# Patient Record
Sex: MALE | Race: WHITE | NOT HISPANIC OR LATINO | URBAN - METROPOLITAN AREA
[De-identification: names, ages, dates, MRNs, and addresses within clinical notes are randomized per-mention and may not be internally consistent; named-entity substitution may affect disease eponyms.]

---

## 2017-08-22 ENCOUNTER — OUTPATIENT (OUTPATIENT)
Dept: OUTPATIENT SERVICES | Facility: HOSPITAL | Age: 72
LOS: 1 days | Discharge: HOME | End: 2017-08-22
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
## 2017-08-22 DIAGNOSIS — M25.519 PAIN IN UNSPECIFIED SHOULDER: ICD-10-CM

## 2017-08-22 DIAGNOSIS — M79.609 PAIN IN UNSPECIFIED LIMB: ICD-10-CM

## 2017-08-22 DIAGNOSIS — S22.39XA FRACTURE OF ONE RIB, UNSPECIFIED SIDE, INITIAL ENCOUNTER FOR CLOSED FRACTURE: ICD-10-CM

## 2017-08-22 DIAGNOSIS — J90 PLEURAL EFFUSION, NOT ELSEWHERE CLASSIFIED: ICD-10-CM
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
## 2017-08-22 DIAGNOSIS — M54.5 LOW BACK PAIN: ICD-10-CM

## 2018-08-06 PROBLEM — Z00.00 ENCOUNTER FOR PREVENTIVE HEALTH EXAMINATION: Status: ACTIVE | Noted: 2018-08-06

## 2018-08-14 ENCOUNTER — LABORATORY RESULT (OUTPATIENT)
Age: 73
End: 2018-08-14

## 2018-08-14 ENCOUNTER — APPOINTMENT (OUTPATIENT)
Dept: HEMATOLOGY ONCOLOGY | Facility: CLINIC | Age: 73
End: 2018-08-14

## 2018-08-14 VITALS
HEIGHT: 65 IN | TEMPERATURE: 96.3 F | DIASTOLIC BLOOD PRESSURE: 89 MMHG | WEIGHT: 193 LBS | SYSTOLIC BLOOD PRESSURE: 167 MMHG | BODY MASS INDEX: 32.15 KG/M2 | RESPIRATION RATE: 14 BRPM | HEART RATE: 87 BPM

## 2018-08-14 DIAGNOSIS — Z82.49 FAMILY HISTORY OF ISCHEMIC HEART DISEASE AND OTHER DISEASES OF THE CIRCULATORY SYSTEM: ICD-10-CM

## 2018-08-14 DIAGNOSIS — T14.8XXA OTHER INJURY OF UNSPECIFIED BODY REGION, INITIAL ENCOUNTER: ICD-10-CM

## 2018-08-14 DIAGNOSIS — Z87.39 PERSONAL HISTORY OF OTHER DISEASES OF THE MUSCULOSKELETAL SYSTEM AND CONNECTIVE TISSUE: ICD-10-CM

## 2018-08-14 DIAGNOSIS — D61.818 OTHER PANCYTOPENIA: ICD-10-CM

## 2018-08-14 DIAGNOSIS — Z86.39 PERSONAL HISTORY OF OTHER ENDOCRINE, NUTRITIONAL AND METABOLIC DISEASE: ICD-10-CM

## 2018-08-14 DIAGNOSIS — Z80.3 FAMILY HISTORY OF MALIGNANT NEOPLASM OF BREAST: ICD-10-CM

## 2018-08-14 LAB
HCT VFR BLD CALC: 33.2 %
HGB BLD-MCNC: 11.3 G/DL
MCHC RBC-ENTMCNC: 33 PG
MCHC RBC-ENTMCNC: 34 G/DL
MCV RBC AUTO: 97.1 FL
PLATELET # BLD AUTO: 115 K/UL
PMV BLD: 9 FL
RBC # BLD: 3.42 M/UL
RBC # FLD: 15.8 %
RETICS # AUTO: 2.6 %
RETICS AGGREG/RBC NFR: 87.6 K/UL
WBC # FLD AUTO: 4.34 K/UL

## 2018-08-15 PROBLEM — D61.818 PANCYTOPENIA: Status: ACTIVE | Noted: 2018-08-15

## 2018-08-15 LAB
ALBUMIN MFR SERPL ELPH: 48.4 %
ALBUMIN SERPL ELPH-MCNC: 3.4 G/DL
ALBUMIN SERPL-MCNC: 3.6 G/DL
ALBUMIN/GLOB SERPL: 0.9 RATIO
ALBUPE: 44.1 %
ALP BLD-CCNC: 167 U/L
ALPHA1 GLOB MFR SERPL ELPH: 2.8 %
ALPHA1 GLOB SERPL ELPH-MCNC: 0.2 G/DL
ALPHA1UPE: 8.3 %
ALPHA2 GLOB MFR SERPL ELPH: 6.3 %
ALPHA2 GLOB SERPL ELPH-MCNC: 0.5 G/DL
ALPHA2UPE: 15.1 %
ALT SERPL-CCNC: 20 U/L
ANION GAP SERPL CALC-SCNC: 13 MMOL/L
APPEARANCE: CLEAR
AST SERPL-CCNC: 29 U/L
B-GLOBULIN MFR SERPL ELPH: 15.3 %
B-GLOBULIN SERPL ELPH-MCNC: 1.1 G/DL
BETAUPE: 18.5 %
BILIRUB SERPL-MCNC: 1.1 MG/DL
BILIRUBIN URINE: NEGATIVE
BLOOD URINE: NEGATIVE
BUN SERPL-MCNC: 13 MG/DL
CALCIUM SERPL-MCNC: 9 MG/DL
CHLORIDE SERPL-SCNC: 102 MMOL/L
CO2 SERPL-SCNC: 27 MMOL/L
COLOR: NORMAL
CREAT 24H UR-MCNC: NORMAL G/24 H
CREAT SERPL-MCNC: 0.6 MG/DL
CREATININE UR (MAYO): 80 MG/DL
CRP SERPL-MCNC: 0.24 MG/DL
ERYTHROCYTE [SEDIMENTATION RATE] IN BLOOD BY WESTERGREN METHOD: 53 MM/HR
FERRITIN SERPL-MCNC: 25 NG/ML
FOLATE SERPL-MCNC: 19.3 NG/ML
GAMMA GLOB FLD ELPH-MCNC: 2 G/DL
GAMMA GLOB MFR SERPL ELPH: 27.2 %
GAMMAUPE: 14 %
GLUCOSE QUALITATIVE U: NEGATIVE MG/DL
GLUCOSE SERPL-MCNC: 105 MG/DL
IGA 24H UR QL IFE: NORMAL
IGA 24H UR QL IFE: NORMAL
INTERPRETATION SERPL IEP-IMP: NORMAL
IRON SATN MFR SERPL: 41 %
IRON SERPL-MCNC: 161 UG/DL
KAPPA LC 24H UR QL: NORMAL
KETONES URINE: NEGATIVE
LDH SERPL-CCNC: 179 U/L
LEUKOCYTE ESTERASE URINE: NEGATIVE
M PROTEIN SPEC IFE-MCNC: NORMAL
NITRITE URINE: NEGATIVE
PH URINE: 6
POTASSIUM SERPL-SCNC: 3.8 MMOL/L
PROT PATTERN 24H UR ELPH-IMP: NORMAL
PROT SERPL-MCNC: 7.2 G/DL
PROT SERPL-MCNC: 7.4 G/DL
PROT SERPL-MCNC: 7.4 G/DL
PROT UR-MCNC: 11 MG/DL
PROT UR-MCNC: 11 MG/DL
PROTEIN URINE: NEGATIVE MG/DL
RHEUMATOID FACT SER QL: <10 IU/ML
SODIUM SERPL-SCNC: 142 MMOL/L
SPECIFIC GRAVITY URINE: 1.02
SPECIMEN VOL 24H UR: NORMAL ML
TIBC SERPL-MCNC: 391 UG/DL
UIBC SERPL-MCNC: 230 UG/DL
UROBILINOGEN URINE: 4 MG/DL (ref 0.2–?)
VIT B12 SERPL-MCNC: 670 PG/ML

## 2018-08-16 LAB
ANA SER IF-ACNC: NEGATIVE
DEPRECATED KAPPA LC FREE/LAMBDA SER: 1.36 RATIO
KAPPA LC CSF-MCNC: 3.69 MG/DL
KAPPA LC SERPL-MCNC: 5 MG/DL

## 2018-08-17 ENCOUNTER — OTHER (OUTPATIENT)
Age: 73
End: 2018-08-17

## 2018-09-04 ENCOUNTER — LABORATORY RESULT (OUTPATIENT)
Age: 73
End: 2018-09-04

## 2018-09-04 ENCOUNTER — APPOINTMENT (OUTPATIENT)
Dept: HEMATOLOGY ONCOLOGY | Facility: CLINIC | Age: 73
End: 2018-09-04

## 2018-09-13 ENCOUNTER — LABORATORY RESULT (OUTPATIENT)
Age: 73
End: 2018-09-13

## 2018-09-13 ENCOUNTER — APPOINTMENT (OUTPATIENT)
Dept: HEMATOLOGY ONCOLOGY | Facility: CLINIC | Age: 73
End: 2018-09-13

## 2018-09-13 VITALS
TEMPERATURE: 97.6 F | HEART RATE: 94 BPM | DIASTOLIC BLOOD PRESSURE: 75 MMHG | SYSTOLIC BLOOD PRESSURE: 151 MMHG | WEIGHT: 203 LBS | HEIGHT: 65 IN | RESPIRATION RATE: 14 BRPM | BODY MASS INDEX: 33.82 KG/M2

## 2018-09-14 LAB
ALBUMIN SERPL ELPH-MCNC: 3.4 G/DL
ALP BLD-CCNC: 150 U/L
ALT SERPL-CCNC: 22 U/L
ANION GAP SERPL CALC-SCNC: 14 MMOL/L
AST SERPL-CCNC: 31 U/L
BILIRUB SERPL-MCNC: 0.9 MG/DL
BUN SERPL-MCNC: 11 MG/DL
CALCIUM SERPL-MCNC: 9.1 MG/DL
CHLORIDE SERPL-SCNC: 103 MMOL/L
CO2 SERPL-SCNC: 25 MMOL/L
CREAT SERPL-MCNC: 0.7 MG/DL
ENDOMYSIUM IGA SER QL: NEGATIVE
ENDOMYSIUM IGA TITR SER: NORMAL
ERYTHROCYTE [SEDIMENTATION RATE] IN BLOOD BY WESTERGREN METHOD: 52 MM/HR
GGT SERPL-CCNC: 16 U/L
GLIADIN IGA SER QL: 5.9 UNITS
GLIADIN IGG SER QL: <5 UNITS
GLIADIN PEPTIDE IGA SER-ACNC: NEGATIVE
GLIADIN PEPTIDE IGG SER-ACNC: NEGATIVE
GLUCOSE SERPL-MCNC: 220 MG/DL
HCT VFR BLD CALC: 31.6 %
HGB BLD-MCNC: 11 G/DL
MCHC RBC-ENTMCNC: 33.7 PG
MCHC RBC-ENTMCNC: 34.8 G/DL
MCV RBC AUTO: 96.9 FL
PLATELET # BLD AUTO: 117 K/UL
PMV BLD: 9.6 FL
POTASSIUM SERPL-SCNC: 4.2 MMOL/L
PROT SERPL-MCNC: 7.1 G/DL
RBC # BLD: 3.26 M/UL
RBC # FLD: 15.4 %
RETICS # AUTO: 2.6 %
RETICS AGGREG/RBC NFR: 83.8 K/UL
SODIUM SERPL-SCNC: 142 MMOL/L
WBC # FLD AUTO: 4.86 K/UL

## 2018-10-11 ENCOUNTER — LABORATORY RESULT (OUTPATIENT)
Age: 73
End: 2018-10-11

## 2018-10-11 ENCOUNTER — APPOINTMENT (OUTPATIENT)
Dept: HEMATOLOGY ONCOLOGY | Facility: CLINIC | Age: 73
End: 2018-10-11

## 2018-10-11 VITALS
HEIGHT: 65 IN | BODY MASS INDEX: 32.82 KG/M2 | SYSTOLIC BLOOD PRESSURE: 140 MMHG | WEIGHT: 197 LBS | DIASTOLIC BLOOD PRESSURE: 69 MMHG | RESPIRATION RATE: 14 BRPM | TEMPERATURE: 97.2 F | HEART RATE: 93 BPM

## 2018-10-12 LAB
FERRITIN SERPL-MCNC: 49 NG/ML
HAPTOGLOB SERPL-MCNC: <20 MG/DL
HCT VFR BLD CALC: 33.8 %
HGB BLD-MCNC: 11.8 G/DL
MCHC RBC-ENTMCNC: 34.3 PG
MCHC RBC-ENTMCNC: 34.9 G/DL
MCV RBC AUTO: 98.3 FL
PLATELET # BLD AUTO: 112 K/UL
PMV BLD: 9.7 FL
RBC # BLD: 3.44 M/UL
RBC # FLD: 15.1 %
WBC # FLD AUTO: 4.91 K/UL

## 2018-10-19 ENCOUNTER — EMERGENCY (EMERGENCY)
Facility: HOSPITAL | Age: 73
LOS: 0 days | Discharge: HOME | End: 2018-10-19
Attending: EMERGENCY MEDICINE | Admitting: EMERGENCY MEDICINE

## 2018-10-19 VITALS
OXYGEN SATURATION: 98 % | TEMPERATURE: 96 F | SYSTOLIC BLOOD PRESSURE: 168 MMHG | DIASTOLIC BLOOD PRESSURE: 75 MMHG | RESPIRATION RATE: 18 BRPM | HEART RATE: 93 BPM

## 2018-10-19 VITALS — HEIGHT: 65 IN | WEIGHT: 194.01 LBS
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
## 2018-10-19 DIAGNOSIS — E11.9 TYPE 2 DIABETES MELLITUS WITHOUT COMPLICATIONS: ICD-10-CM

## 2018-10-19 DIAGNOSIS — T31.0 BURNS INVOLVING LESS THAN 10% OF BODY SURFACE: ICD-10-CM

## 2018-10-19 DIAGNOSIS — T23.232A BURN OF SECOND DEGREE OF MULTIPLE LEFT FINGERS (NAIL), NOT INCLUDING THUMB, INITIAL ENCOUNTER: ICD-10-CM

## 2018-10-19 DIAGNOSIS — Z23 ENCOUNTER FOR IMMUNIZATION: ICD-10-CM
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
## 2018-10-19 DIAGNOSIS — Z79.2 LONG TERM (CURRENT) USE OF ANTIBIOTICS: ICD-10-CM

## 2018-10-19 DIAGNOSIS — Z79.84 LONG TERM (CURRENT) USE OF ORAL HYPOGLYCEMIC DRUGS: ICD-10-CM
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
## 2018-10-19 DIAGNOSIS — F32.9 MAJOR DEPRESSIVE DISORDER, SINGLE EPISODE, UNSPECIFIED: ICD-10-CM

## 2018-10-19 DIAGNOSIS — T23.039A: ICD-10-CM
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
## 2018-10-19 DIAGNOSIS — H26.9 UNSPECIFIED CATARACT: Chronic | ICD-10-CM

## 2018-10-19 DIAGNOSIS — Z79.899 OTHER LONG TERM (CURRENT) DRUG THERAPY: ICD-10-CM

## 2018-10-19 RX ORDER — TETANUS TOXOID, REDUCED DIPHTHERIA TOXOID AND ACELLULAR PERTUSSIS VACCINE, ADSORBED 5; 2.5; 8; 8; 2.5 [IU]/.5ML; [IU]/.5ML; UG/.5ML; UG/.5ML; UG/.5ML
0.5 SUSPENSION INTRAMUSCULAR ONCE
Qty: 0 | Refills: 0 | Status: COMPLETED | OUTPATIENT
Start: 2018-10-19 | End: 2018-10-19

## 2018-10-19 RX ADMIN — TETANUS TOXOID, REDUCED DIPHTHERIA TOXOID AND ACELLULAR PERTUSSIS VACCINE, ADSORBED 0.5 MILLILITER(S): 5; 2.5; 8; 8; 2.5 SUSPENSION INTRAMUSCULAR at 14:45
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
## 2018-10-19 NOTE — ED ADULT NURSE NOTE - NSIMPLEMENTINTERV_GEN_ALL_ED
Implemented All Universal Safety Interventions:  [place] to call system. Call bell, personal items and telephone within reach. Instruct patient to call for assistance. Room bathroom lighting operational. Non-slip footwear when patient is off stretcher. Physically safe environment: no spills, clutter or unnecessary equipment. Stretcher in lowest position, wheels locked, appropriate side rails in place.
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
## 2018-10-22 PROBLEM — E11.9 TYPE 2 DIABETES MELLITUS WITHOUT COMPLICATIONS: Chronic | Status: ACTIVE | Noted: 2018-10-19

## 2018-10-22 PROBLEM — F32.9 MAJOR DEPRESSIVE DISORDER, SINGLE EPISODE, UNSPECIFIED: Chronic | Status: ACTIVE | Noted: 2018-10-19

## 2018-10-23 ENCOUNTER — APPOINTMENT (OUTPATIENT)
Dept: BURN CARE | Facility: CLINIC | Age: 73
End: 2018-10-23

## 2018-10-23 ENCOUNTER — OUTPATIENT (OUTPATIENT)
Dept: OUTPATIENT SERVICES | Facility: HOSPITAL | Age: 73
LOS: 1 days | Discharge: HOME | End: 2018-10-23

## 2018-10-23 DIAGNOSIS — H26.9 UNSPECIFIED CATARACT: Chronic | ICD-10-CM

## 2018-10-28 ENCOUNTER — FORM ENCOUNTER (OUTPATIENT)
Age: 73
End: 2018-10-28

## 2018-10-29 ENCOUNTER — OUTPATIENT (OUTPATIENT)
Dept: OUTPATIENT SERVICES | Facility: HOSPITAL | Age: 73
LOS: 1 days | Discharge: HOME | End: 2018-10-29

## 2018-10-29 DIAGNOSIS — D61.818 OTHER PANCYTOPENIA: ICD-10-CM

## 2018-10-29 DIAGNOSIS — H26.9 UNSPECIFIED CATARACT: Chronic | ICD-10-CM

## 2018-10-30 ENCOUNTER — APPOINTMENT (OUTPATIENT)
Dept: BURN CARE | Facility: CLINIC | Age: 73
End: 2018-10-30

## 2018-10-30 ENCOUNTER — OUTPATIENT (OUTPATIENT)
Dept: OUTPATIENT SERVICES | Facility: HOSPITAL | Age: 73
LOS: 1 days | Discharge: HOME | End: 2018-10-30

## 2018-10-30 DIAGNOSIS — H26.9 UNSPECIFIED CATARACT: Chronic | ICD-10-CM
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
## 2018-11-06 DIAGNOSIS — T31.0 BURNS INVOLVING LESS THAN 10% OF BODY SURFACE: ICD-10-CM
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
## 2018-11-06 DIAGNOSIS — T23.232A BURN OF SECOND DEGREE OF MULTIPLE LEFT FINGERS (NAIL), NOT INCLUDING THUMB, INITIAL ENCOUNTER: ICD-10-CM
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
## 2018-11-06 DIAGNOSIS — T23.202A BURN OF SECOND DEGREE OF LEFT HAND, UNSPECIFIED SITE, INITIAL ENCOUNTER: ICD-10-CM

## 2018-11-13 ENCOUNTER — LABORATORY RESULT (OUTPATIENT)
Age: 73
End: 2018-11-13

## 2018-11-13 ENCOUNTER — APPOINTMENT (OUTPATIENT)
Dept: HEMATOLOGY ONCOLOGY | Facility: CLINIC | Age: 73
End: 2018-11-13

## 2018-11-13 ENCOUNTER — OUTPATIENT (OUTPATIENT)
Dept: OUTPATIENT SERVICES | Facility: HOSPITAL | Age: 73
LOS: 1 days | Discharge: HOME | End: 2018-11-13

## 2018-11-13 VITALS
BODY MASS INDEX: 32.49 KG/M2 | HEIGHT: 65 IN | TEMPERATURE: 96.8 F | WEIGHT: 195 LBS | SYSTOLIC BLOOD PRESSURE: 153 MMHG | DIASTOLIC BLOOD PRESSURE: 76 MMHG | HEART RATE: 83 BPM

## 2018-11-13 DIAGNOSIS — D64.9 ANEMIA, UNSPECIFIED: ICD-10-CM

## 2018-11-13 DIAGNOSIS — H26.9 UNSPECIFIED CATARACT: Chronic | ICD-10-CM

## 2018-11-13 NOTE — ASSESSMENT
[FreeTextEntry1] : 72yo M with normocytic anemia on outpatient lab work with complaints of fatigue, neck/shoulder pain ( L>R ) likely due to severe arthritis, dizziness . Found on  CBC to have mild Pancytopenia . iron deficiency , high ESR , elevated alkaline phosphatase. \par Work up so far confirms iron deficiency responding to po replacement , today's Hb is up to 12 , wbc and platelet also improved.  . Still with mild thrombocytopenia .\par Unexplained low haptoglobin ( rule out intravascular or intramedullary hemolysis ) ,  elevated ESR .  will check PT , PTT , D dimers , PNH , repeat LDH , coomb's. Consider CT scan if not done recently .

## 2018-11-13 NOTE — CONSULT LETTER
[Dear  ___] : Dear  [unfilled], [Courtesy Letter:] : I had the pleasure of seeing your patient, [unfilled], in my office today.
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
## 2018-11-13 NOTE — PHYSICAL EXAM
[Normal] : normoactive bowel sounds, soft and nontender, no hepatosplenomegaly or masses appreciated [de-identified] : slightly pale in NAD .  [de-identified] : limited left shoulder ROM .

## 2018-11-14 LAB
APTT BLD: 35.2 SEC
DEPRECATED D DIMER PPP IA-ACNC: 198 NG/ML DDU
DIRECT COOMBS: NORMAL
FERRITIN SERPL-MCNC: 50 NG/ML
HAPTOGLOB SERPL-MCNC: <20 MG/DL
HCT VFR BLD CALC: 36.3 %
HGB BLD-MCNC: 12.6 G/DL
INR PPP: 1.24 RATIO
LDH SERPL-CCNC: 184 U/L
MCHC RBC-ENTMCNC: 34.7 G/DL
MCHC RBC-ENTMCNC: 34.7 PG
MCV RBC AUTO: 100 FL
PLATELET # BLD AUTO: 128 K/UL
PMV BLD: 8.7 FL
PT BLD: 14.2 SEC
RBC # BLD: 3.63 M/UL
RBC # FLD: 15 %
RETICS # AUTO: 2.2 %
RETICS AGGREG/RBC NFR: 79.9 K/UL
WBC # FLD AUTO: 4.69 K/UL

## 2018-11-15 LAB
ABR COMMENT: NORMAL
PNH GRANULOCYTES: 0 %
PNH MONOCYTES: 0 %
PNH RBC - COMPLETE: 0 %
PNH RBC - PARTIAL: 0.01 %

## 2018-11-20 ENCOUNTER — OUTPATIENT (OUTPATIENT)
Dept: OUTPATIENT SERVICES | Facility: HOSPITAL | Age: 73
LOS: 1 days | Discharge: HOME | End: 2018-11-20

## 2018-11-20 ENCOUNTER — APPOINTMENT (OUTPATIENT)
Dept: BURN CARE | Facility: CLINIC | Age: 73
End: 2018-11-20

## 2018-11-20 DIAGNOSIS — H26.9 UNSPECIFIED CATARACT: Chronic | ICD-10-CM

## 2018-11-20 DIAGNOSIS — T23.232A: ICD-10-CM

## 2018-11-23 LAB — BACTERIA SPEC CULT: ABNORMAL

## 2018-11-27 ENCOUNTER — OUTPATIENT (OUTPATIENT)
Dept: OUTPATIENT SERVICES | Facility: HOSPITAL | Age: 73
LOS: 1 days | Discharge: HOME | End: 2018-11-27

## 2018-11-27 ENCOUNTER — APPOINTMENT (OUTPATIENT)
Dept: BURN CARE | Facility: CLINIC | Age: 73
End: 2018-11-27

## 2018-11-27 DIAGNOSIS — H26.9 UNSPECIFIED CATARACT: Chronic | ICD-10-CM

## 2018-11-27 DIAGNOSIS — L02.519 CUTANEOUS ABSCESS OF UNSPECIFIED HAND: ICD-10-CM
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
## 2018-11-29 DIAGNOSIS — T31.0 BURNS INVOLVING LESS THAN 10% OF BODY SURFACE: ICD-10-CM
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
## 2018-11-29 DIAGNOSIS — T23.231A BURN OF SECOND DEGREE OF MULTIPLE RIGHT FINGERS (NAIL), NOT INCLUDING THUMB, INITIAL ENCOUNTER: ICD-10-CM

## 2019-02-04 ENCOUNTER — LABORATORY RESULT (OUTPATIENT)
Age: 74
End: 2019-02-04

## 2019-02-04 ENCOUNTER — OUTPATIENT (OUTPATIENT)
Dept: OUTPATIENT SERVICES | Facility: HOSPITAL | Age: 74
LOS: 1 days | Discharge: HOME | End: 2019-02-04

## 2019-02-04 ENCOUNTER — APPOINTMENT (OUTPATIENT)
Dept: HEMATOLOGY ONCOLOGY | Facility: CLINIC | Age: 74
End: 2019-02-04

## 2019-02-04 VITALS
HEIGHT: 65 IN | TEMPERATURE: 96 F | SYSTOLIC BLOOD PRESSURE: 130 MMHG | RESPIRATION RATE: 14 BRPM | DIASTOLIC BLOOD PRESSURE: 59 MMHG | HEART RATE: 80 BPM | BODY MASS INDEX: 32.49 KG/M2 | WEIGHT: 195 LBS

## 2019-02-04 DIAGNOSIS — D64.9 ANEMIA, UNSPECIFIED: ICD-10-CM

## 2019-02-04 DIAGNOSIS — H26.9 UNSPECIFIED CATARACT: Chronic | ICD-10-CM

## 2019-02-04 LAB
HCT VFR BLD CALC: 38 %
HGB BLD-MCNC: 13.6 G/DL
MCHC RBC-ENTMCNC: 35.5 PG
MCHC RBC-ENTMCNC: 35.8 G/DL
MCV RBC AUTO: 99.2 FL
PLATELET # BLD AUTO: 127 K/UL
PMV BLD: 9.6 FL
RBC # BLD: 3.83 M/UL
RBC # FLD: 13.6 %
WBC # FLD AUTO: 5.18 K/UL

## 2019-02-05 NOTE — ASSESSMENT
[FreeTextEntry1] : 72yo M with normocytic anemia on outpatient lab work with complaints of fatigue, neck/shoulder pain ( L>R ) likely due to severe arthritis, dizziness . Found on  CBC to have mild Pancytopenia . iron deficiency , high ESR , elevated alkaline phosphatase. \par \par Work up so far confirms iron deficiency responding to po replacement.\par \par PLAN:\par - CBC today showed improved Hgb 13.6. Continue PO iron. recommended GI evaluation for full work up . \par - Unexplained low haptoglobin levels. Will consider doing CT chest if Hgb drops. \par - Will repeat blood work in the next visit. \par \par RTC in 3 months.\par Pt seen and examined with \par \par

## 2019-02-05 NOTE — PHYSICAL EXAM
[Normal] : normoactive bowel sounds, soft and nontender, no hepatosplenomegaly or masses appreciated [Restricted in physically strenuous activity but ambulatory and able to carry out work of a light or sedentary nature] : Status 1- Restricted in physically strenuous activity but ambulatory and able to carry out work of a light or sedentary nature, e.g., light house work, office work [de-identified] : slightly pale in NAD .  [de-identified] : limited left shoulder ROM .
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
## 2019-02-07 DIAGNOSIS — D64.9 ANEMIA, UNSPECIFIED: ICD-10-CM

## 2019-05-06 ENCOUNTER — APPOINTMENT (OUTPATIENT)
Dept: HEMATOLOGY ONCOLOGY | Facility: CLINIC | Age: 74
End: 2019-05-06

## 2019-07-12 ENCOUNTER — INPATIENT (INPATIENT)
Facility: HOSPITAL | Age: 74
LOS: 1 days | Discharge: HOME | End: 2019-07-14
Attending: HOSPITALIST | Admitting: HOSPITALIST
Payer: MEDICARE

## 2019-07-12 VITALS
WEIGHT: 160.06 LBS | RESPIRATION RATE: 20 BRPM | SYSTOLIC BLOOD PRESSURE: 156 MMHG | HEART RATE: 88 BPM | DIASTOLIC BLOOD PRESSURE: 68 MMHG

## 2019-07-12 DIAGNOSIS — E83.42 HYPOMAGNESEMIA: ICD-10-CM

## 2019-07-12 DIAGNOSIS — I10 ESSENTIAL (PRIMARY) HYPERTENSION: ICD-10-CM

## 2019-07-12 DIAGNOSIS — R41.82 ALTERED MENTAL STATUS, UNSPECIFIED: ICD-10-CM

## 2019-07-12 DIAGNOSIS — E11.9 TYPE 2 DIABETES MELLITUS WITHOUT COMPLICATIONS: ICD-10-CM

## 2019-07-12 DIAGNOSIS — F32.9 MAJOR DEPRESSIVE DISORDER, SINGLE EPISODE, UNSPECIFIED: ICD-10-CM

## 2019-07-12 DIAGNOSIS — Z98.890 OTHER SPECIFIED POSTPROCEDURAL STATES: Chronic | ICD-10-CM

## 2019-07-12 DIAGNOSIS — H26.9 UNSPECIFIED CATARACT: Chronic | ICD-10-CM

## 2019-07-12 DIAGNOSIS — Z90.49 ACQUIRED ABSENCE OF OTHER SPECIFIED PARTS OF DIGESTIVE TRACT: Chronic | ICD-10-CM

## 2019-07-12 LAB
ALBUMIN SERPL ELPH-MCNC: 3.5 G/DL — SIGNIFICANT CHANGE UP (ref 3.5–5.2)
ALP SERPL-CCNC: 121 U/L — HIGH (ref 30–115)
ALT FLD-CCNC: 28 U/L — SIGNIFICANT CHANGE UP (ref 0–41)
AMMONIA BLD-MCNC: 167 UMOL/L — CRITICAL HIGH (ref 11–55)
ANION GAP SERPL CALC-SCNC: 10 MMOL/L — SIGNIFICANT CHANGE UP (ref 7–14)
APTT BLD: 34.4 SEC — SIGNIFICANT CHANGE UP (ref 27–39.2)
AST SERPL-CCNC: 47 U/L — HIGH (ref 0–41)
BASE EXCESS BLDV CALC-SCNC: 3.4 MMOL/L — HIGH (ref -2–2)
BASOPHILS # BLD AUTO: 0.03 K/UL — SIGNIFICANT CHANGE UP (ref 0–0.2)
BASOPHILS NFR BLD AUTO: 0.6 % — SIGNIFICANT CHANGE UP (ref 0–1)
BILIRUB SERPL-MCNC: 1.9 MG/DL — HIGH (ref 0.2–1.2)
BUN SERPL-MCNC: 23 MG/DL — HIGH (ref 10–20)
CA-I SERPL-SCNC: 1.2 MMOL/L — SIGNIFICANT CHANGE UP (ref 1.12–1.3)
CALCIUM SERPL-MCNC: 9.5 MG/DL — SIGNIFICANT CHANGE UP (ref 8.5–10.1)
CHLORIDE SERPL-SCNC: 102 MMOL/L — SIGNIFICANT CHANGE UP (ref 98–110)
CO2 SERPL-SCNC: 26 MMOL/L — SIGNIFICANT CHANGE UP (ref 17–32)
CREAT SERPL-MCNC: 0.7 MG/DL — SIGNIFICANT CHANGE UP (ref 0.7–1.5)
EOSINOPHIL # BLD AUTO: 0.21 K/UL — SIGNIFICANT CHANGE UP (ref 0–0.7)
EOSINOPHIL NFR BLD AUTO: 4.2 % — SIGNIFICANT CHANGE UP (ref 0–8)
GAS PNL BLDV: 139 MMOL/L — SIGNIFICANT CHANGE UP (ref 136–145)
GAS PNL BLDV: SIGNIFICANT CHANGE UP
GLUCOSE BLDC GLUCOMTR-MCNC: 70 MG/DL — SIGNIFICANT CHANGE UP (ref 70–99)
GLUCOSE BLDC GLUCOMTR-MCNC: 78 MG/DL — SIGNIFICANT CHANGE UP (ref 70–99)
GLUCOSE BLDC GLUCOMTR-MCNC: 79 MG/DL — SIGNIFICANT CHANGE UP (ref 70–99)
GLUCOSE BLDC GLUCOMTR-MCNC: 86 MG/DL — SIGNIFICANT CHANGE UP (ref 70–99)
GLUCOSE BLDC GLUCOMTR-MCNC: 92 MG/DL — SIGNIFICANT CHANGE UP (ref 70–99)
GLUCOSE SERPL-MCNC: 82 MG/DL — SIGNIFICANT CHANGE UP (ref 70–99)
HCO3 BLDV-SCNC: 28 MMOL/L — SIGNIFICANT CHANGE UP (ref 22–29)
HCT VFR BLD CALC: 37.2 % — LOW (ref 42–52)
HCT VFR BLDA CALC: 69.2 % — HIGH (ref 34–44)
HGB BLD CALC-MCNC: 22.6 G/DL — CRITICAL HIGH (ref 14–18)
HGB BLD-MCNC: 13.3 G/DL — LOW (ref 14–18)
HOROWITZ INDEX BLDV+IHG-RTO: 21 — SIGNIFICANT CHANGE UP
IMM GRANULOCYTES NFR BLD AUTO: 0.2 % — SIGNIFICANT CHANGE UP (ref 0.1–0.3)
INR BLD: 1.42 RATIO — HIGH (ref 0.65–1.3)
LACTATE BLDV-MCNC: 1.1 MMOL/L — SIGNIFICANT CHANGE UP (ref 0.5–1.6)
LYMPHOCYTES # BLD AUTO: 1.64 K/UL — SIGNIFICANT CHANGE UP (ref 1.2–3.4)
LYMPHOCYTES # BLD AUTO: 32.7 % — SIGNIFICANT CHANGE UP (ref 20.5–51.1)
MAGNESIUM SERPL-MCNC: 1.2 MG/DL — LOW (ref 1.8–2.4)
MCHC RBC-ENTMCNC: 35.8 G/DL — SIGNIFICANT CHANGE UP (ref 32–37)
MCHC RBC-ENTMCNC: 36.6 PG — HIGH (ref 27–31)
MCV RBC AUTO: 102.5 FL — HIGH (ref 80–94)
MONOCYTES # BLD AUTO: 0.59 K/UL — SIGNIFICANT CHANGE UP (ref 0.1–0.6)
MONOCYTES NFR BLD AUTO: 11.8 % — HIGH (ref 1.7–9.3)
NEUTROPHILS # BLD AUTO: 2.54 K/UL — SIGNIFICANT CHANGE UP (ref 1.4–6.5)
NEUTROPHILS NFR BLD AUTO: 50.5 % — SIGNIFICANT CHANGE UP (ref 42.2–75.2)
NRBC # BLD: 0 /100 WBCS — SIGNIFICANT CHANGE UP (ref 0–0)
PCO2 BLDV: 40 MMHG — LOW (ref 41–51)
PH BLDV: 7.45 — HIGH (ref 7.26–7.43)
PLATELET # BLD AUTO: 129 K/UL — LOW (ref 130–400)
PO2 BLDV: 85 MMHG — HIGH (ref 20–40)
POTASSIUM BLDV-SCNC: 4 MMOL/L — SIGNIFICANT CHANGE UP (ref 3.3–5.6)
POTASSIUM SERPL-MCNC: 4 MMOL/L — SIGNIFICANT CHANGE UP (ref 3.5–5)
POTASSIUM SERPL-SCNC: 4 MMOL/L — SIGNIFICANT CHANGE UP (ref 3.5–5)
PROT SERPL-MCNC: 7.2 G/DL — SIGNIFICANT CHANGE UP (ref 6–8)
PROTHROM AB SERPL-ACNC: 16.3 SEC — HIGH (ref 9.95–12.87)
RBC # BLD: 3.63 M/UL — LOW (ref 4.7–6.1)
RBC # FLD: 13.4 % — SIGNIFICANT CHANGE UP (ref 11.5–14.5)
SAO2 % BLDV: 95 % — SIGNIFICANT CHANGE UP
SODIUM SERPL-SCNC: 138 MMOL/L — SIGNIFICANT CHANGE UP (ref 135–146)
TROPONIN T SERPL-MCNC: <0.01 NG/ML — SIGNIFICANT CHANGE UP
WBC # BLD: 5.02 K/UL — SIGNIFICANT CHANGE UP (ref 4.8–10.8)
WBC # FLD AUTO: 5.02 K/UL — SIGNIFICANT CHANGE UP (ref 4.8–10.8)

## 2019-07-12 PROCEDURE — 70450 CT HEAD/BRAIN W/O DYE: CPT | Mod: 26

## 2019-07-12 PROCEDURE — 99223 1ST HOSP IP/OBS HIGH 75: CPT

## 2019-07-12 PROCEDURE — 71045 X-RAY EXAM CHEST 1 VIEW: CPT | Mod: 26

## 2019-07-12 PROCEDURE — 99291 CRITICAL CARE FIRST HOUR: CPT

## 2019-07-12 PROCEDURE — 72125 CT NECK SPINE W/O DYE: CPT | Mod: 26

## 2019-07-12 PROCEDURE — 76705 ECHO EXAM OF ABDOMEN: CPT | Mod: 26

## 2019-07-12 PROCEDURE — 71046 X-RAY EXAM CHEST 2 VIEWS: CPT | Mod: 26

## 2019-07-12 RX ORDER — COLCHICINE 0.6 MG
0.6 TABLET ORAL DAILY
Refills: 0 | Status: DISCONTINUED | OUTPATIENT
Start: 2019-07-12 | End: 2019-07-14

## 2019-07-12 RX ORDER — LACTULOSE 10 G/15ML
200 SOLUTION ORAL EVERY 6 HOURS
Refills: 0 | Status: DISCONTINUED | OUTPATIENT
Start: 2019-07-12 | End: 2019-07-13

## 2019-07-12 RX ORDER — DEXTROSE 50 % IN WATER 50 %
25 SYRINGE (ML) INTRAVENOUS ONCE
Refills: 0 | Status: DISCONTINUED | OUTPATIENT
Start: 2019-07-12 | End: 2019-07-14

## 2019-07-12 RX ORDER — LISINOPRIL 2.5 MG/1
20 TABLET ORAL DAILY
Refills: 0 | Status: DISCONTINUED | OUTPATIENT
Start: 2019-07-12 | End: 2019-07-14

## 2019-07-12 RX ORDER — INSULIN LISPRO 100/ML
VIAL (ML) SUBCUTANEOUS
Refills: 0 | Status: DISCONTINUED | OUTPATIENT
Start: 2019-07-12 | End: 2019-07-14

## 2019-07-12 RX ORDER — ENOXAPARIN SODIUM 100 MG/ML
40 INJECTION SUBCUTANEOUS EVERY 24 HOURS
Refills: 0 | Status: DISCONTINUED | OUTPATIENT
Start: 2019-07-12 | End: 2019-07-14

## 2019-07-12 RX ORDER — MAGNESIUM SULFATE 500 MG/ML
2 VIAL (ML) INJECTION ONCE
Refills: 0 | Status: COMPLETED | OUTPATIENT
Start: 2019-07-12 | End: 2019-07-12

## 2019-07-12 RX ORDER — ASPIRIN/CALCIUM CARB/MAGNESIUM 324 MG
81 TABLET ORAL DAILY
Refills: 0 | Status: DISCONTINUED | OUTPATIENT
Start: 2019-07-12 | End: 2019-07-14

## 2019-07-12 RX ORDER — METFORMIN HYDROCHLORIDE 850 MG/1
1000 TABLET ORAL
Refills: 0 | Status: DISCONTINUED | OUTPATIENT
Start: 2019-07-12 | End: 2019-07-14

## 2019-07-12 RX ORDER — DEXTROSE 50 % IN WATER 50 %
12.5 SYRINGE (ML) INTRAVENOUS ONCE
Refills: 0 | Status: DISCONTINUED | OUTPATIENT
Start: 2019-07-12 | End: 2019-07-14

## 2019-07-12 RX ORDER — DEXTROSE 50 % IN WATER 50 %
15 SYRINGE (ML) INTRAVENOUS ONCE
Refills: 0 | Status: DISCONTINUED | OUTPATIENT
Start: 2019-07-12 | End: 2019-07-14

## 2019-07-12 RX ORDER — SODIUM CHLORIDE 9 MG/ML
2000 INJECTION, SOLUTION INTRAVENOUS ONCE
Refills: 0 | Status: COMPLETED | OUTPATIENT
Start: 2019-07-12 | End: 2019-07-12

## 2019-07-12 RX ORDER — PYRIDOXINE HCL (VITAMIN B6) 100 MG
1 TABLET ORAL
Qty: 0 | Refills: 0 | DISCHARGE

## 2019-07-12 RX ORDER — SERTRALINE 25 MG/1
1 TABLET, FILM COATED ORAL
Qty: 0 | Refills: 0 | DISCHARGE

## 2019-07-12 RX ORDER — ACETAMINOPHEN 500 MG
650 TABLET ORAL ONCE
Refills: 0 | Status: DISCONTINUED | OUTPATIENT
Start: 2019-07-12 | End: 2019-07-12

## 2019-07-12 RX ORDER — PANTOPRAZOLE SODIUM 20 MG/1
40 TABLET, DELAYED RELEASE ORAL
Refills: 0 | Status: DISCONTINUED | OUTPATIENT
Start: 2019-07-12 | End: 2019-07-14

## 2019-07-12 RX ORDER — PYRIDOXINE HCL (VITAMIN B6) 100 MG
100 TABLET ORAL DAILY
Refills: 0 | Status: DISCONTINUED | OUTPATIENT
Start: 2019-07-12 | End: 2019-07-14

## 2019-07-12 RX ORDER — SERTRALINE 25 MG/1
100 TABLET, FILM COATED ORAL
Refills: 0 | Status: DISCONTINUED | OUTPATIENT
Start: 2019-07-12 | End: 2019-07-14

## 2019-07-12 RX ORDER — GLUCAGON INJECTION, SOLUTION 0.5 MG/.1ML
1 INJECTION, SOLUTION SUBCUTANEOUS ONCE
Refills: 0 | Status: DISCONTINUED | OUTPATIENT
Start: 2019-07-12 | End: 2019-07-14

## 2019-07-12 RX ORDER — SODIUM CHLORIDE 9 MG/ML
1000 INJECTION, SOLUTION INTRAVENOUS
Refills: 0 | Status: DISCONTINUED | OUTPATIENT
Start: 2019-07-12 | End: 2019-07-13

## 2019-07-12 RX ORDER — LACTULOSE 10 G/15ML
200 SOLUTION ORAL ONCE
Refills: 0 | Status: COMPLETED | OUTPATIENT
Start: 2019-07-12 | End: 2019-07-12

## 2019-07-12 RX ORDER — LACTULOSE 10 G/15ML
200 SOLUTION ORAL ONCE
Refills: 0 | Status: DISCONTINUED | OUTPATIENT
Start: 2019-07-12 | End: 2019-07-12

## 2019-07-12 RX ORDER — SODIUM CHLORIDE 9 MG/ML
1000 INJECTION, SOLUTION INTRAVENOUS
Refills: 0 | Status: DISCONTINUED | OUTPATIENT
Start: 2019-07-12 | End: 2019-07-14

## 2019-07-12 RX ADMIN — Medication 2 GRAM(S): at 10:12

## 2019-07-12 RX ADMIN — Medication 50 GRAM(S): at 18:17

## 2019-07-12 RX ADMIN — Medication 0.6 MILLIGRAM(S): at 11:53

## 2019-07-12 RX ADMIN — Medication 100 MILLIGRAM(S): at 11:53

## 2019-07-12 RX ADMIN — LACTULOSE 200 GRAM(S): 10 SOLUTION ORAL at 10:12

## 2019-07-12 RX ADMIN — SODIUM CHLORIDE 70 MILLILITER(S): 9 INJECTION, SOLUTION INTRAVENOUS at 17:39

## 2019-07-12 RX ADMIN — Medication 81 MILLIGRAM(S): at 12:08

## 2019-07-12 RX ADMIN — LISINOPRIL 20 MILLIGRAM(S): 2.5 TABLET ORAL at 11:48

## 2019-07-12 RX ADMIN — LACTULOSE 200 GRAM(S): 10 SOLUTION ORAL at 18:17

## 2019-07-12 RX ADMIN — ENOXAPARIN SODIUM 40 MILLIGRAM(S): 100 INJECTION SUBCUTANEOUS at 11:47

## 2019-07-12 RX ADMIN — SODIUM CHLORIDE 2000 MILLILITER(S): 9 INJECTION, SOLUTION INTRAVENOUS at 05:28

## 2019-07-12 RX ADMIN — Medication 50 GRAM(S): at 09:11

## 2019-07-12 NOTE — CONSULT NOTE ADULT - ASSESSMENT
72 yo male pmh HTN, DM, depression, early dementia, with liver cirrhosis from [facility] followed at [facility] with jaki Vigil and with Dr. Galicia outpatient GI. Patient presents to the ED s/p confusion patient found unresponsive. Patient at baseline as per family is alert and oriented times 3 72 yo male pmh HTN, DM, depression, early dementia, with liver cirrhosis from [facility] followed at [facility] with jaki Vigil and with Dr. Galicia outpatient GI. Patient presents to the ED s/p confusion patient found unresponsive. Patient at baseline as per family is alert and oriented times 3    Problem 1-Hepatic encephalopathy   Rec  -Rifaximin BID  -Lactulose titrated to 3 bowel movements per day  -MELD Score 13 points 72 yo male pmh HTN, DM, depression, early dementia, with liver cirrhosis from [facility] followed at [facility] with jaki Vigil and with Dr. Galicia outpatient GI. Patient presents to the ED s/p confusion patient found unresponsive. Patient at baseline as per family is alert and oriented times 3    Problem 1-Hepatic encephalopathy   Rec  -Rifaximin BID  -Lactulose titrated to 3 bowel movements per day  -Strict 2g Sodium Diet  -Monitor daily weights   -MELD Score 13 points 72 yo male pmh HTN, DM, depression, early dementia, with liver cirrhosis from [facility] followed at [facility] with jaki Vigil and with Dr. Galicia outpatient GI. Patient presents to the ED s/p confusion patient found unresponsive. Patient at baseline as per family is alert and oriented times 3    Problem 1-Hepatic encephalopathy   Rec  -Rifaximin BID 550mg   -Lactulose titrated to 3 bowel movements per day  -Strict 2g Sodium Diet  -Monitor daily weights   -MELD Score 13 points   -Ultrasound of abdomen, if ultrasound is positive for ascites patient will need Ultrasound guided diagnostic paracentesis: Obtain serum albumin same day as paracentesis and Obtain fluid studies: Cultures, Cell count, albumin, protein, amylase, triglycerides, cytology, AFB smear and culture, Paracentesis Panel   -Consider Neurology consult as well 74 yo male pmh HTN, DM, depression, early dementia, with liver cirrhosis from [facility] followed at [facility] with jaki Vigil and with Dr. aGlicia outpatient GI. Patient presents to the ED s/p confusion patient found unresponsive. Patient at baseline as per family is alert and oriented times 3    Problem 1-Hepatic encephalopathy   Rec  -Rifaximin BID 550mg   -Lactulose titrated to 3 bowel movements per day  -Strict 2g Sodium Diet  -Monitor daily weights   -MELD Score 13 points   -Ultrasound of abdomen, if ultrasound is positive for ascites patient will need Ultrasound guided diagnostic paracentesis to rule out SBP: Obtain serum albumin same day as paracentesis and Obtain fluid studies: Cultures, Cell count, albumin, protein, amylase, triglycerides, cytology, AFB smear and culture, Paracentesis Panel   -Consider Neurology consult as well

## 2019-07-12 NOTE — H&P ADULT - PROBLEM SELECTOR PLAN 1
likely 2/2 hepatic encephalopathy   - admit to medicine service  - NPO until awake  - Lactulose Enema q6h till 2-3 BM's per/day  - Gastroenterology C/S  - RUQ Sono  - Urine Toxicology  - recheck CMP/CBC/PT/INR in AM  - DVT/GI PPX (Lovenox/Protonix)  - CHG 4% QD and PRN likely 2/2 hepatic encephalopathy   - admit to medicine service  - NPO until awake  - Lactulose Enema q6h till 2-3 BM's per/day until able to change to PO  - Gastroenterology C/S  - RUQ Sono  - Urine Toxicology  - recheck CMP/CBC/PT/INR in AM  - DVT/GI PPX (Lovenox/Protonix) likely 2/2 hepatic encephalopathy   - NPO until more awake  - Lactulose Enema q6h till 2-3 BM's per/day until able to change to PO  - Gastroenterology C/S  - RUQ Sono  - Urine Toxicology  - recheck CMP/CBC/PT/INR in AM  - DVT/GI PPX (Lovenox/Protonix)  - hold all po meds while pt lethargic - rn aware

## 2019-07-12 NOTE — ED PROVIDER NOTE - ATTENDING CONTRIBUTION TO CARE
I was present for and supervised the key and critical aspects of the procedures performed during the care of the patient. Patient presents for evaluation of altered mental status he was found on the floor of his room, incontinent of urine, wife states he has been acting normally with no fevers or chills no vomiting or comlaints he was being worked up as an outpatient for a form of dementia vs parkinsons   On physical exam patient is confused he is nc/at perrla eomi oropharynx clear ctab/l rrr s1s2 noted radial pulses 2 + =, pedal pulses 2+=, he cannot comply with neuro exam at this time   A/P- we obtained ct head and neck ekg labs   On further history patient found to have "scarring" of liver ammonia level added I will continue to monitor at this time

## 2019-07-12 NOTE — H&P ADULT - NSHPSOCIALHISTORY_GEN_ALL_CORE
Tobacco use:   EtOH use:  Illicit drug use:  Marital Status: Tobacco use: Denies   EtOH use: Denies   Illicit drug use: Denies   Marital Status: Patient is , lives at home with his family, and ambulates independently.

## 2019-07-12 NOTE — H&P ADULT - NSHPLABSRESULTS_GEN_ALL_CORE
13.3   5.02  )-----------( 129      ( 12 Jul 2019 05:30 )             37.2       07-12    138  |  102  |  23<H>  ----------------------------<  82  4.0   |  26  |  0.7    Ca    9.5      12 Jul 2019 05:30  Mg     1.2     07-12    TPro  7.2  /  Alb  3.5  /  TBili  1.9<H>  /  DBili  x   /  AST  47<H>  /  ALT  28  /  AlkPhos  121<H>  07-12          Magnesium, Serum: 1.2 mg/dL (07-12-19 @ 07:55)              PT/INR - ( 12 Jul 2019 05:30 )   PT: 16.30 sec;   INR: 1.42 ratio         PTT - ( 12 Jul 2019 05:30 )  PTT:34.4 sec    Lactate Trend      CARDIAC MARKERS ( 12 Jul 2019 05:30 )  x     / <0.01 ng/mL / x     / x     / x          CXR  Impression:      Right basilar focal atelectasis    CT C-Spine NonCon  IMPRESSION:     1.  No acute osseous fractures or dislocations.    2.  Multilevel degenerative changes of the cervical spine.     3.  Straightening of normal cervical lordosis with minimal retrolisthesis   of C5 slightly posterior on C6.    CTH  IMPRESSION:     1.  NoCT evidence of acute intra-cranial pathology.    2.  If the patient continues to be symptomatic follow-up MRI of the brain   may be helpful for further evaluation.

## 2019-07-12 NOTE — H&P ADULT - PROBLEM SELECTOR PLAN 3
..  - c/w Metformin 1000mg BID  - ..  - c/w Metformin 1000mg BID  - Hold Glipizide while in house  - s/s AC and HS with consideration for basal bolus if persistent hyperglycemia.

## 2019-07-12 NOTE — ED ADULT NURSE NOTE - NSFALLRSKUNASSIST_ED_ALL_ED
General Information and HPI
 
Consulting Request
Date of Consult: 05/13/18
Requested By:
Tiara Canela MD
 
Reason for Consult:
Atrial fibrillation, hematuria
Source of Information: patient, old records
Exam Limitations: no limitations
History of Present Illness:
The patient is a 68-year-old gentleman with a past medical history of persistent
atrial fibrillation (anticoagulated with Eliquis), coronary artery disease (
status post remote stenting), aortic valve endocarditis, prior DVT/pulmonary 
embolism with IVC filter hypertension, hyperlipidemia, paraplegia (secondary to 
spinal abscess) and a neurogenic bladder, with a chronic indwelling Castro 
catheter.  He presented to our hospital with hematuria following accidental 
traumatic Castro removal.
 
The patient was evaluated by urology and underwent bladder irrigation.  Due to 
significant hematuria, his anticoagulation regimen was held.
 
Following clearing of his hematuria, urology consult obtained suggest resuming 
his anticoagulation possibly on May 14, 2018.
 
In discussion with the patient, he is otherwise doing well from a cardiovascular
standpoint and denies symptoms of chest pains, palpitations nor dyspnea while at
rest or with physical activity.  He is however limited secondary to his 
paraplegia.
 
The patient otherwise denies excessive bleeding/bruising while on 
anticoagulation.
 
Allergies/Medications
Allergies:
Coded Allergies:
heparin (Intermediate, SWELLING, RASH 04/16/18)
vancomycin (Intermediate, HIVES, SKIN CRACKES, TURNS RED, SWELLS AND PEELS 04/16
/18)
warfarin (From COUMADIN) (Intermediate, RASH 04/16/18)
 
Home Med List:
Albuterol Sulfate (Ventolin Hfa) 90 MCG HFA.AER.AD   2 PUF INH AD PRN COPD  (
Reported)
Apixaban (Eliquis) 5 MG TABLET   5 MG PO BID atrial fibrillation
Ascorbate Calcium (Vitamin C) 500 MG TABLET   1 TAB PO BID SUPPLEMENT  (Reported
)
Atomoxetine HCl (Strattera) 40 MG CAPSULE   1 CAP PO QAM MENTAL HEALTH  (
Reported)
Baclofen 20 MG TABLET   1 TAB PO BID MUSCLE RELAXER  (Reported)
Bisacodyl (Dulcolax) 10 MG SUPP.RECT   1 SUP RC Q48 GI  (Reported)
Budesonide/Formoterol Fumarate (Symbicort 80-4.5 Mcg Inhaler) 80 MCG-4.5 MCG/
ACTUATION HFA.AER.AD   2 PUF INH BID COPD  (Reported)
Cefuroxime Axetil (Cefuroxime) 500 MG TABLET   1 TAB PO BID UROLOGIC
Docusate Sodium (Stool Softener) 100 MG CAPSULE   1 CAP PO DAILY STOOL SOFTENER 
(Reported)
Doxycycline Hyclate 100 MG CAPSULE   1 CAP PO BID infection  (Reported)
Evolocumab (Repatha Sureclick) 140 MG/ML PEN.INJCTR   1 ML SC Q 2 WEEKS 
CHOLESTEROL  (Reported)
Furosemide 20 MG TABLET   3 TAB PO Q48 DIURETIC  (Reported)
Gabapentin 300 MG CAPSULE   2 CAP PO BID NERVE PAIN  (Reported)
Guaifenesin (Mucinex) 600 MG TAB.ER.12H   1 TAB PO BID CONGESTION  (Reported)
Hydroxyzine HCl (hydrOXYzine HCl) 10 MG TABLET   1 TAB PO BID PRN ANXIETY  (
Reported)
Lactobacillus Acidophilus (Acidophilus) 1 EACH CAPSULE   1 CAP PO BID PROBIOTIC 
(Reported)
Magnesium Oxide (Magnesium) 400 MG CAPSULE   1 CAP PO 0600 SUPPLEMENT  (Reported
)
Metoprolol Succ XL (Toprol XL) 25 MG TAB   1 TAB PO DAILY HEART  (Reported)
Montelukast Sodium (Singulair) 10 MG TABLET   1 TAB PO DAILY ALLERGIES  (
Reported)
Multivitamin (Daily Value) 1 EACH TABLET   1 TAB PO DAILY VITAMIN SUPPORT  (
Reported)
Nortriptyline HCl 10 MG CAPSULE   1 CAP PO DAILY mental health  (Reported)
Omeprazole 20 MG CAPSULE.DR   1 CAP PO DAILY ACID REFLUX  (Reported)
Oxycodone HCl/Acetaminophen (Oxycodone-Acetaminophen 5-325) 5 MG-325 MG TABLET  
1 TAB PO Q4H PRN PAIN  (Reported)
Potassium Chloride 20 MEQ TAB.ER.PRT   1 TAB PO DAILY SUPPLEMENT  (Reported)
Rifampin (Rifadin) 300 MG CAPSULE   300 MG PO BID MRSA  (Reported)
Sotalol (Betapace) 80 MG TABLET   80 MG PO BID AARYTHMIAS
Tiotropium Bromide (Spiriva) 18 MCG CAP.W.DEV   1 CAP INH DAILY BREATHING 
PROBLEMS  (Reported)
 
Current Medications:
 Current Medications
 
 
  Sig/[place] Start time  Last
 
Medication Dose Route Stop Time Status Admin
 
Acetaminophen 650 MG Q6P PRN 05/12 1500 AC 05/12
 
  PO   2140
 
Albuterol Sulfate 2 PUF Q4-PRN PRN 05/12 2215 AC 
 
  INH   
 
Albuterol Sulfate 3 ML EVERY 4 HRS/AWAKE 05/12 2000 AC 05/13
 
  INH   1136
 
Baclofen 20 MG BID 05/12 2100 AC 05/13
 
  PO   0944
 
Bisacodyl 10 MG Q48 05/14 0900 AC 
 
  AK   
 
Budesonide/ 2 PUF BID 05/12 2100 AC 05/13
 
Formoterol Fumarate  INH   0945
 
Docusate Sodium 100 MG DAILY 05/13 0900 AC 
 
  PO   
 
Docusate Sodium 100 MG DAILY 05/12 1602 CAN 
 
  PO 05/12 2359  
 
Doxycycline Hyclate 100 MG BID 05/12 2100 AC 05/13
 
  PO   0944
 
Gabapentin 600 MG BID 05/12 2100 AC 05/13
 
  PO   0944
 
Guaifenesin 10 ML Q6P PRN 05/12 2115 AC 
 
  PO   
 
Guaifenesin 600 MG BID 05/12 2100 AC 05/13
 
  PO   0944
 
Hydroxyzine HCl 10 MG BID PRN 05/12 1615 AC 
 
  PO   
 
Metoprolol Succinate 25 MG DAILY 05/13 0900 AC 05/13
 
  PO   0945
 
Metoprolol Succinate 25 MG DAILY 05/12 1607 CAN 
 
  PO   
 
Montelukast Sodium 10 MG 2100 05/12 2300 AC 05/12
 
  PO   2335
 
Montelukast Sodium 10 MG DAILY 05/12 1607 CAN 
 
  PO   
 
Nortriptyline HCl 10 MG DAILY 05/13 0900 AC 05/13
 
  PO   0944
 
Omeprazole 20 MG DAILY 05/13 0900 AC 05/13
 
  PO   0944
 
Oxycodone/ 1 TAB ONCE ONE 05/13 0930 DC 05/13
 
Acetaminophen  PO 05/13 0931  0942
 
Oxycodone/ 1 TAB ONCE ONE 05/12 1700 DC 05/12
 
Acetaminophen  PO 05/12 1701  1723
 
Rifampin 300 MG BID 05/12 2100 AC 05/13
 
  PO   0944
 
Sodium Chloride 1,000 ML .Q10H 05/12 1500 DC 05/13
 
  IV   0130
 
Sotalol HCl 80 MG BID 05/12 2100 AC 05/13
 
  PO   0945
 
Tiotropium [place] 1 PUF DAILY 05/13 0900 AC 05/13
 
  INH   0943
 
Tiotropium [place] 1 PUF DAILY 05/12 1608 CAN 
 
  INH   
 
 
 
 
Review of Systems
Review of Systems:
The review of systems is negative for chest pains, palpitations nor 
lightheadedness.
The remainder of the 14 point review of systems is noncontributory with the 
exception of above.
 
Past History
 
Travel History
Traveled to [place] past 21 day No
 
Medical History
Blood Transfusion Hx: Yes
Neurological: C6-7 ABSCESS PARAPLEGIA
EENT: NONE
Cardiovascular: AFIB, hypertension, hyperlipidemia, myocardial infarction
Respiratory: asthma, COPD, OXYGEN DEPEND 2L
Gastrointestinal: NONE
Hepatic: NONE
Renal: neurogenic bladder
Musculoskeletal: PARAPLEGIA R/T ABSCESS
Psychiatric: anxiety
Endocrine: NONE
Blood Disorders: NONE
Cancer(s): NONE
GYN/Reproductive: NONE
 
Surgical History
Surgical History: non-contributory
 
Family History
Relations & Conditions If Any:
Relation not specified for:
  *No pertinent family history
 
 
Psychosocial History
Where Do You Live? Home
Who Do You Live With? partner
Services at Home: Home Health Aide, CNA MORNING & EVENING
Primary Language: English
Smoking Status: Never Smoked
ETOH Use: denies use
Illicit Drug Use: denies illicit drug use
Living Will? yes
 
Functional Ability
ADLs
Needs Assist: dressing, eating, toileting, bathing. 
Ambulation: wheelchair
IADLs
Independent: shopping, housework, finances, food prep, telephone, transportation
, medication admin. 
 
Exam & Diagnostic Data
Vital Signs and I&O
Vital Signs
 
 
Date Time Temp Pulse Resp B/P B/P Pulse O2 O2 Flow FiO2
 
     Mean Ox Delivery Rate 
 
05/13 1139      96 Nasal 2.0L 
 
       Cannula  
 
05/13 0945  74  132/74     
 
05/13 0604 98.0 74 20 132/74  91   
 
05/13 0559      97 Nasal 2.0L 
 
       Cannula  
 
05/13 0558      98   
 
05/13 0401      98   
 
05/13 0034  86    95   
 
05/13 0000       BIPAP  
 
05/12 2300 98.3 93 20 153/73  100 Nasal 2.0L 
 
       Cannula  
 
05/12 1910       Nasal 2.0L 
 
       Cannula  
 
05/12 1700 98.5 85 18 132/84  96   
 
05/12 1635      93 Nasal 2.0L 
 
       Cannula  
 
05/12 1635      93 Nasal 2.0L 
 
       Cannula  
 
05/12 1622 98.1 85 22 151/69  99 Nasal 2.0L 
 
       Cannula  
 
 
 Intake & Output
 
 
 05/13 1600 05/13 0800 05/13 0000 05/12 1600 05/12 0800 05/12 0000
 
Intake Total  920 640   
 
Output Total    
 
Balance  320 -60 -1000  
 
       
 
Intake, IV  800 400   
 
Intake, Oral  120 240   
 
Number  0 0   
 
Bowel      
 
Movements      
 
Output, Urine    
 
Patient   223 lb 223 lb  
 
Weight      
 
Weight   Reported by Patient Reported by Patient  
 
Measurement      
 
Method      
 
 
 
Physical Exam:
General: Nontoxic, no apparent distress.
HEENT: Sclera and conjunctiva within normal limits, without xanthelasmas.
Neck: Carotids 2+ without bruits.
Respiratory: Clear to auscultation, air movement is good, without accessory 
respiratory muscle use.
Heart: Regular rate and rhythm, without murmurs, without JVD.
Abdomen: Soft, nontender, no masses, normoactive bowel sounds.
Extremities: Without clubbing, cyanosis, without edema.
Neuro: Paraplegia
Skin: Within normal limits without lesions.
Psych: Mood and affect: Normal
Labs/Stalin Results:
 Laboratory Tests
 
 
 05/13 05/12
 
 0815 0931
 
Chemistry  
 
  Sodium (137 - 145 mmol/L) 138 
 
  Potassium (3.5 - 5.1 mmol/L) 4.3 
 
  Chloride (98 - 107 mmol/L) 100 
 
  Carbon Dioxide (22 - 30 mmol/L) 33  H 
 
  Anion Gap (5 - 16) 6 
 
  BUN (9 - 20 mg/dL) 14 
 
  Creatinine (0.7 - 1.2 mg/dL) 0.5  L 
 
  Estimated GFR (>60 ml/min) > 60 
 
  BUN/Creatinine Ratio (7 - 25 %) 28.0  H 
 
Hematology  
 
  CBC w Diff NO MAN DIFF REQ NO MAN DIFF REQ
 
  WBC (4.8 - 10.8 /CUMM) 8.0 12.5  H
 
  RBC (4.70 - 6.10 /CUMM) 3.48  L 3.60  L
 
  Hgb (14.0 - 18.0 G/DL) 10.1  L 10.4  L
 
  Hct (42 - 52 %) 31.3  L 32.2  L
 
  MCV (80.0 - 94.0 FL) 90.0 89.6
 
  MCH (27.0 - 31.0 PG) 28.9 28.8
 
  MCHC (33.0 - 37.0 G/DL) 32.2  L 32.2  L
 
  RDW (11.5 - 14.5 %) 14.8  H 14.0
 
  Plt Count (130 - 400 /CUMM) 228 258
 
  MPV (7.4 - 10.4 FL) 8.4 8.4
 
  Gran % (42.2 - 75.2 %) 67.1 74.9
 
  Lymphocytes % (20.5 - 51.1 %) 20.3  L 16.4  L
 
  Monocytes % (1.7 - 9.3 %) 8.5 6.7
 
  Eosinophils % (0 - 5 %) 4.1 1.8
 
  Basophils % (0.0 - 2.0 %) 0 0.2
 
  Absolute Granulocytes (1.4 - 6.5 /CUMM) 5.4 9.4  H
 
  Absolute Lymphocytes (1.2 - 3.4 /CUMM) 1.6 2.1
 
  Absolute Monocytes (0.10 - 0.60 /CUMM) 0.7  H 0.8  H
 
  Absolute Eosinophils (0.0 - 0.7 /CUMM) 0.3 0.2
 
  Absolute Basophils (0.0 - 0.2 /CUMM) 0 0
 
 
 
 
 05/12
 
 0924
 
Chemistry 
 
  Sodium (137 - 145 mmol/L) 137
 
  Potassium (3.5 - 5.1 mmol/L) 4.1
 
  Chloride (98 - 107 mmol/L) 98
 
  Carbon Dioxide (22 - 30 mmol/L) 33  H
 
  Anion Gap (5 - 16) 6
 
  BUN (9 - 20 mg/dL) 20
 
  Creatinine (0.7 - 1.2 mg/dL) 0.6  L
 
  Estimated GFR (>60 ml/min) > 60
 
  BUN/Creatinine Ratio (7 - 25 %) 33.3  H
 
  Glucose (65 - 99 mg/dL) 146  H
 
  Calcium (8.4 - 10.2 mg/dL) 8.8
 
 
 
 
Assessment/Plan
Assessment/Plan
68-year-old gentleman with a past medical history of persistent atrial 
fibrillation (anticoagulated with Eliquis), coronary artery disease (status post
remote stenting), aortic valve endocarditis, prior DVT/pulmonary embolism with 
IVC filter hypertension, hyperlipidemia, paraplegia (secondary to spinal abscess
) and a neurogenic bladder, with a chronic indwelling Castro catheter.  He 
presented to our hospital with hematuria following accidental traumatic Castro 
removal, requiring holding of his anticoagulation regimen.
 
Atrial fibrillation:
The patient has persistent versus paroxysmal atrial fibrillation and is 
anticoagulated with Eliquis.  He has been overall doing well without bleeding 
episodes; however, has had his current presentation in the setting of traumatic 
Castro removal.  At this time, I believe it would be safe to hold his 
anticoagulation for another 24 hours and observe for further hematuria.  Is 
cleared, I would resume his anticoagulation and follow-up with urology.
 
Coronary artery disease:
Stable, we will continue to follow with his primary cardiologist as an 
outpatient.
 
Hypertension:
The patient has had well-controlled blood pressures as an outpatient.  We will 
continue his outpatient regimen, and adjust as needed as an outpatient.
 
Thank you for allowing us to participate in the care of your patient.
Please do not hesitate to contact us further with any questions.
 
Sincerely,
Marshal Tena MD [facility]
 
 
Consult Acknowledgment
- Thank you for your consult request.
yes

## 2019-07-12 NOTE — H&P ADULT - PROBLEM SELECTOR PLAN 2
..  - Serum Mg 1.2 on admission  - s/p 2gm Mag Pilo in ED  - recheck level in AM ..  - Serum Mg 1.2 on admission  - s/p 2gm Mag Pilo in ED  - give another rider stat  - recheck level in AM

## 2019-07-12 NOTE — H&P ADULT - ATTENDING COMMENTS
Statement Selected patient seen and examined independently  agree with pa's assessment and plan except where edited by me

## 2019-07-12 NOTE — CONSULT NOTE ADULT - SUBJECTIVE AND OBJECTIVE BOX
Chief complaint/Reason for consult: Hepatic Encephalopathy     HPI: 74 yo male pmh HTN, DM, depression, early dementia, with liver cirrhosis from MAYA followed at [facility] with jaki Vigil and with Dr. Galicia outpatient GI. Patient presents to the ED s/p confusion patient found unresponsive. Patient at baseline as per family is alert and oriented times 3. Patient's family reports confusion and unresponsiveness has not happened before. Patient had an endoscopy and colonoscopy this year that was normal. History obtained from patient's family as patient is not responding appropriately to prompts. Patient is very obtunded.        PAST MEDICAL & SURGICAL HISTORY: PAST MEDICAL & SURGICAL HISTORY:  Osteoarthritis  Benign essential HTN  Gout  Depression  Diabetes  S/P knee surgery  S/P cholecystectomy  Cataracts, both eyes      Family history:  FAMILY HISTORY:  FH: type 2 diabetes mellitus    No GI cancers in first or second degree relatives    Social History: No smoking. No alcohol. No illegal drug use.    Allergies:     Keflex (Hives)  Keflex (Unknown)          MEDICATIONS: Home Medications:  aspirin 81 mg oral tablet: 1 tab(s) orally once a day (12 Jul 2019 10:19)  colchicine 0.6 mg oral capsule: 1 cap(s) orally once a day (12 Jul 2019 10:19)  glipiZIDE 5 mg oral tablet: 1 tab(s) orally once a day (12 Jul 2019 10:19)  metFORMIN 1000 mg oral tablet: 1 tab(s) orally 2 times a day (12 Jul 2019 10:19)  naproxen sodium 550 mg oral tablet: 1 tab(s) orally 2 times a day (12 Jul 2019 10:19)  nortriptyline 10 mg oral capsule: 1 cap(s) orally once a day (at bedtime) (12 Jul 2019 10:19)  Protonix 40 mg oral delayed release tablet: 1 tab(s) orally once a day (12 Jul 2019 10:19)  quinapril 20 mg oral tablet: 1 tab(s) orally once a day (12 Jul 2019 10:19)  sertraline 100 mg oral tablet: 1 tab(s) orally 2 times a day (12 Jul 2019 10:19)  Vitamin B6 100 mg oral tablet: 1 tab(s) orally once a day (12 Jul 2019 10:19)  Vitamin D3 2000 intl units oral tablet: 1 tab(s) orally once a day (12 Jul 2019 10:19)    MEDICATIONS  (STANDING):  aspirin  chewable 81 milliGRAM(s) Oral daily  colchicine 0.6 milliGRAM(s) Oral daily  lisinopril 20 milliGRAM(s) Oral daily  metFORMIN 1000 milliGRAM(s) Oral two times a day  pantoprazole    Tablet 40 milliGRAM(s) Oral before breakfast  pyridoxine 100 milliGRAM(s) Oral daily  sertraline 100 milliGRAM(s) Oral two times a day        REVIEW OF SYSTEMS  Unobtainable     VITALS:   T(F): --  HR: 93 (07-12-19 @ 07:52) (88 - 93)  BP: 165/68 (07-12-19 @ 07:52) (156/68 - 165/68)  RR: 19 (07-12-19 @ 07:52) (19 - 20)  SpO2: 100% (07-12-19 @ 07:52) (100% - 100%)    PHYSICAL EXAM:  GENERAL: Consuded and obtunded   HEAD:  Atraumatic, Normocephalic  EYES: conjunctiva and sclera clear  NECK: Supple, No thyromegaly   CHEST/LUNG: Clear to auscultation bilaterally; No wheeze, rhonchi, or rales  HEART: Regular rate and rhythm; normal S1, S2, No murmurs.  ABDOMEN: Soft, nontender, nondistended; Bowel sounds present, no abdominal bruit, masses, or hepatosplenomegaly  EXTREMITIES:  2+ Peripheral Pulses. No clubbing, cyanosis, or edema, warm   NEUROLOGY: No asterixis or tremor  SKIN: Intact, no jaundice          LABS:  07-12    138  |  102  |  23<H>  ----------------------------<  82  4.0   |  26  |  0.7    Ca    9.5      12 Jul 2019 05:30  Mg     1.2     07-12    TPro  7.2  /  Alb  3.5  /  TBili  1.9<H>  /  DBili  x   /  AST  47<H>  /  ALT  28  /  AlkPhos  121<H>  07-12                          13.3   5.02  )-----------( 129      ( 12 Jul 2019 05:30 )             37.2     LIVER FUNCTIONS - ( 12 Jul 2019 05:30 )  Alb: 3.5 g/dL / Pro: 7.2 g/dL / ALK PHOS: 121 U/L / ALT: 28 U/L / AST: 47 U/L / GGT: x           PT/INR - ( 12 Jul 2019 05:30 )   PT: 16.30 sec;   INR: 1.42 ratio         PTT - ( 12 Jul 2019 05:30 )  PTT:34.4 sec    IMAGING: Chief complaint/Reason for consult: Hepatic Encephalopathy     HPI: 74 yo male pmh HTN, DM, depression, early dementia, with liver cirrhosis from MAYA followed at [facility] with jaki Vigil and with Dr. Galicia outpatient GI. Patient presents to the ED s/p confusion patient found unresponsive. Patient at baseline as per family is alert and oriented times 3. Patient's family reports confusion and unresponsiveness has not happened before. Patient had an endoscopy and colonoscopy this year that was normal. History obtained from patient's family as patient is not responding appropriately to prompts. Patient is very obtunded.        PAST MEDICAL & SURGICAL HISTORY: PAST MEDICAL & SURGICAL HISTORY:  Osteoarthritis  Benign essential HTN  Gout  Depression  Diabetes  S/P knee surgery  S/P cholecystectomy  Cataracts, both eyes      Family history:  FAMILY HISTORY:  FH: type 2 diabetes mellitus    No GI cancers in first or second degree relatives    Social History: No smoking. No alcohol. No illegal drug use.    Allergies:     Keflex (Hives)  Keflex (Unknown)          MEDICATIONS: Home Medications:  aspirin 81 mg oral tablet: 1 tab(s) orally once a day (12 Jul 2019 10:19)  colchicine 0.6 mg oral capsule: 1 cap(s) orally once a day (12 Jul 2019 10:19)  glipiZIDE 5 mg oral tablet: 1 tab(s) orally once a day (12 Jul 2019 10:19)  metFORMIN 1000 mg oral tablet: 1 tab(s) orally 2 times a day (12 Jul 2019 10:19)  naproxen sodium 550 mg oral tablet: 1 tab(s) orally 2 times a day (12 Jul 2019 10:19)  nortriptyline 10 mg oral capsule: 1 cap(s) orally once a day (at bedtime) (12 Jul 2019 10:19)  Protonix 40 mg oral delayed release tablet: 1 tab(s) orally once a day (12 Jul 2019 10:19)  quinapril 20 mg oral tablet: 1 tab(s) orally once a day (12 Jul 2019 10:19)  sertraline 100 mg oral tablet: 1 tab(s) orally 2 times a day (12 Jul 2019 10:19)  Vitamin B6 100 mg oral tablet: 1 tab(s) orally once a day (12 Jul 2019 10:19)  Vitamin D3 2000 intl units oral tablet: 1 tab(s) orally once a day (12 Jul 2019 10:19)    MEDICATIONS  (STANDING):  aspirin  chewable 81 milliGRAM(s) Oral daily  colchicine 0.6 milliGRAM(s) Oral daily  lisinopril 20 milliGRAM(s) Oral daily  metFORMIN 1000 milliGRAM(s) Oral two times a day  pantoprazole    Tablet 40 milliGRAM(s) Oral before breakfast  pyridoxine 100 milliGRAM(s) Oral daily  sertraline 100 milliGRAM(s) Oral two times a day        REVIEW OF SYSTEMS  Unobtainable     VITALS:   T(F): --  HR: 93 (07-12-19 @ 07:52) (88 - 93)  BP: 165/68 (07-12-19 @ 07:52) (156/68 - 165/68)  RR: 19 (07-12-19 @ 07:52) (19 - 20)  SpO2: 100% (07-12-19 @ 07:52) (100% - 100%)    PHYSICAL EXAM:  GENERAL: Confused and obtunded   HEAD:  Atraumatic, Normocephalic  EYES: conjunctiva and sclera clear  NECK: Supple, No thyromegaly   CHEST/LUNG: Clear to auscultation bilaterally; No wheeze, rhonchi, or rales  HEART: Regular rate and rhythm; normal S1, S2, No murmurs.  ABDOMEN: Soft, nontender, nondistended; Bowel sounds present, no abdominal bruit, masses, or hepatosplenomegaly  NEUROLOGY: +asterixis   SKIN: Intact, no jaundice          LABS:  07-12    138  |  102  |  23<H>  ----------------------------<  82  4.0   |  26  |  0.7    Ca    9.5      12 Jul 2019 05:30  Mg     1.2     07-12    TPro  7.2  /  Alb  3.5  /  TBili  1.9<H>  /  DBili  x   /  AST  47<H>  /  ALT  28  /  AlkPhos  121<H>  07-12                          13.3   5.02  )-----------( 129      ( 12 Jul 2019 05:30 )             37.2     LIVER FUNCTIONS - ( 12 Jul 2019 05:30 )  Alb: 3.5 g/dL / Pro: 7.2 g/dL / ALK PHOS: 121 U/L / ALT: 28 U/L / AST: 47 U/L / GGT: x           PT/INR - ( 12 Jul 2019 05:30 )   PT: 16.30 sec;   INR: 1.42 ratio         PTT - ( 12 Jul 2019 05:30 )  PTT:34.4 sec    IMAGING: Chief complaint/Reason for consult: Hepatic Encephalopathy     HPI: 74 yo male pmh HTN, DM, depression, early dementia, with liver cirrhosis from MAYA followed at [facility] with jaki Vigil and with Dr. Galicia outpatient GI. Patient presents to the ED s/p confusion patient found unresponsive. Patient at baseline as per family is alert and oriented times 3. Patient's family reports confusion and unresponsiveness has not happened before. Patient had an endoscopy and colonoscopy this year that was normal. History obtained from patient's family as patient is not responding appropriately to prompts. Patient is very obtunded.        PAST MEDICAL & SURGICAL HISTORY:   Osteoarthritis  Benign essential HTN  Gout  Depression  Diabetes  S/P knee surgery  S/P cholecystectomy  Cataracts, both eyes      Family history:  FAMILY HISTORY:  FH: type 2 diabetes mellitus    No GI cancers in first or second degree relatives    Social History: No smoking. No alcohol. No illegal drug use.    Allergies:     Keflex (Hives)  Keflex (Unknown)          MEDICATIONS: Home Medications:  aspirin 81 mg oral tablet: 1 tab(s) orally once a day (12 Jul 2019 10:19)  colchicine 0.6 mg oral capsule: 1 cap(s) orally once a day (12 Jul 2019 10:19)  glipiZIDE 5 mg oral tablet: 1 tab(s) orally once a day (12 Jul 2019 10:19)  metFORMIN 1000 mg oral tablet: 1 tab(s) orally 2 times a day (12 Jul 2019 10:19)  naproxen sodium 550 mg oral tablet: 1 tab(s) orally 2 times a day (12 Jul 2019 10:19)  nortriptyline 10 mg oral capsule: 1 cap(s) orally once a day (at bedtime) (12 Jul 2019 10:19)  Protonix 40 mg oral delayed release tablet: 1 tab(s) orally once a day (12 Jul 2019 10:19)  quinapril 20 mg oral tablet: 1 tab(s) orally once a day (12 Jul 2019 10:19)  sertraline 100 mg oral tablet: 1 tab(s) orally 2 times a day (12 Jul 2019 10:19)  Vitamin B6 100 mg oral tablet: 1 tab(s) orally once a day (12 Jul 2019 10:19)  Vitamin D3 2000 intl units oral tablet: 1 tab(s) orally once a day (12 Jul 2019 10:19)    MEDICATIONS  (STANDING):  aspirin  chewable 81 milliGRAM(s) Oral daily  colchicine 0.6 milliGRAM(s) Oral daily  lisinopril 20 milliGRAM(s) Oral daily  metFORMIN 1000 milliGRAM(s) Oral two times a day  pantoprazole    Tablet 40 milliGRAM(s) Oral before breakfast  pyridoxine 100 milliGRAM(s) Oral daily  sertraline 100 milliGRAM(s) Oral two times a day        REVIEW OF SYSTEMS  Unobtainable     VITALS:   T(F): --  HR: 93 (07-12-19 @ 07:52) (88 - 93)  BP: 165/68 (07-12-19 @ 07:52) (156/68 - 165/68)  RR: 19 (07-12-19 @ 07:52) (19 - 20)  SpO2: 100% (07-12-19 @ 07:52) (100% - 100%)    PHYSICAL EXAM:  GENERAL: Confused and obtunded   HEAD:  Atraumatic, Normocephalic  EYES: conjunctiva and sclera clear  NECK: Supple, No thyromegaly   CHEST/LUNG: Clear to auscultation bilaterally; No wheeze, rhonchi, or rales  HEART: Regular rate and rhythm; normal S1, S2, No murmurs.  ABDOMEN: Soft, nontender, nondistended; Bowel sounds present, no abdominal bruit, masses, or hepatosplenomegaly  NEUROLOGY: +asterixis   SKIN: Intact, no jaundice          LABS:  07-12    138  |  102  |  23<H>  ----------------------------<  82  4.0   |  26  |  0.7    Ca    9.5      12 Jul 2019 05:30  Mg     1.2     07-12    TPro  7.2  /  Alb  3.5  /  TBili  1.9<H>  /  DBili  x   /  AST  47<H>  /  ALT  28  /  AlkPhos  121<H>  07-12                          13.3   5.02  )-----------( 129      ( 12 Jul 2019 05:30 )             37.2     LIVER FUNCTIONS - ( 12 Jul 2019 05:30 )  Alb: 3.5 g/dL / Pro: 7.2 g/dL / ALK PHOS: 121 U/L / ALT: 28 U/L / AST: 47 U/L / GGT: x           PT/INR - ( 12 Jul 2019 05:30 )   PT: 16.30 sec;   INR: 1.42 ratio         PTT - ( 12 Jul 2019 05:30 )  PTT:34.4 sec    IMAGING: Chief complaint/Reason for consult: Hepatic Encephalopathy     HPI: 74 yo male pmh HTN, DM, depression, early dementia, with liver cirrhosis from MAYA followed at [facility] with a Jesus Moreau and with Dr. Galicia outpatient GI. Patient presents to the ED s/p confusion patient found unresponsive. Patient at baseline as per family is alert and oriented times 3. Patient's family reports confusion and unresponsiveness has not happened before. Patient had an endoscopy and colonoscopy this year that was normal. History obtained from patient's family as patient is not responding appropriately to prompts. Patient is very obtunded.        PAST MEDICAL & SURGICAL HISTORY:   Osteoarthritis  Benign essential HTN  Gout  Depression  Diabetes  S/P knee surgery  S/P cholecystectomy  Cataracts, both eyes      Family history:  FAMILY HISTORY:  FH: type 2 diabetes mellitus    No GI cancers in first or second degree relatives    Social History: No smoking. No alcohol. No illegal drug use.    Allergies:     Keflex (Hives)            MEDICATIONS: Home Medications:  aspirin 81 mg oral tablet: 1 tab(s) orally once a day (12 Jul 2019 10:19)  colchicine 0.6 mg oral capsule: 1 cap(s) orally once a day (12 Jul 2019 10:19)  glipiZIDE 5 mg oral tablet: 1 tab(s) orally once a day (12 Jul 2019 10:19)  metFORMIN 1000 mg oral tablet: 1 tab(s) orally 2 times a day (12 Jul 2019 10:19)  naproxen sodium 550 mg oral tablet: 1 tab(s) orally 2 times a day (12 Jul 2019 10:19)  nortriptyline 10 mg oral capsule: 1 cap(s) orally once a day (at bedtime) (12 Jul 2019 10:19)  Protonix 40 mg oral delayed release tablet: 1 tab(s) orally once a day (12 Jul 2019 10:19)  quinapril 20 mg oral tablet: 1 tab(s) orally once a day (12 Jul 2019 10:19)  sertraline 100 mg oral tablet: 1 tab(s) orally 2 times a day (12 Jul 2019 10:19)  Vitamin B6 100 mg oral tablet: 1 tab(s) orally once a day (12 Jul 2019 10:19)  Vitamin D3 2000 intl units oral tablet: 1 tab(s) orally once a day (12 Jul 2019 10:19)    MEDICATIONS  (STANDING):  aspirin  chewable 81 milliGRAM(s) Oral daily  colchicine 0.6 milliGRAM(s) Oral daily  lisinopril 20 milliGRAM(s) Oral daily  metFORMIN 1000 milliGRAM(s) Oral two times a day  pantoprazole    Tablet 40 milliGRAM(s) Oral before breakfast  pyridoxine 100 milliGRAM(s) Oral daily  sertraline 100 milliGRAM(s) Oral two times a day        REVIEW OF SYSTEMS  Unobtainable     VITALS:   T(F): --  HR: 93 (07-12-19 @ 07:52) (88 - 93)  BP: 165/68 (07-12-19 @ 07:52) (156/68 - 165/68)  RR: 19 (07-12-19 @ 07:52) (19 - 20)  SpO2: 100% (07-12-19 @ 07:52) (100% - 100%)    PHYSICAL EXAM:  GENERAL: Confused and obtunded   HEAD:  Atraumatic, Normocephalic  EYES: conjunctiva and sclera clear  NECK: Supple, No thyromegaly   CHEST/LUNG: Clear to auscultation bilaterally; No wheeze, rhonchi, or rales  HEART: Regular rate and rhythm; normal S1, S2, No murmurs.  ABDOMEN: Soft, nontender, nondistended; Bowel sounds present, no abdominal bruit, masses, or hepatosplenomegaly  NEUROLOGY: +asterixis   SKIN: Intact, no jaundice          LABS:  07-12    138  |  102  |  23<H>  ----------------------------<  82  4.0   |  26  |  0.7    Ca    9.5      12 Jul 2019 05:30  Mg     1.2     07-12    TPro  7.2  /  Alb  3.5  /  TBili  1.9<H>  /  DBili  x   /  AST  47<H>  /  ALT  28  /  AlkPhos  121<H>  07-12                          13.3   5.02  )-----------( 129      ( 12 Jul 2019 05:30 )             37.2     LIVER FUNCTIONS - ( 12 Jul 2019 05:30 )  Alb: 3.5 g/dL / Pro: 7.2 g/dL / ALK PHOS: 121 U/L / ALT: 28 U/L / AST: 47 U/L / GGT: x           PT/INR - ( 12 Jul 2019 05:30 )   PT: 16.30 sec;   INR: 1.42 ratio         PTT - ( 12 Jul 2019 05:30 )  PTT:34.4 sec    IMAGING:

## 2019-07-12 NOTE — H&P ADULT - ASSESSMENT
73M with above PMHX presents to ED with complaints of AMS found to have likely hepatic encephalopathy and admitted to medicine service for further management.

## 2019-07-12 NOTE — ED PROVIDER NOTE - PHYSICAL EXAMINATION
CONSTITUTIONAL: elderly male; in no apparent distress.   EYES: PERRL; EOM intact.   ENT: normal nose; no rhinorrhea; normal pharynx with no tonsillar hypertrophy.   NECK: Supple; non-tender; no cervical lymphadenopathy. No JVD.   CARDIOVASCULAR: Normal S1, S2; no murmurs, rubs, or gallops.   RESPIRATORY: Normal chest excursion with respiration; breath sounds clear and equal bilaterally; no wheezes, rhonchi, or rales.  GI/: Normal bowel sounds; non-distended; non-tender; no palpable organomegaly.   MS: No evidence of trauma or deformity  SKIN: Normal for age and race; warm; dry; good turgor; no apparent lesions or exudate.   NEURO/PSYCH: poor responsive. move all extremities.

## 2019-07-12 NOTE — H&P ADULT - HISTORY OF PRESENT ILLNESS
From ED:   74 yo male hx of HTN/DM/Depression/early Dementia BIBA 2/2 alter mental status. as per EMS, patient was found next to his bed by wife and wife called EMS. they noticed he was warm and thought patient had fever. patient was poorly responsive so he was brought to ED for evaluation. as per wife, patient had normal day yesterday. She found patient next to the bed with 2 pillows. patient also had an episode of urinary incontinence.  ________________________________________________________________________________________________________   Patient BIBA 2/2 AMS first noticed this AM.  Wife reports her  was in his usual state of health last night before he went to bed at 2030.  She reports she woke up and checked on him at 0400 and noticed him on the floor.  No witnessed fall.  He was unarousable at that time and so EMS was activated and the patient was transported to the ED.  The family reports the patient has had bouts of confusion for several months, most pronounced was approx 7/4 were he was confused x 2 days then spontaneously returned to his baseline.  He has seen multiple specialists inlcuding Neurology and GI and recently diagnosed with Liver Cirrhosis of unknown etiology.  Patient is drowsy but arousable at the bedside.  He does not answer questions.  Wife reports no other symptoms in the preceding days.  Denies Fever/Chill.  No cough/SOB.  No abdominal complaints of pain n/v/d, last BM was yesterday but not checked.  No urinary complaints.  Family does report he has had intermittent bowel and bladder incontinence but the wife states its because the patient can not make it to the bathroom in time.

## 2019-07-12 NOTE — ED PROVIDER NOTE - CLINICAL SUMMARY MEDICAL DECISION MAKING FREE TEXT BOX
mental stus changes likely due to ammonia.  lactulose ordered.  In my opinion, in patient treatment is medically justifiable and appropriate.

## 2019-07-12 NOTE — ED PROVIDER NOTE - OBJECTIVE STATEMENT
72 yo male hx of HTN/DM/Depression/early Dementia BIBA 2/2 alter mental status. as per EMS, patient was found next to his bed by wife and wife called EMS. they noticed he was warm and thought patient had fever. patient was poorly responsive so he was brought to ED for evaluation. as per wife, patient had normal day yesterday. She found patient next to the bed with 2 pillows. patient also had an episode of urinary incontinence.

## 2019-07-12 NOTE — H&P ADULT - NSHPPHYSICALEXAM_GEN_ALL_CORE
PHYSICAL EXAM:  GENERAL: NAD, well-developed, obtunded   SKIN: No rashes or lesions  HEAD:  Atraumatic, Normocephalic  EYES: PERRL, conjunctiva and sclera clear  NECK: Supple, No JVD  CHEST/LUNG: Clear to auscultation bilaterally; No wheeze  HEART: Regular rate and rhythm; No murmurs, rubs, or gallops  ABDOMEN: Soft, Nontender, Nondistended; Bowel sounds present  EXTREMITIES:  No clubbing, cyanosis, or edema  CNS: drowsy but responds to painful stimuli Vital Signs Last 24 Hrs  T(C): 36.3 (12 Jul 2019 14:36), Max: 37.2 (12 Jul 2019 13:03)  T(F): 97.4 (12 Jul 2019 14:36), Max: 99 (12 Jul 2019 13:03)  HR: 94 (12 Jul 2019 14:36) (88 - 94)  BP: 115/57 (12 Jul 2019 14:36) (115/57 - 176/72)  BP(mean): --  RR: 18 (12 Jul 2019 14:36) (18 - 20)  SpO2: 100% (12 Jul 2019 15:00) (99% - 100%)    PHYSICAL EXAM:  GENERAL: NAD, well-developed, obtunded   SKIN: No rashes or lesions  HEAD:  Atraumatic, Normocephalic  EYES: PERRL, conjunctiva and sclera clear  NECK: Supple, No JVD  CHEST/LUNG: Clear to auscultation bilaterally; No wheeze  HEART: Regular rate and rhythm; No murmurs, rubs, or gallops  ABDOMEN: Soft, Nontender, Nondistended; Bowel sounds present  EXTREMITIES:  No clubbing, cyanosis, or edema  CNS: drowsy but responds to painful stimuli

## 2019-07-13 LAB
ALBUMIN SERPL ELPH-MCNC: 3.4 G/DL — LOW (ref 3.5–5.2)
ALP SERPL-CCNC: 91 U/L — SIGNIFICANT CHANGE UP (ref 30–115)
ALT FLD-CCNC: 26 U/L — SIGNIFICANT CHANGE UP (ref 0–41)
AMMONIA BLD-MCNC: 41 UMOL/L — SIGNIFICANT CHANGE UP (ref 11–55)
ANION GAP SERPL CALC-SCNC: 14 MMOL/L — SIGNIFICANT CHANGE UP (ref 7–14)
AST SERPL-CCNC: 40 U/L — SIGNIFICANT CHANGE UP (ref 0–41)
BASOPHILS # BLD AUTO: 0.04 K/UL — SIGNIFICANT CHANGE UP (ref 0–0.2)
BASOPHILS NFR BLD AUTO: 0.9 % — SIGNIFICANT CHANGE UP (ref 0–1)
BILIRUB SERPL-MCNC: 2 MG/DL — HIGH (ref 0.2–1.2)
BUN SERPL-MCNC: 18 MG/DL — SIGNIFICANT CHANGE UP (ref 10–20)
CALCIUM SERPL-MCNC: 8.8 MG/DL — SIGNIFICANT CHANGE UP (ref 8.5–10.1)
CHLORIDE SERPL-SCNC: 109 MMOL/L — SIGNIFICANT CHANGE UP (ref 98–110)
CO2 SERPL-SCNC: 23 MMOL/L — SIGNIFICANT CHANGE UP (ref 17–32)
CREAT SERPL-MCNC: 0.8 MG/DL — SIGNIFICANT CHANGE UP (ref 0.7–1.5)
EOSINOPHIL # BLD AUTO: 0.15 K/UL — SIGNIFICANT CHANGE UP (ref 0–0.7)
EOSINOPHIL NFR BLD AUTO: 3.2 % — SIGNIFICANT CHANGE UP (ref 0–8)
ESTIMATED AVERAGE GLUCOSE: 97 MG/DL — SIGNIFICANT CHANGE UP (ref 68–114)
GLUCOSE BLDC GLUCOMTR-MCNC: 101 MG/DL — HIGH (ref 70–99)
GLUCOSE BLDC GLUCOMTR-MCNC: 114 MG/DL — HIGH (ref 70–99)
GLUCOSE BLDC GLUCOMTR-MCNC: 141 MG/DL — HIGH (ref 70–99)
GLUCOSE BLDC GLUCOMTR-MCNC: 172 MG/DL — HIGH (ref 70–99)
GLUCOSE BLDC GLUCOMTR-MCNC: 85 MG/DL — SIGNIFICANT CHANGE UP (ref 70–99)
GLUCOSE SERPL-MCNC: 88 MG/DL — SIGNIFICANT CHANGE UP (ref 70–99)
HBA1C BLD-MCNC: 5 % — SIGNIFICANT CHANGE UP (ref 4–5.6)
HCT VFR BLD CALC: 37.5 % — LOW (ref 42–52)
HGB BLD-MCNC: 13.2 G/DL — LOW (ref 14–18)
IMM GRANULOCYTES NFR BLD AUTO: 0.2 % — SIGNIFICANT CHANGE UP (ref 0.1–0.3)
INR BLD: 1.48 RATIO — HIGH (ref 0.65–1.3)
LYMPHOCYTES # BLD AUTO: 1.04 K/UL — LOW (ref 1.2–3.4)
LYMPHOCYTES # BLD AUTO: 22.3 % — SIGNIFICANT CHANGE UP (ref 20.5–51.1)
MAGNESIUM SERPL-MCNC: 1.7 MG/DL — LOW (ref 1.8–2.4)
MCHC RBC-ENTMCNC: 35.2 G/DL — SIGNIFICANT CHANGE UP (ref 32–37)
MCHC RBC-ENTMCNC: 36.7 PG — HIGH (ref 27–31)
MCV RBC AUTO: 104.2 FL — HIGH (ref 80–94)
MONOCYTES # BLD AUTO: 0.53 K/UL — SIGNIFICANT CHANGE UP (ref 0.1–0.6)
MONOCYTES NFR BLD AUTO: 11.3 % — HIGH (ref 1.7–9.3)
NEUTROPHILS # BLD AUTO: 2.9 K/UL — SIGNIFICANT CHANGE UP (ref 1.4–6.5)
NEUTROPHILS NFR BLD AUTO: 62.1 % — SIGNIFICANT CHANGE UP (ref 42.2–75.2)
NRBC # BLD: 0 /100 WBCS — SIGNIFICANT CHANGE UP (ref 0–0)
PLATELET # BLD AUTO: 128 K/UL — LOW (ref 130–400)
POTASSIUM SERPL-MCNC: 3.8 MMOL/L — SIGNIFICANT CHANGE UP (ref 3.5–5)
POTASSIUM SERPL-SCNC: 3.8 MMOL/L — SIGNIFICANT CHANGE UP (ref 3.5–5)
PROT SERPL-MCNC: 7.1 G/DL — SIGNIFICANT CHANGE UP (ref 6–8)
PROTHROM AB SERPL-ACNC: 16.9 SEC — HIGH (ref 9.95–12.87)
RBC # BLD: 3.6 M/UL — LOW (ref 4.7–6.1)
RBC # FLD: 13.3 % — SIGNIFICANT CHANGE UP (ref 11.5–14.5)
SODIUM SERPL-SCNC: 146 MMOL/L — SIGNIFICANT CHANGE UP (ref 135–146)
WBC # BLD: 4.67 K/UL — LOW (ref 4.8–10.8)
WBC # FLD AUTO: 4.67 K/UL — LOW (ref 4.8–10.8)

## 2019-07-13 PROCEDURE — 99233 SBSQ HOSP IP/OBS HIGH 50: CPT

## 2019-07-13 RX ORDER — MAGNESIUM SULFATE 500 MG/ML
2 VIAL (ML) INJECTION ONCE
Refills: 0 | Status: COMPLETED | OUTPATIENT
Start: 2019-07-13 | End: 2019-07-13

## 2019-07-13 RX ORDER — SODIUM CHLORIDE 9 MG/ML
1000 INJECTION, SOLUTION INTRAVENOUS
Refills: 0 | Status: DISCONTINUED | OUTPATIENT
Start: 2019-07-13 | End: 2019-07-13

## 2019-07-13 RX ORDER — LACTULOSE 10 G/15ML
20 SOLUTION ORAL EVERY 8 HOURS
Refills: 0 | Status: DISCONTINUED | OUTPATIENT
Start: 2019-07-13 | End: 2019-07-14

## 2019-07-13 RX ADMIN — LISINOPRIL 20 MILLIGRAM(S): 2.5 TABLET ORAL at 05:16

## 2019-07-13 RX ADMIN — Medication 0.6 MILLIGRAM(S): at 11:50

## 2019-07-13 RX ADMIN — METFORMIN HYDROCHLORIDE 1000 MILLIGRAM(S): 850 TABLET ORAL at 05:16

## 2019-07-13 RX ADMIN — SERTRALINE 100 MILLIGRAM(S): 25 TABLET, FILM COATED ORAL at 05:16

## 2019-07-13 RX ADMIN — Medication 50 GRAM(S): at 12:01

## 2019-07-13 RX ADMIN — LACTULOSE 20 GRAM(S): 10 SOLUTION ORAL at 15:14

## 2019-07-13 RX ADMIN — METFORMIN HYDROCHLORIDE 1000 MILLIGRAM(S): 850 TABLET ORAL at 17:52

## 2019-07-13 RX ADMIN — Medication 1: at 16:58

## 2019-07-13 RX ADMIN — PANTOPRAZOLE SODIUM 40 MILLIGRAM(S): 20 TABLET, DELAYED RELEASE ORAL at 05:18

## 2019-07-13 RX ADMIN — LACTULOSE 200 GRAM(S): 10 SOLUTION ORAL at 05:15

## 2019-07-13 RX ADMIN — LACTULOSE 200 GRAM(S): 10 SOLUTION ORAL at 00:05

## 2019-07-13 RX ADMIN — SERTRALINE 100 MILLIGRAM(S): 25 TABLET, FILM COATED ORAL at 17:52

## 2019-07-13 RX ADMIN — Medication 100 MILLIGRAM(S): at 11:50

## 2019-07-13 RX ADMIN — Medication 81 MILLIGRAM(S): at 11:51

## 2019-07-13 RX ADMIN — ENOXAPARIN SODIUM 40 MILLIGRAM(S): 100 INJECTION SUBCUTANEOUS at 11:55

## 2019-07-13 RX ADMIN — PANTOPRAZOLE SODIUM 40 MILLIGRAM(S): 20 TABLET, DELAYED RELEASE ORAL at 00:06

## 2019-07-13 NOTE — PROGRESS NOTE ADULT - PROBLEM SELECTOR PLAN 1
likely 2/2 hepatic encephalopathy   significantly improved since yesterday   start po lactulose  Gastroenterology C/S  - RUQ Sono - cirrhosis  -resume all po meds today

## 2019-07-13 NOTE — PROGRESS NOTE ADULT - SUBJECTIVE AND OBJECTIVE BOX
STACY CARDENAS  73y  Male      Patient is a 73y old  Male who presents with AMS x 1 day (12 Jul 2019 10:41)      INTERVAL HPI/OVERNIGHT EVENTS:  Patient seen and examined earlier this morning.  Wife and daughter bedside.  Pt is significantly improved.  Lying comfortably in bed.  Eating well and asking to walk around.  No complaints.       REVIEW OF SYSTEMS:  CONSTITUTIONAL: No fever, weight loss, or fatigue  EYES: No eye pain, visual disturbances, or discharge  ENMT:  No difficulty hearing, tinnitus, vertigo; No sinus or throat pain  NECK: No pain or stiffness  RESPIRATORY: No cough, wheezing, chills or hemoptysis; No shortness of breath  CARDIOVASCULAR: No chest pain, palpitations, dizziness, or leg swelling  GASTROINTESTINAL: No abdominal or epigastric pain. No nausea, vomiting, or hematemesis; No diarrhea or constipation. No melena or hematochezia.  GENITOURINARY: No dysuria, frequency, hematuria, or incontinence  NEUROLOGICAL: No headaches, memory loss, loss of strength, numbness, or tremors  SKIN: No itching, burning, rashes, or lesions   LYMPH NODES: No enlarged glands  ENDOCRINE: No heat or cold intolerance; No hair loss  MUSCULOSKELETAL: No joint pain or swelling; No muscle, back, or extremity pain  PSYCHIATRIC: No depression, anxiety, mood swings, or difficulty sleeping  HEME/LYMPH: No easy bruising, or bleeding gums  ALLERY AND IMMUNOLOGIC: No hives or eczema    T(C): 36.7 (07-13-19 @ 05:15), Max: 37.2 (07-12-19 @ 13:03)  HR: 91 (07-13-19 @ 05:15) (86 - 94)  BP: 137/61 (07-13-19 @ 05:15) (115/57 - 176/72)  RR: 16 (07-13-19 @ 05:15) (16 - 20)  SpO2: 100% (07-12-19 @ 15:00) (99% - 100%)    PHYSICAL EXAM:  GENERAL: NAD, well-groomed, well-developed  HEAD:  Atraumatic, Normocephalic  EYES: EOMI, PERRLA, conjunctiva and sclera clear  ENMT: No tonsillar erythema, exudates, or enlargement; Moist mucous membranes, Good dentition, No lesions  NECK: Supple, No JVD, Normal thyroid  NERVOUS SYSTEM:  Alert & Oriented X3, Good concentration; Motor Strength 5/5 B/L upper and lower extremities; DTRs 2+ intact and symmetric  CHEST/LUNG: Clear to percussion bilaterally; No rales, rhonchi, wheezing, or rubs  HEART: Regular rate and rhythm; No murmurs, rubs, or gallops  ABDOMEN: Soft, Nontender, Nondistended; Bowel sounds present  EXTREMITIES:  2+ Peripheral Pulses, No clubbing, cyanosis, or edema  LYMPH: No lymphadenopathy noted  SKIN: No rashes or lesions    Consultant(s) Notes Reviewed:  [x ] YES  [ ] NO  Care Discussed with Consultants/Other Providers [ x] YES  [ ] NO    LAB:                        13.2   4.67  )-----------( 128      ( 13 Jul 2019 08:18 )             37.5     07-13    146  |  109  |  18  ----------------------------<  88  3.8   |  23  |  0.8    Ca    8.8      13 Jul 2019 08:18  Mg     1.7     07-13    TPro  7.1  /  Alb  3.4<L>  /  TBili  2.0<H>  /  DBili  x   /  AST  40  /  ALT  26  /  AlkPhos  91  07-13    LIVER FUNCTIONS - ( 13 Jul 2019 08:18 )  Alb: 3.4 g/dL / Pro: 7.1 g/dL / ALK PHOS: 91 U/L / ALT: 26 U/L / AST: 40 U/L / GGT: x           CARDIAC MARKERS ( 12 Jul 2019 05:30 )  x     / <0.01 ng/mL / x     / x     / x          Drug Dosing Weight  Height (cm): 154.94 (12 Jul 2019 14:36)  Weight (kg): 81 (12 Jul 2019 14:36)  BMI (kg/m2): 33.7 (12 Jul 2019 14:36)  BSA (m2): 1.8 (12 Jul 2019 14:36)        CAPILLARY BLOOD GLUCOSE  POCT Blood Glucose.: 85 mg/dL (13 Jul 2019 07:51)  POCT Blood Glucose.: 141 mg/dL (13 Jul 2019 01:41)  POCT Blood Glucose.: 86 mg/dL (12 Jul 2019 21:03)  POCT Blood Glucose.: 70 mg/dL (12 Jul 2019 16:25)  POCT Blood Glucose.: 92 mg/dL (12 Jul 2019 12:22)  POCT Blood Glucose.: 79 mg/dL (12 Jul 2019 11:31)        RADIOLOGY & ADDITIONAL TESTS:  Imaging Personally Reviewed:  [x] YES  [ ] NO    HEALTH ISSUES - PROBLEM Dx:  Depression: Depression  Benign essential HTN: Benign essential HTN  Diabetes: Diabetes  Hypomagnesemia: Hypomagnesemia  Altered mental status: Altered mental status          MEDS:  aspirin  chewable 81 milliGRAM(s) Oral daily  colchicine 0.6 milliGRAM(s) Oral daily  dextrose 40% Gel 15 Gram(s) Oral once PRN  dextrose 5%. 1000 milliLiter(s) IV Continuous <Continuous>  dextrose 50% Injectable 12.5 Gram(s) IV Push once  dextrose 50% Injectable 25 Gram(s) IV Push once  dextrose 50% Injectable 25 Gram(s) IV Push once  enoxaparin Injectable 40 milliGRAM(s) SubCutaneous every 24 hours  glucagon  Injectable 1 milliGRAM(s) IntraMuscular once PRN  insulin lispro (HumaLOG) corrective regimen sliding scale   SubCutaneous three times a day before meals  lactulose Syrup 20 Gram(s) Oral every 8 hours  lisinopril 20 milliGRAM(s) Oral daily  magnesium sulfate  IVPB 2 Gram(s) IV Intermittent once  metFORMIN 1000 milliGRAM(s) Oral two times a day  pantoprazole    Tablet 40 milliGRAM(s) Oral before breakfast  pyridoxine 100 milliGRAM(s) Oral daily  rifaximin 550 milliGRAM(s) Oral two times a day  sertraline 100 milliGRAM(s) Oral two times a day      Progress Note Handoff  Pending Consults: none  Pending Tests: none  Pending Results: none  Family Discussion: discussed with pt's family -anticipated for am if continues to improve  Disposition: Home_x____/SNF______/Other_____/Unknown at this time_____    Please call me with any questions at extension [phone]

## 2019-07-14 ENCOUNTER — TRANSCRIPTION ENCOUNTER (OUTPATIENT)
Age: 74
End: 2019-07-14

## 2019-07-14 VITALS
HEART RATE: 76 BPM | SYSTOLIC BLOOD PRESSURE: 157 MMHG | TEMPERATURE: 98 F | DIASTOLIC BLOOD PRESSURE: 64 MMHG | RESPIRATION RATE: 16 BRPM

## 2019-07-14 LAB
APPEARANCE UR: CLEAR — SIGNIFICANT CHANGE UP
BACTERIA # UR AUTO: ABNORMAL
BILIRUB UR-MCNC: ABNORMAL
COD CRY URNS QL: ABNORMAL
COLOR SPEC: YELLOW — SIGNIFICANT CHANGE UP
DIFF PNL FLD: NEGATIVE — SIGNIFICANT CHANGE UP
EPI CELLS # UR: ABNORMAL /HPF
GLUCOSE BLDC GLUCOMTR-MCNC: 134 MG/DL — HIGH (ref 70–99)
GLUCOSE BLDC GLUCOMTR-MCNC: 94 MG/DL — SIGNIFICANT CHANGE UP (ref 70–99)
GLUCOSE UR QL: NEGATIVE MG/DL — SIGNIFICANT CHANGE UP
HCV AB S/CO SERPL IA: 0.11 S/CO — SIGNIFICANT CHANGE UP (ref 0–0.99)
HCV AB SERPL-IMP: SIGNIFICANT CHANGE UP
KETONES UR-MCNC: 15
LEUKOCYTE ESTERASE UR-ACNC: NEGATIVE — SIGNIFICANT CHANGE UP
NITRITE UR-MCNC: NEGATIVE — SIGNIFICANT CHANGE UP
PH UR: 5.5 — SIGNIFICANT CHANGE UP (ref 5–8)
PROT UR-MCNC: ABNORMAL MG/DL
SP GR SPEC: >=1.03 (ref 1.01–1.03)
UROBILINOGEN FLD QL: 0.2 MG/DL — SIGNIFICANT CHANGE UP (ref 0.2–0.2)
WBC UR QL: SIGNIFICANT CHANGE UP /HPF

## 2019-07-14 PROCEDURE — 99239 HOSP IP/OBS DSCHRG MGMT >30: CPT

## 2019-07-14 RX ORDER — LACTULOSE 10 G/15ML
30 SOLUTION ORAL
Qty: 2700 | Refills: 0
Start: 2019-07-14 | End: 2019-08-12

## 2019-07-14 RX ADMIN — METFORMIN HYDROCHLORIDE 1000 MILLIGRAM(S): 850 TABLET ORAL at 06:05

## 2019-07-14 RX ADMIN — LISINOPRIL 20 MILLIGRAM(S): 2.5 TABLET ORAL at 06:05

## 2019-07-14 RX ADMIN — Medication 0.6 MILLIGRAM(S): at 11:18

## 2019-07-14 RX ADMIN — Medication 100 MILLIGRAM(S): at 11:19

## 2019-07-14 RX ADMIN — Medication 81 MILLIGRAM(S): at 11:18

## 2019-07-14 RX ADMIN — LACTULOSE 20 GRAM(S): 10 SOLUTION ORAL at 06:05

## 2019-07-14 RX ADMIN — PANTOPRAZOLE SODIUM 40 MILLIGRAM(S): 20 TABLET, DELAYED RELEASE ORAL at 06:05

## 2019-07-14 RX ADMIN — ENOXAPARIN SODIUM 40 MILLIGRAM(S): 100 INJECTION SUBCUTANEOUS at 11:19

## 2019-07-14 RX ADMIN — SERTRALINE 100 MILLIGRAM(S): 25 TABLET, FILM COATED ORAL at 06:05

## 2019-07-14 NOTE — DISCHARGE NOTE PROVIDER - NSDCCPCAREPLAN_GEN_ALL_CORE_FT
PRINCIPAL DISCHARGE DIAGNOSIS  Diagnosis: Hepatic encephalopathy  Assessment and Plan of Treatment: take medication as prescribed  follow up with your liver specialist

## 2019-07-14 NOTE — DISCHARGE NOTE NURSING/CASE MANAGEMENT/SOCIAL WORK - NSDCDPATPORTLINK_GEN_ALL_CORE
You can access the [facility] Portal, offered by [facility], by registering with the following website: [email or web address]

## 2019-07-14 NOTE — DISCHARGE NOTE PROVIDER - CARE PROVIDER_API CALL
liver specialist,   Phone: (   )    -  Fax: (   )    -  Follow Up Time: 1 week    Adam Kirkland (DO)  Infectious Disease; Internal Medicine  [address] [place] [address]  Phone: [phone]  Fax: [phone]  Follow Up Time: 1 week

## 2019-07-14 NOTE — DISCHARGE NOTE PROVIDER - PROVIDER TOKENS
FREE:[LAST:[liver specialist],PHONE:[(   )    -],FAX:[(   )    -],FOLLOWUP:[1 week]],PROVIDER:[TOKEN:[[id number]],FOLLOWUP:[1 week]]

## 2019-07-14 NOTE — DISCHARGE NOTE PROVIDER - HOSPITAL COURSE
Patient BIBA 2/2 [facility] first noticed this AM.  Wife reports her  was in his usual state of health last night before he went to bed at 2030.  She reports she woke up and checked on him at 0400 and noticed him on the floor.  No witnessed fall.  He was unarousable at that time and so EMS was activated and the patient was transported to the ED.  The family reports the patient has had bouts of confusion for several months, most pronounced was approx 7/4 were he was confused x 2 days then spontaneously returned to his baseline.  He has seen multiple specialists inlcuding Neurology and GI and recently diagnosed with Liver Cirrhosis of unknown etiology.  Patient is drowsy but arousable at the bedside.  He does not answer questions.  Wife reports no other symptoms in the preceding days.  Denies Fever/Chill.  No cough/SOB.  No abdominal complaints of pain n/v/d, last BM was yesterday but not checked.  No urinary complaints.  Family does report he has had intermittent bowel and bladder incontinence but the wife states its because the patient can not make it to the bathroom in time.          Patient was given lactulose and rifaxamine with significant improvement.  His ammonia level went from 161 to 41 with return to baseline of mental status.    Pt ambulated with the staff, tolerated a diet, and is stable for d/ c home today.     D/C planning took over 57 min

## 2019-07-14 NOTE — PROGRESS NOTE ADULT - PROBLEM SELECTOR PLAN 1
likely 2/2 hepatic encephalopathy   resolved  on lactulose  Gastroenterology follow up as outpt  - RUQ Sono - cirrhosis

## 2019-07-14 NOTE — PROGRESS NOTE ADULT - SUBJECTIVE AND OBJECTIVE BOX
STACY CARDENAS  73y  Male      Patient is a 73y old  Male who presents with AMS x 1 day (12 Jul 2019 10:41)      INTERVAL HPI/OVERNIGHT EVENTS:  Patient seen and examined earlier this morning.  Discussed with pt's wife. Pt is significantly improved.  Sitting comfortably in chair.  Eating well and asking to walk around; ambulated with RN without any issues.  No complaints.       REVIEW OF SYSTEMS:  CONSTITUTIONAL: No fever, weight loss, or fatigue  EYES: No eye pain, visual disturbances, or discharge  ENMT:  No difficulty hearing, tinnitus, vertigo; No sinus or throat pain  NECK: No pain or stiffness  RESPIRATORY: No cough, wheezing, chills or hemoptysis; No shortness of breath  CARDIOVASCULAR: No chest pain, palpitations, dizziness, or leg swelling  GASTROINTESTINAL: No abdominal or epigastric pain. No nausea, vomiting, or hematemesis; No diarrhea or constipation. No melena or hematochezia.  GENITOURINARY: No dysuria, frequency, hematuria, or incontinence  NEUROLOGICAL: No headaches, memory loss, loss of strength, numbness, or tremors  SKIN: No itching, burning, rashes, or lesions   LYMPH NODES: No enlarged glands  ENDOCRINE: No heat or cold intolerance; No hair loss  MUSCULOSKELETAL: No joint pain or swelling; No muscle, back, or extremity pain  PSYCHIATRIC: No depression, anxiety, mood swings, or difficulty sleeping  HEME/LYMPH: No easy bruising, or bleeding gums  ALLERY AND IMMUNOLOGIC: No hives or eczema    Vital Signs Last 24 Hrs  T(C): 36.7 (14 Jul 2019 05:16), Max: 36.7 (14 Jul 2019 05:16)  T(F): 98 (14 Jul 2019 05:16), Max: 98 (14 Jul 2019 05:16)  HR: 76 (14 Jul 2019 05:16) (76 - 86)  BP: 157/64 (14 Jul 2019 05:16) (125/60 - 157/64)  BP(mean): --  RR: 16 (14 Jul 2019 05:16) (16 - 18)  SpO2: --    PHYSICAL EXAM:  GENERAL: NAD, well-groomed, well-developed  HEAD:  Atraumatic, Normocephalic  EYES: EOMI, PERRLA, conjunctiva and sclera clear  ENMT: No tonsillar erythema, exudates, or enlargement; Moist mucous membranes, Good dentition, No lesions  NECK: Supple, No JVD, Normal thyroid  NERVOUS SYSTEM:  Alert & Oriented X3, Good concentration; Motor Strength 5/5 B/L upper and lower extremities; DTRs 2+ intact and symmetric  CHEST/LUNG: Clear to percussion bilaterally; No rales, rhonchi, wheezing, or rubs  HEART: Regular rate and rhythm; No murmurs, rubs, or gallops  ABDOMEN: Soft, Nontender, Nondistended; Bowel sounds present  EXTREMITIES:  2+ Peripheral Pulses, No clubbing, cyanosis, or edema  LYMPH: No lymphadenopathy noted  SKIN: No rashes or lesions    Consultant(s) Notes Reviewed:  [x ] YES  [ ] NO  Care Discussed with Consultants/Other Providers [ x] YES  [ ] NO    LAB:  AMMONIA = 41                        13.2   4.67  )-----------( 128      ( 13 Jul 2019 08:18 )             37.5     07-13    146  |  109  |  18  ----------------------------<  88  3.8   |  23  |  0.8    Ca    8.8      13 Jul 2019 08:18  Mg     1.7     07-13    TPro  7.1  /  Alb  3.4<L>  /  TBili  2.0<H>  /  DBili  x   /  AST  40  /  ALT  26  /  AlkPhos  91  07-13    LIVER FUNCTIONS - ( 13 Jul 2019 08:18 )  Alb: 3.4 g/dL / Pro: 7.1 g/dL / ALK PHOS: 91 U/L / ALT: 26 U/L / AST: 40 U/L / GGT: x           CARDIAC MARKERS ( 12 Jul 2019 05:30 )  x     / <0.01 ng/mL / x     / x     / x          Drug Dosing Weight  Height (cm): 154.94 (12 Jul 2019 14:36)  Weight (kg): 81 (12 Jul 2019 14:36)  BMI (kg/m2): 33.7 (12 Jul 2019 14:36)  BSA (m2): 1.8 (12 Jul 2019 14:36)      CAPILLARY BLOOD GLUCOSE  POCT Blood Glucose.: 94 mg/dL (14 Jul 2019 07:53)  POCT Blood Glucose.: 114 mg/dL (13 Jul 2019 21:31)  POCT Blood Glucose.: 172 mg/dL (13 Jul 2019 16:47)  POCT Blood Glucose.: 101 mg/dL (13 Jul 2019 11:32)        RADIOLOGY & ADDITIONAL TESTS:  Imaging Personally Reviewed:  [x] YES  [ ] NO    HEALTH ISSUES - PROBLEM Dx:  Depression: Depression  Benign essential HTN: Benign essential HTN  Diabetes: Diabetes  Hypomagnesemia: Hypomagnesemia  Altered mental status: Altered mental status    MEDICATIONS  (STANDING):  aspirin  chewable 81 milliGRAM(s) Oral daily  colchicine 0.6 milliGRAM(s) Oral daily  dextrose 5%. 1000 milliLiter(s) (50 mL/Hr) IV Continuous <Continuous>  dextrose 50% Injectable 12.5 Gram(s) IV Push once  dextrose 50% Injectable 25 Gram(s) IV Push once  dextrose 50% Injectable 25 Gram(s) IV Push once  enoxaparin Injectable 40 milliGRAM(s) SubCutaneous every 24 hours  insulin lispro (HumaLOG) corrective regimen sliding scale   SubCutaneous three times a day before meals  lactulose Syrup 20 Gram(s) Oral every 8 hours  lisinopril 20 milliGRAM(s) Oral daily  metFORMIN 1000 milliGRAM(s) Oral two times a day  pantoprazole    Tablet 40 milliGRAM(s) Oral before breakfast  pyridoxine 100 milliGRAM(s) Oral daily  rifaximin 550 milliGRAM(s) Oral two times a day  sertraline 100 milliGRAM(s) Oral two times a day    MEDICATIONS  (PRN):  dextrose 40% Gel 15 Gram(s) Oral once PRN Blood Glucose LESS THAN 70 milliGRAM(s)/deciliter  glucagon  Injectable 1 milliGRAM(s) IntraMuscular once PRN Glucose LESS THAN 70 milligrams/deciliter      Progress Note Handoff  Pending Consults: none  Pending Tests: none  Pending Results: none  Family Discussion: discussed with pt's wife - in agreement with discharge   Disposition: Home_x____/SNF______/Other_____/Unknown at this time_____    Please call me with any questions at extension [phone]

## 2019-07-15 LAB
AMPHET UR-MCNC: NEGATIVE — SIGNIFICANT CHANGE UP
BARBITURATES UR SCN-MCNC: NEGATIVE — SIGNIFICANT CHANGE UP
BENZODIAZ UR-MCNC: NEGATIVE — SIGNIFICANT CHANGE UP
COCAINE METAB.OTHER UR-MCNC: NEGATIVE — SIGNIFICANT CHANGE UP
CULTURE RESULTS: NO GROWTH — SIGNIFICANT CHANGE UP
METHADONE UR-MCNC: NEGATIVE — SIGNIFICANT CHANGE UP
OPIATES UR-MCNC: NEGATIVE — SIGNIFICANT CHANGE UP
PCP SPEC-MCNC: SIGNIFICANT CHANGE UP
PROPOXYPHENE QUALITATIVE URINE RESULT: NEGATIVE — SIGNIFICANT CHANGE UP
SPECIMEN SOURCE: SIGNIFICANT CHANGE UP

## 2019-07-17 ENCOUNTER — OUTPATIENT (OUTPATIENT)
Dept: OUTPATIENT SERVICES | Facility: HOSPITAL | Age: 74
LOS: 1 days | Discharge: HOME | End: 2019-07-17
Payer: MEDICARE

## 2019-07-17 DIAGNOSIS — H26.9 UNSPECIFIED CATARACT: Chronic | ICD-10-CM

## 2019-07-17 DIAGNOSIS — G20 PARKINSON'S DISEASE: ICD-10-CM

## 2019-07-17 DIAGNOSIS — Z90.49 ACQUIRED ABSENCE OF OTHER SPECIFIED PARTS OF DIGESTIVE TRACT: Chronic | ICD-10-CM

## 2019-07-17 DIAGNOSIS — Z98.890 OTHER SPECIFIED POSTPROCEDURAL STATES: Chronic | ICD-10-CM

## 2019-07-17 DIAGNOSIS — G25.0 ESSENTIAL TREMOR: ICD-10-CM

## 2019-07-17 LAB
CULTURE RESULTS: SIGNIFICANT CHANGE UP
CULTURE RESULTS: SIGNIFICANT CHANGE UP
SPECIMEN SOURCE: SIGNIFICANT CHANGE UP
SPECIMEN SOURCE: SIGNIFICANT CHANGE UP

## 2019-07-17 PROCEDURE — 78607: CPT | Mod: 26

## 2019-07-18 DIAGNOSIS — I10 ESSENTIAL (PRIMARY) HYPERTENSION: ICD-10-CM

## 2019-07-18 DIAGNOSIS — F32.9 MAJOR DEPRESSIVE DISORDER, SINGLE EPISODE, UNSPECIFIED: ICD-10-CM

## 2019-07-18 DIAGNOSIS — Z79.84 LONG TERM (CURRENT) USE OF ORAL HYPOGLYCEMIC DRUGS: ICD-10-CM

## 2019-07-18 DIAGNOSIS — K74.60 UNSPECIFIED CIRRHOSIS OF LIVER: ICD-10-CM

## 2019-07-18 DIAGNOSIS — E11.9 TYPE 2 DIABETES MELLITUS WITHOUT COMPLICATIONS: ICD-10-CM

## 2019-07-18 DIAGNOSIS — E83.42 HYPOMAGNESEMIA: ICD-10-CM

## 2019-07-18 DIAGNOSIS — M19.90 UNSPECIFIED OSTEOARTHRITIS, UNSPECIFIED SITE: ICD-10-CM

## 2019-07-18 DIAGNOSIS — R41.82 ALTERED MENTAL STATUS, UNSPECIFIED: ICD-10-CM

## 2019-07-18 DIAGNOSIS — F03.90 UNSPECIFIED DEMENTIA WITHOUT BEHAVIORAL DISTURBANCE: ICD-10-CM

## 2019-07-18 DIAGNOSIS — H26.9 UNSPECIFIED CATARACT: ICD-10-CM

## 2019-07-18 DIAGNOSIS — K72.90 HEPATIC FAILURE, UNSPECIFIED WITHOUT COMA: ICD-10-CM

## 2019-07-18 DIAGNOSIS — M10.9 GOUT, UNSPECIFIED: ICD-10-CM

## 2019-07-19 PROBLEM — I10 ESSENTIAL (PRIMARY) HYPERTENSION: Chronic | Status: ACTIVE | Noted: 2019-07-12

## 2019-07-19 PROBLEM — M10.9 GOUT, UNSPECIFIED: Chronic | Status: ACTIVE | Noted: 2019-07-12

## 2019-07-19 PROBLEM — M19.90 UNSPECIFIED OSTEOARTHRITIS, UNSPECIFIED SITE: Chronic | Status: ACTIVE | Noted: 2019-07-12

## 2019-07-21 ENCOUNTER — OUTPATIENT (OUTPATIENT)
Dept: OUTPATIENT SERVICES | Facility: HOSPITAL | Age: 74
LOS: 1 days | Discharge: HOME | End: 2019-07-21
Payer: MEDICARE

## 2019-07-21 DIAGNOSIS — H26.9 UNSPECIFIED CATARACT: Chronic | ICD-10-CM

## 2019-07-21 DIAGNOSIS — K74.60 UNSPECIFIED CIRRHOSIS OF LIVER: ICD-10-CM

## 2019-07-21 DIAGNOSIS — Z90.49 ACQUIRED ABSENCE OF OTHER SPECIFIED PARTS OF DIGESTIVE TRACT: Chronic | ICD-10-CM

## 2019-07-21 DIAGNOSIS — Z98.890 OTHER SPECIFIED POSTPROCEDURAL STATES: Chronic | ICD-10-CM

## 2019-07-21 PROCEDURE — 74183 MRI ABD W/O CNTR FLWD CNTR: CPT | Mod: 26

## 2019-10-10 ENCOUNTER — INPATIENT (INPATIENT)
Facility: HOSPITAL | Age: 74
LOS: 6 days | Discharge: HOME HEALTH | End: 2019-10-17
Attending: SURGERY | Admitting: SURGERY
Payer: MEDICARE

## 2019-10-10 VITALS
DIASTOLIC BLOOD PRESSURE: 69 MMHG | RESPIRATION RATE: 17 BRPM | SYSTOLIC BLOOD PRESSURE: 150 MMHG | TEMPERATURE: 97 F | OXYGEN SATURATION: 95 % | HEART RATE: 98 BPM

## 2019-10-10 DIAGNOSIS — Z98.890 OTHER SPECIFIED POSTPROCEDURAL STATES: Chronic | ICD-10-CM

## 2019-10-10 DIAGNOSIS — Z90.49 ACQUIRED ABSENCE OF OTHER SPECIFIED PARTS OF DIGESTIVE TRACT: Chronic | ICD-10-CM

## 2019-10-10 DIAGNOSIS — H26.9 UNSPECIFIED CATARACT: Chronic | ICD-10-CM

## 2019-10-10 LAB
ALBUMIN SERPL ELPH-MCNC: 3.4 G/DL — LOW (ref 3.5–5.2)
ALP SERPL-CCNC: 92 U/L — SIGNIFICANT CHANGE UP (ref 30–115)
ALT FLD-CCNC: 34 U/L — SIGNIFICANT CHANGE UP (ref 0–41)
ANION GAP SERPL CALC-SCNC: 14 MMOL/L — SIGNIFICANT CHANGE UP (ref 7–14)
APTT BLD: 30.5 SEC — SIGNIFICANT CHANGE UP (ref 27–39.2)
AST SERPL-CCNC: 54 U/L — HIGH (ref 0–41)
BASOPHILS # BLD AUTO: 0.03 K/UL — SIGNIFICANT CHANGE UP (ref 0–0.2)
BASOPHILS NFR BLD AUTO: 0.4 % — SIGNIFICANT CHANGE UP (ref 0–1)
BILIRUB SERPL-MCNC: 2.1 MG/DL — HIGH (ref 0.2–1.2)
BLD GP AB SCN SERPL QL: SIGNIFICANT CHANGE UP
BUN SERPL-MCNC: 17 MG/DL — SIGNIFICANT CHANGE UP (ref 10–20)
CALCIUM SERPL-MCNC: 8.8 MG/DL — SIGNIFICANT CHANGE UP (ref 8.5–10.1)
CHLORIDE SERPL-SCNC: 102 MMOL/L — SIGNIFICANT CHANGE UP (ref 98–110)
CO2 SERPL-SCNC: 22 MMOL/L — SIGNIFICANT CHANGE UP (ref 17–32)
CREAT SERPL-MCNC: 0.7 MG/DL — SIGNIFICANT CHANGE UP (ref 0.7–1.5)
EOSINOPHIL # BLD AUTO: 0.08 K/UL — SIGNIFICANT CHANGE UP (ref 0–0.7)
EOSINOPHIL NFR BLD AUTO: 1 % — SIGNIFICANT CHANGE UP (ref 0–8)
GLUCOSE SERPL-MCNC: 186 MG/DL — HIGH (ref 70–99)
HCT VFR BLD CALC: 35.4 % — LOW (ref 42–52)
HGB BLD-MCNC: 12.4 G/DL — LOW (ref 14–18)
IMM GRANULOCYTES NFR BLD AUTO: 0.5 % — HIGH (ref 0.1–0.3)
INR BLD: 1.36 RATIO — HIGH (ref 0.65–1.3)
LACTATE SERPL-SCNC: 2.9 MMOL/L — HIGH (ref 0.5–2.2)
LIDOCAIN IGE QN: 22 U/L — SIGNIFICANT CHANGE UP (ref 7–60)
LYMPHOCYTES # BLD AUTO: 1.47 K/UL — SIGNIFICANT CHANGE UP (ref 1.2–3.4)
LYMPHOCYTES # BLD AUTO: 18.7 % — LOW (ref 20.5–51.1)
MCHC RBC-ENTMCNC: 35 G/DL — SIGNIFICANT CHANGE UP (ref 32–37)
MCHC RBC-ENTMCNC: 36.3 PG — HIGH (ref 27–31)
MCV RBC AUTO: 103.5 FL — HIGH (ref 80–94)
MONOCYTES # BLD AUTO: 0.94 K/UL — HIGH (ref 0.1–0.6)
MONOCYTES NFR BLD AUTO: 11.9 % — HIGH (ref 1.7–9.3)
NEUTROPHILS # BLD AUTO: 5.32 K/UL — SIGNIFICANT CHANGE UP (ref 1.4–6.5)
NEUTROPHILS NFR BLD AUTO: 67.5 % — SIGNIFICANT CHANGE UP (ref 42.2–75.2)
NRBC # BLD: 0 /100 WBCS — SIGNIFICANT CHANGE UP (ref 0–0)
PLATELET # BLD AUTO: 128 K/UL — LOW (ref 130–400)
POTASSIUM SERPL-MCNC: 4.4 MMOL/L — SIGNIFICANT CHANGE UP (ref 3.5–5)
POTASSIUM SERPL-SCNC: 4.4 MMOL/L — SIGNIFICANT CHANGE UP (ref 3.5–5)
PROT SERPL-MCNC: 7.4 G/DL — SIGNIFICANT CHANGE UP (ref 6–8)
PROTHROM AB SERPL-ACNC: 15.6 SEC — HIGH (ref 9.95–12.87)
RBC # BLD: 3.42 M/UL — LOW (ref 4.7–6.1)
RBC # FLD: 14.5 % — SIGNIFICANT CHANGE UP (ref 11.5–14.5)
SODIUM SERPL-SCNC: 138 MMOL/L — SIGNIFICANT CHANGE UP (ref 135–146)
WBC # BLD: 7.88 K/UL — SIGNIFICANT CHANGE UP (ref 4.8–10.8)
WBC # FLD AUTO: 7.88 K/UL — SIGNIFICANT CHANGE UP (ref 4.8–10.8)

## 2019-10-10 PROCEDURE — 71260 CT THORAX DX C+: CPT | Mod: 26

## 2019-10-10 PROCEDURE — 72125 CT NECK SPINE W/O DYE: CPT | Mod: 26

## 2019-10-10 PROCEDURE — 32551 INSERTION OF CHEST TUBE: CPT

## 2019-10-10 PROCEDURE — 93010 ELECTROCARDIOGRAM REPORT: CPT

## 2019-10-10 PROCEDURE — 99223 1ST HOSP IP/OBS HIGH 75: CPT | Mod: 25

## 2019-10-10 PROCEDURE — 99291 CRITICAL CARE FIRST HOUR: CPT

## 2019-10-10 PROCEDURE — 71045 X-RAY EXAM CHEST 1 VIEW: CPT | Mod: 26,77

## 2019-10-10 PROCEDURE — 71045 X-RAY EXAM CHEST 1 VIEW: CPT | Mod: 26,76

## 2019-10-10 PROCEDURE — 72170 X-RAY EXAM OF PELVIS: CPT | Mod: 26

## 2019-10-10 PROCEDURE — 74177 CT ABD & PELVIS W/CONTRAST: CPT | Mod: 26

## 2019-10-10 PROCEDURE — 70450 CT HEAD/BRAIN W/O DYE: CPT | Mod: 26

## 2019-10-10 RX ORDER — MORPHINE SULFATE 50 MG/1
4 CAPSULE, EXTENDED RELEASE ORAL ONCE
Refills: 0 | Status: DISCONTINUED | OUTPATIENT
Start: 2019-10-10 | End: 2019-10-10

## 2019-10-10 RX ORDER — IBUPROFEN 200 MG
600 TABLET ORAL EVERY 8 HOURS
Refills: 0 | Status: DISCONTINUED | OUTPATIENT
Start: 2019-10-10 | End: 2019-10-17

## 2019-10-10 RX ORDER — SODIUM CHLORIDE 9 MG/ML
1000 INJECTION INTRAMUSCULAR; INTRAVENOUS; SUBCUTANEOUS ONCE
Refills: 0 | Status: COMPLETED | OUTPATIENT
Start: 2019-10-10 | End: 2019-10-10

## 2019-10-10 RX ORDER — CHLORHEXIDINE GLUCONATE 213 G/1000ML
1 SOLUTION TOPICAL EVERY 12 HOURS
Refills: 0 | Status: COMPLETED | OUTPATIENT
Start: 2019-10-10 | End: 2019-10-11

## 2019-10-10 RX ORDER — PYRIDOXINE HCL (VITAMIN B6) 100 MG
100 TABLET ORAL DAILY
Refills: 0 | Status: DISCONTINUED | OUTPATIENT
Start: 2019-10-10 | End: 2019-10-17

## 2019-10-10 RX ORDER — FENTANYL CITRATE 50 UG/ML
50 INJECTION INTRAVENOUS ONCE
Refills: 0 | Status: DISCONTINUED | OUTPATIENT
Start: 2019-10-10 | End: 2019-10-10

## 2019-10-10 RX ORDER — LACTULOSE 10 G/15ML
20 SOLUTION ORAL EVERY 12 HOURS
Refills: 0 | Status: DISCONTINUED | OUTPATIENT
Start: 2019-10-10 | End: 2019-10-17

## 2019-10-10 RX ORDER — CHOLECALCIFEROL (VITAMIN D3) 125 MCG
1 CAPSULE ORAL
Qty: 0 | Refills: 0 | DISCHARGE

## 2019-10-10 RX ORDER — SODIUM CHLORIDE 9 MG/ML
1000 INJECTION, SOLUTION INTRAVENOUS
Refills: 0 | Status: DISCONTINUED | OUTPATIENT
Start: 2019-10-10 | End: 2019-10-17

## 2019-10-10 RX ORDER — DEXTROSE 50 % IN WATER 50 %
12.5 SYRINGE (ML) INTRAVENOUS ONCE
Refills: 0 | Status: DISCONTINUED | OUTPATIENT
Start: 2019-10-10 | End: 2019-10-17

## 2019-10-10 RX ORDER — DEXTROSE 50 % IN WATER 50 %
25 SYRINGE (ML) INTRAVENOUS ONCE
Refills: 0 | Status: DISCONTINUED | OUTPATIENT
Start: 2019-10-10 | End: 2019-10-17

## 2019-10-10 RX ORDER — PANTOPRAZOLE SODIUM 20 MG/1
1 TABLET, DELAYED RELEASE ORAL
Qty: 0 | Refills: 0 | DISCHARGE

## 2019-10-10 RX ORDER — CHOLECALCIFEROL (VITAMIN D3) 125 MCG
2000 CAPSULE ORAL DAILY
Refills: 0 | Status: DISCONTINUED | OUTPATIENT
Start: 2019-10-10 | End: 2019-10-17

## 2019-10-10 RX ORDER — NORTRIPTYLINE HYDROCHLORIDE 10 MG/1
1 CAPSULE ORAL
Qty: 0 | Refills: 0 | DISCHARGE

## 2019-10-10 RX ORDER — GLUCAGON INJECTION, SOLUTION 0.5 MG/.1ML
1 INJECTION, SOLUTION SUBCUTANEOUS ONCE
Refills: 0 | Status: DISCONTINUED | OUTPATIENT
Start: 2019-10-10 | End: 2019-10-17

## 2019-10-10 RX ORDER — OXYCODONE HYDROCHLORIDE 5 MG/1
5 TABLET ORAL EVERY 6 HOURS
Refills: 0 | Status: DISCONTINUED | OUTPATIENT
Start: 2019-10-10 | End: 2019-10-15

## 2019-10-10 RX ORDER — HEPARIN SODIUM 5000 [USP'U]/ML
5000 INJECTION INTRAVENOUS; SUBCUTANEOUS EVERY 8 HOURS
Refills: 0 | Status: DISCONTINUED | OUTPATIENT
Start: 2019-10-10 | End: 2019-10-17

## 2019-10-10 RX ORDER — SERTRALINE 25 MG/1
1 TABLET, FILM COATED ORAL
Qty: 0 | Refills: 0 | DISCHARGE

## 2019-10-10 RX ORDER — DEXTROSE 50 % IN WATER 50 %
15 SYRINGE (ML) INTRAVENOUS ONCE
Refills: 0 | Status: DISCONTINUED | OUTPATIENT
Start: 2019-10-10 | End: 2019-10-17

## 2019-10-10 RX ORDER — QUINAPRIL HYDROCHLORIDE 40 MG/1
1 TABLET, FILM COATED ORAL
Qty: 0 | Refills: 0 | DISCHARGE

## 2019-10-10 RX ORDER — INSULIN LISPRO 100/ML
VIAL (ML) SUBCUTANEOUS
Refills: 0 | Status: DISCONTINUED | OUTPATIENT
Start: 2019-10-10 | End: 2019-10-11

## 2019-10-10 RX ORDER — MORPHINE SULFATE 50 MG/1
2 CAPSULE, EXTENDED RELEASE ORAL ONCE
Refills: 0 | Status: DISCONTINUED | OUTPATIENT
Start: 2019-10-10 | End: 2019-10-10

## 2019-10-10 RX ORDER — PANTOPRAZOLE SODIUM 20 MG/1
40 TABLET, DELAYED RELEASE ORAL
Refills: 0 | Status: DISCONTINUED | OUTPATIENT
Start: 2019-10-10 | End: 2019-10-17

## 2019-10-10 RX ADMIN — OXYCODONE HYDROCHLORIDE 5 MILLIGRAM(S): 5 TABLET ORAL at 23:25

## 2019-10-10 RX ADMIN — Medication 600 MILLIGRAM(S): at 23:57

## 2019-10-10 RX ADMIN — OXYCODONE HYDROCHLORIDE 5 MILLIGRAM(S): 5 TABLET ORAL at 23:55

## 2019-10-10 RX ADMIN — FENTANYL CITRATE 50 MICROGRAM(S): 50 INJECTION INTRAVENOUS at 22:14

## 2019-10-10 RX ADMIN — SODIUM CHLORIDE 1000 MILLILITER(S): 9 INJECTION INTRAMUSCULAR; INTRAVENOUS; SUBCUTANEOUS at 16:34

## 2019-10-10 RX ADMIN — Medication 600 MILLIGRAM(S): at 23:27

## 2019-10-10 RX ADMIN — FENTANYL CITRATE 50 MICROGRAM(S): 50 INJECTION INTRAVENOUS at 20:57

## 2019-10-10 RX ADMIN — MORPHINE SULFATE 4 MILLIGRAM(S): 50 CAPSULE, EXTENDED RELEASE ORAL at 18:25

## 2019-10-10 RX ADMIN — MORPHINE SULFATE 2 MILLIGRAM(S): 50 CAPSULE, EXTENDED RELEASE ORAL at 20:19

## 2019-10-10 NOTE — ED PROVIDER NOTE - CRITICAL CARE PROVIDED
direct patient care (not related to procedure)/consultation with other physicians/interpretation of diagnostic studies/documentation/consult w/ pt's family directly relating to pts condition/additional history taking

## 2019-10-10 NOTE — ED ADULT NURSE NOTE - OBJECTIVE STATEMENT
pt states he was standing on 2 step stool while  painting when he fell back onto back of his head /back. denies any loc. denies any n/v/d. denies ac. pt gcs 15. pt c/o pain right sided back pain and right arm pain. denies any bleeding. denies chest pain. pt uses cane at baseline.

## 2019-10-10 NOTE — ED ADULT NURSE NOTE - NSIMPLEMENTINTERV_GEN_ALL_ED
Implemented All Fall with Harm Risk Interventions:  [place] to call system. Call bell, personal items and telephone within reach. Instruct patient to call for assistance. Room bathroom lighting operational. Non-slip footwear when patient is off stretcher. Physically safe environment: no spills, clutter or unnecessary equipment. Stretcher in lowest position, wheels locked, appropriate side rails in place. Provide visual cue, wrist band, yellow gown, etc. Monitor gait and stability. Monitor for mental status changes and reorient to person, place, and time. Review medications for side effects contributing to fall risk. Reinforce activity limits and safety measures with patient and family. Provide visual clues: red socks.

## 2019-10-10 NOTE — H&P ADULT - ATTENDING COMMENTS
I examined the patient with the resident and discussed my plan with him    the patent has a post-traumatic hemothorax requiring chest tube drainage.  he will require sicu admission for multiple rib fractures in setting of cirrhosis and advanced age

## 2019-10-10 NOTE — ED PROVIDER NOTE - CARE PLAN
Principal Discharge DX:	Hemothorax with pneumothorax, traumatic, initial encounter  Secondary Diagnosis:	Rib fractures  Secondary Diagnosis:	Fall

## 2019-10-10 NOTE — ED ADULT NURSE NOTE - CHPI ED NUR SYMPTOMS NEG
no fever/no confusion/no abrasion/no bleeding/no numbness/no vomiting/no loss of consciousness/no tingling

## 2019-10-10 NOTE — ED PROVIDER NOTE - PROGRESS NOTE DETAILS
Rib fxs noted on CT. Trauma consult placed. Pt moved to critical care area. Pt signed out to Dr. Walter.

## 2019-10-10 NOTE — H&P ADULT - ASSESSMENT
74m s/p mechanical fall with Right 3-8 rib fractures with pneumothorax, s/p chest tube placement on the right side.  -Admission to SICU  -Chest tube to suction  -Regular diet, IVL  -Pain management  -Home meds 74m s/p mechanical fall with Right 3-8 rib fractures with pneumothorax, s/p chest tube placement on the right side.  -Admission to SICU  -Chest tube to suction  -Regular diet, IVL  -Pain management  -Home meds  Senior Note  I have personally examined and evaluated the patient  I agree with the above plan and note  Surgical Attending aware and agrees with plan

## 2019-10-10 NOTE — ED ADULT NURSE REASSESSMENT NOTE - NS ED NURSE REASSESS COMMENT FT1
pt received from previous shift RN. pt aox4 resting comfortably in bed. pt in no acute distress, hooked up to cardiac monitor. pt c/o right sided chest pain, MD made aware 2mg morphine ordered.

## 2019-10-10 NOTE — H&P ADULT - NSHPLABSRESULTS_GEN_ALL_CORE
Labs:  CAPILLARY BLOOD GLUCOSE                              12.4   7.88  )-----------( 128      ( 10 Oct 2019 16:32 )             35.4       Auto Immature Granulocyte %: 0.5 % (10-10-19 @ 16:32)  Auto Neutrophil %: 67.5 % (10-10-19 @ 16:32)    10-10    138  |  102  |  17  ----------------------------<  186<H>  4.4   |  22  |  0.7      Calcium, Total Serum: 8.8 mg/dL (10-10-19 @ 16:32)      LFTs:             7.4  | 2.1  | 54       ------------------[92      ( 10 Oct 2019 16:32 )  3.4  | x    | 34          Lipase:22     Amylase:x         Lactate, Blood: 2.9 mmol/L (10-10-19 @ 16:32)      Coags:     15.60  ----< 1.36    ( 10 Oct 2019 16:32 )     30.5      < from: CT Abdomen and Pelvis w/ IV Cont (10.10.19 @ 17:59) >    Multiple right-sided rib fractures (3rd-8th ribs) some of which are   comminuted.  The fractures of the fifth and sixth ribs involve the   lateral and posterior aspect.    Small right apical pneumothorax.    Right extrapleural hematoma. Right pleural effusion measuring higher than   simple fluid and may represent some degree of hemorrhage with adjacent   atelectasis    Cirrhosis with sequela of  portal hypertension.    < from: CT Chest w/ IV Cont (10.10.19 @ 17:59) >    Multiple right-sided rib fractures (3rd-8th ribs) some of which are   comminuted.  The fractures of the fifth and sixth ribs involve the   lateral and posterior aspect.    Small right apical pneumothorax.    Right extrapleural hematoma. Right pleural effusion measuring higher than   simple fluid and may represent some degree of hemorrhage with adjacent   atelectasis    Cirrhosis with sequela of  portal hypertension.    < end of copied text >    < from: CT Cervical Spine No Cont (10.10.19 @ 17:46) >        < end of copied text >    < from: CT Head No Cont (10.10.19 @ 17:46) >        < end of copied text >

## 2019-10-10 NOTE — ED PROVIDER NOTE - OBJECTIVE STATEMENT
74y M w/ PMH of HTN, DM, Depression, and early Dementia presents with R. sided pain after falling from the stool while painting. States landed on his R. side. Did not hit head. No LOC. Complaining of R. shoulder pain, and R. rib pain. Denies headache, CP, SOB, abd pain, n/v, or urinary/bowel incontinence.

## 2019-10-10 NOTE — ED PROVIDER NOTE - CLINICAL SUMMARY MEDICAL DECISION MAKING FREE TEXT BOX
pt seen for rib pain s/p fall, labs unremarkable, pt s/p pan scan, notable for right hemothorax with small PTX and multiple rib fractures. pt remained stable with normal O@ and normal WOB. Trauma consult called and pt moved to critical care. Surgery requested admission to trauma service under Dr. Zamora with surgery to place chest tube.

## 2019-10-10 NOTE — H&P ADULT - HISTORY OF PRESENT ILLNESS
74m presents to ED s/p fall yesterday. The patient was standing on a stool and fell off and hit his right side on the side of a tub. The patient reports right sided chest pain for the day and decided to come to the ED. CT scan revealed several rib fractures on the right side with a hemothorax. A chest tube was placed in the ED.
Spine appears normal, range of motion is not limited, no muscle or joint tenderness

## 2019-10-10 NOTE — ED PROVIDER NOTE - NS ED ROS FT
Eyes:  No visual changes, eye pain or discharge.  ENMT:  No hearing changes, pain, no sore throat or runny nose, no difficulty swallowing  Cardiac:  No chest pain, SOB or edema. No chest pain with exertion.  Respiratory:  No cough or respiratory distress. No hemoptysis. No history of asthma or RAD.  GI:  No nausea, vomiting, diarrhea or abdominal pain.  :  No dysuria, frequency or burning.  MS:  No myalgia, muscle weakness, back pain. + joint pain   Neuro:  No headache or weakness.  No LOC.  Skin:  No skin rash.   Endocrine: + history of diabetes.

## 2019-10-10 NOTE — ED PROVIDER NOTE - ATTENDING CONTRIBUTION TO CARE
73 yo male with PMH DM, HTN, gout presents c/o right sided rib pain and back pain s/op fall that occurred yesterday. Pt states he was painting in his bathroom and felt dizzy and fell backward onto floor. Today with worsened right chest wall pain. No HA, focal weakness or paresthesias, N/V/D or lower abdominal pain. Able to ambulate at baseline.     VITAL SIGNS: noted  CONSTITUTIONAL: Well-developed; well-nourished; in no acute distress  HEAD: Normocephalic; atraumatic  EYES: PERRL, EOM intact; conjunctiva and sclera clear  ENT: No nasal discharge; airway clear. MMM  NECK: Supple; non tender. No anterior cervical lymphadenopathy noted  CARD: S1, S2 normal; no murmurs, gallops, or rubs. Regular rate and rhythm  CHEST: + diffuse right sided chest wall pain  RESP: CTAB/L, no wheezes, rales or rhonchi  ABD: Normal bowel sounds; soft; non-distended; + bilateral upper abdominal tenderness R>L  EXT: Normal ROM. No calf tenderness or edema. Distal pulses intact  NEURO: Alert, oriented. Grossly unremarkable. No focal deficits  SKIN: Skin exam is warm and dry, no acute rash  MS: No midline spinal tenderness 73 yo male with PMH DM, HTN, gout, cirrhosis secondary to fatty liver presents c/o right sided rib pain and back pain s/op fall that occurred yesterday. Pt states he was painting in his bathroom and felt dizzy and fell backward onto floor. Today with worsened right chest wall pain. No HA, focal weakness or paresthesias, N/V/D or lower abdominal pain. Able to ambulate at baseline.     VITAL SIGNS: noted  CONSTITUTIONAL: Well-developed; well-nourished; in no acute distress  HEAD: Normocephalic; atraumatic  EYES: PERRL, EOM intact; conjunctiva and sclera clear  ENT: No nasal discharge; airway clear. MMM  NECK: Supple; non tender. No anterior cervical lymphadenopathy noted  CARD: S1, S2 normal; no murmurs, gallops, or rubs. Regular rate and rhythm  CHEST: + diffuse right sided chest wall pain  RESP: CTAB/L, no wheezes, rales or rhonchi  ABD: Normal bowel sounds; soft; non-distended; + bilateral upper abdominal tenderness R>L  EXT: Normal ROM. No calf tenderness or edema. Distal pulses intact  NEURO: Alert, oriented. Grossly unremarkable. No focal deficits  SKIN: Skin exam is warm and dry, no acute rash  MS: No midline spinal tenderness

## 2019-10-11 LAB
ALBUMIN SERPL ELPH-MCNC: 2.9 G/DL — LOW (ref 3.5–5.2)
ALP SERPL-CCNC: 77 U/L — SIGNIFICANT CHANGE UP (ref 30–115)
ALT FLD-CCNC: 27 U/L — SIGNIFICANT CHANGE UP (ref 0–41)
ANION GAP SERPL CALC-SCNC: 13 MMOL/L — SIGNIFICANT CHANGE UP (ref 7–14)
AST SERPL-CCNC: 39 U/L — SIGNIFICANT CHANGE UP (ref 0–41)
BASOPHILS # BLD AUTO: 0.02 K/UL — SIGNIFICANT CHANGE UP (ref 0–0.2)
BASOPHILS NFR BLD AUTO: 0.3 % — SIGNIFICANT CHANGE UP (ref 0–1)
BILIRUB DIRECT SERPL-MCNC: 0.7 MG/DL — HIGH (ref 0–0.2)
BILIRUB INDIRECT FLD-MCNC: 1.4 MG/DL — HIGH (ref 0.2–1.2)
BILIRUB SERPL-MCNC: 2.1 MG/DL — HIGH (ref 0.2–1.2)
BUN SERPL-MCNC: 15 MG/DL — SIGNIFICANT CHANGE UP (ref 10–20)
CALCIUM SERPL-MCNC: 8.3 MG/DL — LOW (ref 8.5–10.1)
CHLORIDE SERPL-SCNC: 103 MMOL/L — SIGNIFICANT CHANGE UP (ref 98–110)
CO2 SERPL-SCNC: 23 MMOL/L — SIGNIFICANT CHANGE UP (ref 17–32)
CREAT SERPL-MCNC: 0.6 MG/DL — LOW (ref 0.7–1.5)
EOSINOPHIL # BLD AUTO: 0.17 K/UL — SIGNIFICANT CHANGE UP (ref 0–0.7)
EOSINOPHIL NFR BLD AUTO: 2.5 % — SIGNIFICANT CHANGE UP (ref 0–8)
ESTIMATED AVERAGE GLUCOSE: 91 MG/DL — SIGNIFICANT CHANGE UP (ref 68–114)
GLUCOSE BLDC GLUCOMTR-MCNC: 147 MG/DL — HIGH (ref 70–99)
GLUCOSE BLDC GLUCOMTR-MCNC: 151 MG/DL — HIGH (ref 70–99)
GLUCOSE BLDC GLUCOMTR-MCNC: 181 MG/DL — HIGH (ref 70–99)
GLUCOSE BLDC GLUCOMTR-MCNC: 228 MG/DL — HIGH (ref 70–99)
GLUCOSE SERPL-MCNC: 130 MG/DL — HIGH (ref 70–99)
HBA1C BLD-MCNC: 4.8 % — SIGNIFICANT CHANGE UP (ref 4–5.6)
HCT VFR BLD CALC: 30 % — LOW (ref 42–52)
HGB BLD-MCNC: 10.4 G/DL — LOW (ref 14–18)
IMM GRANULOCYTES NFR BLD AUTO: 0.3 % — SIGNIFICANT CHANGE UP (ref 0.1–0.3)
LYMPHOCYTES # BLD AUTO: 1.51 K/UL — SIGNIFICANT CHANGE UP (ref 1.2–3.4)
LYMPHOCYTES # BLD AUTO: 22.2 % — SIGNIFICANT CHANGE UP (ref 20.5–51.1)
MAGNESIUM SERPL-MCNC: 1.4 MG/DL — LOW (ref 1.8–2.4)
MCHC RBC-ENTMCNC: 34.7 G/DL — SIGNIFICANT CHANGE UP (ref 32–37)
MCHC RBC-ENTMCNC: 35.9 PG — HIGH (ref 27–31)
MCV RBC AUTO: 103.4 FL — HIGH (ref 80–94)
MONOCYTES # BLD AUTO: 0.8 K/UL — HIGH (ref 0.1–0.6)
MONOCYTES NFR BLD AUTO: 11.8 % — HIGH (ref 1.7–9.3)
NEUTROPHILS # BLD AUTO: 4.27 K/UL — SIGNIFICANT CHANGE UP (ref 1.4–6.5)
NEUTROPHILS NFR BLD AUTO: 62.9 % — SIGNIFICANT CHANGE UP (ref 42.2–75.2)
NRBC # BLD: 0 /100 WBCS — SIGNIFICANT CHANGE UP (ref 0–0)
PHOSPHATE SERPL-MCNC: 2.9 MG/DL — SIGNIFICANT CHANGE UP (ref 2.1–4.9)
PLATELET # BLD AUTO: 111 K/UL — LOW (ref 130–400)
POTASSIUM SERPL-MCNC: 4.1 MMOL/L — SIGNIFICANT CHANGE UP (ref 3.5–5)
POTASSIUM SERPL-SCNC: 4.1 MMOL/L — SIGNIFICANT CHANGE UP (ref 3.5–5)
PROT SERPL-MCNC: 6.3 G/DL — SIGNIFICANT CHANGE UP (ref 6–8)
RBC # BLD: 2.9 M/UL — LOW (ref 4.7–6.1)
RBC # FLD: 14.6 % — HIGH (ref 11.5–14.5)
SODIUM SERPL-SCNC: 139 MMOL/L — SIGNIFICANT CHANGE UP (ref 135–146)
WBC # BLD: 6.79 K/UL — SIGNIFICANT CHANGE UP (ref 4.8–10.8)
WBC # FLD AUTO: 6.79 K/UL — SIGNIFICANT CHANGE UP (ref 4.8–10.8)

## 2019-10-11 PROCEDURE — 99291 CRITICAL CARE FIRST HOUR: CPT

## 2019-10-11 PROCEDURE — 71045 X-RAY EXAM CHEST 1 VIEW: CPT | Mod: 26

## 2019-10-11 RX ORDER — CHLORHEXIDINE GLUCONATE 213 G/1000ML
1 SOLUTION TOPICAL
Refills: 0 | Status: DISCONTINUED | OUTPATIENT
Start: 2019-10-11 | End: 2019-10-17

## 2019-10-11 RX ORDER — MAGNESIUM SULFATE 500 MG/ML
2 VIAL (ML) INJECTION ONCE
Refills: 0 | Status: COMPLETED | OUTPATIENT
Start: 2019-10-11 | End: 2019-10-11

## 2019-10-11 RX ORDER — INSULIN LISPRO 100/ML
VIAL (ML) SUBCUTANEOUS
Refills: 0 | Status: DISCONTINUED | OUTPATIENT
Start: 2019-10-11 | End: 2019-10-17

## 2019-10-11 RX ORDER — ACETAMINOPHEN 500 MG
650 TABLET ORAL EVERY 6 HOURS
Refills: 0 | Status: COMPLETED | OUTPATIENT
Start: 2019-10-11 | End: 2019-10-14

## 2019-10-11 RX ORDER — INFLUENZA VIRUS VACCINE 15; 15; 15; 15 UG/.5ML; UG/.5ML; UG/.5ML; UG/.5ML
0.5 SUSPENSION INTRAMUSCULAR ONCE
Refills: 0 | Status: DISCONTINUED | OUTPATIENT
Start: 2019-10-11 | End: 2019-10-17

## 2019-10-11 RX ADMIN — HEPARIN SODIUM 5000 UNIT(S): 5000 INJECTION INTRAVENOUS; SUBCUTANEOUS at 05:30

## 2019-10-11 RX ADMIN — OXYCODONE HYDROCHLORIDE 5 MILLIGRAM(S): 5 TABLET ORAL at 07:58

## 2019-10-11 RX ADMIN — Medication 2: at 17:12

## 2019-10-11 RX ADMIN — Medication 600 MILLIGRAM(S): at 05:33

## 2019-10-11 RX ADMIN — LACTULOSE 20 GRAM(S): 10 SOLUTION ORAL at 17:12

## 2019-10-11 RX ADMIN — LACTULOSE 20 GRAM(S): 10 SOLUTION ORAL at 05:31

## 2019-10-11 RX ADMIN — Medication 650 MILLIGRAM(S): at 17:12

## 2019-10-11 RX ADMIN — PANTOPRAZOLE SODIUM 40 MILLIGRAM(S): 20 TABLET, DELAYED RELEASE ORAL at 05:31

## 2019-10-11 RX ADMIN — HEPARIN SODIUM 5000 UNIT(S): 5000 INJECTION INTRAVENOUS; SUBCUTANEOUS at 15:07

## 2019-10-11 RX ADMIN — Medication 2000 UNIT(S): at 11:50

## 2019-10-11 RX ADMIN — Medication 25 GRAM(S): at 03:04

## 2019-10-11 RX ADMIN — Medication 650 MILLIGRAM(S): at 11:55

## 2019-10-11 RX ADMIN — Medication 100 MILLIGRAM(S): at 11:50

## 2019-10-11 RX ADMIN — Medication 600 MILLIGRAM(S): at 22:21

## 2019-10-11 RX ADMIN — HEPARIN SODIUM 5000 UNIT(S): 5000 INJECTION INTRAVENOUS; SUBCUTANEOUS at 22:21

## 2019-10-11 RX ADMIN — Medication 4: at 11:57

## 2019-10-11 RX ADMIN — CHLORHEXIDINE GLUCONATE 1 APPLICATION(S): 213 SOLUTION TOPICAL at 05:30

## 2019-10-11 RX ADMIN — Medication 600 MILLIGRAM(S): at 15:07

## 2019-10-11 NOTE — CONSULT NOTE ADULT - SUBJECTIVE AND OBJECTIVE BOX
SICU Consultation Note  ======================================================================================================  STACY CARDENAS  [id number]    74y Male          Procedure:  OR Stats  OR Time:      EBL:          IV Fluids:       Blood Products:                UOP:      Findings-    PMH  PAST MEDICAL & SURGICAL HISTORY:  Osteoarthritis  Benign essential HTN  Gout  Depression  Diabetes  S/P knee surgery  S/P cholecystectomy  Cataracts, both eyes      Home Meds:  Home Medications:  aspirin 81 mg oral tablet: 1 tab(s) orally once a day (10 Oct 2019 21:41)  colchicine 0.6 mg oral capsule: 1 cap(s) orally once a day (10 Oct 2019 21:41)  glipiZIDE 2.5 mg oral tablet, extended release: 1 tab(s) orally once a day (10 Oct 2019 21:41)  metFORMIN 1000 mg oral tablet: 1 tab(s) orally 2 times a day (10 Oct 2019 21:41)  Vitamin B6 100 mg oral tablet: 1 tab(s) orally once a day (10 Oct 2019 21:41)  Vitamin D3 2000 intl units oral tablet: 1 tab(s) orally once a day (10 Oct 2019 21:47)          Allergies   Allergies    Keflex (Hives)  Keflex (Unknown)    Intolerances          Current Medications:   chlorhexidine 4% Liquid 1 Application(s) Topical every 12 hours  cholecalciferol 2000 Unit(s) Oral daily  dextrose 40% Gel 15 Gram(s) Oral once PRN  dextrose 5%. 1000 milliLiter(s) (50 mL/Hr) IV Continuous <Continuous>  dextrose 50% Injectable 12.5 Gram(s) IV Push once  dextrose 50% Injectable 25 Gram(s) IV Push once  dextrose 50% Injectable 25 Gram(s) IV Push once  glucagon  Injectable 1 milliGRAM(s) IntraMuscular once PRN  heparin  Injectable 5000 Unit(s) SubCutaneous every 8 hours  ibuprofen  Tablet. 600 milliGRAM(s) Oral every 8 hours  insulin lispro (HumaLOG) corrective regimen sliding scale   SubCutaneous three times a day before meals  lactulose Syrup 20 Gram(s) Oral every 12 hours  oxyCODONE    IR 5 milliGRAM(s) Oral every 6 hours PRN  pantoprazole    Tablet 40 milliGRAM(s) Oral before breakfast  pyridoxine 100 milliGRAM(s) Oral daily      Advanced Directives: Presumed Full Code    VITAL SIGNS, INS/OUTS (Last 24hours):    ICU Vital Signs Last 24 Hrs  T(C): 37.1 (10 Oct 2019 21:00), Max: 37.1 (10 Oct 2019 21:00)  T(F): 98.7 (10 Oct 2019 21:00), Max: 98.7 (10 Oct 2019 21:00)  HR: 88 (10 Oct 2019 22:00) (88 - 98)  BP: 162/72 (10 Oct 2019 22:00) (150/69 - 173/72)  BP(mean): --  ABP: --  ABP(mean): --  RR: 18 (10 Oct 2019 22:00) (17 - 18)  SpO2: 99% (10 Oct 2019 22:00) (95% - 99%)    I&O's Summary    10 Oct 2019 07:01  -  11 Oct 2019 01:34  --------------------------------------------------------  IN: 0 mL / OUT: 650 mL / NET: -650 mL            Physical Exam:   ---------------------------------------------------------------------------------------  GCS:      Exam: A&Ox3, no focal deficits    RESPIRATORY:  Lungs clear to auscultation b/l, Normal expansion/effort  Mechanical Ventilation:     CARDIOVASCULAR:   S1/S2.  RRR  No peripheral edema    GASTROINTESTINAL:  Abdomen soft, non-tender, non-distended      MUSCULOSKELETAL:  Extremities warm, pink, well-perfused.    DERM:  No skin breakdown     :   Exam: Graves catheter in place.       Tubes/Lines/Drains   ----------------------------------------------------------------------------------------------------------  [x] Peripheral IV  [X] Central Venous Line          IJ/Femoral             Date Placed:    [X] Arterial Line		      Radial/Femoral    Date Placed:   [X] PICC:         	  [X] Midline		                                  Date Placed:   [X] Urinary Catheter Graves                                             Date Placed:       LABS  --------------------------------------------------------------------------------------  LFTs  LIVER FUNCTIONS - ( 11 Oct 2019 00:22 )  Alb: 2.9 g/dL / Pro: 6.3 g/dL / ALK PHOS: 77 U/L / ALT: 27 U/L / AST: 39 U/L / GGT: x             Cardiac Markers        Coagulation  PT/INR - ( 10 Oct 2019 16:32 )   PT: 15.60 sec;   INR: 1.36 ratio         PTT - ( 10 Oct 2019 16:32 )  PTT:30.5 sec    Arterial Blood Gas      Blood Sugar  CAPILLARY BLOOD GLUCOSE          Urinalysis      Cultures            CT/XRAY/ECHO/TCD/EEG  ----------------------------------------------------------------------------------------------          --------------------------------------------------------------------------------------  Admit Diagnosis: HEMOTHORAX WITH PNEUMOTHORAX TARUMATIC INITIAL ENCOUNTER RIB FRACTURES     Critical Care Diagnoses: SICU Consultation Note  ======================================================================================================  STACY CARDENAS  [id number]    74y Male pmh of OA, HTN, Gout, Depression, DM, cataracts presents to the ED 10/10 c/o right sided pain s/p fall. Patient states he fell from a stool while painting and c/o right shoulder and rib pain. Denies head trauma and LOC. Patient is not on any anticoagulation at home, is on ASA 81mg qd. Workup scans including CT Head, CT cspine negative for any acute fractures. CT C/A/P revealed right 3-8th rib fx, right small apical pneumothorax right extrapleural hematoma and right pleural effusion and cirrhosis with sequela of portal HTN.     In ED, right sided 20Fr chest tube was placed in the R 5th ICS and is at 14cm at skin and set to wall suction confirmed by CXR. SICU consulted for hemodynamic monitoring in the presence of multiple rib fx and advanced age.      PMH  PAST MEDICAL & SURGICAL HISTORY:  Osteoarthritis  Benign essential HTN  Gout  Depression  Diabetes  S/P knee surgery  S/P cholecystectomy  Cataracts, both eyes      Home Meds:  Home Medications:  aspirin 81 mg oral tablet: 1 tab(s) orally once a day (10 Oct 2019 21:41)  colchicine 0.6 mg oral capsule: 1 cap(s) orally once a day (10 Oct 2019 21:41)  glipiZIDE 2.5 mg oral tablet, extended release: 1 tab(s) orally once a day (10 Oct 2019 21:41)  metFORMIN 1000 mg oral tablet: 1 tab(s) orally 2 times a day (10 Oct 2019 21:41)  Vitamin B6 100 mg oral tablet: 1 tab(s) orally once a day (10 Oct 2019 21:41)  Vitamin D3 2000 intl units oral tablet: 1 tab(s) orally once a day (10 Oct 2019 21:47)          Allergies   Allergies  Keflex (Hives)          Current Medications:   chlorhexidine 4% Liquid 1 Application(s) Topical every 12 hours  cholecalciferol 2000 Unit(s) Oral daily  dextrose 40% Gel 15 Gram(s) Oral once PRN  dextrose 5%. 1000 milliLiter(s) (50 mL/Hr) IV Continuous <Continuous>  dextrose 50% Injectable 12.5 Gram(s) IV Push once  dextrose 50% Injectable 25 Gram(s) IV Push once  dextrose 50% Injectable 25 Gram(s) IV Push once  glucagon  Injectable 1 milliGRAM(s) IntraMuscular once PRN  heparin  Injectable 5000 Unit(s) SubCutaneous every 8 hours  ibuprofen  Tablet. 600 milliGRAM(s) Oral every 8 hours  insulin lispro (HumaLOG) corrective regimen sliding scale   SubCutaneous three times a day before meals  lactulose Syrup 20 Gram(s) Oral every 12 hours  oxyCODONE    IR 5 milliGRAM(s) Oral every 6 hours PRN  pantoprazole    Tablet 40 milliGRAM(s) Oral before breakfast  pyridoxine 100 milliGRAM(s) Oral daily      Advanced Directives: Presumed Full Code    VITAL SIGNS, INS/OUTS (Last 24hours):    ICU Vital Signs Last 24 Hrs  T(C): 37.1 (10 Oct 2019 21:00), Max: 37.1 (10 Oct 2019 21:00)  T(F): 98.7 (10 Oct 2019 21:00), Max: 98.7 (10 Oct 2019 21:00)  HR: 88 (10 Oct 2019 22:00) (88 - 98)  BP: 162/72 (10 Oct 2019 22:00) (150/69 - 173/72)  BP(mean): --  ABP: --  ABP(mean): --  RR: 18 (10 Oct 2019 22:00) (17 - 18)  SpO2: 99% (10 Oct 2019 22:00) (95% - 99%)    I&O's Summary    10 Oct 2019 07:01  -  11 Oct 2019 01:34  --------------------------------------------------------  IN: 0 mL / OUT: 650 mL / NET: -650 mL            Physical Exam:   ---------------------------------------------------------------------------------------        Exam: A&Ox3    RESPIRATORY:   Normal expansion/effort  right sided chest tube set to suction    CARDIOVASCULAR:   S1/S2.  RRR    GASTROINTESTINAL:  Abdomen soft, non-tender, non-distended    MUSCULOSKELETAL:  Extremities warm, pink, well-perfused. TTP on right side    DERM:  No skin breakdown     :   Exam: Free void      Tubes/Lines/Drains   ----------------------------------------------------------------------------------------------------------  [x] Peripheral IV        LABS  --------------------------------------------------------------------------------------  LFTs  LIVER FUNCTIONS - ( 11 Oct 2019 00:22 )  Alb: 2.9 g/dL / Pro: 6.3 g/dL / ALK PHOS: 77 U/L / ALT: 27 U/L / AST: 39 U/L / GGT: x             Cardiac Markers        Coagulation  PT/INR - ( 10 Oct 2019 16:32 )   PT: 15.60 sec;   INR: 1.36 ratio         PTT - ( 10 Oct 2019 16:32 )  PTT:30.5 sec    Arterial Blood Gas      Blood Sugar  CAPILLARY BLOOD GLUCOSE          Urinalysis      Cultures            CT/XRAY/ECHO/TCD/EEG  ----------------------------------------------------------------------------------------------    < from: CT Chest w/ IV Cont (10.10.19 @ 17:59) >  IMPRESSION:  Multiple right-sided rib fractures (3rd-8th ribs) some of which are   comminuted.  The fractures of the fifth and sixth ribs involve the   lateral and posterior aspect.    Small right apical pneumothorax.    Right extrapleural hematoma. Right pleural effusion measuring higher than   simple fluid and may represent some degree of hemorrhage with adjacent   atelectasis    Cirrhosis with sequela of  portal hypertension.    < end of copied text >        --------------------------------------------------------------------------------------  Admit Diagnosis: HEMOTHORAX WITH PNEUMOTHORAX TARUMATIC INITIAL ENCOUNTER RIB FRACTURES     Critical Care Diagnoses:

## 2019-10-11 NOTE — CONSULT NOTE ADULT - ASSESSMENT
Assessment & Plan    74y Male  s/p fall, right sided 3-8th rib fx, right apical pneumothorax, right extrapleural hematoma s/p right sided chest tube    NEURO:    Pain-controlled with motrin, oxy prn    RESP:     multiple rib fx-encourage IS, right sided chest tube set to wall suction, AM CXR pending    Activity-OOBTC         CARDS:     hx of HTN-no on any rx at home, remains normotensive    12 Lead ECG 10/10 Diagnosis Line Normal sinus rhythm,Right bundle branch block, Voltage criteria for left ventricular, hypertrophy      GI/NUTR:       GI Prophylaxis-    Bowel regimen-        lactulose Syrup 20 Gram(s) Oral every 12 hours  pantoprazole    Tablet 40 milliGRAM(s) Oral before breakfast          /RENAL:     Strict I/O-15/0.6  <--, 17/0.7  <--    Lytes-10-11 @ 00:22 Na 139   K 4.1   Phos 2.9    Mg 1.4    10-10 @ 16:32 Na 138   K 4.4   Phos ---    Mg ---              HEME/ONC:     DVT prophylaxis-    Acute anemia-H/H 10.4 (10-11 @ 00:22)  12.4 (10-10 @ 16:32)        ID:      Abx-Antibiotics-    ENDO:      Glucose Glucose, Serum: 130 (10-11 @ 00:22)      HA1C 5.0 (07-13-19)      LINES/DRAINS:  Rae WHITE Foley     DISPO: SICU Assessment & Plan    74y Male  s/p fall, right sided 3-8th rib fx, right apical pneumothorax, right extrapleural hematoma s/p right sided chest tube    NEURO:    Pain-controlled with motrin, oxy prn    RESP:     multiple rib fx-encourage IS, right sided chest tube set to wall suction, AM CXR pending    Activity-OOBTC         CARDS:     hx of HTN-no on any rx at home, remains normotensive    12 Lead ECG 10/10 Diagnosis Line Normal sinus rhythm,Right bundle branch block, Voltage criteria for left ventricular, hypertrophy      GI/NUTR:     Diet-carb consistent    GI Prophylaxis-pantoprazole     Bowel regimen-lactulose    /RENAL:     Strict I/O-BUN/Cr 15/0.6  <--, 17/0.7      Lytes-10-11 @ 00:22 Na 139   K 4.1   Phos 2.9    Mg 1.4   10-10 @ 16:32 Na 138   K 4.4   Phos ---    Mg ---    HEME/ONC:     DVT prophylaxis-HSQ    Acute anemia-H/H 10.4 (10-11 @ 00:22)12.4 (10-10 @ 16:32)    ID:    Abx-none    ENDO:    hx of DM-Glu 130s  HA1C 5.0 (07-13-19)      LINES/DRAINS:  PIV    DISPO: SICU d/w Dr. Zamora

## 2019-10-11 NOTE — PHYSICAL THERAPY INITIAL EVALUATION ADULT - GENERAL OBSERVATIONS, REHAB EVAL
15:07-15:55 48 min  pt rec in bed in NAD, pt agreeable to PT, VSS, RN disconnected tele for PT, pt c/o 6/10 r sided rib pain 2* fxs, as per RN, pt got pain meds recently

## 2019-10-11 NOTE — CONSULT NOTE ADULT - SUBJECTIVE AND OBJECTIVE BOX
HPI:  74m presents to ED s/p fall yesterday. The patient was standing on a stool and fell off and hit his right side on the side of a tub. The patient reports right sided chest pain for the day and decided to come to the ED. CT scan revealed several rib fractures on the right side with a hemothorax. A chest tube was placed in the ED. (10 Oct 2019 21:41)      PAST MEDICAL & SURGICAL HISTORY:  Osteoarthritis  Benign essential HTN  Gout  Depression  Diabetes  S/P knee surgery  S/P cholecystectomy  Cataracts, both eyes      Hospital Course:    TODAY'S SUBJECTIVE & REVIEW OF SYMPTOMS:     Constitutional WNL   Cardio WNL   Resp WNL   GI WNL  Heme WNL  Endo WNL  Skin WNL  MSK Weakness  Neuro WNL  Cognitive WNL  Psych WNL      MEDICATIONS  (STANDING):  acetaminophen   Tablet .. 650 milliGRAM(s) Oral every 6 hours  chlorhexidine 4% Liquid 1 Application(s) Topical <User Schedule>  cholecalciferol 2000 Unit(s) Oral daily  dextrose 5%. 1000 milliLiter(s) (50 mL/Hr) IV Continuous <Continuous>  dextrose 50% Injectable 12.5 Gram(s) IV Push once  dextrose 50% Injectable 25 Gram(s) IV Push once  dextrose 50% Injectable 25 Gram(s) IV Push once  heparin  Injectable 5000 Unit(s) SubCutaneous every 8 hours  ibuprofen  Tablet. 600 milliGRAM(s) Oral every 8 hours  influenza   Vaccine 0.5 milliLiter(s) IntraMuscular once  insulin lispro (HumaLOG) corrective regimen sliding scale   SubCutaneous three times a day before meals  lactulose Syrup 20 Gram(s) Oral every 12 hours  pantoprazole    Tablet 40 milliGRAM(s) Oral before breakfast  pyridoxine 100 milliGRAM(s) Oral daily    MEDICATIONS  (PRN):  dextrose 40% Gel 15 Gram(s) Oral once PRN Blood Glucose LESS THAN 70 milliGRAM(s)/deciliter  glucagon  Injectable 1 milliGRAM(s) IntraMuscular once PRN Glucose LESS THAN 70 milligrams/deciliter  oxyCODONE    IR 5 milliGRAM(s) Oral every 6 hours PRN Severe Pain (7 - 10)      FAMILY HISTORY:      Allergies    Keflex (Hives)  Keflex (Unknown)    Intolerances        SOCIAL HISTORY:    [  ] Etoh  [  ] Smoking  [  ] Substance abuse     Home Environment:  [  ] Home Alone  [x  ] Lives with Family  [  ] Home Health Aid    Dwelling:  [  ] Apartment  [ x ] Private House  [  ] Adult Home  [  ] Skilled Nursing Facility      [  ] Short Term  [  ] Long Term  [ x ] Stairs       Elevator [  ]    FUNCTIONAL STATUS PTA: (Check all that apply)  Ambulation: [ x  ]Independent    [  ] Dependent     [  ] Non-Ambulatory  Assistive Device: [x  ] SA Cane  [  ]  Q Cane  [  ] Walker  [  ]  Wheelchair  ADL : [ x ] Independent  [  ]  Dependent       Vital Signs Last 24 Hrs  T(C): 36.6 (11 Oct 2019 04:00), Max: 37.2 (11 Oct 2019 00:00)  T(F): 97.8 (11 Oct 2019 04:00), Max: 98.9 (11 Oct 2019 00:00)  HR: 80 (11 Oct 2019 06:00) (76 - 98)  BP: 115/55 (11 Oct 2019 06:00) (114/57 - 173/72)  BP(mean): 79 (11 Oct 2019 06:00) (71 - 112)  RR: 20 (11 Oct 2019 06:00) (15 - 21)  SpO2: 95% (11 Oct 2019 06:00) (92% - 99%)      PHYSICAL EXAM: Alert & Oriented X3  GENERAL: NAD  HEAD:  Atraumatic, Normocephalic  CHEST/LUNG: decreased bs lung bases  HEART: S1S2+  ABDOMEN: Soft, Nontender  EXTREMITIES:  no calf tenderness    NERVOUS SYSTEM:  Cranial Nerves 2-12 intact [  ] Abnormal  [  ]  ROM: WFL all extremities [x  ]  Abnormal [  ]  Motor Strength: WFL all extremities  [x  ]  Abnormal [  ]  Sensation: intact to light touch [x  ] Abnormal [  ]  Reflexes: Symmetric [  ]  Abnormal [  ]    FUNCTIONAL STATUS:  Bed Mobility: Independent [  ]  Supervision [  ]  Needs Assistance [ x ]  N/A [  ]  Transfers: Independent [  ]  Supervision [  ]  Needs Assistance [x  ]  N/A [  ]   Ambulation: Independent [  ]  Supervision [  ]  Needs Assistance [  ]  N/A [  ]  ADL: Independent [  ] Requires Assistance [  ] N/A [  ]      LABS:                        10.4   6.79  )-----------( 111      ( 11 Oct 2019 00:22 )             30.0     10-11    139  |  103  |  15  ----------------------------<  130<H>  4.1   |  23  |  0.6<L>    Ca    8.3<L>      11 Oct 2019 00:22  Phos  2.9     10-11  Mg     1.4     10-11    TPro  6.3  /  Alb  2.9<L>  /  TBili  2.1<H>  /  DBili  0.7<H>  /  AST  39  /  ALT  27  /  AlkPhos  77  10-11    PT/INR - ( 10 Oct 2019 16:32 )   PT: 15.60 sec;   INR: 1.36 ratio         PTT - ( 10 Oct 2019 16:32 )  PTT:30.5 sec      RADIOLOGY & ADDITIONAL STUDIES:    Assesment:

## 2019-10-11 NOTE — CHART NOTE - NSCHARTNOTEFT_GEN_A_CORE
74y Male  s/p fall, right sided 3-8th rib fx, right apical pneumothorax, right extrapleural hematoma s/p right sided chest tube    NEURO:    Pain-controlled with tylenol, motrin, oxy prn    RESP:     multiple rib fx-encourage IS, right sided chest tube set to wall suction, pulling 500cc    Activity-OOBTC         CARDS:     hx of HTN-no on any rx at home, remains normotensive    12 Lead ECG 10/10 Diagnosis Line Normal sinus rhythm, Right bundle branch block, Voltage criteria for left ventricular, hypertrophy      GI/NUTR:     Diet-carb consistent    GI Prophylaxis-pantoprazole     Bowel regimen-lactulose    /RENAL:   no marie, voiding,     HEME/ONC:     DVT prophylaxis-HSQ    Acute anemia-H/H 10.4 (10-11 @ 00:22)12.4 (10-10 @ 16:32)    ID:    Abx-none    ENDO:    hx of DM-hbgA1C 4.8, on RISS, monitor F/s    f/u  -evening lab,   -encourange IS  -f/u am cxr     full sign out given to primary team Dr Moreno @ 11:44am

## 2019-10-11 NOTE — CONSULT NOTE ADULT - ASSESSMENT
IMPRESSION: Rehab of gait dysfunction    PRECAUTIONS: [  ] Cardiac  [  ] Respiratory  [  ] Seizures [  ] Contact Isolation  [  ] Droplet Isolation  [  ] Other    Weight Bearing Status:     RECOMMENDATION:    Out of Bed to Chair     DVT/Decubiti Prophylaxis    REHAB PLAN:     [ x  ] Bedside P/T 3-5 times a week   [   ]   Bedside O/T  2-3 times a week             [   ] No Rehab Therapy Indicated                   [   ]  Speech Therapy   Conditioning/ROM                                    ADL  Bed Mobility                                               Conditioning/ROM  Transfers                                                     Bed Mobility  Sitting /Standing Balance                         Transfers                                        Gait Training                                               Sitting/Standing Balance  Stair Training [   ]Applicable                    Home equipment Eval                                                                        Splinting  [   ] Only      GOALS:   ADL   [   ]   Independent                    Transfers  [x   ] Independent                          Ambulation  [ x  ] Independent     [  x  ] With device                            [   ]  CG                                                         [   ]  CG                                                                  [   ] CG                            [    ] Min A                                                   [   ] Min A                                                              [   ] Min  A          DISCHARGE PLAN:   [   ]  Good candidate for Intensive Rehabilitation/Hospital based                                             Will tolerate 3hrs Intensive Rehab Daily                                       [ x   ]  Short Term Rehab in Skilled Nursing Facility                           vs            [  x  ]  Home with Outpatient or VN services                                         [    ]  Possible Candidate for Intensive Hospital based Rehab

## 2019-10-12 LAB
ANION GAP SERPL CALC-SCNC: 11 MMOL/L — SIGNIFICANT CHANGE UP (ref 7–14)
ANION GAP SERPL CALC-SCNC: 9 MMOL/L — SIGNIFICANT CHANGE UP (ref 7–14)
APTT BLD: 45.7 SEC — HIGH (ref 27–39.2)
BASOPHILS # BLD AUTO: 0.02 K/UL — SIGNIFICANT CHANGE UP (ref 0–0.2)
BASOPHILS NFR BLD AUTO: 0.3 % — SIGNIFICANT CHANGE UP (ref 0–1)
BUN SERPL-MCNC: 21 MG/DL — HIGH (ref 10–20)
BUN SERPL-MCNC: 21 MG/DL — HIGH (ref 10–20)
CALCIUM SERPL-MCNC: 8.1 MG/DL — LOW (ref 8.5–10.1)
CALCIUM SERPL-MCNC: 8.3 MG/DL — LOW (ref 8.5–10.1)
CHLORIDE SERPL-SCNC: 103 MMOL/L — SIGNIFICANT CHANGE UP (ref 98–110)
CHLORIDE SERPL-SCNC: 107 MMOL/L — SIGNIFICANT CHANGE UP (ref 98–110)
CO2 SERPL-SCNC: 25 MMOL/L — SIGNIFICANT CHANGE UP (ref 17–32)
CO2 SERPL-SCNC: 25 MMOL/L — SIGNIFICANT CHANGE UP (ref 17–32)
CREAT SERPL-MCNC: 0.7 MG/DL — SIGNIFICANT CHANGE UP (ref 0.7–1.5)
CREAT SERPL-MCNC: 0.8 MG/DL — SIGNIFICANT CHANGE UP (ref 0.7–1.5)
EOSINOPHIL # BLD AUTO: 0.3 K/UL — SIGNIFICANT CHANGE UP (ref 0–0.7)
EOSINOPHIL NFR BLD AUTO: 4.3 % — SIGNIFICANT CHANGE UP (ref 0–8)
GLUCOSE BLDC GLUCOMTR-MCNC: 119 MG/DL — HIGH (ref 70–99)
GLUCOSE BLDC GLUCOMTR-MCNC: 153 MG/DL — HIGH (ref 70–99)
GLUCOSE BLDC GLUCOMTR-MCNC: 251 MG/DL — HIGH (ref 70–99)
GLUCOSE BLDC GLUCOMTR-MCNC: 89 MG/DL — SIGNIFICANT CHANGE UP (ref 70–99)
GLUCOSE SERPL-MCNC: 112 MG/DL — HIGH (ref 70–99)
GLUCOSE SERPL-MCNC: 140 MG/DL — HIGH (ref 70–99)
HCT VFR BLD CALC: 26.9 % — LOW (ref 42–52)
HCT VFR BLD CALC: 27.2 % — LOW (ref 42–52)
HGB BLD-MCNC: 9.5 G/DL — LOW (ref 14–18)
HGB BLD-MCNC: 9.7 G/DL — LOW (ref 14–18)
IMM GRANULOCYTES NFR BLD AUTO: 0.4 % — HIGH (ref 0.1–0.3)
INR BLD: 1.54 RATIO — HIGH (ref 0.65–1.3)
LYMPHOCYTES # BLD AUTO: 1.79 K/UL — SIGNIFICANT CHANGE UP (ref 1.2–3.4)
LYMPHOCYTES # BLD AUTO: 25.8 % — SIGNIFICANT CHANGE UP (ref 20.5–51.1)
MAGNESIUM SERPL-MCNC: 1.8 MG/DL — SIGNIFICANT CHANGE UP (ref 1.8–2.4)
MAGNESIUM SERPL-MCNC: 2 MG/DL — SIGNIFICANT CHANGE UP (ref 1.8–2.4)
MCHC RBC-ENTMCNC: 35.3 G/DL — SIGNIFICANT CHANGE UP (ref 32–37)
MCHC RBC-ENTMCNC: 35.7 G/DL — SIGNIFICANT CHANGE UP (ref 32–37)
MCHC RBC-ENTMCNC: 36.4 PG — HIGH (ref 27–31)
MCHC RBC-ENTMCNC: 36.7 PG — HIGH (ref 27–31)
MCV RBC AUTO: 103 FL — HIGH (ref 80–94)
MCV RBC AUTO: 103.1 FL — HIGH (ref 80–94)
MONOCYTES # BLD AUTO: 0.59 K/UL — SIGNIFICANT CHANGE UP (ref 0.1–0.6)
MONOCYTES NFR BLD AUTO: 8.5 % — SIGNIFICANT CHANGE UP (ref 1.7–9.3)
NEUTROPHILS # BLD AUTO: 4.2 K/UL — SIGNIFICANT CHANGE UP (ref 1.4–6.5)
NEUTROPHILS NFR BLD AUTO: 60.7 % — SIGNIFICANT CHANGE UP (ref 42.2–75.2)
NRBC # BLD: 0 /100 WBCS — SIGNIFICANT CHANGE UP (ref 0–0)
NRBC # BLD: 0 /100 WBCS — SIGNIFICANT CHANGE UP (ref 0–0)
PHOSPHATE SERPL-MCNC: 3.4 MG/DL — SIGNIFICANT CHANGE UP (ref 2.1–4.9)
PHOSPHATE SERPL-MCNC: 3.6 MG/DL — SIGNIFICANT CHANGE UP (ref 2.1–4.9)
PLATELET # BLD AUTO: 110 K/UL — LOW (ref 130–400)
PLATELET # BLD AUTO: 125 K/UL — LOW (ref 130–400)
POTASSIUM SERPL-MCNC: 3.9 MMOL/L — SIGNIFICANT CHANGE UP (ref 3.5–5)
POTASSIUM SERPL-MCNC: 3.9 MMOL/L — SIGNIFICANT CHANGE UP (ref 3.5–5)
POTASSIUM SERPL-SCNC: 3.9 MMOL/L — SIGNIFICANT CHANGE UP (ref 3.5–5)
POTASSIUM SERPL-SCNC: 3.9 MMOL/L — SIGNIFICANT CHANGE UP (ref 3.5–5)
PROTHROM AB SERPL-ACNC: 17.6 SEC — HIGH (ref 9.95–12.87)
RBC # BLD: 2.61 M/UL — LOW (ref 4.7–6.1)
RBC # BLD: 2.64 M/UL — LOW (ref 4.7–6.1)
RBC # FLD: 14.3 % — SIGNIFICANT CHANGE UP (ref 11.5–14.5)
RBC # FLD: 14.5 % — SIGNIFICANT CHANGE UP (ref 11.5–14.5)
SODIUM SERPL-SCNC: 137 MMOL/L — SIGNIFICANT CHANGE UP (ref 135–146)
SODIUM SERPL-SCNC: 143 MMOL/L — SIGNIFICANT CHANGE UP (ref 135–146)
WBC # BLD: 6.93 K/UL — SIGNIFICANT CHANGE UP (ref 4.8–10.8)
WBC # BLD: 7.85 K/UL — SIGNIFICANT CHANGE UP (ref 4.8–10.8)
WBC # FLD AUTO: 6.93 K/UL — SIGNIFICANT CHANGE UP (ref 4.8–10.8)
WBC # FLD AUTO: 7.85 K/UL — SIGNIFICANT CHANGE UP (ref 4.8–10.8)

## 2019-10-12 PROCEDURE — 99232 SBSQ HOSP IP/OBS MODERATE 35: CPT

## 2019-10-12 PROCEDURE — 71045 X-RAY EXAM CHEST 1 VIEW: CPT | Mod: 26

## 2019-10-12 RX ADMIN — Medication 650 MILLIGRAM(S): at 07:52

## 2019-10-12 RX ADMIN — Medication 8: at 11:39

## 2019-10-12 RX ADMIN — Medication 2000 UNIT(S): at 11:40

## 2019-10-12 RX ADMIN — HEPARIN SODIUM 5000 UNIT(S): 5000 INJECTION INTRAVENOUS; SUBCUTANEOUS at 13:39

## 2019-10-12 RX ADMIN — Medication 100 MILLIGRAM(S): at 11:40

## 2019-10-12 RX ADMIN — Medication 650 MILLIGRAM(S): at 00:30

## 2019-10-12 RX ADMIN — CHLORHEXIDINE GLUCONATE 1 APPLICATION(S): 213 SOLUTION TOPICAL at 06:56

## 2019-10-12 RX ADMIN — Medication 600 MILLIGRAM(S): at 21:14

## 2019-10-12 RX ADMIN — Medication 650 MILLIGRAM(S): at 07:00

## 2019-10-12 RX ADMIN — Medication 650 MILLIGRAM(S): at 06:56

## 2019-10-12 RX ADMIN — Medication 600 MILLIGRAM(S): at 13:39

## 2019-10-12 RX ADMIN — HEPARIN SODIUM 5000 UNIT(S): 5000 INJECTION INTRAVENOUS; SUBCUTANEOUS at 06:57

## 2019-10-12 RX ADMIN — Medication 600 MILLIGRAM(S): at 07:52

## 2019-10-12 RX ADMIN — Medication 650 MILLIGRAM(S): at 11:40

## 2019-10-12 RX ADMIN — LACTULOSE 20 GRAM(S): 10 SOLUTION ORAL at 17:13

## 2019-10-12 RX ADMIN — Medication 650 MILLIGRAM(S): at 11:42

## 2019-10-12 RX ADMIN — PANTOPRAZOLE SODIUM 40 MILLIGRAM(S): 20 TABLET, DELAYED RELEASE ORAL at 06:57

## 2019-10-12 RX ADMIN — HEPARIN SODIUM 5000 UNIT(S): 5000 INJECTION INTRAVENOUS; SUBCUTANEOUS at 21:14

## 2019-10-12 RX ADMIN — Medication 600 MILLIGRAM(S): at 06:57

## 2019-10-12 RX ADMIN — LACTULOSE 20 GRAM(S): 10 SOLUTION ORAL at 06:58

## 2019-10-12 RX ADMIN — Medication 650 MILLIGRAM(S): at 17:13

## 2019-10-12 RX ADMIN — Medication 650 MILLIGRAM(S): at 17:14

## 2019-10-12 NOTE — PROGRESS NOTE ADULT - SUBJECTIVE AND OBJECTIVE BOX
GENERAL SURGERY PROGRESS NOTE     STACY CARDENAS  74y  Male  Hospital day :2d    OVERNIGHT EVENTS: No acute events    T(F): 97.5 (10-12-19 @ 00:55), Max: 98 (10-11-19 @ 08:00)  HR: 75 (10-12-19 @ 00:55) (75 - 86)  BP: 139/66 (10-12-19 @ 00:55) (99/48 - 140/63)  RR: 18 (10-12-19 @ 00:55) (15 - 31)  SpO2: 95% (10-11-19 @ 21:06) (95% - 97%)    DIET/FLUIDS: cholecalciferol 2000 Unit(s) Oral daily  dextrose 5%. 1000 milliLiter(s) IV Continuous <Continuous>  pyridoxine 100 milliGRAM(s) Oral daily       GI proph:  pantoprazole    Tablet 40 milliGRAM(s) Oral before breakfast    AC/ proph: heparin  Injectable 5000 Unit(s) SubCutaneous every 8 hours    ABx:     PHYSICAL EXAM:  GENERAL: NAD, well appearing  CHEST/LUNG: Clear to auscultation bilaterally  HEART: Regular rate and rhythm  ABDOMEN: Soft, Nontender, Nondistended;   EXTREMITIES:  No clubbing, cyanosis, or edema      LABS  Labs:  CAPILLARY BLOOD GLUCOSE      POCT Blood Glucose.: 181 mg/dL (11 Oct 2019 22:22)  POCT Blood Glucose.: 151 mg/dL (11 Oct 2019 16:54)  POCT Blood Glucose.: 228 mg/dL (11 Oct 2019 11:54)  POCT Blood Glucose.: 147 mg/dL (11 Oct 2019 07:46)                          9.7    7.85  )-----------( 110      ( 11 Oct 2019 22:58 )             27.2         10-11    137  |  103  |  21<H>  ----------------------------<  140<H>  3.9   |  25  |  0.8      Calcium, Total Serum: 8.1 mg/dL (10-11-19 @ 22:58)      LFTs:             6.3  | 2.1  | 39       ------------------[77      ( 11 Oct 2019 00:22 )  2.9  | 0.7  | 27          Lipase:x      Amylase:x         Lactate, Blood: 2.9 mmol/L (10-10-19 @ 16:32)      Coags:     17.60  ----< 1.54    ( 12 Oct 2019 01:22 )     45.7                        RADIOLOGY & ADDITIONAL TESTS:    Impression:      Stable elevated right hemidiaphragm with blunted costophrenic angle and   adjacent basilar opacity.    No  radiographic evidence of pneumothorax

## 2019-10-13 LAB
ANION GAP SERPL CALC-SCNC: 9 MMOL/L — SIGNIFICANT CHANGE UP (ref 7–14)
BASOPHILS # BLD AUTO: 0.03 K/UL — SIGNIFICANT CHANGE UP (ref 0–0.2)
BASOPHILS NFR BLD AUTO: 0.5 % — SIGNIFICANT CHANGE UP (ref 0–1)
BUN SERPL-MCNC: 22 MG/DL — HIGH (ref 10–20)
CALCIUM SERPL-MCNC: 8.3 MG/DL — LOW (ref 8.5–10.1)
CHLORIDE SERPL-SCNC: 107 MMOL/L — SIGNIFICANT CHANGE UP (ref 98–110)
CO2 SERPL-SCNC: 28 MMOL/L — SIGNIFICANT CHANGE UP (ref 17–32)
CREAT SERPL-MCNC: 0.9 MG/DL — SIGNIFICANT CHANGE UP (ref 0.7–1.5)
EOSINOPHIL # BLD AUTO: 0.3 K/UL — SIGNIFICANT CHANGE UP (ref 0–0.7)
EOSINOPHIL NFR BLD AUTO: 4.7 % — SIGNIFICANT CHANGE UP (ref 0–8)
GLUCOSE BLDC GLUCOMTR-MCNC: 115 MG/DL — HIGH (ref 70–99)
GLUCOSE BLDC GLUCOMTR-MCNC: 187 MG/DL — HIGH (ref 70–99)
GLUCOSE BLDC GLUCOMTR-MCNC: 202 MG/DL — HIGH (ref 70–99)
GLUCOSE BLDC GLUCOMTR-MCNC: 233 MG/DL — HIGH (ref 70–99)
GLUCOSE SERPL-MCNC: 203 MG/DL — HIGH (ref 70–99)
HCT VFR BLD CALC: 27.6 % — LOW (ref 42–52)
HGB BLD-MCNC: 9.6 G/DL — LOW (ref 14–18)
IMM GRANULOCYTES NFR BLD AUTO: 0.5 % — HIGH (ref 0.1–0.3)
LYMPHOCYTES # BLD AUTO: 1.61 K/UL — SIGNIFICANT CHANGE UP (ref 1.2–3.4)
LYMPHOCYTES # BLD AUTO: 25.4 % — SIGNIFICANT CHANGE UP (ref 20.5–51.1)
MAGNESIUM SERPL-MCNC: 1.8 MG/DL — SIGNIFICANT CHANGE UP (ref 1.8–2.4)
MCHC RBC-ENTMCNC: 34.8 G/DL — SIGNIFICANT CHANGE UP (ref 32–37)
MCHC RBC-ENTMCNC: 36.4 PG — HIGH (ref 27–31)
MCV RBC AUTO: 104.5 FL — HIGH (ref 80–94)
MONOCYTES # BLD AUTO: 0.57 K/UL — SIGNIFICANT CHANGE UP (ref 0.1–0.6)
MONOCYTES NFR BLD AUTO: 9 % — SIGNIFICANT CHANGE UP (ref 1.7–9.3)
NEUTROPHILS # BLD AUTO: 3.79 K/UL — SIGNIFICANT CHANGE UP (ref 1.4–6.5)
NEUTROPHILS NFR BLD AUTO: 59.9 % — SIGNIFICANT CHANGE UP (ref 42.2–75.2)
NRBC # BLD: 0 /100 WBCS — SIGNIFICANT CHANGE UP (ref 0–0)
PHOSPHATE SERPL-MCNC: 3.6 MG/DL — SIGNIFICANT CHANGE UP (ref 2.1–4.9)
PLATELET # BLD AUTO: 151 K/UL — SIGNIFICANT CHANGE UP (ref 130–400)
POTASSIUM SERPL-MCNC: 4.2 MMOL/L — SIGNIFICANT CHANGE UP (ref 3.5–5)
POTASSIUM SERPL-SCNC: 4.2 MMOL/L — SIGNIFICANT CHANGE UP (ref 3.5–5)
RBC # BLD: 2.64 M/UL — LOW (ref 4.7–6.1)
RBC # FLD: 14.6 % — HIGH (ref 11.5–14.5)
SODIUM SERPL-SCNC: 144 MMOL/L — SIGNIFICANT CHANGE UP (ref 135–146)
WBC # BLD: 6.33 K/UL — SIGNIFICANT CHANGE UP (ref 4.8–10.8)
WBC # FLD AUTO: 6.33 K/UL — SIGNIFICANT CHANGE UP (ref 4.8–10.8)

## 2019-10-13 PROCEDURE — 99232 SBSQ HOSP IP/OBS MODERATE 35: CPT

## 2019-10-13 PROCEDURE — 71045 X-RAY EXAM CHEST 1 VIEW: CPT | Mod: 26

## 2019-10-13 RX ADMIN — Medication 2000 UNIT(S): at 11:32

## 2019-10-13 RX ADMIN — HEPARIN SODIUM 5000 UNIT(S): 5000 INJECTION INTRAVENOUS; SUBCUTANEOUS at 05:18

## 2019-10-13 RX ADMIN — HEPARIN SODIUM 5000 UNIT(S): 5000 INJECTION INTRAVENOUS; SUBCUTANEOUS at 13:13

## 2019-10-13 RX ADMIN — LACTULOSE 20 GRAM(S): 10 SOLUTION ORAL at 17:21

## 2019-10-13 RX ADMIN — Medication 600 MILLIGRAM(S): at 13:13

## 2019-10-13 RX ADMIN — LACTULOSE 20 GRAM(S): 10 SOLUTION ORAL at 05:17

## 2019-10-13 RX ADMIN — Medication 600 MILLIGRAM(S): at 21:23

## 2019-10-13 RX ADMIN — Medication 650 MILLIGRAM(S): at 11:31

## 2019-10-13 RX ADMIN — Medication 600 MILLIGRAM(S): at 05:17

## 2019-10-13 RX ADMIN — Medication 6: at 11:29

## 2019-10-13 RX ADMIN — HEPARIN SODIUM 5000 UNIT(S): 5000 INJECTION INTRAVENOUS; SUBCUTANEOUS at 21:23

## 2019-10-13 RX ADMIN — Medication 100 MILLIGRAM(S): at 11:31

## 2019-10-13 RX ADMIN — Medication 650 MILLIGRAM(S): at 05:17

## 2019-10-13 RX ADMIN — Medication 650 MILLIGRAM(S): at 17:21

## 2019-10-13 RX ADMIN — PANTOPRAZOLE SODIUM 40 MILLIGRAM(S): 20 TABLET, DELAYED RELEASE ORAL at 08:16

## 2019-10-13 RX ADMIN — Medication 4: at 16:21

## 2019-10-13 NOTE — PROGRESS NOTE ADULT - SUBJECTIVE AND OBJECTIVE BOX
GENERAL SURGERY PROGRESS NOTE     RONALD [address] day :3d  POD:  Procedure:   Surgical Attending: Riki Zamora  Overnight events: No acute events overnight. CT to suction. His pain is well controlled.    T(F): 98.4 (10-12-19 @ 21:00), Max: 98.4 (10-12-19 @ 21:00)  HR: 84 (10-12-19 @ 21:00) (72 - 84)  BP: 137/61 (10-12-19 @ 21:00) (112/55 - 152/68)  ABP: --  ABP(mean): --  RR: 18 (10-12-19 @ 21:00) (18 - 18)  SpO2: 97% (10-12-19 @ 09:00) (96% - 97%)      10-11-19 @ 07:01  -  10-12-19 @ 07:00  --------------------------------------------------------  IN:    Oral Fluid: 240 mL  Total IN: 240 mL    OUT:    Chest Tube: 198 mL    Voided: 650 mL  Total OUT: 848 mL    Total NET: -608 mL      10-12-19 @ 07:01  -  10-13-19 @ 04:39  --------------------------------------------------------  IN:    Oral Fluid: 396 mL  Total IN: 396 mL    OUT:    Chest Tube: 140 mL    Voided: 1200 mL  Total OUT: 1340 mL    Total NET: -944 mL        DIET/FLUIDS: cholecalciferol 2000 Unit(s) Oral daily  dextrose 5%. 1000 milliLiter(s) IV Continuous <Continuous>  pyridoxine 100 milliGRAM(s) Oral daily     GI proph:  pantoprazole    Tablet 40 milliGRAM(s) Oral before breakfast    AC/ proph: heparin  Injectable 5000 Unit(s) SubCutaneous every 8 hours    ABx:     PHYSICAL EXAM:  GENERAL: NAD, well-appearing  CHEST/LUNG: Clear to auscultation bilaterally, right sided chest tube set to suction, without sign of air leak  HEART: Regular rate and rhythm  ABDOMEN: Soft, Nontender, Nondistended;   EXTREMITIES:  No clubbing, cyanosis, or edema      LABS  Labs:  CAPILLARY BLOOD GLUCOSE      POCT Blood Glucose.: 153 mg/dL (12 Oct 2019 20:45)  POCT Blood Glucose.: 89 mg/dL (12 Oct 2019 16:29)  POCT Blood Glucose.: 251 mg/dL (12 Oct 2019 11:25)  POCT Blood Glucose.: 119 mg/dL (12 Oct 2019 07:40)                          9.5    6.93  )-----------( 125      ( 12 Oct 2019 23:12 )             26.9       Auto Immature Granulocyte %: 0.4 % (10-12-19 @ 23:12)  Auto Neutrophil %: 60.7 % (10-12-19 @ 23:12)    10-12    143  |  107  |  21<H>  ----------------------------<  112<H>  3.9   |  25  |  0.7      Calcium, Total Serum: 8.3 mg/dL (10-12-19 @ 23:12)           Lactate, Blood: 2.9 mmol/L (10-10-19 @ 16:32)      Coags:     17.60  ----< 1.54    ( 12 Oct 2019 01:22 )     45.7      RADIOLOGY & ADDITIONAL TESTS:  < from: Xray Chest 1 View- PORTABLE-Routine (10.12.19 @ 07:58) >  Impression:      Stable right basilar opacity. No radiographic evidence of pneumothorax on   current x-ray.        < end of copied text >

## 2019-10-14 LAB
BASOPHILS # BLD AUTO: 0.03 K/UL — SIGNIFICANT CHANGE UP (ref 0–0.2)
BASOPHILS NFR BLD AUTO: 0.5 % — SIGNIFICANT CHANGE UP (ref 0–1)
EOSINOPHIL # BLD AUTO: 0.35 K/UL — SIGNIFICANT CHANGE UP (ref 0–0.7)
EOSINOPHIL NFR BLD AUTO: 5.6 % — SIGNIFICANT CHANGE UP (ref 0–8)
GLUCOSE BLDC GLUCOMTR-MCNC: 139 MG/DL — HIGH (ref 70–99)
GLUCOSE BLDC GLUCOMTR-MCNC: 140 MG/DL — HIGH (ref 70–99)
GLUCOSE BLDC GLUCOMTR-MCNC: 170 MG/DL — HIGH (ref 70–99)
HCT VFR BLD CALC: 28.2 % — LOW (ref 42–52)
HGB BLD-MCNC: 9.9 G/DL — LOW (ref 14–18)
IMM GRANULOCYTES NFR BLD AUTO: 0.5 % — HIGH (ref 0.1–0.3)
LYMPHOCYTES # BLD AUTO: 1.5 K/UL — SIGNIFICANT CHANGE UP (ref 1.2–3.4)
LYMPHOCYTES # BLD AUTO: 24 % — SIGNIFICANT CHANGE UP (ref 20.5–51.1)
MCHC RBC-ENTMCNC: 35.1 G/DL — SIGNIFICANT CHANGE UP (ref 32–37)
MCHC RBC-ENTMCNC: 36.4 PG — HIGH (ref 27–31)
MCV RBC AUTO: 103.7 FL — HIGH (ref 80–94)
MONOCYTES # BLD AUTO: 0.62 K/UL — HIGH (ref 0.1–0.6)
MONOCYTES NFR BLD AUTO: 9.9 % — HIGH (ref 1.7–9.3)
NEUTROPHILS # BLD AUTO: 3.72 K/UL — SIGNIFICANT CHANGE UP (ref 1.4–6.5)
NEUTROPHILS NFR BLD AUTO: 59.5 % — SIGNIFICANT CHANGE UP (ref 42.2–75.2)
NRBC # BLD: 0 /100 WBCS — SIGNIFICANT CHANGE UP (ref 0–0)
PLATELET # BLD AUTO: 158 K/UL — SIGNIFICANT CHANGE UP (ref 130–400)
RBC # BLD: 2.72 M/UL — LOW (ref 4.7–6.1)
RBC # FLD: 14.5 % — SIGNIFICANT CHANGE UP (ref 11.5–14.5)
WBC # BLD: 6.25 K/UL — SIGNIFICANT CHANGE UP (ref 4.8–10.8)
WBC # FLD AUTO: 6.25 K/UL — SIGNIFICANT CHANGE UP (ref 4.8–10.8)

## 2019-10-14 PROCEDURE — 71045 X-RAY EXAM CHEST 1 VIEW: CPT | Mod: 26

## 2019-10-14 PROCEDURE — 71250 CT THORAX DX C-: CPT | Mod: 26

## 2019-10-14 PROCEDURE — 99223 1ST HOSP IP/OBS HIGH 75: CPT

## 2019-10-14 PROCEDURE — 99232 SBSQ HOSP IP/OBS MODERATE 35: CPT

## 2019-10-14 RX ADMIN — HEPARIN SODIUM 5000 UNIT(S): 5000 INJECTION INTRAVENOUS; SUBCUTANEOUS at 21:23

## 2019-10-14 RX ADMIN — Medication 2000 UNIT(S): at 12:31

## 2019-10-14 RX ADMIN — Medication 600 MILLIGRAM(S): at 15:20

## 2019-10-14 RX ADMIN — Medication 600 MILLIGRAM(S): at 21:26

## 2019-10-14 RX ADMIN — LACTULOSE 20 GRAM(S): 10 SOLUTION ORAL at 05:23

## 2019-10-14 RX ADMIN — Medication 650 MILLIGRAM(S): at 05:22

## 2019-10-14 RX ADMIN — LACTULOSE 20 GRAM(S): 10 SOLUTION ORAL at 17:17

## 2019-10-14 RX ADMIN — Medication 100 MILLIGRAM(S): at 12:31

## 2019-10-14 RX ADMIN — Medication 600 MILLIGRAM(S): at 21:23

## 2019-10-14 RX ADMIN — PANTOPRAZOLE SODIUM 40 MILLIGRAM(S): 20 TABLET, DELAYED RELEASE ORAL at 08:33

## 2019-10-14 RX ADMIN — HEPARIN SODIUM 5000 UNIT(S): 5000 INJECTION INTRAVENOUS; SUBCUTANEOUS at 15:20

## 2019-10-14 RX ADMIN — Medication 2: at 17:16

## 2019-10-14 RX ADMIN — HEPARIN SODIUM 5000 UNIT(S): 5000 INJECTION INTRAVENOUS; SUBCUTANEOUS at 05:22

## 2019-10-14 RX ADMIN — Medication 600 MILLIGRAM(S): at 05:22

## 2019-10-14 NOTE — CONSULT NOTE ADULT - ATTENDING COMMENTS
General Thoracic Surgery Attestation    I have seen and examined the patient.  Where appropriate I have updated, edited, or corrected the resident's or PA's note with regard to findings, values, and plan.    additionally, have reviewed ct chest from today, of note appx half of the opacity in the lower chest is subpleural as evidenced by a fat line superficial to the clot.    chest tube outputs reported as high, although at least per chart slowly decreasing.  I suspect we are looking at chest tube output that is high due to multifactorial issues:  significant chest wall trauma, low albumin, may have some component of hepatic ascites with known cirrhosis (although no ascites on last abd U/S.)  Fluid output is non chylous, and mostly serous with a tinge of sanguinous.    would recommend:    consider anti-inflammatories, this would also be excellent pain control for rib fx  consider fluid restriction  consider diuretics  continue chest tube until output <250cc per day.  thoracic will follow
s/ fall with multiple ribs fracture and hemothorax s/p CT   stable this morning   pain control   incentive spirometer   resume diet and home meds   Transfer to floor.

## 2019-10-14 NOTE — CONSULT NOTE ADULT - SUBJECTIVE AND OBJECTIVE BOX
CT SURGERY CONSULT NOTE    Patient: STACY CARDENAS , 74y ([birth year])Male   MRN: [id number]  Location: [address]  Visit: 10-10-19 Inpatient  Date: 10-14-19 @ 13:52    HPI:  74m presents to ED s/p fall yesterday. The patient was standing on a stool and fell off and hit his right side on the side of a tub. The patient reports right sided chest pain for the day and decided to come to the ED. CT scan revealed several rib fractures on the right side with a hemothorax. A chest tube was placed in the ED. (10 Oct 2019 21:41)    PAST MEDICAL & SURGICAL HISTORY:  Osteoarthritis  Benign essential HTN  Gout  Depression  Diabetes  S/P knee surgery  S/P cholecystectomy  Cataracts, both eyes    Home Medications:  aspirin 81 mg oral tablet: 1 tab(s) orally once a day (10 Oct 2019 21:41)  colchicine 0.6 mg oral capsule: 1 cap(s) orally once a day (10 Oct 2019 21:41)  glipiZIDE 2.5 mg oral tablet, extended release: 1 tab(s) orally once a day (10 Oct 2019 21:41)  metFORMIN 1000 mg oral tablet: 1 tab(s) orally 2 times a day (10 Oct 2019 21:41)  Vitamin B6 100 mg oral tablet: 1 tab(s) orally once a day (10 Oct 2019 21:41)  Vitamin D3 2000 intl units oral tablet: 1 tab(s) orally once a day (10 Oct 2019 21:47)    VITALS:  T(F): 97.7 (10-14-19 @ 12:11), Max: 97.8 (10-14-19 @ 08:21)  HR: 88 (10-14-19 @ 12:11) (73 - 88)  BP: 139/65 (10-14-19 @ 12:11) (132/58 - 164/71)  RR: 18 (10-14-19 @ 12:11) (18 - 18)    PHYSICAL EXAM:  GENERAL: NAD, well-appearing  CHEST/LUNG: Clear to auscultation bilaterally. Rt Chest tube in place, - airleak. SS output.  HEART: Regular rate and rhythm  ABDOMEN: Soft, Nontender, Nondistended;   EXTREMITIES:  No clubbing, cyanosis, or edema    MEDICATIONS  (STANDING):  chlorhexidine 4% Liquid 1 Application(s) Topical <User Schedule>  cholecalciferol 2000 Unit(s) Oral daily  dextrose 5%. 1000 milliLiter(s) (50 mL/Hr) IV Continuous <Continuous>  dextrose 50% Injectable 12.5 Gram(s) IV Push once  dextrose 50% Injectable 25 Gram(s) IV Push once  dextrose 50% Injectable 25 Gram(s) IV Push once  heparin  Injectable 5000 Unit(s) SubCutaneous every 8 hours  ibuprofen  Tablet. 600 milliGRAM(s) Oral every 8 hours  influenza   Vaccine 0.5 milliLiter(s) IntraMuscular once  insulin lispro (HumaLOG) corrective regimen sliding scale   SubCutaneous three times a day before meals  lactulose Syrup 20 Gram(s) Oral every 12 hours  pantoprazole    Tablet 40 milliGRAM(s) Oral before breakfast  pyridoxine 100 milliGRAM(s) Oral daily    MEDICATIONS  (PRN):  dextrose 40% Gel 15 Gram(s) Oral once PRN Blood Glucose LESS THAN 70 milliGRAM(s)/deciliter  glucagon  Injectable 1 milliGRAM(s) IntraMuscular once PRN Glucose LESS THAN 70 milligrams/deciliter  oxyCODONE    IR 5 milliGRAM(s) Oral every 6 hours PRN Severe Pain (7 - 10)    LAB/STUDIES:             9.6    6.33  )-----------( 151      ( 13 Oct 2019 22:16 )             27.6     10-13    144  |  107  |  22<H>  ----------------------------<  203<H>  4.2   |  28  |  0.9    Ca    8.3<L>      13 Oct 2019 22:16  Phos  3.6     10-13  Mg     1.8     10-13    IMAGING:  Xray Chest 1 View- PORTABLE-Urgent (10.14.19 @ 06:36) >  Impression:    Stable right chest tube and basilar opacity. No pneumothorax    ASSESSMENT:  74M s/p mechanical fall with Right 3-8 rib fractures with pneumothorax, s/p chest tube placement on the right side. CT output 150, CXR shows right basilar effusion. CT surgery consulted for persistent high serosanguinous outputs from Rt CT    PLAN:  - Obtain CT Chest  - Keep Chest tube to suction, Monitor outputs

## 2019-10-14 NOTE — PROGRESS NOTE ADULT - SUBJECTIVE AND OBJECTIVE BOX
GENERAL SURGERY PROGRESS NOTE     STACY CARDENAS  74y  Male  Hospital day :4d    OVERNIGHT EVENTS: No acute events    T(F): 97.3 (10-14-19 @ 00:02), Max: 97.9 (10-13-19 @ 11:58)  HR: 75 (10-14-19 @ 00:02) (73 - 86)  BP: 138/65 (10-14-19 @ 00:02) (132/58 - 163/74)  RR: 18 (10-14-19 @ 00:02) (18 - 18)  SpO2: 95% (10-13-19 @ 10:07) (95% - 95%)    DIET/FLUIDS: cholecalciferol 2000 Unit(s) Oral daily  dextrose 5%. 1000 milliLiter(s) IV Continuous <Continuous>  pyridoxine 100 milliGRAM(s) Oral daily         GI proph:  pantoprazole    Tablet 40 milliGRAM(s) Oral before breakfast    AC/ proph: heparin  Injectable 5000 Unit(s) SubCutaneous every 8 hours    ABx:     PHYSICAL EXAM:  GENERAL: NAD, well-appearing  CHEST/LUNG: Clear to auscultation bilaterally. Chest tube in right chest  HEART: Regular rate and rhythm  ABDOMEN: Soft, Nontender, Nondistended;   EXTREMITIES:  No clubbing, cyanosis, or edema      LABS  Labs:  CAPILLARY BLOOD GLUCOSE      POCT Blood Glucose.: 187 mg/dL (13 Oct 2019 21:15)  POCT Blood Glucose.: 202 mg/dL (13 Oct 2019 16:14)  POCT Blood Glucose.: 233 mg/dL (13 Oct 2019 11:21)  POCT Blood Glucose.: 115 mg/dL (13 Oct 2019 07:25)                          9.6    6.33  )-----------( 151      ( 13 Oct 2019 22:16 )             27.6       Auto Neutrophil %: 59.9 % (10-13-19 @ 22:16)  Auto Immature Granulocyte %: 0.5 % (10-13-19 @ 22:16)    10-13    144  |  107  |  22<H>  ----------------------------<  203<H>  4.2   |  28  |  0.9      Calcium, Total Serum: 8.3 mg/dL (10-13-19 @ 22:16)                        RADIOLOGY & ADDITIONAL TESTS:      < from: Xray Chest 1 View- PORTABLE-Routine (10.13.19 @ 08:28) >  Impression:      Stable right basilar opacity/pleural effusion.  No radiographic evidence of pneumothorax.    < end of copied text >

## 2019-10-14 NOTE — CONSULT NOTE ADULT - REASON FOR ADMISSION
s/p fall with multiple rib fractures

## 2019-10-14 NOTE — CONSULT NOTE ADULT - CONSULT REASON
s/p fall, rib fx, chest tube placement
fall
Right 3-8 rib fractures with hemopneumothorax, s/p Rt chest tube placement.

## 2019-10-15 LAB
ANION GAP SERPL CALC-SCNC: 10 MMOL/L — SIGNIFICANT CHANGE UP (ref 7–14)
ANION GAP SERPL CALC-SCNC: 10 MMOL/L — SIGNIFICANT CHANGE UP (ref 7–14)
BASOPHILS # BLD AUTO: 0.03 K/UL — SIGNIFICANT CHANGE UP (ref 0–0.2)
BASOPHILS NFR BLD AUTO: 0.4 % — SIGNIFICANT CHANGE UP (ref 0–1)
BUN SERPL-MCNC: 18 MG/DL — SIGNIFICANT CHANGE UP (ref 10–20)
BUN SERPL-MCNC: 19 MG/DL — SIGNIFICANT CHANGE UP (ref 10–20)
CALCIUM SERPL-MCNC: 8.1 MG/DL — LOW (ref 8.5–10.1)
CALCIUM SERPL-MCNC: 8.2 MG/DL — LOW (ref 8.5–10.1)
CHLORIDE SERPL-SCNC: 105 MMOL/L — SIGNIFICANT CHANGE UP (ref 98–110)
CHLORIDE SERPL-SCNC: 107 MMOL/L — SIGNIFICANT CHANGE UP (ref 98–110)
CO2 SERPL-SCNC: 25 MMOL/L — SIGNIFICANT CHANGE UP (ref 17–32)
CO2 SERPL-SCNC: 26 MMOL/L — SIGNIFICANT CHANGE UP (ref 17–32)
CREAT SERPL-MCNC: 0.7 MG/DL — SIGNIFICANT CHANGE UP (ref 0.7–1.5)
CREAT SERPL-MCNC: 0.7 MG/DL — SIGNIFICANT CHANGE UP (ref 0.7–1.5)
EOSINOPHIL # BLD AUTO: 0.29 K/UL — SIGNIFICANT CHANGE UP (ref 0–0.7)
EOSINOPHIL NFR BLD AUTO: 4.3 % — SIGNIFICANT CHANGE UP (ref 0–8)
GLUCOSE BLDC GLUCOMTR-MCNC: 144 MG/DL — HIGH (ref 70–99)
GLUCOSE BLDC GLUCOMTR-MCNC: 146 MG/DL — HIGH (ref 70–99)
GLUCOSE BLDC GLUCOMTR-MCNC: 151 MG/DL — HIGH (ref 70–99)
GLUCOSE BLDC GLUCOMTR-MCNC: 257 MG/DL — HIGH (ref 70–99)
GLUCOSE BLDC GLUCOMTR-MCNC: 286 MG/DL — HIGH (ref 70–99)
GLUCOSE SERPL-MCNC: 124 MG/DL — HIGH (ref 70–99)
GLUCOSE SERPL-MCNC: 151 MG/DL — HIGH (ref 70–99)
HCT VFR BLD CALC: 30.2 % — LOW (ref 42–52)
HGB BLD-MCNC: 10.5 G/DL — LOW (ref 14–18)
IMM GRANULOCYTES NFR BLD AUTO: 0.3 % — SIGNIFICANT CHANGE UP (ref 0.1–0.3)
LYMPHOCYTES # BLD AUTO: 1.74 K/UL — SIGNIFICANT CHANGE UP (ref 1.2–3.4)
LYMPHOCYTES # BLD AUTO: 25.7 % — SIGNIFICANT CHANGE UP (ref 20.5–51.1)
MAGNESIUM SERPL-MCNC: 1.5 MG/DL — LOW (ref 1.8–2.4)
MAGNESIUM SERPL-MCNC: 1.8 MG/DL — SIGNIFICANT CHANGE UP (ref 1.8–2.4)
MCHC RBC-ENTMCNC: 34.8 G/DL — SIGNIFICANT CHANGE UP (ref 32–37)
MCHC RBC-ENTMCNC: 36.2 PG — HIGH (ref 27–31)
MCV RBC AUTO: 104.1 FL — HIGH (ref 80–94)
MONOCYTES # BLD AUTO: 0.7 K/UL — HIGH (ref 0.1–0.6)
MONOCYTES NFR BLD AUTO: 10.3 % — HIGH (ref 1.7–9.3)
NEUTROPHILS # BLD AUTO: 4 K/UL — SIGNIFICANT CHANGE UP (ref 1.4–6.5)
NEUTROPHILS NFR BLD AUTO: 59 % — SIGNIFICANT CHANGE UP (ref 42.2–75.2)
NRBC # BLD: 0 /100 WBCS — SIGNIFICANT CHANGE UP (ref 0–0)
PHOSPHATE SERPL-MCNC: 3 MG/DL — SIGNIFICANT CHANGE UP (ref 2.1–4.9)
PHOSPHATE SERPL-MCNC: 3.2 MG/DL — SIGNIFICANT CHANGE UP (ref 2.1–4.9)
PLATELET # BLD AUTO: 160 K/UL — SIGNIFICANT CHANGE UP (ref 130–400)
POTASSIUM SERPL-MCNC: 4.2 MMOL/L — SIGNIFICANT CHANGE UP (ref 3.5–5)
POTASSIUM SERPL-MCNC: 4.4 MMOL/L — SIGNIFICANT CHANGE UP (ref 3.5–5)
POTASSIUM SERPL-SCNC: 4.2 MMOL/L — SIGNIFICANT CHANGE UP (ref 3.5–5)
POTASSIUM SERPL-SCNC: 4.4 MMOL/L — SIGNIFICANT CHANGE UP (ref 3.5–5)
RBC # BLD: 2.9 M/UL — LOW (ref 4.7–6.1)
RBC # FLD: 14.6 % — HIGH (ref 11.5–14.5)
SODIUM SERPL-SCNC: 141 MMOL/L — SIGNIFICANT CHANGE UP (ref 135–146)
SODIUM SERPL-SCNC: 142 MMOL/L — SIGNIFICANT CHANGE UP (ref 135–146)
WBC # BLD: 6.78 K/UL — SIGNIFICANT CHANGE UP (ref 4.8–10.8)
WBC # FLD AUTO: 6.78 K/UL — SIGNIFICANT CHANGE UP (ref 4.8–10.8)

## 2019-10-15 PROCEDURE — 99232 SBSQ HOSP IP/OBS MODERATE 35: CPT

## 2019-10-15 PROCEDURE — 71045 X-RAY EXAM CHEST 1 VIEW: CPT | Mod: 26

## 2019-10-15 PROCEDURE — 93010 ELECTROCARDIOGRAM REPORT: CPT

## 2019-10-15 RX ORDER — MAGNESIUM SULFATE 500 MG/ML
2 VIAL (ML) INJECTION ONCE
Refills: 0 | Status: COMPLETED | OUTPATIENT
Start: 2019-10-15 | End: 2019-10-15

## 2019-10-15 RX ADMIN — Medication 2: at 17:24

## 2019-10-15 RX ADMIN — HEPARIN SODIUM 5000 UNIT(S): 5000 INJECTION INTRAVENOUS; SUBCUTANEOUS at 05:44

## 2019-10-15 RX ADMIN — HEPARIN SODIUM 5000 UNIT(S): 5000 INJECTION INTRAVENOUS; SUBCUTANEOUS at 13:25

## 2019-10-15 RX ADMIN — Medication 50 GRAM(S): at 06:28

## 2019-10-15 RX ADMIN — OXYCODONE HYDROCHLORIDE 5 MILLIGRAM(S): 5 TABLET ORAL at 05:43

## 2019-10-15 RX ADMIN — LACTULOSE 20 GRAM(S): 10 SOLUTION ORAL at 05:44

## 2019-10-15 RX ADMIN — PANTOPRAZOLE SODIUM 40 MILLIGRAM(S): 20 TABLET, DELAYED RELEASE ORAL at 08:25

## 2019-10-15 RX ADMIN — Medication 600 MILLIGRAM(S): at 21:05

## 2019-10-15 RX ADMIN — HEPARIN SODIUM 5000 UNIT(S): 5000 INJECTION INTRAVENOUS; SUBCUTANEOUS at 21:05

## 2019-10-15 RX ADMIN — Medication 8: at 12:10

## 2019-10-15 RX ADMIN — Medication 2000 UNIT(S): at 12:10

## 2019-10-15 RX ADMIN — Medication 600 MILLIGRAM(S): at 05:44

## 2019-10-15 RX ADMIN — Medication 600 MILLIGRAM(S): at 21:06

## 2019-10-15 RX ADMIN — LACTULOSE 20 GRAM(S): 10 SOLUTION ORAL at 17:25

## 2019-10-15 RX ADMIN — Medication 100 MILLIGRAM(S): at 12:10

## 2019-10-15 RX ADMIN — Medication 600 MILLIGRAM(S): at 13:25

## 2019-10-15 RX ADMIN — CHLORHEXIDINE GLUCONATE 1 APPLICATION(S): 213 SOLUTION TOPICAL at 05:39

## 2019-10-15 NOTE — PROGRESS NOTE ADULT - SUBJECTIVE AND OBJECTIVE BOX
GENERAL SURGERY PROGRESS NOTE     STACY CARDENAS  74y  Male  Hospital day :5d    OVERNIGHT EVENTS: No acute events. Pleuravac changed after reaching capacity    T(F): 97.6 (10-15-19 @ 05:00), Max: 98.8 (10-14-19 @ 16:00)  HR: 81 (10-15-19 @ 05:00) (80 - 88)  BP: 174/65 (10-15-19 @ 05:00) (133/62 - 174/65)    RR: 18 (10-15-19 @ 05:00) (18 - 18)  SpO2: 98% (10-14-19 @ 20:09) (98% - 100%)    DIET/FLUIDS: cholecalciferol 2000 Unit(s) Oral daily  dextrose 5%. 1000 milliLiter(s) IV Continuous <Continuous>  magnesium sulfate  IVPB 2 Gram(s) IV Intermittent once  pyridoxine 100 milliGRAM(s) Oral daily         GI proph:  pantoprazole    Tablet 40 milliGRAM(s) Oral before breakfast    AC/ proph: heparin  Injectable 5000 Unit(s) SubCutaneous every 8 hours    ABx:     PHYSICAL EXAM:  GENERAL: NAD, well-appearing  CHEST/LUNG: Clear to auscultation bilaterally  HEART: Regular rate and rhythm  ABDOMEN: Soft, Nontender, Nondistended;   EXTREMITIES:  No clubbing, cyanosis, or edema      LABS  Labs:  CAPILLARY BLOOD GLUCOSE      POCT Blood Glucose.: 139 mg/dL (14 Oct 2019 21:11)  POCT Blood Glucose.: 170 mg/dL (14 Oct 2019 16:01)  POCT Blood Glucose.: 140 mg/dL (14 Oct 2019 07:11)                          9.9    6.25  )-----------( 158      ( 14 Oct 2019 22:03 )             28.2       Auto Neutrophil %: 59.5 % (10-14-19 @ 22:03)  Auto Immature Granulocyte %: 0.5 % (10-14-19 @ 22:03)    10-14    142  |  107  |  19  ----------------------------<  124<H>  4.2   |  25  |  0.7      Calcium, Total Serum: 8.1 mg/dL (10-14-19 @ 22:03)                RADIOLOGY & ADDITIONAL TESTS:      Follow AM CXR

## 2019-10-15 NOTE — PROGRESS NOTE ADULT - SUBJECTIVE AND OBJECTIVE BOX
GENERAL SURGERY PROGRESS NOTE     STACY CARDENAS  74y  Male  Hospital day :5d    OVERNIGHT EVENTS: no acute events overnight    T(F): 97.6 (10-15-19 @ 05:00), Max: 98.8 (10-14-19 @ 16:00)  HR: 81 (10-15-19 @ 05:00) (80 - 88)  BP: 174/65 (10-15-19 @ 05:00) (133/62 - 174/65)  RR: 18 (10-15-19 @ 08:35) (18 - 18)  SpO2: 95% (10-15-19 @ 08:35) (95% - 98%)    DIET/FLUIDS: cholecalciferol 2000 Unit(s) Oral daily  dextrose 5%. 1000 milliLiter(s) IV Continuous <Continuous>  pyridoxine 100 milliGRAM(s) Oral daily       GI proph:  pantoprazole    Tablet 40 milliGRAM(s) Oral before breakfast    AC/ proph: heparin  Injectable 5000 Unit(s) SubCutaneous every 8 hours    PHYSICAL EXAM:  GENERAL: NAD, well-appearing  CHEST/LUNG: Clear to auscultation bilaterally  HEART: Regular rate and rhythm  ABDOMEN: Soft, Nontender, Nondistended;   EXTREMITIES:  No clubbing, cyanosis, or edema    LABS  Labs:  CAPILLARY BLOOD GLUCOSE      POCT Blood Glucose.: 257 mg/dL (15 Oct 2019 11:25)  POCT Blood Glucose.: 144 mg/dL (15 Oct 2019 07:41)  POCT Blood Glucose.: 139 mg/dL (14 Oct 2019 21:11)  POCT Blood Glucose.: 170 mg/dL (14 Oct 2019 16:01)                          9.9    6.25  )-----------( 158      ( 14 Oct 2019 22:03 )             28.2       Auto Neutrophil %: 59.5 % (10-14-19 @ 22:03)  Auto Immature Granulocyte %: 0.5 % (10-14-19 @ 22:03)    10-14    142  |  107  |  19  ----------------------------<  124<H>  4.2   |  25  |  0.7      Calcium, Total Serum: 8.1 mg/dL (10-14-19 @ 22:03)      RADIOLOGY & ADDITIONAL TESTS:  < from: CT Chest No Cont (10.14.19 @ 13:48) >  IMPRESSION:      1. Redemonstration of the acute right rib fractures some of which are   comminuted and are associated with extrapleural hematoma.     2. New right chest tube with interval decrease in the complex right   pleural fluid, now trace.    3. Trace apicolateral pneumothorax, increased since prior.    4. New patchy upper lung predominant ground glass opacities, likely   infectious or inflammatory in etiology.    < end of copied text >

## 2019-10-16 LAB
ANION GAP SERPL CALC-SCNC: 10 MMOL/L — SIGNIFICANT CHANGE UP (ref 7–14)
BUN SERPL-MCNC: 22 MG/DL — HIGH (ref 10–20)
CALCIUM SERPL-MCNC: 8.3 MG/DL — LOW (ref 8.5–10.1)
CHLORIDE SERPL-SCNC: 106 MMOL/L — SIGNIFICANT CHANGE UP (ref 98–110)
CO2 SERPL-SCNC: 26 MMOL/L — SIGNIFICANT CHANGE UP (ref 17–32)
CREAT SERPL-MCNC: 1.1 MG/DL — SIGNIFICANT CHANGE UP (ref 0.7–1.5)
GLUCOSE BLDC GLUCOMTR-MCNC: 136 MG/DL — HIGH (ref 70–99)
GLUCOSE BLDC GLUCOMTR-MCNC: 148 MG/DL — HIGH (ref 70–99)
GLUCOSE BLDC GLUCOMTR-MCNC: 163 MG/DL — HIGH (ref 70–99)
GLUCOSE BLDC GLUCOMTR-MCNC: 359 MG/DL — HIGH (ref 70–99)
GLUCOSE SERPL-MCNC: 143 MG/DL — HIGH (ref 70–99)
MAGNESIUM SERPL-MCNC: 1.9 MG/DL — SIGNIFICANT CHANGE UP (ref 1.8–2.4)
PHOSPHATE SERPL-MCNC: 3.7 MG/DL — SIGNIFICANT CHANGE UP (ref 2.1–4.9)
POTASSIUM SERPL-MCNC: 4.8 MMOL/L — SIGNIFICANT CHANGE UP (ref 3.5–5)
POTASSIUM SERPL-SCNC: 4.8 MMOL/L — SIGNIFICANT CHANGE UP (ref 3.5–5)
SODIUM SERPL-SCNC: 142 MMOL/L — SIGNIFICANT CHANGE UP (ref 135–146)

## 2019-10-16 PROCEDURE — 99232 SBSQ HOSP IP/OBS MODERATE 35: CPT

## 2019-10-16 PROCEDURE — 71045 X-RAY EXAM CHEST 1 VIEW: CPT | Mod: 26

## 2019-10-16 RX ORDER — MAGNESIUM SULFATE 500 MG/ML
2 VIAL (ML) INJECTION ONCE
Refills: 0 | Status: COMPLETED | OUTPATIENT
Start: 2019-10-16 | End: 2019-10-16

## 2019-10-16 RX ORDER — FUROSEMIDE 40 MG
20 TABLET ORAL ONCE
Refills: 0 | Status: COMPLETED | OUTPATIENT
Start: 2019-10-16 | End: 2019-10-16

## 2019-10-16 RX ADMIN — PANTOPRAZOLE SODIUM 40 MILLIGRAM(S): 20 TABLET, DELAYED RELEASE ORAL at 07:59

## 2019-10-16 RX ADMIN — LACTULOSE 20 GRAM(S): 10 SOLUTION ORAL at 17:08

## 2019-10-16 RX ADMIN — Medication 12: at 11:18

## 2019-10-16 RX ADMIN — Medication 600 MILLIGRAM(S): at 21:54

## 2019-10-16 RX ADMIN — Medication 600 MILLIGRAM(S): at 06:32

## 2019-10-16 RX ADMIN — CHLORHEXIDINE GLUCONATE 1 APPLICATION(S): 213 SOLUTION TOPICAL at 06:31

## 2019-10-16 RX ADMIN — LACTULOSE 20 GRAM(S): 10 SOLUTION ORAL at 06:31

## 2019-10-16 RX ADMIN — Medication 100 MILLIGRAM(S): at 11:18

## 2019-10-16 RX ADMIN — Medication 2000 UNIT(S): at 11:18

## 2019-10-16 RX ADMIN — Medication 600 MILLIGRAM(S): at 13:13

## 2019-10-16 RX ADMIN — Medication 600 MILLIGRAM(S): at 21:24

## 2019-10-16 RX ADMIN — Medication 50 GRAM(S): at 06:31

## 2019-10-16 RX ADMIN — Medication 2: at 17:07

## 2019-10-16 RX ADMIN — HEPARIN SODIUM 5000 UNIT(S): 5000 INJECTION INTRAVENOUS; SUBCUTANEOUS at 13:13

## 2019-10-16 RX ADMIN — HEPARIN SODIUM 5000 UNIT(S): 5000 INJECTION INTRAVENOUS; SUBCUTANEOUS at 06:31

## 2019-10-16 RX ADMIN — Medication 20 MILLIGRAM(S): at 15:22

## 2019-10-16 RX ADMIN — HEPARIN SODIUM 5000 UNIT(S): 5000 INJECTION INTRAVENOUS; SUBCUTANEOUS at 21:24

## 2019-10-16 RX ADMIN — Medication 600 MILLIGRAM(S): at 06:31

## 2019-10-16 NOTE — PROGRESS NOTE ADULT - SUBJECTIVE AND OBJECTIVE BOX
GENERAL SURGERY PROGRESS NOTE     STACY CARDENAS  [address] [place] [address] day :6d  POD:  Procedure:   Surgical Attending: Riki Zamora  Overnight events: No acute events overnight.    T(F): 97.6 (10-16-19 @ 04:30), Max: 98.1 (10-15-19 @ 20:29)  HR: 77 (10-16-19 @ 04:30) (77 - 86)  BP: 136/63 (10-16-19 @ 04:30) (119/67 - 136/63)  ABP: --  ABP(mean): --  RR: 18 (10-16-19 @ 04:30) (18 - 18)  SpO2: 95% (10-15-19 @ 08:35) (95% - 95%)      10-14-19 @ 07:01  -  10-15-19 @ 07:00  --------------------------------------------------------  IN:  Total IN: 0 mL    OUT:    Chest Tube: 540 mL    Voided: 1950 mL  Total OUT: 2490 mL    Total NET: -2490 mL      10-15-19 @ 07:01  -  10-16-19 @ 05:06  --------------------------------------------------------  IN:    Oral Fluid: 935 mL  Total IN: 935 mL    OUT:    Chest Tube: 100 mL    Voided: 1475 mL  Total OUT: 1575 mL    Total NET: -640 mL        DIET/FLUIDS: cholecalciferol 2000 Unit(s) Oral daily  dextrose 5%. 1000 milliLiter(s) IV Continuous <Continuous>  pyridoxine 100 milliGRAM(s) Oral daily    GI proph:  pantoprazole    Tablet 40 milliGRAM(s) Oral before breakfast    AC/ proph: heparin  Injectable 5000 Unit(s) SubCutaneous every 8 hours    ABx:     PHYSICAL EXAM:  GENERAL: NAD, well-appearing  CHEST/LUNG: Clear to auscultation bilaterally  HEART: Regular rate and rhythm  ABDOMEN: Soft, Nontender, Nondistended;   EXTREMITIES:  No clubbing, cyanosis, or edema    LABS  Labs:  CAPILLARY BLOOD GLUCOSE      POCT Blood Glucose.: 146 mg/dL (15 Oct 2019 20:29)  POCT Blood Glucose.: 151 mg/dL (15 Oct 2019 16:24)  POCT Blood Glucose.: 257 mg/dL (15 Oct 2019 11:25)  POCT Blood Glucose.: 144 mg/dL (15 Oct 2019 07:41)                          10.5   6.78  )-----------( 160      ( 15 Oct 2019 22:23 )             30.2       Auto Neutrophil %: 59.0 % (10-15-19 @ 22:23)  Auto Immature Granulocyte %: 0.3 % (10-15-19 @ 22:23)    10-15    141  |  105  |  18  ----------------------------<  151<H>  4.4   |  26  |  0.7      Magnesium, Serum: 1.8 mg/dL (10-15-19 @ 22:23)      LFTs:         Coags:

## 2019-10-16 NOTE — PROGRESS NOTE ADULT - SUBJECTIVE AND OBJECTIVE BOX
GENERAL SURGERY PROGRESS NOTE     STACY CARDENAS  74y  Male  Hospital day :6d    OVERNIGHT EVENTS: no acute events overnight. 450cc output in CT overnight.    T(F): 97.6 (10-16-19 @ 04:30), Max: 98.1 (10-15-19 @ 20:29)  HR: 77 (10-16-19 @ 04:30) (77 - 86)  BP: 136/63 (10-16-19 @ 04:30) (119/67 - 136/63)  RR: 18 (10-16-19 @ 04:30) (18 - 18)  SpO2: 95% (10-15-19 @ 08:35) (95% - 95%)    DIET/FLUIDS: cholecalciferol 2000 Unit(s) Oral daily  dextrose 5%. 1000 milliLiter(s) IV Continuous <Continuous>  pyridoxine 100 milliGRAM(s) Oral daily     GI proph:  pantoprazole    Tablet 40 milliGRAM(s) Oral before breakfast    AC/ proph: heparin  Injectable 5000 Unit(s) SubCutaneous every 8 hours    PHYSICAL EXAM:  GENERAL: NAD, well-appearing  CHEST/LUNG: Clear to auscultation bilaterally  HEART: Regular rate and rhythm  ABDOMEN: Soft, Nontender, Nondistended;   EXTREMITIES:  No clubbing, cyanosis, or edema      LABS  CAPILLARY BLOOD GLUCOSE  POCT Blood Glucose.: 136 mg/dL (16 Oct 2019 07:13)  POCT Blood Glucose.: 146 mg/dL (15 Oct 2019 20:29)  POCT Blood Glucose.: 151 mg/dL (15 Oct 2019 16:24)  POCT Blood Glucose.: 257 mg/dL (15 Oct 2019 11:25)                          10.5   6.78  )-----------( 160      ( 15 Oct 2019 22:23 )             30.2       Auto Neutrophil %: 59.0 % (10-15-19 @ 22:23)  Auto Immature Granulocyte %: 0.3 % (10-15-19 @ 22:23)    10-15    141  |  105  |  18  ----------------------------<  151<H>  4.4   |  26  |  0.7      Magnesium, Serum: 1.8 mg/dL (10-15-19 @ 22:23)    RADIOLOGY & ADDITIONAL TESTS:  < from: Xray Chest 1 View- PORTABLE-Urgent (10.15.19 @ 06:28) >    Impression:      Stable right effusion/basilar opacity. No significant pneumothorax.    Stable right basilar chest tube.    < end of copied text > GENERAL SURGERY PROGRESS NOTE     STACY CARDENAS  74y  Male  Hospital day :6d    HPI: 74m presents to ED s/p fall 10/10. The patient was standing on a stool and fell off and hit his right side on the side of a tub. The patient reports right sided chest pain for the day and decided to come to the ED. CT scan revealed several rib fractures on the right side with a hemothorax. A chest tube was placed in the ED. Drop in hemoglobin from 12.4 to 10.4 on HD2.     OVERNIGHT EVENTS: no acute events overnight. 450cc output in CT overnight.    T(F): 97.6 (10-16-19 @ 04:30), Max: 98.1 (10-15-19 @ 20:29)  HR: 77 (10-16-19 @ 04:30) (77 - 86)  BP: 136/63 (10-16-19 @ 04:30) (119/67 - 136/63)  RR: 18 (10-16-19 @ 04:30) (18 - 18)  SpO2: 95% (10-15-19 @ 08:35) (95% - 95%)    DIET/FLUIDS: cholecalciferol 2000 Unit(s) Oral daily  dextrose 5%. 1000 milliLiter(s) IV Continuous <Continuous>  pyridoxine 100 milliGRAM(s) Oral daily     GI proph:  pantoprazole    Tablet 40 milliGRAM(s) Oral before breakfast    AC/ proph: heparin  Injectable 5000 Unit(s) SubCutaneous every 8 hours    PHYSICAL EXAM:  GENERAL: NAD, well-appearing  CHEST/LUNG: Clear to auscultation bilaterally  HEART: Regular rate and rhythm  ABDOMEN: Soft, Nontender, Nondistended;   EXTREMITIES:  No clubbing, cyanosis, or edema      LABS  CAPILLARY BLOOD GLUCOSE  POCT Blood Glucose.: 136 mg/dL (16 Oct 2019 07:13)  POCT Blood Glucose.: 146 mg/dL (15 Oct 2019 20:29)  POCT Blood Glucose.: 151 mg/dL (15 Oct 2019 16:24)  POCT Blood Glucose.: 257 mg/dL (15 Oct 2019 11:25)                          10.5   6.78  )-----------( 160      ( 15 Oct 2019 22:23 )             30.2       Auto Neutrophil %: 59.0 % (10-15-19 @ 22:23)  Auto Immature Granulocyte %: 0.3 % (10-15-19 @ 22:23)    10-15    141  |  105  |  18  ----------------------------<  151<H>  4.4   |  26  |  0.7      Magnesium, Serum: 1.8 mg/dL (10-15-19 @ 22:23)    RADIOLOGY & ADDITIONAL TESTS:  < from: Xray Chest 1 View- PORTABLE-Urgent (10.15.19 @ 06:28) >    Impression:      Stable right effusion/basilar opacity. No significant pneumothorax.    Stable right basilar chest tube.    < end of copied text > GENERAL SURGERY PROGRESS NOTE     STACY CARDENAS  74y  Male  Hospital day :6d    HPI: 74m presents to ED s/p fall 10/10. The patient was standing on a stool and fell off and hit his right side on the side of a tub. The patient reports right sided chest pain for the day and decided to come to the ED. CT scan revealed several rib fractures on the right side with a hemothorax. A chest tube was placed in the ED. Drop in hemoglobin from 12.4 to 10.4 on HD2, stable since then.     OVERNIGHT EVENTS: no acute events overnight. 450cc output in CT overnight.    T(F): 97.6 (10-16-19 @ 04:30), Max: 98.1 (10-15-19 @ 20:29)  HR: 77 (10-16-19 @ 04:30) (77 - 86)  BP: 136/63 (10-16-19 @ 04:30) (119/67 - 136/63)  RR: 18 (10-16-19 @ 04:30) (18 - 18)  SpO2: 95% (10-15-19 @ 08:35) (95% - 95%)    DIET/FLUIDS: cholecalciferol 2000 Unit(s) Oral daily  dextrose 5%. 1000 milliLiter(s) IV Continuous <Continuous>  pyridoxine 100 milliGRAM(s) Oral daily     GI proph:  pantoprazole    Tablet 40 milliGRAM(s) Oral before breakfast    AC/ proph: heparin  Injectable 5000 Unit(s) SubCutaneous every 8 hours    PHYSICAL EXAM:  GENERAL: NAD, well-appearing  CHEST/LUNG: Clear to auscultation bilaterally  HEART: Regular rate and rhythm  ABDOMEN: Soft, Nontender, Nondistended;   EXTREMITIES:  No clubbing, cyanosis, or edema      LABS  CAPILLARY BLOOD GLUCOSE  POCT Blood Glucose.: 136 mg/dL (16 Oct 2019 07:13)  POCT Blood Glucose.: 146 mg/dL (15 Oct 2019 20:29)  POCT Blood Glucose.: 151 mg/dL (15 Oct 2019 16:24)  POCT Blood Glucose.: 257 mg/dL (15 Oct 2019 11:25)                          10.5   6.78  )-----------( 160      ( 15 Oct 2019 22:23 )             30.2       Auto Neutrophil %: 59.0 % (10-15-19 @ 22:23)  Auto Immature Granulocyte %: 0.3 % (10-15-19 @ 22:23)    10-15    141  |  105  |  18  ----------------------------<  151<H>  4.4   |  26  |  0.7      Magnesium, Serum: 1.8 mg/dL (10-15-19 @ 22:23)    RADIOLOGY & ADDITIONAL TESTS:  < from: Xray Chest 1 View- PORTABLE-Urgent (10.15.19 @ 06:28) >    Impression:      Stable right effusion/basilar opacity. No significant pneumothorax.    Stable right basilar chest tube.    < end of copied text >

## 2019-10-17 ENCOUNTER — TRANSCRIPTION ENCOUNTER (OUTPATIENT)
Age: 74
End: 2019-10-17

## 2019-10-17 VITALS
TEMPERATURE: 99 F | DIASTOLIC BLOOD PRESSURE: 59 MMHG | SYSTOLIC BLOOD PRESSURE: 113 MMHG | RESPIRATION RATE: 18 BRPM | HEART RATE: 85 BPM

## 2019-10-17 LAB
BASOPHILS # BLD AUTO: 0.04 K/UL — SIGNIFICANT CHANGE UP (ref 0–0.2)
BASOPHILS NFR BLD AUTO: 0.6 % — SIGNIFICANT CHANGE UP (ref 0–1)
EOSINOPHIL # BLD AUTO: 0.31 K/UL — SIGNIFICANT CHANGE UP (ref 0–0.7)
EOSINOPHIL NFR BLD AUTO: 5 % — SIGNIFICANT CHANGE UP (ref 0–8)
GLUCOSE BLDC GLUCOMTR-MCNC: 134 MG/DL — HIGH (ref 70–99)
GLUCOSE BLDC GLUCOMTR-MCNC: 212 MG/DL — HIGH (ref 70–99)
GLUCOSE BLDC GLUCOMTR-MCNC: 259 MG/DL — HIGH (ref 70–99)
HCT VFR BLD CALC: 29.8 % — LOW (ref 42–52)
HGB BLD-MCNC: 10.3 G/DL — LOW (ref 14–18)
IMM GRANULOCYTES NFR BLD AUTO: 0.3 % — SIGNIFICANT CHANGE UP (ref 0.1–0.3)
LYMPHOCYTES # BLD AUTO: 1.73 K/UL — SIGNIFICANT CHANGE UP (ref 1.2–3.4)
LYMPHOCYTES # BLD AUTO: 27.8 % — SIGNIFICANT CHANGE UP (ref 20.5–51.1)
MCHC RBC-ENTMCNC: 34.6 G/DL — SIGNIFICANT CHANGE UP (ref 32–37)
MCHC RBC-ENTMCNC: 36.4 PG — HIGH (ref 27–31)
MCV RBC AUTO: 105.3 FL — HIGH (ref 80–94)
MONOCYTES # BLD AUTO: 0.63 K/UL — HIGH (ref 0.1–0.6)
MONOCYTES NFR BLD AUTO: 10.1 % — HIGH (ref 1.7–9.3)
NEUTROPHILS # BLD AUTO: 3.5 K/UL — SIGNIFICANT CHANGE UP (ref 1.4–6.5)
NEUTROPHILS NFR BLD AUTO: 56.2 % — SIGNIFICANT CHANGE UP (ref 42.2–75.2)
NRBC # BLD: 0 /100 WBCS — SIGNIFICANT CHANGE UP (ref 0–0)
PLATELET # BLD AUTO: 155 K/UL — SIGNIFICANT CHANGE UP (ref 130–400)
RBC # BLD: 2.83 M/UL — LOW (ref 4.7–6.1)
RBC # FLD: 14.6 % — HIGH (ref 11.5–14.5)
WBC # BLD: 6.23 K/UL — SIGNIFICANT CHANGE UP (ref 4.8–10.8)
WBC # FLD AUTO: 6.23 K/UL — SIGNIFICANT CHANGE UP (ref 4.8–10.8)

## 2019-10-17 PROCEDURE — 99232 SBSQ HOSP IP/OBS MODERATE 35: CPT

## 2019-10-17 PROCEDURE — 71045 X-RAY EXAM CHEST 1 VIEW: CPT | Mod: 26,76

## 2019-10-17 RX ORDER — IBUPROFEN 200 MG
1 TABLET ORAL
Qty: 0 | Refills: 0 | DISCHARGE
Start: 2019-10-17

## 2019-10-17 RX ADMIN — Medication 2000 UNIT(S): at 11:28

## 2019-10-17 RX ADMIN — LACTULOSE 20 GRAM(S): 10 SOLUTION ORAL at 05:31

## 2019-10-17 RX ADMIN — Medication 8: at 11:29

## 2019-10-17 RX ADMIN — Medication 6: at 16:53

## 2019-10-17 RX ADMIN — Medication 100 MILLIGRAM(S): at 11:28

## 2019-10-17 RX ADMIN — Medication 600 MILLIGRAM(S): at 05:30

## 2019-10-17 RX ADMIN — PANTOPRAZOLE SODIUM 40 MILLIGRAM(S): 20 TABLET, DELAYED RELEASE ORAL at 07:58

## 2019-10-17 RX ADMIN — HEPARIN SODIUM 5000 UNIT(S): 5000 INJECTION INTRAVENOUS; SUBCUTANEOUS at 05:31

## 2019-10-17 RX ADMIN — Medication 600 MILLIGRAM(S): at 13:07

## 2019-10-17 RX ADMIN — LACTULOSE 20 GRAM(S): 10 SOLUTION ORAL at 17:35

## 2019-10-17 RX ADMIN — HEPARIN SODIUM 5000 UNIT(S): 5000 INJECTION INTRAVENOUS; SUBCUTANEOUS at 13:08

## 2019-10-17 NOTE — DIETITIAN INITIAL EVALUATION ADULT. - ENERGY NEEDS
kcal: [id number] (MSJ x 1-1.1 AF) Obese BMI  protein: 56-67 g (1-1.2 g/kg IBW) same as above  fluid: 1mL/kcal or per LIP

## 2019-10-17 NOTE — PROGRESS NOTE ADULT - SUBJECTIVE AND OBJECTIVE BOX
GENERAL SURGERY PROGRESS NOTE     STACY CARDENAS  [address] [place] [address] day :7d  POD:  Procedure:   Surgical Attending: Riki Zamora  Overnight events: No acute events. Pain well controlled. Tolerating RD without nausea or vomiting.    T(F): 98.7 (10-17-19 @ 12:00), Max: 98.7 (10-17-19 @ 12:00)  HR: 88 (10-17-19 @ 12:00) (73 - 88)  BP: 155/70 (10-17-19 @ 12:00) (103/58 - 155/70)  ABP: --  ABP(mean): --  RR: 18 (10-17-19 @ 12:00) (18 - 18)  SpO2: --      10-16-19 @ 07:01  -  10-17-19 @ 07:00  --------------------------------------------------------  IN:    Oral Fluid: 360 mL  Total IN: 360 mL    OUT:    Chest Tube: 100 mL    Voided: 1400 mL  Total OUT: 1500 mL    Total NET: -1140 mL        DIET/FLUIDS: cholecalciferol 2000 Unit(s) Oral daily  dextrose 5%. 1000 milliLiter(s) IV Continuous <Continuous>  pyridoxine 100 milliGRAM(s) Oral daily    NG:                                                                                DRAINS:     BM:     EMESIS:     URINE:      GI proph:  pantoprazole    Tablet 40 milliGRAM(s) Oral before breakfast    AC/ proph: heparin  Injectable 5000 Unit(s) SubCutaneous every 8 hours    ABx:     PHYSICAL EXAM:  GENERAL: NAD, well-appearing  CHEST/LUNG: Clear to auscultation bilaterally  HEART: Regular rate and rhythm  ABDOMEN: Soft, Nontender, Nondistended;   EXTREMITIES:  No clubbing, cyanosis, or edema      LABS  Labs:  CAPILLARY BLOOD GLUCOSE      POCT Blood Glucose.: 259 mg/dL (17 Oct 2019 11:04)  POCT Blood Glucose.: 134 mg/dL (17 Oct 2019 07:14)  POCT Blood Glucose.: 148 mg/dL (16 Oct 2019 20:55)  POCT Blood Glucose.: 163 mg/dL (16 Oct 2019 16:48)                          10.3   6.23  )-----------( 155      ( 16 Oct 2019 21:23 )             29.8       Auto Neutrophil %: 56.2 % (10-16-19 @ 21:23)  Auto Immature Granulocyte %: 0.3 % (10-16-19 @ 21:23)    10-16    142  |  106  |  22<H>  ----------------------------<  143<H>  4.8   |  26  |  1.1      Calcium, Total Serum: 8.3 mg/dL (10-16-19 @ 21:23)      LFTs:         Coags:

## 2019-10-17 NOTE — PROGRESS NOTE ADULT - SUBJECTIVE AND OBJECTIVE BOX
GENERAL SURGERY PROGRESS NOTE     STACY CARDENAS  74y  Male  Hospital day :7d    OVERNIGHT EVENTS: no acute events overnight. Patient had 200cc of serosanguinous output overnight.     T(F): 96.6 (10-17-19 @ 05:05), Max: 98.2 (10-16-19 @ 16:48)  HR: 73 (10-17-19 @ 05:05) (73 - 86)  BP: 149/66 (10-17-19 @ 05:05) (103/58 - 149/66)  RR: 18 (10-17-19 @ 05:05) (18 - 18)    DIET/FLUIDS: cholecalciferol 2000 Unit(s) Oral daily  dextrose 5%. 1000 milliLiter(s) IV Continuous <Continuous>  pyridoxine 100 milliGRAM(s) Oral daily     GI proph:  pantoprazole    Tablet 40 milliGRAM(s) Oral before breakfast    AC/ proph: heparin  Injectable 5000 Unit(s) SubCutaneous every 8 hours    PHYSICAL EXAM:  GENERAL: NAD, well-appearing  CHEST/LUNG: Chest tube in place  HEART: Regular rate and rhythm  ABDOMEN: Soft, Nontender, Nondistended;   EXTREMITIES:  No clubbing, cyanosis, or edema    LABS  CAPILLARY BLOOD GLUCOSE  POCT Blood Glucose.: 134 mg/dL (17 Oct 2019 07:14)  POCT Blood Glucose.: 148 mg/dL (16 Oct 2019 20:55)  POCT Blood Glucose.: 163 mg/dL (16 Oct 2019 16:48)  POCT Blood Glucose.: 359 mg/dL (16 Oct 2019 11:07)                          10.3   6.23  )-----------( 155      ( 16 Oct 2019 21:23 )             29.8       Auto Neutrophil %: 56.2 % (10-16-19 @ 21:23)  Auto Immature Granulocyte %: 0.3 % (10-16-19 @ 21:23)    10-16    142  |  106  |  22<H>  ----------------------------<  143<H>  4.8   |  26  |  1.1      Calcium, Total Serum: 8.3 mg/dL (10-16-19 @ 21:23)

## 2019-10-17 NOTE — DISCHARGE NOTE NURSING/CASE MANAGEMENT/SOCIAL WORK - PATIENT PORTAL LINK FT
You can access the FollowMyHealth Patient Portal offered by [facility] by registering at the following website: [email or web address]. By joining [employer]’s FollowMyHealth portal, you will also be able to view your health information using other applications (apps) compatible with our system.
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
## 2019-10-17 NOTE — DIETITIAN INITIAL EVALUATION ADULT. - RD TO REMAIN AVAILABLE
yes/INTERVENTION: meals and snacks, coordination of care. ME: RD to monitor diet order, energy intake, body composition, NFPF, glucose profile
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
## 2019-10-17 NOTE — PROGRESS NOTE ADULT - ATTENDING COMMENTS
General Thoracic Surgery Attestation    I have seen and examined the patient.  Where appropriate I have updated, edited, or corrected the resident's or PA's note with regard to findings, values, and plan.    fluid output initially reported as decreased, but on review with bedside RN was 310cc overnight.  would continue tube for one more day.  fluid more serous today.
doing well   decreased CT output   d/c CT   obtain CXR after CT removal  possible d/c home
doing well   pain well controlled CT chest showing no pleural adhesion or residual hemothorax /fibro thorax   will d/c CT in 24 Hours due to high output today   discharge planning
doing well   pain well controlled CT in place with high bloody output   continue to suction   resume home meds   discharge planning
s/p fall with multiple ribs fracture and hemothorax   CT output 300ml overnight   comfortable   cont CT to suction
still with high output from CT   will repeat CT   thoracic surgery consult   discharge planning
doing well CH with low output   will remove   obtain CXR post CT removal   d/c home.

## 2019-10-17 NOTE — DISCHARGE NOTE PROVIDER - NSDCCPCAREPLAN_GEN_ALL_CORE_FT
PRINCIPAL DISCHARGE DIAGNOSIS  Diagnosis: Hemothorax with pneumothorax, traumatic, initial encounter  Assessment and Plan of Treatment: Continue using ibuprofen and tylenol for pain control as needed.   You may change dressing over chest tube insertion site with 4x4 gauze and tape after showering.   After a few days, it is not necessary to keep it covered.   Continue use of incentive spirometer.  You will follow up in one week, call to make appointment [phone].        SECONDARY DISCHARGE DIAGNOSES  Diagnosis: Rib fractures  Assessment and Plan of Treatment: Continue using ibuprofen and tylenol for pain control as needed.  Continue use of incentive spirometer.

## 2019-10-17 NOTE — DIETITIAN INITIAL EVALUATION ADULT. - PERTINENT LABORATORY DATA
(10/11) HbA1c 4.8; (10/16) RBC 2.83, H/H 10.3/29.8, BUN 22, glucose 143; (10/17) POC glucose 134-259

## 2019-10-17 NOTE — DISCHARGE NOTE PROVIDER - CARE PROVIDER_API CALL
Justin Rangel)  Surgery; Surgical Critical Care  [address] [place] [address], [address] [place] [address]  Phone: [phone]  Fax: [phone]  Follow Up Time:

## 2019-10-17 NOTE — PROGRESS NOTE ADULT - REASON FOR ADMISSION
s/p fall with multiple rib fractures

## 2019-10-17 NOTE — PROGRESS NOTE ADULT - PROVIDER SPECIALTY LIST ADULT
CT Surgery
Trauma Surgery

## 2019-10-17 NOTE — DISCHARGE NOTE PROVIDER - HOSPITAL COURSE
Patient presented to ED s/p fall. Patient was standing on a stool and fell off hitting his right side on the side of bathtub. Patient had right sided chest pain for approximately 24 hours prior to seeking care at ED. CT scan revealed right 3-8 rib fractures with associated hemothorax. A chest tube was placed in ED, patient tolerated procedure well. Per cardiothoracic surgery, chest tube was placed on suction until output was less than 250cc per day. Patient was admitted to SICU on 10/10 and was subsequently downgraded to the floor on 10/11. Patient tolerated regular diet and pain was well controlled through out hospital stay.

## 2019-10-22 DIAGNOSIS — Z88.1 ALLERGY STATUS TO OTHER ANTIBIOTIC AGENTS STATUS: ICD-10-CM
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
## 2019-10-22 DIAGNOSIS — S22.41XA MULTIPLE FRACTURES OF RIBS, RIGHT SIDE, INITIAL ENCOUNTER FOR CLOSED FRACTURE: ICD-10-CM
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
## 2019-10-22 DIAGNOSIS — Z98.41 CATARACT EXTRACTION STATUS, RIGHT EYE: ICD-10-CM
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
## 2019-10-22 DIAGNOSIS — S27.2XXA TRAUMATIC HEMOPNEUMOTHORAX, INITIAL ENCOUNTER: ICD-10-CM

## 2019-10-22 DIAGNOSIS — K76.6 PORTAL HYPERTENSION: ICD-10-CM

## 2019-10-22 DIAGNOSIS — Z90.49 ACQUIRED ABSENCE OF OTHER SPECIFIED PARTS OF DIGESTIVE TRACT: ICD-10-CM

## 2019-10-22 DIAGNOSIS — K76.0 FATTY (CHANGE OF) LIVER, NOT ELSEWHERE CLASSIFIED: ICD-10-CM

## 2019-10-22 DIAGNOSIS — M19.90 UNSPECIFIED OSTEOARTHRITIS, UNSPECIFIED SITE: ICD-10-CM

## 2019-10-22 DIAGNOSIS — J90 PLEURAL EFFUSION, NOT ELSEWHERE CLASSIFIED: ICD-10-CM

## 2019-10-22 DIAGNOSIS — Z79.82 LONG TERM (CURRENT) USE OF ASPIRIN: ICD-10-CM

## 2019-10-22 DIAGNOSIS — F32.9 MAJOR DEPRESSIVE DISORDER, SINGLE EPISODE, UNSPECIFIED: ICD-10-CM

## 2019-10-22 DIAGNOSIS — E11.9 TYPE 2 DIABETES MELLITUS WITHOUT COMPLICATIONS: ICD-10-CM

## 2019-10-22 DIAGNOSIS — Z79.84 LONG TERM (CURRENT) USE OF ORAL HYPOGLYCEMIC DRUGS: ICD-10-CM

## 2019-10-22 DIAGNOSIS — Z98.42 CATARACT EXTRACTION STATUS, LEFT EYE: ICD-10-CM

## 2019-10-22 DIAGNOSIS — I10 ESSENTIAL (PRIMARY) HYPERTENSION: ICD-10-CM

## 2019-10-22 DIAGNOSIS — Z98.890 OTHER SPECIFIED POSTPROCEDURAL STATES: ICD-10-CM

## 2019-10-22 DIAGNOSIS — R71.0 PRECIPITOUS DROP IN HEMATOCRIT: ICD-10-CM

## 2019-10-22 DIAGNOSIS — M10.9 GOUT, UNSPECIFIED: ICD-10-CM

## 2019-10-23 ENCOUNTER — CHART COPY (OUTPATIENT)
Age: 74
End: 2019-10-23

## 2019-10-23 ENCOUNTER — FORM ENCOUNTER (OUTPATIENT)
Age: 74
End: 2019-10-23

## 2019-10-24 ENCOUNTER — OUTPATIENT (OUTPATIENT)
Dept: OUTPATIENT SERVICES | Facility: HOSPITAL | Age: 74
LOS: 1 days | Discharge: HOME | End: 2019-10-24
Payer: MEDICARE

## 2019-10-24 ENCOUNTER — APPOINTMENT (OUTPATIENT)
Dept: SURGERY | Facility: CLINIC | Age: 74
End: 2019-10-24
Payer: MEDICARE

## 2019-10-24 VITALS
WEIGHT: 170 LBS | SYSTOLIC BLOOD PRESSURE: 128 MMHG | HEIGHT: 65 IN | DIASTOLIC BLOOD PRESSURE: 62 MMHG | BODY MASS INDEX: 28.32 KG/M2

## 2019-10-24 DIAGNOSIS — Z90.49 ACQUIRED ABSENCE OF OTHER SPECIFIED PARTS OF DIGESTIVE TRACT: Chronic | ICD-10-CM

## 2019-10-24 DIAGNOSIS — Z98.890 OTHER SPECIFIED POSTPROCEDURAL STATES: Chronic | ICD-10-CM

## 2019-10-24 DIAGNOSIS — S22.41XS MULTIPLE FRACTURES OF RIBS, RIGHT SIDE, SEQUELA: ICD-10-CM

## 2019-10-24 DIAGNOSIS — H26.9 UNSPECIFIED CATARACT: Chronic | ICD-10-CM

## 2019-10-24 PROCEDURE — 71046 X-RAY EXAM CHEST 2 VIEWS: CPT | Mod: 26

## 2019-10-24 PROCEDURE — 99212 OFFICE O/P EST SF 10 MIN: CPT

## 2019-10-24 NOTE — HISTORY OF PRESENT ILLNESS
[de-identified] : pt reports persistent pain in the r chest limiting his ability to sleep, but denies fevers, sob\par chest tube wounds are clean\par he should take tylenol and ibuprofen atc and I will order gabapentin as adjunct\par he should undergo a cxr\par rto 1 week

## 2019-10-25 ENCOUNTER — APPOINTMENT (OUTPATIENT)
Dept: SURGERY | Facility: CLINIC | Age: 74
End: 2019-10-25

## 2019-10-31 ENCOUNTER — APPOINTMENT (OUTPATIENT)
Dept: SURGERY | Facility: CLINIC | Age: 74
End: 2019-10-31
Payer: MEDICARE

## 2019-10-31 VITALS
HEIGHT: 65 IN | WEIGHT: 178 LBS | DIASTOLIC BLOOD PRESSURE: 82 MMHG | BODY MASS INDEX: 29.66 KG/M2 | SYSTOLIC BLOOD PRESSURE: 139 MMHG

## 2019-10-31 DIAGNOSIS — S22.41XS MULTIPLE FRACTURES OF RIBS, RIGHT SIDE, SEQUELA: ICD-10-CM

## 2019-10-31 PROCEDURE — 99211 OFF/OP EST MAY X REQ PHY/QHP: CPT

## 2019-10-31 NOTE — HISTORY OF PRESENT ILLNESS
[de-identified] : pt reports better mobility and sleep, but still limited and using walker\par did not take gebeprntin, will try this week to see if works as adjunct\par cxr reviewed, no new findings\par will see in office in 2 weeks to confirm trajectory

## 2019-11-14 ENCOUNTER — APPOINTMENT (OUTPATIENT)
Dept: SURGERY | Facility: CLINIC | Age: 74
End: 2019-11-14

## 2020-06-20 NOTE — DIETITIAN INITIAL EVALUATION ADULT. - FACTORS AFF FOOD INTAKE
"Kathi Head is a 68 y.o. female patient.   1. Achilles rupture, right, subsequent encounter    2. Calcaneal spur of foot, right      Past Medical History:   Diagnosis Date    Allergy     GERD (gastroesophageal reflux disease)     Hyperlipidemia     Neck pain 12/9/2019    Shingles     Thyroid disease      No past surgical history pertinent negatives on file.  Scheduled Meds:  Continuous Infusions:  PRN Meds:    Review of patient's allergies indicates:  No Known Allergies  There are no hospital problems to display for this patient.    Blood pressure 108/71, pulse 82, temperature 98.9 °F (37.2 °C), height 5' 7" (1.702 m), weight 74.8 kg (165 lb).    Subjective  Objective   Assessment & Plan     Patient presents status post excision posterior calcaneal exostosis and Achilles tendon repair right.  Patient states she is doing well she has requested a knee scooter she states that she is going to need this to get around a little bit better and to maintain her nonweightbearing status.  Patient is currently afebrile and in no acute distress patient is taking her Bactrim as directed she states she forgot about taking the aspirin but will start taking this immediately.  Patient's cast is dry and intact fitting well both distally and proximally capillary fill time is immediate distal digits right.  Patient will be seen for follow-up in 2 weeks for cast change she has been advised to contact us if she has any problems questions or concerns prior to that time prescription for knee scooter was dispensed today.This note was created using [employer] voice recognition software that occasionally misinterpreted phrases or words.  Teofilo Plaza DPM  6/20/2020  "
none

## 2020-11-11 ENCOUNTER — OUTPATIENT (OUTPATIENT)
Dept: OUTPATIENT SERVICES | Facility: HOSPITAL | Age: 75
LOS: 1 days | Discharge: HOME | End: 2020-11-11
Payer: MEDICARE

## 2020-11-11 DIAGNOSIS — K73.9 CHRONIC HEPATITIS, UNSPECIFIED: ICD-10-CM

## 2020-11-11 DIAGNOSIS — G89.29 OTHER CHRONIC PAIN: ICD-10-CM

## 2020-11-11 DIAGNOSIS — R73.09 OTHER ABNORMAL GLUCOSE: ICD-10-CM

## 2020-11-11 DIAGNOSIS — H26.9 UNSPECIFIED CATARACT: Chronic | ICD-10-CM

## 2020-11-11 DIAGNOSIS — K22.0 ACHALASIA OF CARDIA: ICD-10-CM

## 2020-11-11 DIAGNOSIS — Z90.49 ACQUIRED ABSENCE OF OTHER SPECIFIED PARTS OF DIGESTIVE TRACT: Chronic | ICD-10-CM

## 2020-11-11 DIAGNOSIS — Z98.890 OTHER SPECIFIED POSTPROCEDURAL STATES: Chronic | ICD-10-CM

## 2020-11-11 DIAGNOSIS — E55.9 VITAMIN D DEFICIENCY, UNSPECIFIED: ICD-10-CM

## 2020-11-11 PROCEDURE — 74183 MRI ABD W/O CNTR FLWD CNTR: CPT | Mod: 26

## 2021-02-18 ENCOUNTER — EMERGENCY (EMERGENCY)
Facility: HOSPITAL | Age: 76
LOS: 0 days | Discharge: HOME | End: 2021-02-18
Attending: EMERGENCY MEDICINE | Admitting: EMERGENCY MEDICINE
Payer: MEDICARE

## 2021-02-18 VITALS
OXYGEN SATURATION: 98 % | HEART RATE: 108 BPM | DIASTOLIC BLOOD PRESSURE: 82 MMHG | RESPIRATION RATE: 18 BRPM | WEIGHT: 210.1 LBS | TEMPERATURE: 99 F | HEIGHT: 63 IN | SYSTOLIC BLOOD PRESSURE: 171 MMHG

## 2021-02-18 VITALS — TEMPERATURE: 100 F
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
## 2021-02-18 DIAGNOSIS — R41.82 ALTERED MENTAL STATUS, UNSPECIFIED: ICD-10-CM

## 2021-02-18 DIAGNOSIS — E11.65 TYPE 2 DIABETES MELLITUS WITH HYPERGLYCEMIA: ICD-10-CM
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
## 2021-02-18 DIAGNOSIS — Z20.822 CONTACT WITH AND (SUSPECTED) EXPOSURE TO COVID-19: ICD-10-CM
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
## 2021-02-18 DIAGNOSIS — E11.9 TYPE 2 DIABETES MELLITUS WITHOUT COMPLICATIONS: ICD-10-CM

## 2021-02-18 DIAGNOSIS — I10 ESSENTIAL (PRIMARY) HYPERTENSION: ICD-10-CM

## 2021-02-18 DIAGNOSIS — L90.5 SCAR CONDITIONS AND FIBROSIS OF SKIN: ICD-10-CM

## 2021-02-18 DIAGNOSIS — F32.9 MAJOR DEPRESSIVE DISORDER, SINGLE EPISODE, UNSPECIFIED: ICD-10-CM
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
## 2021-02-18 DIAGNOSIS — Z98.890 OTHER SPECIFIED POSTPROCEDURAL STATES: Chronic | ICD-10-CM

## 2021-02-18 DIAGNOSIS — Z90.49 ACQUIRED ABSENCE OF OTHER SPECIFIED PARTS OF DIGESTIVE TRACT: Chronic | ICD-10-CM

## 2021-02-18 DIAGNOSIS — H26.9 UNSPECIFIED CATARACT: Chronic | ICD-10-CM

## 2021-02-18 DIAGNOSIS — R68.2 DRY MOUTH, UNSPECIFIED: ICD-10-CM

## 2021-02-18 LAB
ALBUMIN SERPL ELPH-MCNC: 3.5 G/DL — SIGNIFICANT CHANGE UP (ref 3.5–5.2)
ALP SERPL-CCNC: 170 U/L — HIGH (ref 30–115)
ALT FLD-CCNC: 29 U/L — SIGNIFICANT CHANGE UP (ref 0–41)
ANION GAP SERPL CALC-SCNC: 6 MMOL/L — LOW (ref 7–14)
APPEARANCE UR: CLEAR — SIGNIFICANT CHANGE UP
AST SERPL-CCNC: 30 U/L — SIGNIFICANT CHANGE UP (ref 0–41)
B-OH-BUTYR SERPL-SCNC: 0.2 MMOL/L — SIGNIFICANT CHANGE UP
BACTERIA # UR AUTO: ABNORMAL
BASE EXCESS BLDV CALC-SCNC: 5.7 MMOL/L — HIGH (ref -2–2)
BASOPHILS # BLD AUTO: 0.03 K/UL — SIGNIFICANT CHANGE UP (ref 0–0.2)
BASOPHILS NFR BLD AUTO: 0.6 % — SIGNIFICANT CHANGE UP (ref 0–1)
BILIRUB SERPL-MCNC: 1.3 MG/DL — HIGH (ref 0.2–1.2)
BILIRUB UR-MCNC: NEGATIVE — SIGNIFICANT CHANGE UP
BUN SERPL-MCNC: 18 MG/DL — SIGNIFICANT CHANGE UP (ref 10–20)
CA-I SERPL-SCNC: 1.24 MMOL/L — SIGNIFICANT CHANGE UP (ref 1.12–1.3)
CALCIUM SERPL-MCNC: 9.7 MG/DL — SIGNIFICANT CHANGE UP (ref 8.5–10.1)
CHLORIDE SERPL-SCNC: 99 MMOL/L — SIGNIFICANT CHANGE UP (ref 98–110)
CO2 SERPL-SCNC: 30 MMOL/L — SIGNIFICANT CHANGE UP (ref 17–32)
COD CRY URNS QL: NEGATIVE — SIGNIFICANT CHANGE UP
COLOR SPEC: YELLOW — SIGNIFICANT CHANGE UP
CREAT SERPL-MCNC: 0.9 MG/DL — SIGNIFICANT CHANGE UP (ref 0.7–1.5)
DIFF PNL FLD: NEGATIVE — SIGNIFICANT CHANGE UP
EOSINOPHIL # BLD AUTO: 0.11 K/UL — SIGNIFICANT CHANGE UP (ref 0–0.7)
EOSINOPHIL NFR BLD AUTO: 2.4 % — SIGNIFICANT CHANGE UP (ref 0–8)
EPI CELLS # UR: ABNORMAL /HPF
GAS PNL BLDV: 139 MMOL/L — SIGNIFICANT CHANGE UP (ref 136–145)
GAS PNL BLDV: SIGNIFICANT CHANGE UP
GLUCOSE SERPL-MCNC: 313 MG/DL — HIGH (ref 70–99)
GLUCOSE UR QL: >=1000 MG/DL
GRAN CASTS # UR COMP ASSIST: NEGATIVE — SIGNIFICANT CHANGE UP
HCO3 BLDV-SCNC: 33 MMOL/L — HIGH (ref 22–29)
HCT VFR BLD CALC: 40.4 % — LOW (ref 42–52)
HCT VFR BLDA CALC: 60.2 % — HIGH (ref 34–44)
HGB BLD CALC-MCNC: 19.6 G/DL — HIGH (ref 14–18)
HGB BLD-MCNC: 14.7 G/DL — SIGNIFICANT CHANGE UP (ref 14–18)
HOROWITZ INDEX BLDV+IHG-RTO: 21 — SIGNIFICANT CHANGE UP
HYALINE CASTS # UR AUTO: NEGATIVE — SIGNIFICANT CHANGE UP
IMM GRANULOCYTES NFR BLD AUTO: 0.2 % — SIGNIFICANT CHANGE UP (ref 0.1–0.3)
KETONES UR-MCNC: NEGATIVE — SIGNIFICANT CHANGE UP
LACTATE BLDV-MCNC: 3.3 MMOL/L — HIGH (ref 0.5–1.6)
LEUKOCYTE ESTERASE UR-ACNC: NEGATIVE — SIGNIFICANT CHANGE UP
LYMPHOCYTES # BLD AUTO: 0.96 K/UL — LOW (ref 1.2–3.4)
LYMPHOCYTES # BLD AUTO: 20.7 % — SIGNIFICANT CHANGE UP (ref 20.5–51.1)
MCHC RBC-ENTMCNC: 35.8 PG — HIGH (ref 27–31)
MCHC RBC-ENTMCNC: 36.4 G/DL — SIGNIFICANT CHANGE UP (ref 32–37)
MCV RBC AUTO: 98.3 FL — HIGH (ref 80–94)
MONOCYTES # BLD AUTO: 0.59 K/UL — SIGNIFICANT CHANGE UP (ref 0.1–0.6)
MONOCYTES NFR BLD AUTO: 12.7 % — HIGH (ref 1.7–9.3)
NEUTROPHILS # BLD AUTO: 2.93 K/UL — SIGNIFICANT CHANGE UP (ref 1.4–6.5)
NEUTROPHILS NFR BLD AUTO: 63.4 % — SIGNIFICANT CHANGE UP (ref 42.2–75.2)
NITRITE UR-MCNC: NEGATIVE — SIGNIFICANT CHANGE UP
NRBC # BLD: 0 /100 WBCS — SIGNIFICANT CHANGE UP (ref 0–0)
PCO2 BLDV: 54 MMHG — HIGH (ref 41–51)
PH BLDV: 7.39 — SIGNIFICANT CHANGE UP (ref 7.26–7.43)
PH UR: 6 — SIGNIFICANT CHANGE UP (ref 5–8)
PLATELET # BLD AUTO: 137 K/UL — SIGNIFICANT CHANGE UP (ref 130–400)
PO2 BLDV: 23 MMHG — SIGNIFICANT CHANGE UP (ref 20–40)
POTASSIUM BLDV-SCNC: 4.8 MMOL/L — SIGNIFICANT CHANGE UP (ref 3.3–5.6)
POTASSIUM SERPL-MCNC: 5 MMOL/L — SIGNIFICANT CHANGE UP (ref 3.5–5)
POTASSIUM SERPL-SCNC: 5 MMOL/L — SIGNIFICANT CHANGE UP (ref 3.5–5)
PROT SERPL-MCNC: 7 G/DL — SIGNIFICANT CHANGE UP (ref 6–8)
PROT UR-MCNC: ABNORMAL MG/DL
RAPID RVP RESULT: SIGNIFICANT CHANGE UP
RBC # BLD: 4.11 M/UL — LOW (ref 4.7–6.1)
RBC # FLD: 13.2 % — SIGNIFICANT CHANGE UP (ref 11.5–14.5)
RBC CASTS # UR COMP ASSIST: NEGATIVE — SIGNIFICANT CHANGE UP
SAO2 % BLDV: 37 % — SIGNIFICANT CHANGE UP
SARS-COV-2 RNA SPEC QL NAA+PROBE: SIGNIFICANT CHANGE UP
SODIUM SERPL-SCNC: 135 MMOL/L — SIGNIFICANT CHANGE UP (ref 135–146)
SP GR SPEC: >=1.03 (ref 1.01–1.03)
TRI-PHOS CRY UR QL COMP ASSIST: NEGATIVE — SIGNIFICANT CHANGE UP
TROPONIN T SERPL-MCNC: <0.01 NG/ML — SIGNIFICANT CHANGE UP
URATE CRY FLD QL MICRO: NEGATIVE — SIGNIFICANT CHANGE UP
UROBILINOGEN FLD QL: 2 MG/DL (ref 0.2–0.2)
WBC # BLD: 4.63 K/UL — LOW (ref 4.8–10.8)
WBC # FLD AUTO: 4.63 K/UL — LOW (ref 4.8–10.8)
WBC UR QL: SIGNIFICANT CHANGE UP /HPF

## 2021-02-18 PROCEDURE — 70450 CT HEAD/BRAIN W/O DYE: CPT | Mod: 26

## 2021-02-18 PROCEDURE — 99285 EMERGENCY DEPT VISIT HI MDM: CPT

## 2021-02-18 PROCEDURE — 71045 X-RAY EXAM CHEST 1 VIEW: CPT | Mod: 26

## 2021-02-18 RX ORDER — SODIUM CHLORIDE 9 MG/ML
1000 INJECTION INTRAMUSCULAR; INTRAVENOUS; SUBCUTANEOUS ONCE
Refills: 0 | Status: COMPLETED | OUTPATIENT
Start: 2021-02-18 | End: 2021-02-18

## 2021-02-18 RX ADMIN — SODIUM CHLORIDE 1000 MILLILITER(S): 9 INJECTION INTRAMUSCULAR; INTRAVENOUS; SUBCUTANEOUS at 18:34

## 2021-02-18 NOTE — ED ADULT NURSE NOTE - NSIMPLEMENTINTERV_GEN_ALL_ED
Implemented All Fall with Harm Risk Interventions:  Julian to call system. Call bell, personal items and telephone within reach. Instruct patient to call for assistance. Room bathroom lighting operational. Non-slip footwear when patient is off stretcher. Physically safe environment: no spills, clutter or unnecessary equipment. Stretcher in lowest position, wheels locked, appropriate side rails in place. Provide visual cue, wrist band, yellow gown, etc. Monitor gait and stability. Monitor for mental status changes and reorient to person, place, and time. Review medications for side effects contributing to fall risk. Reinforce activity limits and safety measures with patient and family. Provide visual clues: red socks.
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
## 2021-02-18 NOTE — ED PROVIDER NOTE - PHYSICAL EXAMINATION
Vital Signs: I have reviewed the initial vital signs.  Constitutional: well-nourished, no acute distress, normocephalic  Eyes: PERRLA, EOMI, , clear conjunctiva  ENT: dry mucouc membranes  Cardiovascular: regular rate, regular rhythm, no murmur appreciated  Respiratory: unlabored respiratory effort, clear to auscultation bilaterally  Gastrointestinal: soft, non-tender, non-distended  abdomen, no pulsatile mass  Musculoskeletal: supple neck, no lower extremity edema, no bony tenderness  Integumentary: warm, dry, no rash  Neurologic: awake, alert, cranial nerves II-XII grossly intact, extremities’ motor and sensory functions grossly intact, no focal deficits,

## 2021-02-18 NOTE — ED PROVIDER NOTE - NS ED ROS FT
Review of Systems    Constitutional: (-) fever/ chills (-)loss of appetite or  weight loss  Eyes (-) visual changes  ENT: (-) epistaxis (-) sore throat (-) ear pain  Cardiovascular: (-) chest pain, (-) syncope (-) palpitations  Respiratory: (-) cough, (-) shortness of breath  Gastrointestinal: (-) vomiting, (-) diarrhea (-) abdominal pain  : (-) dysuria , hematuria   neck: (-) neck pain or stiffness  Musculoskeletal:  (-) back pain, (-) joint pain   Integumentary: (-) rash, (-) swelling  Neurological: (-) headache, (-) altered mental status  Psychiatric: (-) hallucinations or depression
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
## 2021-02-18 NOTE — ED PROVIDER NOTE - PATIENT PORTAL LINK FT
You can access the FollowMyHealth Patient Portal offered by [facility] by registering at the following website: [email or web address]. By joining [facility]’s FollowMyHealth portal, you will also be able to view your health information using other applications (apps) compatible with our system.

## 2021-02-18 NOTE — ED PROVIDER NOTE - CLINICAL SUMMARY MEDICAL DECISION MAKING FREE TEXT BOX
Results reviewed, pt observed in ED.  Wants to go home.  Wife updated on results.  will  patient. Pt to follow up with PMD. Pt instructed to return if any worsening symptoms or concerns.  They verbalize understanding.
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
## 2021-02-19 NOTE — ED POST DISCHARGE NOTE - RESULT SUMMARY
CXR FINDINGS DISCUSSED, ? LEFT UPPER LUNG NODULE NOTED. PATIENT DOES NOT HAVE D/C PAPERS AND NEEDS THEM MAILED TO HIM. WILL FED-EX D/C PAPERS AND LAB TESTS ON MONDAY 2-.

## 2021-05-03 ENCOUNTER — OUTPATIENT (OUTPATIENT)
Dept: OUTPATIENT SERVICES | Facility: HOSPITAL | Age: 76
LOS: 1 days | Discharge: HOME | End: 2021-05-03
Payer: MEDICARE

## 2021-05-03 DIAGNOSIS — R10.9 UNSPECIFIED ABDOMINAL PAIN: ICD-10-CM

## 2021-05-03 DIAGNOSIS — Z90.49 ACQUIRED ABSENCE OF OTHER SPECIFIED PARTS OF DIGESTIVE TRACT: Chronic | ICD-10-CM

## 2021-05-03 DIAGNOSIS — Z98.890 OTHER SPECIFIED POSTPROCEDURAL STATES: Chronic | ICD-10-CM

## 2021-05-03 DIAGNOSIS — H26.9 UNSPECIFIED CATARACT: Chronic | ICD-10-CM

## 2021-05-03 PROCEDURE — 74182 MRI ABDOMEN W/CONTRAST: CPT | Mod: 26

## 2021-05-03 PROCEDURE — 71250 CT THORAX DX C-: CPT | Mod: 26

## 2021-07-24 ENCOUNTER — APPOINTMENT (OUTPATIENT)
Age: 76
End: 2021-07-24
Payer: MEDICARE

## 2021-07-24 VITALS
HEART RATE: 108 BPM | SYSTOLIC BLOOD PRESSURE: 130 MMHG | RESPIRATION RATE: 14 BRPM | WEIGHT: 209 LBS | DIASTOLIC BLOOD PRESSURE: 80 MMHG | BODY MASS INDEX: 34.82 KG/M2 | OXYGEN SATURATION: 93 % | HEIGHT: 65 IN

## 2021-07-24 VITALS
SYSTOLIC BLOOD PRESSURE: 130 MMHG | HEART RATE: 108 BPM | WEIGHT: 209 LBS | RESPIRATION RATE: 14 BRPM | DIASTOLIC BLOOD PRESSURE: 80 MMHG | HEIGHT: 65 IN | OXYGEN SATURATION: 93 % | BODY MASS INDEX: 34.82 KG/M2

## 2021-07-24 PROCEDURE — 99203 OFFICE O/P NEW LOW 30 MIN: CPT | Mod: 25

## 2021-07-24 PROCEDURE — G0447 BEHAVIOR COUNSEL OBESITY 15M: CPT | Mod: 59

## 2021-07-24 NOTE — COUNSELING
[Potential consequences of obesity discussed] : Potential consequences of obesity discussed [Benefits of weight loss discussed] : Benefits of weight loss discussed [Encouraged to maintain food diary] : Encouraged to maintain food diary [Good understanding] : Patient has a good understanding of disease, goals and obesity follow-up plan [FreeTextEntry4] : 16
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
## 2021-07-24 NOTE — HISTORY OF PRESENT ILLNESS
[Excessive Daytime Sleepiness] : excessive daytime sleepiness [Initial Evaluation] : an initial evaluation of [Snoring] : snoring [Unrefreshing Sleep] : unrefreshing sleep [Sleepy When Sedentary] : sleepy when sedentary [Currently Experiencing] : The patient is currently experiencing symptoms. [None] : No associated symptoms are reported

## 2021-07-24 NOTE — REASON FOR VISIT
[Initial] : an initial visit [Abnormal CXR/ Chest CT] : an abnormal CXR/ chest CT [Sleep Apnea] : sleep apnea

## 2021-08-16 ENCOUNTER — OUTPATIENT (OUTPATIENT)
Dept: OUTPATIENT SERVICES | Facility: HOSPITAL | Age: 76
LOS: 1 days | Discharge: HOME | End: 2021-08-16
Payer: MEDICARE

## 2021-08-16 DIAGNOSIS — H26.9 UNSPECIFIED CATARACT: Chronic | ICD-10-CM

## 2021-08-16 DIAGNOSIS — Z90.49 ACQUIRED ABSENCE OF OTHER SPECIFIED PARTS OF DIGESTIVE TRACT: Chronic | ICD-10-CM

## 2021-08-16 DIAGNOSIS — Z98.890 OTHER SPECIFIED POSTPROCEDURAL STATES: Chronic | ICD-10-CM

## 2021-08-16 PROCEDURE — 95810 POLYSOM 6/> YRS 4/> PARAM: CPT | Mod: 26

## 2021-08-17 DIAGNOSIS — G47.33 OBSTRUCTIVE SLEEP APNEA (ADULT) (PEDIATRIC): ICD-10-CM

## 2021-10-04 ENCOUNTER — APPOINTMENT (OUTPATIENT)
Age: 76
End: 2021-10-04
Payer: MEDICARE

## 2021-10-04 VITALS
HEIGHT: 65 IN | SYSTOLIC BLOOD PRESSURE: 118 MMHG | HEART RATE: 100 BPM | WEIGHT: 209 LBS | DIASTOLIC BLOOD PRESSURE: 70 MMHG | OXYGEN SATURATION: 99 % | BODY MASS INDEX: 34.82 KG/M2

## 2021-10-04 PROCEDURE — 99213 OFFICE O/P EST LOW 20 MIN: CPT | Mod: 25

## 2021-10-04 PROCEDURE — G0447 BEHAVIOR COUNSEL OBESITY 15M: CPT | Mod: 59

## 2021-10-04 NOTE — ASSESSMENT
[FreeTextEntry1] : NO significant SHELLEY on NPSG.  Of Note TST was low. \par Some scarring on CT that is improving.  Likely post infectious

## 2021-10-04 NOTE — HISTORY OF PRESENT ILLNESS
[Follow-Up - Routine Clinic] : a routine clinic follow-up of [Excessive Daytime Sleepiness] : excessive daytime sleepiness [Snoring] : snoring [Unrefreshing Sleep] : unrefreshing sleep [Sleepy When Sedentary] : sleepy when sedentary [Currently Experiencing] : The patient is currently experiencing symptoms. [None] : No associated symptoms are reported

## 2021-12-17 NOTE — H&P ADULT - NSICDXFAMILYHX_GEN_ALL_CORE_FT
FAMILY HISTORY:  FH: type 2 diabetes mellitus Ftsg Text: The defect edges were debeveled with a #15 scalpel blade.  Given the location of the defect, shape of the defect and the proximity to free margins a full thickness skin graft was deemed most appropriate.  Using a sterile surgical marker, the primary defect shape was transferred to the donor site. The area thus outlined was incised deep to adipose tissue with a #15 scalpel blade.  The harvested graft was then trimmed of adipose tissue until only dermis and epidermis was left.  The skin margins of the secondary defect were undermined to an appropriate distance in all directions utilizing iris scissors.  The secondary defect was closed with interrupted buried subcutaneous sutures.  The skin edges were then re-apposed with running  sutures.  The skin graft was then placed in the primary defect and oriented appropriately.

## 2022-02-14 ENCOUNTER — INPATIENT (INPATIENT)
Facility: HOSPITAL | Age: 77
LOS: 1 days | Discharge: NURSING, REHAB OR LONG-TERM CARE | End: 2022-02-16
Attending: HOSPITALIST | Admitting: HOSPITALIST
Payer: MEDICARE

## 2022-02-14 VITALS
TEMPERATURE: 98 F | HEIGHT: 63 IN | WEIGHT: 179.9 LBS | OXYGEN SATURATION: 98 % | SYSTOLIC BLOOD PRESSURE: 120 MMHG | HEART RATE: 105 BPM | DIASTOLIC BLOOD PRESSURE: 58 MMHG | RESPIRATION RATE: 18 BRPM

## 2022-02-14 DIAGNOSIS — Z98.890 OTHER SPECIFIED POSTPROCEDURAL STATES: Chronic | ICD-10-CM

## 2022-02-14 DIAGNOSIS — S99.919A UNSPECIFIED INJURY OF UNSPECIFIED ANKLE, INITIAL ENCOUNTER: ICD-10-CM

## 2022-02-14 DIAGNOSIS — Z90.49 ACQUIRED ABSENCE OF OTHER SPECIFIED PARTS OF DIGESTIVE TRACT: Chronic | ICD-10-CM

## 2022-02-14 DIAGNOSIS — E11.9 TYPE 2 DIABETES MELLITUS WITHOUT COMPLICATIONS: ICD-10-CM

## 2022-02-14 DIAGNOSIS — H26.9 UNSPECIFIED CATARACT: Chronic | ICD-10-CM

## 2022-02-14 DIAGNOSIS — R29.6 REPEATED FALLS: ICD-10-CM

## 2022-02-14 DIAGNOSIS — F03.90 UNSPECIFIED DEMENTIA WITHOUT BEHAVIORAL DISTURBANCE: ICD-10-CM

## 2022-02-14 LAB
ALBUMIN SERPL ELPH-MCNC: 3.1 G/DL — LOW (ref 3.5–5.2)
ALP SERPL-CCNC: 89 U/L — SIGNIFICANT CHANGE UP (ref 30–115)
ALT FLD-CCNC: 26 U/L — SIGNIFICANT CHANGE UP (ref 0–41)
ANION GAP SERPL CALC-SCNC: 14 MMOL/L — SIGNIFICANT CHANGE UP (ref 7–14)
AST SERPL-CCNC: 28 U/L — SIGNIFICANT CHANGE UP (ref 0–41)
BASOPHILS # BLD AUTO: 0.06 K/UL — SIGNIFICANT CHANGE UP (ref 0–0.2)
BASOPHILS NFR BLD AUTO: 0.7 % — SIGNIFICANT CHANGE UP (ref 0–1)
BILIRUB SERPL-MCNC: 2 MG/DL — HIGH (ref 0.2–1.2)
BUN SERPL-MCNC: 21 MG/DL — HIGH (ref 10–20)
CALCIUM SERPL-MCNC: 9 MG/DL — SIGNIFICANT CHANGE UP (ref 8.5–10.1)
CHLORIDE SERPL-SCNC: 99 MMOL/L — SIGNIFICANT CHANGE UP (ref 98–110)
CO2 SERPL-SCNC: 26 MMOL/L — SIGNIFICANT CHANGE UP (ref 17–32)
CREAT SERPL-MCNC: 1 MG/DL — SIGNIFICANT CHANGE UP (ref 0.7–1.5)
EOSINOPHIL # BLD AUTO: 0.15 K/UL — SIGNIFICANT CHANGE UP (ref 0–0.7)
EOSINOPHIL NFR BLD AUTO: 1.7 % — SIGNIFICANT CHANGE UP (ref 0–8)
GLUCOSE BLDC GLUCOMTR-MCNC: 138 MG/DL — HIGH (ref 70–99)
GLUCOSE BLDC GLUCOMTR-MCNC: 182 MG/DL — HIGH (ref 70–99)
GLUCOSE BLDC GLUCOMTR-MCNC: 193 MG/DL — HIGH (ref 70–99)
GLUCOSE SERPL-MCNC: 109 MG/DL — HIGH (ref 70–99)
HCT VFR BLD CALC: 38.8 % — LOW (ref 42–52)
HGB BLD-MCNC: 14.1 G/DL — SIGNIFICANT CHANGE UP (ref 14–18)
IMM GRANULOCYTES NFR BLD AUTO: 0.2 % — SIGNIFICANT CHANGE UP (ref 0.1–0.3)
LYMPHOCYTES # BLD AUTO: 2.1 K/UL — SIGNIFICANT CHANGE UP (ref 1.2–3.4)
LYMPHOCYTES # BLD AUTO: 23.1 % — SIGNIFICANT CHANGE UP (ref 20.5–51.1)
MCHC RBC-ENTMCNC: 36.1 PG — HIGH (ref 27–31)
MCHC RBC-ENTMCNC: 36.3 G/DL — SIGNIFICANT CHANGE UP (ref 32–37)
MCV RBC AUTO: 99.2 FL — HIGH (ref 80–94)
MONOCYTES # BLD AUTO: 1.07 K/UL — HIGH (ref 0.1–0.6)
MONOCYTES NFR BLD AUTO: 11.8 % — HIGH (ref 1.7–9.3)
NEUTROPHILS # BLD AUTO: 5.68 K/UL — SIGNIFICANT CHANGE UP (ref 1.4–6.5)
NEUTROPHILS NFR BLD AUTO: 62.5 % — SIGNIFICANT CHANGE UP (ref 42.2–75.2)
NRBC # BLD: 0 /100 WBCS — SIGNIFICANT CHANGE UP (ref 0–0)
PLATELET # BLD AUTO: 134 K/UL — SIGNIFICANT CHANGE UP (ref 130–400)
POTASSIUM SERPL-MCNC: 4.2 MMOL/L — SIGNIFICANT CHANGE UP (ref 3.5–5)
POTASSIUM SERPL-SCNC: 4.2 MMOL/L — SIGNIFICANT CHANGE UP (ref 3.5–5)
PROT SERPL-MCNC: 6 G/DL — SIGNIFICANT CHANGE UP (ref 6–8)
RBC # BLD: 3.91 M/UL — LOW (ref 4.7–6.1)
RBC # FLD: 13.4 % — SIGNIFICANT CHANGE UP (ref 11.5–14.5)
SARS-COV-2 RNA SPEC QL NAA+PROBE: SIGNIFICANT CHANGE UP
SODIUM SERPL-SCNC: 139 MMOL/L — SIGNIFICANT CHANGE UP (ref 135–146)
TROPONIN T SERPL-MCNC: 0.01 NG/ML — SIGNIFICANT CHANGE UP
WBC # BLD: 9.08 K/UL — SIGNIFICANT CHANGE UP (ref 4.8–10.8)
WBC # FLD AUTO: 9.08 K/UL — SIGNIFICANT CHANGE UP (ref 4.8–10.8)

## 2022-02-14 PROCEDURE — 93880 EXTRACRANIAL BILAT STUDY: CPT | Mod: 26

## 2022-02-14 PROCEDURE — 99223 1ST HOSP IP/OBS HIGH 75: CPT

## 2022-02-14 PROCEDURE — 71045 X-RAY EXAM CHEST 1 VIEW: CPT | Mod: 26

## 2022-02-14 PROCEDURE — 73610 X-RAY EXAM OF ANKLE: CPT | Mod: 26,LT

## 2022-02-14 PROCEDURE — 99285 EMERGENCY DEPT VISIT HI MDM: CPT

## 2022-02-14 PROCEDURE — 99497 ADVNCD CARE PLAN 30 MIN: CPT | Mod: 25

## 2022-02-14 PROCEDURE — 99053 MED SERV 10PM-8AM 24 HR FAC: CPT

## 2022-02-14 PROCEDURE — 72170 X-RAY EXAM OF PELVIS: CPT | Mod: 26

## 2022-02-14 PROCEDURE — 73700 CT LOWER EXTREMITY W/O DYE: CPT | Mod: 26,LT

## 2022-02-14 PROCEDURE — 70450 CT HEAD/BRAIN W/O DYE: CPT | Mod: 26,MA

## 2022-02-14 PROCEDURE — 93010 ELECTROCARDIOGRAM REPORT: CPT

## 2022-02-14 RX ORDER — DULOXETINE HYDROCHLORIDE 30 MG/1
60 CAPSULE, DELAYED RELEASE ORAL DAILY
Refills: 0 | Status: DISCONTINUED | OUTPATIENT
Start: 2022-02-14 | End: 2022-02-16

## 2022-02-14 RX ORDER — ACETAMINOPHEN 500 MG
650 TABLET ORAL EVERY 6 HOURS
Refills: 0 | Status: DISCONTINUED | OUTPATIENT
Start: 2022-02-14 | End: 2022-02-16

## 2022-02-14 RX ORDER — ENOXAPARIN SODIUM 100 MG/ML
40 INJECTION SUBCUTANEOUS DAILY
Refills: 0 | Status: DISCONTINUED | OUTPATIENT
Start: 2022-02-14 | End: 2022-02-16

## 2022-02-14 RX ORDER — ASPIRIN/CALCIUM CARB/MAGNESIUM 324 MG
81 TABLET ORAL DAILY
Refills: 0 | Status: DISCONTINUED | OUTPATIENT
Start: 2022-02-14 | End: 2022-02-16

## 2022-02-14 RX ORDER — GLUCAGON INJECTION, SOLUTION 0.5 MG/.1ML
1 INJECTION, SOLUTION SUBCUTANEOUS ONCE
Refills: 0 | Status: DISCONTINUED | OUTPATIENT
Start: 2022-02-14 | End: 2022-02-16

## 2022-02-14 RX ORDER — DONEPEZIL HYDROCHLORIDE 10 MG/1
10 TABLET, FILM COATED ORAL AT BEDTIME
Refills: 0 | Status: DISCONTINUED | OUTPATIENT
Start: 2022-02-14 | End: 2022-02-16

## 2022-02-14 RX ORDER — SODIUM CHLORIDE 9 MG/ML
3 INJECTION INTRAMUSCULAR; INTRAVENOUS; SUBCUTANEOUS EVERY 8 HOURS
Refills: 0 | Status: DISCONTINUED | OUTPATIENT
Start: 2022-02-14 | End: 2022-02-16

## 2022-02-14 RX ORDER — LACTULOSE 10 G/15ML
20 SOLUTION ORAL EVERY 8 HOURS
Refills: 0 | Status: DISCONTINUED | OUTPATIENT
Start: 2022-02-14 | End: 2022-02-16

## 2022-02-14 RX ORDER — LISINOPRIL 2.5 MG/1
20 TABLET ORAL DAILY
Refills: 0 | Status: DISCONTINUED | OUTPATIENT
Start: 2022-02-14 | End: 2022-02-14

## 2022-02-14 RX ORDER — BUPROPION HYDROCHLORIDE 150 MG/1
150 TABLET, EXTENDED RELEASE ORAL DAILY
Refills: 0 | Status: DISCONTINUED | OUTPATIENT
Start: 2022-02-14 | End: 2022-02-16

## 2022-02-14 RX ORDER — CHLORHEXIDINE GLUCONATE 213 G/1000ML
1 SOLUTION TOPICAL
Refills: 0 | Status: DISCONTINUED | OUTPATIENT
Start: 2022-02-14 | End: 2022-02-16

## 2022-02-14 RX ORDER — MEMANTINE HYDROCHLORIDE 10 MG/1
10 TABLET ORAL
Refills: 0 | Status: DISCONTINUED | OUTPATIENT
Start: 2022-02-14 | End: 2022-02-16

## 2022-02-14 RX ORDER — IBUPROFEN 200 MG
600 TABLET ORAL ONCE
Refills: 0 | Status: COMPLETED | OUTPATIENT
Start: 2022-02-14 | End: 2022-02-14

## 2022-02-14 RX ORDER — SODIUM CHLORIDE 9 MG/ML
1000 INJECTION, SOLUTION INTRAVENOUS
Refills: 0 | Status: DISCONTINUED | OUTPATIENT
Start: 2022-02-14 | End: 2022-02-16

## 2022-02-14 RX ORDER — INSULIN LISPRO 100/ML
VIAL (ML) SUBCUTANEOUS
Refills: 0 | Status: DISCONTINUED | OUTPATIENT
Start: 2022-02-14 | End: 2022-02-16

## 2022-02-14 RX ORDER — COLCHICINE 0.6 MG
1 TABLET ORAL
Qty: 0 | Refills: 0 | DISCHARGE

## 2022-02-14 RX ORDER — PANTOPRAZOLE SODIUM 20 MG/1
40 TABLET, DELAYED RELEASE ORAL
Refills: 0 | Status: DISCONTINUED | OUTPATIENT
Start: 2022-02-14 | End: 2022-02-16

## 2022-02-14 RX ORDER — METFORMIN HYDROCHLORIDE 850 MG/1
1000 TABLET ORAL
Refills: 0 | Status: DISCONTINUED | OUTPATIENT
Start: 2022-02-14 | End: 2022-02-16

## 2022-02-14 RX ADMIN — DONEPEZIL HYDROCHLORIDE 10 MILLIGRAM(S): 10 TABLET, FILM COATED ORAL at 22:12

## 2022-02-14 RX ADMIN — SODIUM CHLORIDE 3 MILLILITER(S): 9 INJECTION INTRAMUSCULAR; INTRAVENOUS; SUBCUTANEOUS at 15:22

## 2022-02-14 RX ADMIN — Medication 2: at 18:01

## 2022-02-14 RX ADMIN — MEMANTINE HYDROCHLORIDE 10 MILLIGRAM(S): 10 TABLET ORAL at 18:04

## 2022-02-14 RX ADMIN — BUPROPION HYDROCHLORIDE 150 MILLIGRAM(S): 150 TABLET, EXTENDED RELEASE ORAL at 18:04

## 2022-02-14 RX ADMIN — METFORMIN HYDROCHLORIDE 1000 MILLIGRAM(S): 850 TABLET ORAL at 18:04

## 2022-02-14 RX ADMIN — Medication 600 MILLIGRAM(S): at 12:00

## 2022-02-14 RX ADMIN — ENOXAPARIN SODIUM 40 MILLIGRAM(S): 100 INJECTION SUBCUTANEOUS at 11:23

## 2022-02-14 RX ADMIN — DULOXETINE HYDROCHLORIDE 60 MILLIGRAM(S): 30 CAPSULE, DELAYED RELEASE ORAL at 18:04

## 2022-02-14 RX ADMIN — SODIUM CHLORIDE 3 MILLILITER(S): 9 INJECTION INTRAMUSCULAR; INTRAVENOUS; SUBCUTANEOUS at 22:00

## 2022-02-14 RX ADMIN — Medication 81 MILLIGRAM(S): at 11:23

## 2022-02-14 RX ADMIN — Medication 600 MILLIGRAM(S): at 11:24

## 2022-02-14 NOTE — ED ADULT NURSE NOTE - HIV OFFER
Pt. Arrives to ED with reports of chest pain and shortness of breath x 2 weeks. Denies cardiac hx. +Chills/+fevers. Denies N/V/D.   
Previously Declined (within the last year)
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
## 2022-02-14 NOTE — ED PROVIDER NOTE - OBJECTIVE STATEMENT
76y M pmh Dementia, HTN, OA, Gout, DM, Liver disease presents for eval s/p fall. Pt had unwitnessed fall this morning since having moderate aching L ankle pain, aggravated with rom, no relieving factors. Denies ac, loc, cp, sob, weakness, numbness, n/v
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
## 2022-02-14 NOTE — ED ADULT NURSE NOTE - NSIMPLEMENTINTERV_GEN_ALL_ED
Implemented All Fall with Harm Risk Interventions:  [place] to call system. Call bell, personal items and telephone within reach. Instruct patient to call for assistance. Room bathroom lighting operational. Non-slip footwear when patient is off stretcher. Physically safe environment: no spills, clutter or unnecessary equipment. Stretcher in lowest position, wheels locked, appropriate side rails in place. Provide visual cue, wrist band, yellow gown, etc. Monitor gait and stability. Monitor for mental status changes and reorient to person, place, and time. Review medications for side effects contributing to fall risk. Reinforce activity limits and safety measures with patient and family. Provide visual clues: red socks.

## 2022-02-14 NOTE — H&P ADULT - ASSESSMENT
76y M pmh Dementia, HTN, OA, Gout, DM, Liver disease presents for eval s/p fall this am and now c/o left foot pain  76y M pmh Dementia, HTN, OA, Gout, DM, Liver disease presents for eval s/p fall this am and now c/o left foot pain     # Left ankle pain , Sprain vs FX   -xray with questionable fx   - pain control   - pt cx     #HTN  - monitor vitals   - low NA diet   - c/w ACEi    #DM  - c/w Metformin   - FS SS coverage   - low carb diet     #Dementia   - c/w home meds: cymbalta / namenda / aricept/ wellbutrion   - fall risk   - SS for placement  76y M pmh Dementia, HTN, OA, Gout, DM, Liver disease presents for eval s/p fall this am and now c/o left foot pain     # Left ankle pain , Sprain vs FX   -xray with questionable fx   - pain control   - pt cx   - leave splint on until seen by ortho     #HTN  - monitor vitals   - low NA diet   - c/w ACEi    #DM  - c/w Metformin   - FS SS coverage   - low carb diet     #Dementia   - c/w home meds: cymbalta / namenda / aricept/ wellbutrion   - fall risk   - SS for placement  76y M pmh Dementia, HTN, OA, Gout, DM, Liver disease presents for eval s/p fall this am and now c/o left foot pain     # Left ankle pain , Sprain vs FX  s/p fall  -xray with questionable fx   - pain control   - pt cx   - leave splint on until seen by ortho   - echo / carotid dopp. to r/o cardiac etiology     #HTN  - monitor vitals   - low NA diet   - c/w ACEi    #DM  - c/w Metformin   - FS SS coverage   - low carb diet     #Dementia   - c/w home meds: cymbalta / namenda / aricept/ wellbutrion   - fall risk   - SS for placement     gi/ dvt ppx    d/w attending dr. merino

## 2022-02-14 NOTE — ED PROVIDER NOTE - PHYSICAL EXAMINATION
CONST: NAD  EYES: Sclera and conjunctiva clear.   ENT: No nasal discharge. Oropharynx normal appearing, no erythema or exudates. No abscess or swelling. Uvula midline.   NECK: Non-tender, no meningeal signs. normal ROM. supple   CARD: S1 S2; No jvd  RESP: Equal BS B/L, No wheezes, rhonchi or rales. No distress  GI: Soft, non-tender, non-distended. no cva tenderness. normal BS  MS: Tenderness to L lateral ankle. Normal ROM in all extremities. pulses 2 +. no calf tenderness or swelling  SKIN: Warm, dry, no acute rashes. Good turgor  NEURO: A&Ox1, baseline as per family. No focal deficits. Strength 5/5 with no sensory deficits.

## 2022-02-14 NOTE — H&P ADULT - NSHPPHYSICALEXAM_GEN_ALL_CORE
Vital Signs Last 24 Hrs  T(C): 36.4 (14 Feb 2022 05:41), Max: 36.4 (14 Feb 2022 05:41)  T(F): 97.6 (14 Feb 2022 05:41), Max: 97.6 (14 Feb 2022 05:41)  HR: 105 (14 Feb 2022 05:41) (105 - 105)  BP: 120/58 (14 Feb 2022 05:41) (120/58 - 120/58)  RR: 18 (14 Feb 2022 05:41) (18 - 18)  SpO2: 98% (14 Feb 2022 05:41) (98% - 98%)    CONST: NAD  EYES: Sclera and conjunctiva clear.   ENT: No nasal discharge. Oropharynx normal appearing, no erythema or exudates. No abscess or swelling. Uvula midline.   NECK: Non-tender, no meningeal signs. normal ROM. supple   CARD: S1 S2; No jvd  RESP: Equal BS B/L, No wheezes, rhonchi or rales. No distress  GI: Soft, non-tender, non-distended. no cva tenderness. normal BS  MS: Tenderness to L lateral ankle. Normal ROM in all extremities. pulses 2 +. no calf tenderness or swelling  SKIN: Warm, dry, no acute rashes. Good turgor  NEURO: A&Ox1, baseline as per family. No focal deficits. Strength 5/5 with no sensory deficits. Vital Signs Last 24 Hrs  T(C): 36.4 (14 Feb 2022 05:41), Max: 36.4 (14 Feb 2022 05:41)  T(F): 97.6 (14 Feb 2022 05:41), Max: 97.6 (14 Feb 2022 05:41)  HR: 105 (14 Feb 2022 05:41) (105 - 105)  BP: 120/58 (14 Feb 2022 05:41) (120/58 - 120/58)  RR: 18 (14 Feb 2022 05:41) (18 - 18)  SpO2: 98% (14 Feb 2022 05:41) (98% - 98%)    CONST: NAD  EYES: Sclera and conjunctiva clear.   ENT: No nasal discharge. Oropharynx normal appearing, no erythema or exudates. No abscess or swelling. Uvula midline.   NECK: Non-tender, no meningeal signs. normal ROM. supple   CARD: S1 S2; No jvd  RESP: Equal BS B/L, No wheezes, rhonchi or rales. No distress  GI: Soft, non-tender, non-distended. no cva tenderness. normal BS  MS: Tenderness to L lateral ankle. Normal ROM in all extremities. pulses 2 +. no calf tenderness or swelling SPLINT TO LEFT ANKLE   SKIN: Warm, dry, no acute rashes. Good turgor  NEURO: A&Ox1, baseline as per family. No focal deficits. Strength 5/5 with no sensory deficits.

## 2022-02-14 NOTE — GOALS OF CARE CONVERSATION - ADVANCED CARE PLANNING - NS PRO AD PATIENT TYPE
Patient requests refill of oxycodone 10 mg.    Patient will  have the prescription filled at [id number].  Patient was advised to bring photo ID and if they select another party to  prescription they will need a photo ID, along with hours and location of pharmacy.       Power of

## 2022-02-14 NOTE — GOALS OF CARE CONVERSATION - ADVANCED CARE PLANNING - CONVERSATION DETAILS
discussed overall goals of care with wife. pateint himself has dementia and unabel to particiapte congitively.  wife has good grasp of pt's preferences. she doesn't want heroic measures like CPR dibrillation .  did not opt DNI or PEG , wants to discuss them when needs arise for now only DNR.

## 2022-02-14 NOTE — ED PROVIDER NOTE - CLINICAL SUMMARY MEDICAL DECISION MAKING FREE TEXT BOX
case d/w son.  his parents live alone and his mother does not have the ability to provide ADL.  he is agreeable to short term rehab placement.    fx care provided.

## 2022-02-14 NOTE — PATIENT PROFILE ADULT - FALL HARM RISK - HARM RISK INTERVENTIONS
Assistance with ambulation/Assistance OOB with selected safe patient handling equipment/Communicate Risk of Fall with Harm to all staff/Discuss with provider need for PT consult/Monitor for mental status changes/Monitor gait and stability/Provide patient with walking aids - walker, cane, crutches/Reinforce activity limits and safety measures with patient and family/Tailored Fall Risk Interventions/Use of alarms - bed, chair and/or voice tab/Visual Cue: Yellow wristband and red socks/Bed in lowest position, wheels locked, appropriate side rails in place/Call bell, personal items and telephone in reach/Instruct patient to call for assistance before getting out of bed or chair/Non-slip footwear when patient is out of bed/[place] to call system/Physically safe environment - no spills, clutter or unnecessary equipment/Purposeful Proactive Rounding/Room/bathroom lighting operational, light cord in reach

## 2022-02-14 NOTE — ED PROVIDER NOTE - CARE PLAN
Principal Discharge DX:	Fall at home   1 Principal Discharge DX:	Fall at home  Secondary Diagnosis:	Fracture, fibula

## 2022-02-14 NOTE — H&P ADULT - NSHPLABSRESULTS_GEN_ALL_CORE
02-14    139  |  99  |  21<H>  ----------------------------<  109<H>  4.2   |  26  |  1.0    Ca    9.0      14 Feb 2022 06:40    TPro  6.0  /  Alb  3.1<L>  /  TBili  2.0<H>  /  DBili  x   /  AST  28  /  ALT  26  /  AlkPhos  89  02-14                            14.1   9.08  )-----------( 134      ( 14 Feb 2022 06:40 )             38.8     CARDIAC MARKERS ( 14 Feb 2022 07:30 )  x     / 0.01 ng/mL / x     / x     / x    < from: CT Head No Cont (02.14.22 @ 07:26) >      Impression:    No evidence of acute intracranial abnormality.    < end of copied text >      < from: Xray Pelvis AP only (02.14.22 @ 06:24) >      INTERPRETATION:  Clinical History / Reason for exam: Trauma.    Technique: 2 x-rays of AP pelvis obtained.    Comparison: Pelvic x-ray October 10, 2019.    Findings:    No evidence of acute fracture or dislocation.    Impression:    No evidence of acute fracture.    < end of copied text > 02-14    139  |  99  |  21<H>  ----------------------------<  109<H>  4.2   |  26  |  1.0    Ca    9.0      14 Feb 2022 06:40    TPro  6.0  /  Alb  3.1<L>  /  TBili  2.0<H>  /  DBili  x   /  AST  28  /  ALT  26  /  AlkPhos  89  02-14                            14.1   9.08  )-----------( 134      ( 14 Feb 2022 06:40 )             38.8     CARDIAC MARKERS ( 14 Feb 2022 07:30 )  x     / 0.01 ng/mL / x     / x     / x    < from: CT Head No Cont (02.14.22 @ 07:26) >      Impression:    No evidence of acute intracranial abnormality.    < end of copied text >      < from: Xray Pelvis AP only (02.14.22 @ 06:24) >      INTERPRETATION:  Clinical History / Reason for exam: Trauma.    Technique: 2 x-rays of AP pelvis obtained.    Comparison: Pelvic x-ray October 10, 2019.    Findings:    No evidence of acute fracture or dislocation.    Impression:    No evidence of acute fracture.    < end of copied text >    < from: Xray Ankle Complete 3 Views, Left (02.14.22 @ 06:24) >    Findings/  impression:    Ankle soft tissue swelling. Nonspecific linear lucency projecting over   both the tibia and fibula on lateral view without correlate on frontal   view, questioning possibility of fracture. Alternatively this could   represent a vascular channel. Correlate with exam.    Chronic appearing deformity of the distal fibula.    Ankle mortise is intact.    Plantar calcaneal spurring.    < end of copied text >

## 2022-02-14 NOTE — ED PROVIDER NOTE - NSICDXPASTMEDICALHX_GEN_ALL_CORE_FT
PAST MEDICAL HISTORY:  Benign essential HTN     Depression     Diabetes     Gout     Osteoarthritis

## 2022-02-14 NOTE — ED PROVIDER NOTE - CCCP TRG CHIEF CMPLNT

## 2022-02-14 NOTE — ED PROVIDER NOTE - NS ED ROS FT
Constitutional: (-) fever  Eyes/ENT: (-) blurry vision, (-) epistaxis  Cardiovascular: (-) chest pain, (-) syncope  Respiratory: (-) cough, (-) shortness of breath  Gastrointestinal: (-) vomiting, (-) diarrhea  : (-) dysuria, (-) hematuria  Musculoskeletal: (+) L ankle pain, (-) neck pain, (-) back pain  Integumentary: (-) rash, (-) edema  Neurological: (-) headache, (-) altered mental status  Allergic/Immunologic: (-) pruritus

## 2022-02-14 NOTE — PHYSICAL THERAPY INITIAL EVALUATION ADULT - SPECIFY REASON(S)
Pt. admitted following possible L ankle fx, awaiting ortho consult. Hold PT IE at this time until ortho consult to determine weight bearing.

## 2022-02-14 NOTE — ED PROVIDER NOTE - ATTENDING CONTRIBUTION TO CARE
I personally evaluated the patient. I reviewed the Resident´s or Physician Assistant´s note (as assigned above), and agree with the findings and plan except as documented in my note.  76-year-old male, history of dementia, was found on the floor at home.  Noted to have painful swollen left ankle.  Denies chest pain.  Exam shows alert patient in no distress, HEENT NCAT PERRL, neck supple, throat no exudates, lungs clear, RR S1S2, abdomen soft NT +BS, no swollen tender left ankle, skin no rash, neuro alert GCS 15 no deficits.

## 2022-02-15 DIAGNOSIS — S82.892A OTHER FRACTURE OF LEFT LOWER LEG, INITIAL ENCOUNTER FOR CLOSED FRACTURE: ICD-10-CM

## 2022-02-15 LAB
A1C WITH ESTIMATED AVERAGE GLUCOSE RESULT: 5.3 % — SIGNIFICANT CHANGE UP (ref 4–5.6)
ALBUMIN SERPL ELPH-MCNC: 2.8 G/DL — LOW (ref 3.5–5.2)
ALP SERPL-CCNC: 93 U/L — SIGNIFICANT CHANGE UP (ref 30–115)
ALT FLD-CCNC: 23 U/L — SIGNIFICANT CHANGE UP (ref 0–41)
AMMONIA BLD-MCNC: 85 UMOL/L — HIGH (ref 11–55)
ANION GAP SERPL CALC-SCNC: 9 MMOL/L — SIGNIFICANT CHANGE UP (ref 7–14)
AST SERPL-CCNC: 25 U/L — SIGNIFICANT CHANGE UP (ref 0–41)
BILIRUB SERPL-MCNC: 2.3 MG/DL — HIGH (ref 0.2–1.2)
BUN SERPL-MCNC: 21 MG/DL — HIGH (ref 10–20)
CALCIUM SERPL-MCNC: 8.9 MG/DL — SIGNIFICANT CHANGE UP (ref 8.5–10.1)
CHLORIDE SERPL-SCNC: 104 MMOL/L — SIGNIFICANT CHANGE UP (ref 98–110)
CO2 SERPL-SCNC: 28 MMOL/L — SIGNIFICANT CHANGE UP (ref 17–32)
CREAT SERPL-MCNC: 0.9 MG/DL — SIGNIFICANT CHANGE UP (ref 0.7–1.5)
ESTIMATED AVERAGE GLUCOSE: 105 MG/DL — SIGNIFICANT CHANGE UP (ref 68–114)
GLUCOSE BLDC GLUCOMTR-MCNC: 148 MG/DL — HIGH (ref 70–99)
GLUCOSE BLDC GLUCOMTR-MCNC: 149 MG/DL — HIGH (ref 70–99)
GLUCOSE BLDC GLUCOMTR-MCNC: 153 MG/DL — HIGH (ref 70–99)
GLUCOSE BLDC GLUCOMTR-MCNC: 156 MG/DL — HIGH (ref 70–99)
GLUCOSE BLDC GLUCOMTR-MCNC: 159 MG/DL — HIGH (ref 70–99)
GLUCOSE SERPL-MCNC: 140 MG/DL — HIGH (ref 70–99)
HCT VFR BLD CALC: 37.5 % — LOW (ref 42–52)
HGB BLD-MCNC: 13.5 G/DL — LOW (ref 14–18)
MCHC RBC-ENTMCNC: 36 G/DL — SIGNIFICANT CHANGE UP (ref 32–37)
MCHC RBC-ENTMCNC: 36.4 PG — HIGH (ref 27–31)
MCV RBC AUTO: 101.1 FL — HIGH (ref 80–94)
NRBC # BLD: 0 /100 WBCS — SIGNIFICANT CHANGE UP (ref 0–0)
PLATELET # BLD AUTO: 111 K/UL — LOW (ref 130–400)
POTASSIUM SERPL-MCNC: 4.5 MMOL/L — SIGNIFICANT CHANGE UP (ref 3.5–5)
POTASSIUM SERPL-SCNC: 4.5 MMOL/L — SIGNIFICANT CHANGE UP (ref 3.5–5)
PROT SERPL-MCNC: 5.6 G/DL — LOW (ref 6–8)
RBC # BLD: 3.71 M/UL — LOW (ref 4.7–6.1)
RBC # FLD: 13.1 % — SIGNIFICANT CHANGE UP (ref 11.5–14.5)
SODIUM SERPL-SCNC: 141 MMOL/L — SIGNIFICANT CHANGE UP (ref 135–146)
WBC # BLD: 6.65 K/UL — SIGNIFICANT CHANGE UP (ref 4.8–10.8)
WBC # FLD AUTO: 6.65 K/UL — SIGNIFICANT CHANGE UP (ref 4.8–10.8)

## 2022-02-15 PROCEDURE — 99232 SBSQ HOSP IP/OBS MODERATE 35: CPT

## 2022-02-15 PROCEDURE — 99221 1ST HOSP IP/OBS SF/LOW 40: CPT

## 2022-02-15 RX ORDER — AMLODIPINE BESYLATE 2.5 MG/1
5 TABLET ORAL ONCE
Refills: 0 | Status: COMPLETED | OUTPATIENT
Start: 2022-02-15 | End: 2022-02-15

## 2022-02-15 RX ADMIN — METFORMIN HYDROCHLORIDE 1000 MILLIGRAM(S): 850 TABLET ORAL at 18:03

## 2022-02-15 RX ADMIN — METFORMIN HYDROCHLORIDE 1000 MILLIGRAM(S): 850 TABLET ORAL at 06:31

## 2022-02-15 RX ADMIN — MEMANTINE HYDROCHLORIDE 10 MILLIGRAM(S): 10 TABLET ORAL at 18:03

## 2022-02-15 RX ADMIN — MEMANTINE HYDROCHLORIDE 10 MILLIGRAM(S): 10 TABLET ORAL at 06:31

## 2022-02-15 RX ADMIN — BUPROPION HYDROCHLORIDE 150 MILLIGRAM(S): 150 TABLET, EXTENDED RELEASE ORAL at 11:40

## 2022-02-15 RX ADMIN — Medication 2: at 17:11

## 2022-02-15 RX ADMIN — SODIUM CHLORIDE 3 MILLILITER(S): 9 INJECTION INTRAMUSCULAR; INTRAVENOUS; SUBCUTANEOUS at 06:33

## 2022-02-15 RX ADMIN — ENOXAPARIN SODIUM 40 MILLIGRAM(S): 100 INJECTION SUBCUTANEOUS at 11:41

## 2022-02-15 RX ADMIN — DONEPEZIL HYDROCHLORIDE 10 MILLIGRAM(S): 10 TABLET, FILM COATED ORAL at 21:47

## 2022-02-15 RX ADMIN — AMLODIPINE BESYLATE 5 MILLIGRAM(S): 2.5 TABLET ORAL at 22:31

## 2022-02-15 RX ADMIN — CHLORHEXIDINE GLUCONATE 1 APPLICATION(S): 213 SOLUTION TOPICAL at 06:33

## 2022-02-15 RX ADMIN — PANTOPRAZOLE SODIUM 40 MILLIGRAM(S): 20 TABLET, DELAYED RELEASE ORAL at 06:35

## 2022-02-15 RX ADMIN — SODIUM CHLORIDE 3 MILLILITER(S): 9 INJECTION INTRAMUSCULAR; INTRAVENOUS; SUBCUTANEOUS at 21:34

## 2022-02-15 RX ADMIN — DULOXETINE HYDROCHLORIDE 60 MILLIGRAM(S): 30 CAPSULE, DELAYED RELEASE ORAL at 11:40

## 2022-02-15 RX ADMIN — SODIUM CHLORIDE 3 MILLILITER(S): 9 INJECTION INTRAMUSCULAR; INTRAVENOUS; SUBCUTANEOUS at 13:45

## 2022-02-15 RX ADMIN — Medication 81 MILLIGRAM(S): at 11:40

## 2022-02-15 NOTE — CONSULT NOTE ADULT - SUBJECTIVE AND OBJECTIVE BOX
HPI:  76y M pmh Dementia, HTN, OA, Gout, DM, Liver disease presents for eval s/p fall. Pt had unwitnessed fall this morning since having moderate aching L ankle pain, aggravated with rom, no relieving factors. Denies ac, loc, cp, sob, weakness, numbness, n/v.    was found on the floor at home.  Noted to have painful swollen left ankle.  pt does not recall how he fell but reports NO cp / sob/ LOC prior to fall    ER: placed splint on Left ankle -     as per wife: he fell yesterday as well but did not call 911,  is fully functional  (14 Feb 2022 08:48)    Of note , patient has been walking in the hospital and not maintaining nwb    PAST MEDICAL & SURGICAL HISTORY:  Diabetes    Depression    Gout    Benign essential HTN    Osteoarthritis    Cataracts, both eyes    S/P cholecystectomy    S/P knee surgery    MEDICATIONS  (STANDING):  aspirin  chewable 81 milliGRAM(s) Oral daily  buPROPion XL (24-Hour) . 150 milliGRAM(s) Oral daily  chlorhexidine 4% Liquid 1 Application(s) Topical <User Schedule>  dextrose 5%. 1000 milliLiter(s) (100 mL/Hr) IV Continuous <Continuous>  donepezil 10 milliGRAM(s) Oral at bedtime  DULoxetine 60 milliGRAM(s) Oral daily  enoxaparin Injectable 40 milliGRAM(s) SubCutaneous daily  glucagon  Injectable 1 milliGRAM(s) IntraMuscular once  insulin lispro (ADMELOG) corrective regimen sliding scale   SubCutaneous three times a day before meals  memantine 10 milliGRAM(s) Oral two times a day  metFORMIN 1000 milliGRAM(s) Oral two times a day  pantoprazole    Tablet 40 milliGRAM(s) Oral before breakfast  sodium chloride 0.9% lock flush 3 milliLiter(s) IV Push every 8 hours    MEDICATIONS  (PRN):  acetaminophen     Tablet .. 650 milliGRAM(s) Oral every 6 hours PRN Mild Pain (1 - 3)  lactulose Syrup 20 Gram(s) Oral every 8 hours PRN constipation    < from: Xray Ankle Complete 3 Views, Left (02.14.22 @ 06:24) >    ACC: [id number] EXAM:  XR ANKLE COMP MIN 3 VIEWS LT                          PROCEDURE DATE:  02/14/2022          INTERPRETATION:  Clinical History / Reason for exam: Trauma    Technique:  Three radiographs of the left ankle are obtained.    Comparison:  No studies are available for comparison.    Findings/  impression:    Ankle soft tissue swelling. Nonspecific linear lucency projecting over   both the tibia and fibula on lateral view without correlate on frontal   view, questioning possibility of fracture. Alternatively this could   represent a vascular channel. Correlate with exam.    Chronic appearing deformity of the distal fibula.    Ankle mortise is intact.    Plantar calcaneal spurring.    --- End of Report ---            MAYITO KIM MD; Attending Radiologist  This document has been electronically signed. Feb 14 2022  9:30AM    < end of copied text >  < from: CT Ankle No Cont, Left (02.14.22 @ 16:35) >    ACC: [id number] EXAM:  CT ANKLE ONLY LT                          PROCEDURE DATE:  02/14/2022          INTERPRETATION:  CT OF THE LEFT ANKLE WITHOUT CONTRAST    CLINICAL HISTORY: Fall. Old deformity. Evaluate for fracture. Trauma.    TECHNIQUE: Imageswere obtained of the left ankle without contrast.   Coronal and sagittal reformatted images were also provided.    COMPARISON: Radiographs of 2/14/2022.    FINDINGS:    Osteopenia.  Nondisplaced oblique fracture through the distal fibula/lateral malleolus.  Nondisplaced fracture through the posterior malleolus.  Small avulsion fracture fragments adjacent to the medial malleolus of   indeterminate age.  Displaced fracture of the anterolateral tibial plafond at the insertion   of the anterior inferior tibiofibular ligament.    Achilles enthesophyte and calcaneal heel spur. Degenerative change in the   talonavicular joint and midfoot.    Subcutaneous edema and hemorrhage. Atherosclerotic calcifications.    IMPRESSION:    1. Nondisplaced fractures of the posterior and lateral malleoli. Small   osseous fracture fragments adjacent to the medial malleolus of   indeterminate age.  2. Displaced fracture of the anterolateral tibial plafond at the   insertion of the anterior inferior tibiofibular ligament.    --- End of Report ---            PADMINI RIVERA MD; Attending Radiologist  This document has been electronically signed. Feb 15 2022  8:36A    < end of copied text >      Vital Signs Last 24 Hrs  T(C): 36.3 (15 Feb 2022 13:37), Max: 36.6 (15 Feb 2022 05:00)  T(F): 97.4 (15 Feb 2022 13:37), Max: 97.8 (15 Feb 2022 05:00)  HR: 79 (15 Feb 2022 13:37) (79 - 88)  BP: 124/62 (15 Feb 2022 13:37) (113/56 - 173/62)  BP(mean): --  RR: 18 (15 Feb 2022 13:37) (18 - 22)  SpO2: 96% (15 Feb 2022 13:37) (95% - 96%)    On physical examination , the patient is resting comfortably without complaints.   Left ankle in splint, minimal ttp of ankle , toes warm , mobile, + sensation. good cap refill

## 2022-02-15 NOTE — PROGRESS NOTE ADULT - SUBJECTIVE AND OBJECTIVE BOX
CC.  Fall  HPI.  Patient reports ankle pain which is controlled  offers no other complaints               Constitutional: No fever, fatigue or weight loss.  Skin: No rash.  Eyes: No recent vision problems or eye pain.  ENT: No congestion, ear pain, or sore throat.  Endocrine: No thyroid problems.  Cardiovascular: No chest pain or palpation.  Respiratory: No cough, shortness of breath, congestion, or wheezing.  Gastrointestinal: No abdominal pain, nausea, vomiting, or diarrhea.  Genitourinary: No dysuria.  Musculoskeletal: No joint swelling.  Neurologic: No headache.      Vital Signs Last 24 Hrs  T(C): 36.3 (02-15-22 @ 13:37), Max: 36.6 (02-15-22 @ 05:00)  T(F): 97.4 (02-15-22 @ 13:37), Max: 97.8 (02-15-22 @ 05:00)  HR: 79 (02-15-22 @ 13:37) (79 - 88)  BP: 124/62 (02-15-22 @ 13:37) (113/56 - 173/62)  BP(mean): --  RR: 18 (02-15-22 @ 13:37) (18 - 22)  SpO2: 96% (02-15-22 @ 13:37) (95% - 96%)        PHYSICAL EXAM-  GENERAL: NAD   HEAD:  Atraumatic, Normocephalic  EYES: EOMI, PERRLA, conjunctiva and sclera clear  NECK: Supple, No JVD, Normal thyroid  NERVOUS SYSTEM:  Alert moving all extremities  CHEST/LUNG: Clear to percussion bilaterally; No rales, rhonchi, wheezing, or rubs  HEART: Regular rate and rhythm; No murmurs, rubs, or gallops  ABDOMEN: Soft, Nontender, Nondistended; Bowel sounds present  EXTREMITIES: ankle splint  SKIN: No rashes or lesions                                  13.5   6.65  )-----------( 111      ( 15 Feb 2022 08:30 )             37.5     02-15    141  |  104  |  21<H>  ----------------------------<  140<H>  4.5   |  28  |  0.9    Ca    8.9      15 Feb 2022 08:30    TPro  5.6<L>  /  Alb  2.8<L>  /  TBili  2.3<H>  /  DBili  x   /  AST  25  /  ALT  23  /  AlkPhos  93  02-15    CARDIAC MARKERS ( 14 Feb 2022 07:30 )  x     / 0.01 ng/mL / x     / x     / x                      MEDICATIONS  (STANDING):  aspirin  chewable 81 milliGRAM(s) Oral daily  buPROPion XL (24-Hour) . 150 milliGRAM(s) Oral daily  chlorhexidine 4% Liquid 1 Application(s) Topical <User Schedule>  dextrose 5%. 1000 milliLiter(s) (100 mL/Hr) IV Continuous <Continuous>  donepezil 10 milliGRAM(s) Oral at bedtime  DULoxetine 60 milliGRAM(s) Oral daily  enoxaparin Injectable 40 milliGRAM(s) SubCutaneous daily  glucagon  Injectable 1 milliGRAM(s) IntraMuscular once  insulin lispro (ADMELOG) corrective regimen sliding scale   SubCutaneous three times a day before meals  memantine 10 milliGRAM(s) Oral two times a day  metFORMIN 1000 milliGRAM(s) Oral two times a day  pantoprazole    Tablet 40 milliGRAM(s) Oral before breakfast  sodium chloride 0.9% lock flush 3 milliLiter(s) IV Push every 8 hours    MEDICATIONS  (PRN):  acetaminophen     Tablet .. 650 milliGRAM(s) Oral every 6 hours PRN Mild Pain (1 - 3)  lactulose Syrup 20 Gram(s) Oral every 8 hours PRN constipation      Imaging Personally Reviewed:     [x ] YES  [ ] NO    Consultant(s) Notes Reviewed:  [x ] YES  [ ] NO    Care Discussed with Consultants/Other Providers [x ] YES  [ ] No medical contraindication for discharge

## 2022-02-15 NOTE — CONSULT NOTE ADULT - ATTENDING COMMENTS
case was reviewed  films including CT scan reviewed  agree with plan  follow one week in office if he is home

## 2022-02-15 NOTE — PHYSICAL THERAPY INITIAL EVALUATION ADULT - ADDITIONAL COMMENTS
Pt reports he lives with wife in house with 3 SOL, flight to bedroom.  Pt says he occasionally uses RW outside in community.

## 2022-02-15 NOTE — PROGRESS NOTE ADULT - ASSESSMENT
76y M pmh Dementia, HTN, OA, Gout, DM, Liver disease presents for eval s/p fall this am and now c/o left foot pain     # Left ankle pain ,    -xray with questionable fx.  CT scan shows Nondisplaced fractures of the posterior and lateral malleoli. Small   osseous fracture fragments adjacent to the medial malleolus of indeterminate age.  Displaced fracture of the anterolateral tibial plafond at the insertion of the anterior inferior tibiofibular ligament.  -S/P splint  -Ortho to follow up   - pain control     #Fall  -EKG shows No ischemic changes  -Carotid shows no significant stenosis  -TTE pending   -F/O orthostatic BP  -No syncopal episodes  -Head CT scan shows no acute process   -neurologically intact    #HTN  - monitor vitals   - low NA diet   - c/w ACEi    #DM  - c/w Metformin   - FS SS coverage   - low carb diet     #Dementia   - c/w home meds: cymbalta / namenda / aricept/ wellbutrion   - fall risk   - SS for placement     #Progress Note Handoff  Pending (specify):  as above   Family discussion:  plan of care was discussed with patient   in details.  all questions were answered.  seems to understand, and in agreement  Disposition: PT for rehab

## 2022-02-15 NOTE — CONSULT NOTE ADULT - PROBLEM SELECTOR RECOMMENDATION 9
NWB LLE   keep splint clean and dry  pain control  elevation  f/u with ortho for new x-rays in 1 week     d/w Dr Munoz

## 2022-02-15 NOTE — PHYSICAL THERAPY INITIAL EVALUATION ADULT - GENERAL OBSERVATIONS, REHAB EVAL
11:40-12:05 Chart reviewed. Patient available to be seen for physical therapy, denies pain, confirmed with RN.  Pt rec'd in bed, +L foot/ankle splinted, wife at bedside, pt in NAD.

## 2022-02-16 ENCOUNTER — TRANSCRIPTION ENCOUNTER (OUTPATIENT)
Age: 77
End: 2022-02-16

## 2022-02-16 VITALS
DIASTOLIC BLOOD PRESSURE: 58 MMHG | SYSTOLIC BLOOD PRESSURE: 103 MMHG | RESPIRATION RATE: 18 BRPM | HEART RATE: 106 BPM | TEMPERATURE: 98 F

## 2022-02-16 LAB
ALBUMIN SERPL ELPH-MCNC: 2.9 G/DL — LOW (ref 3.5–5.2)
ALP SERPL-CCNC: 77 U/L — SIGNIFICANT CHANGE UP (ref 30–115)
ALT FLD-CCNC: 20 U/L — SIGNIFICANT CHANGE UP (ref 0–41)
ANION GAP SERPL CALC-SCNC: 11 MMOL/L — SIGNIFICANT CHANGE UP (ref 7–14)
AST SERPL-CCNC: 24 U/L — SIGNIFICANT CHANGE UP (ref 0–41)
BILIRUB SERPL-MCNC: 2.3 MG/DL — HIGH (ref 0.2–1.2)
BUN SERPL-MCNC: 17 MG/DL — SIGNIFICANT CHANGE UP (ref 10–20)
CALCIUM SERPL-MCNC: 8.8 MG/DL — SIGNIFICANT CHANGE UP (ref 8.5–10.1)
CHLORIDE SERPL-SCNC: 104 MMOL/L — SIGNIFICANT CHANGE UP (ref 98–110)
CO2 SERPL-SCNC: 26 MMOL/L — SIGNIFICANT CHANGE UP (ref 17–32)
CREAT SERPL-MCNC: 0.7 MG/DL — SIGNIFICANT CHANGE UP (ref 0.7–1.5)
GLUCOSE BLDC GLUCOMTR-MCNC: 110 MG/DL — HIGH (ref 70–99)
GLUCOSE BLDC GLUCOMTR-MCNC: 146 MG/DL — HIGH (ref 70–99)
GLUCOSE SERPL-MCNC: 92 MG/DL — SIGNIFICANT CHANGE UP (ref 70–99)
HCT VFR BLD CALC: 37.8 % — LOW (ref 42–52)
HGB BLD-MCNC: 13.7 G/DL — LOW (ref 14–18)
MCHC RBC-ENTMCNC: 36.1 PG — HIGH (ref 27–31)
MCHC RBC-ENTMCNC: 36.2 G/DL — SIGNIFICANT CHANGE UP (ref 32–37)
MCV RBC AUTO: 99.7 FL — HIGH (ref 80–94)
NRBC # BLD: 0 /100 WBCS — SIGNIFICANT CHANGE UP (ref 0–0)
PLATELET # BLD AUTO: 121 K/UL — LOW (ref 130–400)
POTASSIUM SERPL-MCNC: 4.2 MMOL/L — SIGNIFICANT CHANGE UP (ref 3.5–5)
POTASSIUM SERPL-SCNC: 4.2 MMOL/L — SIGNIFICANT CHANGE UP (ref 3.5–5)
PROT SERPL-MCNC: 5.8 G/DL — LOW (ref 6–8)
RBC # BLD: 3.79 M/UL — LOW (ref 4.7–6.1)
RBC # FLD: 13 % — SIGNIFICANT CHANGE UP (ref 11.5–14.5)
SODIUM SERPL-SCNC: 141 MMOL/L — SIGNIFICANT CHANGE UP (ref 135–146)
WBC # BLD: 5.78 K/UL — SIGNIFICANT CHANGE UP (ref 4.8–10.8)
WBC # FLD AUTO: 5.78 K/UL — SIGNIFICANT CHANGE UP (ref 4.8–10.8)

## 2022-02-16 PROCEDURE — 99239 HOSP IP/OBS DSCHRG MGMT >30: CPT

## 2022-02-16 PROCEDURE — 93306 TTE W/DOPPLER COMPLETE: CPT | Mod: 26

## 2022-02-16 RX ADMIN — ENOXAPARIN SODIUM 40 MILLIGRAM(S): 100 INJECTION SUBCUTANEOUS at 12:10

## 2022-02-16 RX ADMIN — BUPROPION HYDROCHLORIDE 150 MILLIGRAM(S): 150 TABLET, EXTENDED RELEASE ORAL at 12:09

## 2022-02-16 RX ADMIN — METFORMIN HYDROCHLORIDE 1000 MILLIGRAM(S): 850 TABLET ORAL at 05:46

## 2022-02-16 RX ADMIN — DULOXETINE HYDROCHLORIDE 60 MILLIGRAM(S): 30 CAPSULE, DELAYED RELEASE ORAL at 12:09

## 2022-02-16 RX ADMIN — Medication 81 MILLIGRAM(S): at 12:09

## 2022-02-16 RX ADMIN — SODIUM CHLORIDE 3 MILLILITER(S): 9 INJECTION INTRAMUSCULAR; INTRAVENOUS; SUBCUTANEOUS at 05:43

## 2022-02-16 RX ADMIN — PANTOPRAZOLE SODIUM 40 MILLIGRAM(S): 20 TABLET, DELAYED RELEASE ORAL at 05:46

## 2022-02-16 RX ADMIN — CHLORHEXIDINE GLUCONATE 1 APPLICATION(S): 213 SOLUTION TOPICAL at 05:42

## 2022-02-16 RX ADMIN — MEMANTINE HYDROCHLORIDE 10 MILLIGRAM(S): 10 TABLET ORAL at 05:46

## 2022-02-16 NOTE — DISCHARGE NOTE PROVIDER - PROVIDER TOKENS
PROVIDER:[TOKEN:[[id number]],FOLLOWUP:[1-3 days]],PROVIDER:[TOKEN:[[id number]],FOLLOWUP:[1 week]]

## 2022-02-16 NOTE — DISCHARGE NOTE PROVIDER - CARE PROVIDER_API CALL
Adam Kirkland ()  Infectious Disease; Internal Medicine  [address] [place] [address]  Phone: [phone]  Fax: [phone]  Follow Up Time: 1-3 days    Lebron Munoz)  Orthopaedic Surgery  [address] [place] [address]  Phone: [phone]  Fax: [phone]  Follow Up Time: 1 week

## 2022-02-16 NOTE — DISCHARGE NOTE NURSING/CASE MANAGEMENT/SOCIAL WORK - NSDCVIVACCINE_GEN_ALL_CORE_FT
Tdap; 19-Oct-2018 14:41; Deepthi Hamilton); IWT; [id number] (Exp. Date: 10-Oct-2020); IntraMuscular; Deltoid Right.; 0.5 milliLiter(s); VIS (VIS Published: 09-May-2013, VIS Presented: 19-Oct-2018);

## 2022-02-16 NOTE — DISCHARGE NOTE PROVIDER - NSDCCPCAREPLAN_GEN_ALL_CORE_FT
PRINCIPAL DISCHARGE DIAGNOSIS  Diagnosis: Fall at home  Assessment and Plan of Treatment: -  - You have a questionable fracture. Ortho placed a splint and asked to keep it clean. You need to follow up with ortho in 1 week for further imaging. Physical therapy was only able to walk 5 feet with you.

## 2022-02-16 NOTE — DISCHARGE NOTE PROVIDER - HOSPITAL COURSE
Patient is a 76 year old Male with past medical history of Dementia, HTN, OA, Gout, DM, Liver disease presented to the ER with a chief complaint of left foot pain secondary to fall. Patient was only able to do 5 Feet with Physical therapy. Ortho was consulted and recommended non-weight bearing on left lower extremity, keep splint clean and dry. Patient to be discharged to [facility].

## 2022-02-16 NOTE — DISCHARGE NOTE NURSING/CASE MANAGEMENT/SOCIAL WORK - PATIENT PORTAL LINK FT
You can access the FollowMyHealth Patient Portal offered by [facility] by registering at the following website: [email or web address]. By joining [employer]’s FollowMyHealth portal, you will also be able to view your health information using other applications (apps) compatible with our system.

## 2022-02-16 NOTE — DISCHARGE NOTE NURSING/CASE MANAGEMENT/SOCIAL WORK - NSDCPEFALRISK_GEN_ALL_CORE
For information on Fall & Injury Prevention, visit: [email or web address] OR  [email or web address] OR  https://www.cdc.gov/steadi/patient.html

## 2022-02-16 NOTE — DISCHARGE NOTE PROVIDER - NSDCMRMEDTOKEN_GEN_ALL_CORE_FT
aspirin 81 mg oral tablet: 1 tab(s) orally once a day  buPROPion 150 mg/24 hours (XL) oral tablet, extended release: 1 tab(s) orally every 24 hours  donepezil 10 mg oral tablet: 1 tab(s) orally once a day (at bedtime)  DULoxetine 60 mg oral delayed release capsule: 1 cap(s) orally once a day  glipiZIDE 2.5 mg oral tablet, extended release: 1 tab(s) orally once a day  memantine 10 mg oral tablet: 1 tab(s) orally 2 times a day  metFORMIN 1000 mg oral tablet: 1 tab(s) orally 2 times a day  quinapril 20 mg oral tablet: 1 tab(s) orally once a day  Vitamin B6 100 mg oral tablet: 1 tab(s) orally once a day  Vitamin D3 2000 intl units oral tablet: 1 tab(s) orally once a day

## 2022-02-22 DIAGNOSIS — K74.60 UNSPECIFIED CIRRHOSIS OF LIVER: ICD-10-CM

## 2022-02-22 DIAGNOSIS — M25.572 PAIN IN LEFT ANKLE AND JOINTS OF LEFT FOOT: ICD-10-CM

## 2022-02-22 DIAGNOSIS — I10 ESSENTIAL (PRIMARY) HYPERTENSION: ICD-10-CM

## 2022-02-22 DIAGNOSIS — E11.9 TYPE 2 DIABETES MELLITUS WITHOUT COMPLICATIONS: ICD-10-CM

## 2022-02-22 DIAGNOSIS — F03.90 UNSPECIFIED DEMENTIA WITHOUT BEHAVIORAL DISTURBANCE: ICD-10-CM
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
## 2022-02-22 DIAGNOSIS — R29.6 REPEATED FALLS: ICD-10-CM

## 2022-02-22 DIAGNOSIS — M10.9 GOUT, UNSPECIFIED: ICD-10-CM

## 2022-02-22 DIAGNOSIS — S82.62XA DISPLACED FRACTURE OF LATERAL MALLEOLUS OF LEFT FIBULA, INITIAL ENCOUNTER FOR CLOSED FRACTURE: ICD-10-CM
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
## 2022-05-09 ENCOUNTER — RESULT REVIEW (OUTPATIENT)
Age: 77
End: 2022-05-09

## 2022-05-09 ENCOUNTER — OUTPATIENT (OUTPATIENT)
Dept: OUTPATIENT SERVICES | Facility: HOSPITAL | Age: 77
LOS: 1 days | Discharge: HOME | End: 2022-05-09
Payer: MEDICARE

## 2022-05-09 DIAGNOSIS — Z98.890 OTHER SPECIFIED POSTPROCEDURAL STATES: Chronic | ICD-10-CM

## 2022-05-09 DIAGNOSIS — R91.1 SOLITARY PULMONARY NODULE: ICD-10-CM

## 2022-05-09 DIAGNOSIS — H26.9 UNSPECIFIED CATARACT: Chronic | ICD-10-CM

## 2022-05-09 DIAGNOSIS — Z90.49 ACQUIRED ABSENCE OF OTHER SPECIFIED PARTS OF DIGESTIVE TRACT: Chronic | ICD-10-CM

## 2022-05-09 PROCEDURE — 71250 CT THORAX DX C-: CPT | Mod: 26

## 2022-06-06 ENCOUNTER — APPOINTMENT (OUTPATIENT)
Age: 77
End: 2022-06-06
Payer: MEDICARE

## 2022-06-06 VITALS
HEIGHT: 65 IN | OXYGEN SATURATION: 97 % | DIASTOLIC BLOOD PRESSURE: 86 MMHG | RESPIRATION RATE: 14 BRPM | SYSTOLIC BLOOD PRESSURE: 126 MMHG | WEIGHT: 181 LBS | HEART RATE: 88 BPM | BODY MASS INDEX: 30.16 KG/M2

## 2022-06-06 DIAGNOSIS — R91.8 OTHER NONSPECIFIC ABNORMAL FINDING OF LUNG FIELD: ICD-10-CM

## 2022-06-06 DIAGNOSIS — G47.33 OBSTRUCTIVE SLEEP APNEA (ADULT) (PEDIATRIC): ICD-10-CM

## 2022-06-06 PROCEDURE — 94010 BREATHING CAPACITY TEST: CPT

## 2022-06-06 PROCEDURE — 99213 OFFICE O/P EST LOW 20 MIN: CPT | Mod: 25

## 2022-06-06 PROCEDURE — 94727 GAS DIL/WSHOT DETER LNG VOL: CPT

## 2022-06-06 PROCEDURE — 94729 DIFFUSING CAPACITY: CPT

## 2022-06-06 NOTE — ASSESSMENT
[FreeTextEntry1] : NO significant SHELLEY on NPSG.  Of Note TST was low. \par Some scarring on CT that is improving.  Stable on repeat CT

## 2022-07-24 NOTE — PROGRESS NOTE ADULT - NSHPATTENDINGPLANDISCUSS_GEN_ALL_CORE
Trauma Team
Trauma team and patient
patient and Trauma Team
patient and Trauma Team
Yes

## 2022-09-14 ENCOUNTER — APPOINTMENT (OUTPATIENT)
Dept: UROLOGY | Facility: CLINIC | Age: 77
End: 2022-09-14

## 2022-09-14 VITALS
RESPIRATION RATE: 16 BRPM | DIASTOLIC BLOOD PRESSURE: 74 MMHG | WEIGHT: 170 LBS | HEIGHT: 65 IN | HEART RATE: 95 BPM | BODY MASS INDEX: 28.32 KG/M2 | SYSTOLIC BLOOD PRESSURE: 108 MMHG

## 2022-09-14 DIAGNOSIS — N48.83 ACQUIRED BURIED PENIS: ICD-10-CM

## 2022-09-14 DIAGNOSIS — E11.9 TYPE 2 DIABETES MELLITUS W/OUT COMPLICATIONS: ICD-10-CM

## 2022-09-14 DIAGNOSIS — R39.81 FUNCTIONAL URINARY INCONTINENCE: ICD-10-CM

## 2022-09-14 PROCEDURE — 99203 OFFICE O/P NEW LOW 30 MIN: CPT

## 2022-09-14 RX ORDER — CLINDAMYCIN HYDROCHLORIDE 150 MG/1
150 CAPSULE ORAL EVERY 6 HOURS
Qty: 40 | Refills: 0 | Status: DISCONTINUED | COMMUNITY
Start: 2018-11-25 | End: 2022-09-14

## 2022-09-14 RX ORDER — GABAPENTIN 300 MG/1
300 CAPSULE ORAL 3 TIMES DAILY
Qty: 21 | Refills: 0 | Status: DISCONTINUED | COMMUNITY
Start: 2019-10-24 | End: 2022-09-14

## 2022-09-14 NOTE — PHYSICAL EXAM
[General Appearance - Well Developed] : well developed [General Appearance - Well Nourished] : well nourished [Normal Appearance] : normal appearance [Well Groomed] : well groomed [General Appearance - In No Acute Distress] : no acute distress [Respiration, Rhythm And Depth] : normal respiratory rhythm and effort [Exaggerated Use Of Accessory Muscles For Inspiration] : no accessory muscle use [Abdomen Soft] : soft [Abdomen Tenderness] : non-tender [Costovertebral Angle Tenderness] : no ~M costovertebral angle tenderness [Penis Abnormality] : normal circumcised penis [] : no rash [No Focal Deficits] : no focal deficits [FreeTextEntry1] : He is not fully aware

## 2022-09-14 NOTE — LETTER BODY
[Dear  ___] : Dear  [unfilled], [Consult Letter:] : I had the pleasure of evaluating your patient, [unfilled]. [Please see my note below.] : Please see my note below. [Consult Closing:] : Thank you very much for allowing me to participate in the care of this patient.  If you have any questions, please do not hesitate to contact me. [Sincerely,] : Sincerely, [FreeTextEntry2] : Adam Kirkland MD\par [address] [facility] [address]\[place] [address]

## 2022-09-14 NOTE — ASSESSMENT
[FreeTextEntry1] : The problem here is that this is not a functional but and anatomical issue.  It would be great if he was 30-40 pounds lighter, if he was younger and it was a use issue we could go in and secure the pearly penile fat down to the base of the penis but that not going to be done.  We can also consider liposuction but that is not going to be done.  They tried a plastic tube i.e. a urinal but functionally that was not working.  There is something of seen in the past called a 's friend which is a jockstrap with a rubber tube which the penis just sits in but without pressure to keep the penis inside the tube I do not think that would work.\par \par What his wife and I discussed is getting pressure underwear AKA "spanks" cutting a small hole through the penis.  The pressure on the way would push the fat and the penis might pop out and that would work.  If that works great she would just wash the spanks as an if needed getting a few pairs.  If that does not work I would recommend they come back and see Dr. Garza our incontinence specialist to see if he has anything else that he can think of Male

## 2022-09-14 NOTE — HISTORY OF PRESENT ILLNESS
[FreeTextEntry1] : Humberto is a 77-year-old male born July 22, 1945 brought in by his wife as over time his penis has turtled into the [place] penile fat and when he has to urinate, though he knows it is coming and tries his penis is so withdrawn that he just urinates all over himself in the bed chair whenever he sitting on.  He has progressive dementia but he really is not bump on a log during almost no activity to the point he can no longer walk and if they have to go anyplace they need to put him in a wheelchair.  He does not go upstairs he sleeps on the couch and either watches TV or sleeps the whole day.  There is no blood there is no pus there is no burning it says he is incontinent all over the place and his wife is wondering what can be done at becoming quite difficult to manage.  She is worried about skin breakdown as well as the odor of the house coming from urine\par \par They have tried a urinal but he shakes too much for her to hold it and he does not have the physical strength to hold himself [Urinary Incontinence] : urinary incontinence

## 2022-09-14 NOTE — LETTER HEADER
[FreeTextEntry3] : Kimmie Parada M.D.\par Director Emeritus of Urology\par [facility]\par [address] [place] [address], [address]\[place] [address]

## 2022-10-13 ENCOUNTER — INPATIENT (INPATIENT)
Facility: HOSPITAL | Age: 77
LOS: 5 days | Discharge: NURSING, REHAB OR LONG-TERM CARE | End: 2022-10-19
Attending: SURGERY | Admitting: SURGERY

## 2022-10-13 VITALS
OXYGEN SATURATION: 97 % | RESPIRATION RATE: 17 BRPM | HEIGHT: 66 IN | SYSTOLIC BLOOD PRESSURE: 141 MMHG | DIASTOLIC BLOOD PRESSURE: 63 MMHG | HEART RATE: 104 BPM | WEIGHT: 210.1 LBS | TEMPERATURE: 98 F

## 2022-10-13 DIAGNOSIS — H26.9 UNSPECIFIED CATARACT: Chronic | ICD-10-CM

## 2022-10-13 DIAGNOSIS — Z98.890 OTHER SPECIFIED POSTPROCEDURAL STATES: Chronic | ICD-10-CM

## 2022-10-13 DIAGNOSIS — Z90.49 ACQUIRED ABSENCE OF OTHER SPECIFIED PARTS OF DIGESTIVE TRACT: Chronic | ICD-10-CM

## 2022-10-13 LAB
ALBUMIN SERPL ELPH-MCNC: 2.8 G/DL — LOW (ref 3.5–5.2)
ALBUMIN SERPL ELPH-MCNC: 3 G/DL — LOW (ref 3.5–5.2)
ALP SERPL-CCNC: 114 U/L — SIGNIFICANT CHANGE UP (ref 30–115)
ALP SERPL-CCNC: 139 U/L — HIGH (ref 30–115)
ALT FLD-CCNC: 19 U/L — SIGNIFICANT CHANGE UP (ref 0–41)
ALT FLD-CCNC: 29 U/L — SIGNIFICANT CHANGE UP (ref 0–41)
ANION GAP SERPL CALC-SCNC: 12 MMOL/L — SIGNIFICANT CHANGE UP (ref 7–14)
ANION GAP SERPL CALC-SCNC: 15 MMOL/L — HIGH (ref 7–14)
APTT BLD: 35.4 SEC — SIGNIFICANT CHANGE UP (ref 27–39.2)
AST SERPL-CCNC: 40 U/L — SIGNIFICANT CHANGE UP (ref 0–41)
AST SERPL-CCNC: 69 U/L — HIGH (ref 0–41)
BASOPHILS # BLD AUTO: 0.05 K/UL — SIGNIFICANT CHANGE UP (ref 0–0.2)
BASOPHILS # BLD AUTO: 0.06 K/UL — SIGNIFICANT CHANGE UP (ref 0–0.2)
BASOPHILS NFR BLD AUTO: 0.6 % — SIGNIFICANT CHANGE UP (ref 0–1)
BASOPHILS NFR BLD AUTO: 0.7 % — SIGNIFICANT CHANGE UP (ref 0–1)
BILIRUB DIRECT SERPL-MCNC: 0.5 MG/DL — HIGH (ref 0–0.3)
BILIRUB INDIRECT FLD-MCNC: 1.4 MG/DL — HIGH (ref 0.2–1.2)
BILIRUB SERPL-MCNC: 1.3 MG/DL — HIGH (ref 0.2–1.2)
BILIRUB SERPL-MCNC: 1.9 MG/DL — HIGH (ref 0.2–1.2)
BLD GP AB SCN SERPL QL: SIGNIFICANT CHANGE UP
BUN SERPL-MCNC: 14 MG/DL — SIGNIFICANT CHANGE UP (ref 10–20)
BUN SERPL-MCNC: 17 MG/DL — SIGNIFICANT CHANGE UP (ref 10–20)
CALCIUM SERPL-MCNC: 8.6 MG/DL — SIGNIFICANT CHANGE UP (ref 8.4–10.4)
CALCIUM SERPL-MCNC: 9.2 MG/DL — SIGNIFICANT CHANGE UP (ref 8.4–10.5)
CHLORIDE SERPL-SCNC: 102 MMOL/L — SIGNIFICANT CHANGE UP (ref 98–110)
CHLORIDE SERPL-SCNC: 97 MMOL/L — LOW (ref 98–110)
CK SERPL-CCNC: 108 U/L — SIGNIFICANT CHANGE UP (ref 0–225)
CO2 SERPL-SCNC: 25 MMOL/L — SIGNIFICANT CHANGE UP (ref 17–32)
CO2 SERPL-SCNC: 26 MMOL/L — SIGNIFICANT CHANGE UP (ref 17–32)
CREAT SERPL-MCNC: 0.8 MG/DL — SIGNIFICANT CHANGE UP (ref 0.7–1.5)
CREAT SERPL-MCNC: 0.9 MG/DL — SIGNIFICANT CHANGE UP (ref 0.7–1.5)
EGFR: 88 ML/MIN/1.73M2 — SIGNIFICANT CHANGE UP
EGFR: 91 ML/MIN/1.73M2 — SIGNIFICANT CHANGE UP
EOSINOPHIL # BLD AUTO: 0.14 K/UL — SIGNIFICANT CHANGE UP (ref 0–0.7)
EOSINOPHIL # BLD AUTO: 0.17 K/UL — SIGNIFICANT CHANGE UP (ref 0–0.7)
EOSINOPHIL NFR BLD AUTO: 1.7 % — SIGNIFICANT CHANGE UP (ref 0–8)
EOSINOPHIL NFR BLD AUTO: 1.8 % — SIGNIFICANT CHANGE UP (ref 0–8)
GLUCOSE BLDC GLUCOMTR-MCNC: 185 MG/DL — HIGH (ref 70–99)
GLUCOSE BLDC GLUCOMTR-MCNC: 207 MG/DL — HIGH (ref 70–99)
GLUCOSE SERPL-MCNC: 181 MG/DL — HIGH (ref 70–99)
GLUCOSE SERPL-MCNC: 185 MG/DL — HIGH (ref 70–99)
HCT VFR BLD CALC: 35.5 % — LOW (ref 42–52)
HCT VFR BLD CALC: 38.4 % — LOW (ref 42–52)
HGB BLD-MCNC: 13 G/DL — LOW (ref 14–18)
HGB BLD-MCNC: 13.9 G/DL — LOW (ref 14–18)
IMM GRANULOCYTES NFR BLD AUTO: 0.1 % — SIGNIFICANT CHANGE UP (ref 0.1–0.3)
IMM GRANULOCYTES NFR BLD AUTO: 0.6 % — HIGH (ref 0.1–0.3)
INR BLD: 1.16 RATIO — SIGNIFICANT CHANGE UP (ref 0.65–1.3)
LYMPHOCYTES # BLD AUTO: 1.73 K/UL — SIGNIFICANT CHANGE UP (ref 1.2–3.4)
LYMPHOCYTES # BLD AUTO: 1.73 K/UL — SIGNIFICANT CHANGE UP (ref 1.2–3.4)
LYMPHOCYTES # BLD AUTO: 16.9 % — LOW (ref 20.5–51.1)
LYMPHOCYTES # BLD AUTO: 22.8 % — SIGNIFICANT CHANGE UP (ref 20.5–51.1)
MAGNESIUM SERPL-MCNC: 1.4 MG/DL — LOW (ref 1.8–2.4)
MAGNESIUM SERPL-MCNC: 1.8 MG/DL — SIGNIFICANT CHANGE UP (ref 1.8–2.4)
MCHC RBC-ENTMCNC: 35.5 PG — HIGH (ref 27–31)
MCHC RBC-ENTMCNC: 35.8 PG — HIGH (ref 27–31)
MCHC RBC-ENTMCNC: 36.2 G/DL — SIGNIFICANT CHANGE UP (ref 32–37)
MCHC RBC-ENTMCNC: 36.6 G/DL — SIGNIFICANT CHANGE UP (ref 32–37)
MCV RBC AUTO: 97.8 FL — HIGH (ref 80–94)
MCV RBC AUTO: 98 FL — HIGH (ref 80–94)
MONOCYTES # BLD AUTO: 0.95 K/UL — HIGH (ref 0.1–0.6)
MONOCYTES # BLD AUTO: 0.96 K/UL — HIGH (ref 0.1–0.6)
MONOCYTES NFR BLD AUTO: 12.5 % — HIGH (ref 1.7–9.3)
MONOCYTES NFR BLD AUTO: 9.4 % — HIGH (ref 1.7–9.3)
NEUTROPHILS # BLD AUTO: 4.71 K/UL — SIGNIFICANT CHANGE UP (ref 1.4–6.5)
NEUTROPHILS # BLD AUTO: 7.28 K/UL — HIGH (ref 1.4–6.5)
NEUTROPHILS NFR BLD AUTO: 62.1 % — SIGNIFICANT CHANGE UP (ref 42.2–75.2)
NEUTROPHILS NFR BLD AUTO: 70.8 % — SIGNIFICANT CHANGE UP (ref 42.2–75.2)
NRBC # BLD: 0 /100 WBCS — SIGNIFICANT CHANGE UP (ref 0–0)
NRBC # BLD: 0 /100 WBCS — SIGNIFICANT CHANGE UP (ref 0–0)
PHOSPHATE SERPL-MCNC: 3.8 MG/DL — SIGNIFICANT CHANGE UP (ref 2.1–4.9)
PLATELET # BLD AUTO: 155 K/UL — SIGNIFICANT CHANGE UP (ref 130–400)
PLATELET # BLD AUTO: 171 K/UL — SIGNIFICANT CHANGE UP (ref 130–400)
POTASSIUM SERPL-MCNC: 5.2 MMOL/L — HIGH (ref 3.5–5)
POTASSIUM SERPL-MCNC: 5.2 MMOL/L — HIGH (ref 3.5–5)
POTASSIUM SERPL-SCNC: 5.2 MMOL/L — HIGH (ref 3.5–5)
POTASSIUM SERPL-SCNC: 5.2 MMOL/L — HIGH (ref 3.5–5)
PROT SERPL-MCNC: 6.1 G/DL — SIGNIFICANT CHANGE UP (ref 6–8)
PROT SERPL-MCNC: 6.8 G/DL — SIGNIFICANT CHANGE UP (ref 6–8)
PROTHROM AB SERPL-ACNC: 13.3 SEC — HIGH (ref 9.95–12.87)
RBC # BLD: 3.63 M/UL — LOW (ref 4.7–6.1)
RBC # BLD: 3.92 M/UL — LOW (ref 4.7–6.1)
RBC # FLD: 13.7 % — SIGNIFICANT CHANGE UP (ref 11.5–14.5)
RBC # FLD: 13.8 % — SIGNIFICANT CHANGE UP (ref 11.5–14.5)
SARS-COV-2 RNA SPEC QL NAA+PROBE: SIGNIFICANT CHANGE UP
SODIUM SERPL-SCNC: 137 MMOL/L — SIGNIFICANT CHANGE UP (ref 135–146)
SODIUM SERPL-SCNC: 140 MMOL/L — SIGNIFICANT CHANGE UP (ref 135–146)
TROPONIN T SERPL-MCNC: <0.01 NG/ML — SIGNIFICANT CHANGE UP
WBC # BLD: 10.26 K/UL — SIGNIFICANT CHANGE UP (ref 4.8–10.8)
WBC # BLD: 7.59 K/UL — SIGNIFICANT CHANGE UP (ref 4.8–10.8)
WBC # FLD AUTO: 10.26 K/UL — SIGNIFICANT CHANGE UP (ref 4.8–10.8)
WBC # FLD AUTO: 7.59 K/UL — SIGNIFICANT CHANGE UP (ref 4.8–10.8)

## 2022-10-13 PROCEDURE — 71045 X-RAY EXAM CHEST 1 VIEW: CPT | Mod: 26

## 2022-10-13 PROCEDURE — 72170 X-RAY EXAM OF PELVIS: CPT | Mod: 26

## 2022-10-13 PROCEDURE — 72125 CT NECK SPINE W/O DYE: CPT | Mod: 26,MA

## 2022-10-13 PROCEDURE — 99285 EMERGENCY DEPT VISIT HI MDM: CPT

## 2022-10-13 PROCEDURE — 99053 MED SERV 10PM-8AM 24 HR FAC: CPT

## 2022-10-13 PROCEDURE — 70450 CT HEAD/BRAIN W/O DYE: CPT | Mod: 26,MA

## 2022-10-13 PROCEDURE — 74177 CT ABD & PELVIS W/CONTRAST: CPT | Mod: 26,MA

## 2022-10-13 PROCEDURE — 99223 1ST HOSP IP/OBS HIGH 75: CPT

## 2022-10-13 PROCEDURE — 93010 ELECTROCARDIOGRAM REPORT: CPT | Mod: 77

## 2022-10-13 PROCEDURE — 99291 CRITICAL CARE FIRST HOUR: CPT | Mod: 24,25

## 2022-10-13 PROCEDURE — 93010 ELECTROCARDIOGRAM REPORT: CPT

## 2022-10-13 PROCEDURE — 73562 X-RAY EXAM OF KNEE 3: CPT | Mod: 26,50

## 2022-10-13 PROCEDURE — 70498 CT ANGIOGRAPHY NECK: CPT | Mod: 26,MA

## 2022-10-13 PROCEDURE — 71260 CT THORAX DX C+: CPT | Mod: 26,MA

## 2022-10-13 RX ORDER — CARBIDOPA AND LEVODOPA 25; 100 MG/1; MG/1
1 TABLET ORAL
Refills: 0 | Status: DISCONTINUED | OUTPATIENT
Start: 2022-10-13 | End: 2022-10-19

## 2022-10-13 RX ORDER — ACETAMINOPHEN 500 MG
650 TABLET ORAL ONCE
Refills: 0 | Status: COMPLETED | OUTPATIENT
Start: 2022-10-13 | End: 2022-10-13

## 2022-10-13 RX ORDER — INSULIN LISPRO 100/ML
VIAL (ML) SUBCUTANEOUS
Refills: 0 | Status: DISCONTINUED | OUTPATIENT
Start: 2022-10-13 | End: 2022-10-14

## 2022-10-13 RX ORDER — MEMANTINE HYDROCHLORIDE 10 MG/1
10 TABLET ORAL DAILY
Refills: 0 | Status: DISCONTINUED | OUTPATIENT
Start: 2022-10-13 | End: 2022-10-19

## 2022-10-13 RX ORDER — DEXTROSE 50 % IN WATER 50 %
15 SYRINGE (ML) INTRAVENOUS ONCE
Refills: 0 | Status: DISCONTINUED | OUTPATIENT
Start: 2022-10-13 | End: 2022-10-14

## 2022-10-13 RX ORDER — LISINOPRIL 2.5 MG/1
20 TABLET ORAL DAILY
Refills: 0 | Status: DISCONTINUED | OUTPATIENT
Start: 2022-10-13 | End: 2022-10-19

## 2022-10-13 RX ORDER — OXYCODONE HYDROCHLORIDE 5 MG/1
5 TABLET ORAL EVERY 6 HOURS
Refills: 0 | Status: DISCONTINUED | OUTPATIENT
Start: 2022-10-13 | End: 2022-10-13

## 2022-10-13 RX ORDER — GLUCAGON INJECTION, SOLUTION 0.5 MG/.1ML
1 INJECTION, SOLUTION SUBCUTANEOUS ONCE
Refills: 0 | Status: DISCONTINUED | OUTPATIENT
Start: 2022-10-13 | End: 2022-10-15

## 2022-10-13 RX ORDER — MORPHINE SULFATE 50 MG/1
4 CAPSULE, EXTENDED RELEASE ORAL ONCE
Refills: 0 | Status: DISCONTINUED | OUTPATIENT
Start: 2022-10-13 | End: 2022-10-13

## 2022-10-13 RX ORDER — ENOXAPARIN SODIUM 100 MG/ML
40 INJECTION SUBCUTANEOUS EVERY 24 HOURS
Refills: 0 | Status: DISCONTINUED | OUTPATIENT
Start: 2022-10-13 | End: 2022-10-19

## 2022-10-13 RX ORDER — INFLUENZA VIRUS VACCINE 15; 15; 15; 15 UG/.5ML; UG/.5ML; UG/.5ML; UG/.5ML
0.7 SUSPENSION INTRAMUSCULAR ONCE
Refills: 0 | Status: DISCONTINUED | OUTPATIENT
Start: 2022-10-13 | End: 2022-10-19

## 2022-10-13 RX ORDER — SENNA PLUS 8.6 MG/1
2 TABLET ORAL AT BEDTIME
Refills: 0 | Status: DISCONTINUED | OUTPATIENT
Start: 2022-10-13 | End: 2022-10-14

## 2022-10-13 RX ORDER — CHLORHEXIDINE GLUCONATE 213 G/1000ML
1 SOLUTION TOPICAL
Refills: 0 | Status: DISCONTINUED | OUTPATIENT
Start: 2022-10-13 | End: 2022-10-19

## 2022-10-13 RX ORDER — KETOROLAC TROMETHAMINE 30 MG/ML
15 SYRINGE (ML) INJECTION EVERY 8 HOURS
Refills: 0 | Status: DISCONTINUED | OUTPATIENT
Start: 2022-10-13 | End: 2022-10-13

## 2022-10-13 RX ORDER — LISINOPRIL 2.5 MG/1
20 TABLET ORAL DAILY
Refills: 0 | Status: DISCONTINUED | OUTPATIENT
Start: 2022-10-13 | End: 2022-10-13

## 2022-10-13 RX ORDER — DULOXETINE HYDROCHLORIDE 30 MG/1
60 CAPSULE, DELAYED RELEASE ORAL DAILY
Refills: 0 | Status: DISCONTINUED | OUTPATIENT
Start: 2022-10-13 | End: 2022-10-19

## 2022-10-13 RX ORDER — BUPROPION HYDROCHLORIDE 150 MG/1
1 TABLET, EXTENDED RELEASE ORAL
Qty: 0 | Refills: 0 | DISCHARGE

## 2022-10-13 RX ORDER — LIDOCAINE 4 G/100G
1 CREAM TOPICAL EVERY 24 HOURS
Refills: 0 | Status: DISCONTINUED | OUTPATIENT
Start: 2022-10-13 | End: 2022-10-19

## 2022-10-13 RX ORDER — MORPHINE SULFATE 50 MG/1
2 CAPSULE, EXTENDED RELEASE ORAL EVERY 4 HOURS
Refills: 0 | Status: DISCONTINUED | OUTPATIENT
Start: 2022-10-13 | End: 2022-10-13

## 2022-10-13 RX ORDER — DONEPEZIL HYDROCHLORIDE 10 MG/1
10 TABLET, FILM COATED ORAL AT BEDTIME
Refills: 0 | Status: DISCONTINUED | OUTPATIENT
Start: 2022-10-13 | End: 2022-10-19

## 2022-10-13 RX ORDER — LACTULOSE 10 G/15ML
10 SOLUTION ORAL
Refills: 0 | Status: DISCONTINUED | OUTPATIENT
Start: 2022-10-13 | End: 2022-10-15

## 2022-10-13 RX ORDER — GABAPENTIN 400 MG/1
100 CAPSULE ORAL THREE TIMES A DAY
Refills: 0 | Status: DISCONTINUED | OUTPATIENT
Start: 2022-10-13 | End: 2022-10-19

## 2022-10-13 RX ORDER — MAGNESIUM SULFATE 500 MG/ML
2 VIAL (ML) INJECTION ONCE
Refills: 0 | Status: COMPLETED | OUTPATIENT
Start: 2022-10-13 | End: 2022-10-13

## 2022-10-13 RX ORDER — DEXTROSE 50 % IN WATER 50 %
25 SYRINGE (ML) INTRAVENOUS ONCE
Refills: 0 | Status: DISCONTINUED | OUTPATIENT
Start: 2022-10-13 | End: 2022-10-14

## 2022-10-13 RX ORDER — SODIUM CHLORIDE 9 MG/ML
1000 INJECTION, SOLUTION INTRAVENOUS
Refills: 0 | Status: DISCONTINUED | OUTPATIENT
Start: 2022-10-13 | End: 2022-10-14

## 2022-10-13 RX ORDER — ASPIRIN/CALCIUM CARB/MAGNESIUM 324 MG
81 TABLET ORAL DAILY
Refills: 0 | Status: DISCONTINUED | OUTPATIENT
Start: 2022-10-13 | End: 2022-10-19

## 2022-10-13 RX ORDER — ACETAMINOPHEN 500 MG
650 TABLET ORAL EVERY 6 HOURS
Refills: 0 | Status: DISCONTINUED | OUTPATIENT
Start: 2022-10-13 | End: 2022-10-16

## 2022-10-13 RX ORDER — INSULIN LISPRO 100/ML
VIAL (ML) SUBCUTANEOUS
Refills: 0 | Status: DISCONTINUED | OUTPATIENT
Start: 2022-10-13 | End: 2022-10-13

## 2022-10-13 RX ORDER — LATANOPROST 0.05 MG/ML
1 SOLUTION/ DROPS OPHTHALMIC; TOPICAL AT BEDTIME
Refills: 0 | Status: DISCONTINUED | OUTPATIENT
Start: 2022-10-13 | End: 2022-10-19

## 2022-10-13 RX ADMIN — LATANOPROST 1 DROP(S): 0.05 SOLUTION/ DROPS OPHTHALMIC; TOPICAL at 21:17

## 2022-10-13 RX ADMIN — Medication 25 GRAM(S): at 04:44

## 2022-10-13 RX ADMIN — MORPHINE SULFATE 4 MILLIGRAM(S): 50 CAPSULE, EXTENDED RELEASE ORAL at 07:03

## 2022-10-13 RX ADMIN — GABAPENTIN 100 MILLIGRAM(S): 400 CAPSULE ORAL at 21:15

## 2022-10-13 RX ADMIN — Medication 650 MILLIGRAM(S): at 21:16

## 2022-10-13 RX ADMIN — Medication 25 GRAM(S): at 17:38

## 2022-10-13 RX ADMIN — LACTULOSE 10 GRAM(S): 10 SOLUTION ORAL at 17:38

## 2022-10-13 RX ADMIN — Medication 81 MILLIGRAM(S): at 15:34

## 2022-10-13 RX ADMIN — CARBIDOPA AND LEVODOPA 1 TABLET(S): 25; 100 TABLET ORAL at 17:39

## 2022-10-13 RX ADMIN — SENNA PLUS 2 TABLET(S): 8.6 TABLET ORAL at 21:15

## 2022-10-13 RX ADMIN — LIDOCAINE 1 PATCH: 4 CREAM TOPICAL at 15:32

## 2022-10-13 RX ADMIN — DULOXETINE HYDROCHLORIDE 60 MILLIGRAM(S): 30 CAPSULE, DELAYED RELEASE ORAL at 15:34

## 2022-10-13 RX ADMIN — Medication 650 MILLIGRAM(S): at 15:32

## 2022-10-13 RX ADMIN — MEMANTINE HYDROCHLORIDE 10 MILLIGRAM(S): 10 TABLET ORAL at 15:34

## 2022-10-13 RX ADMIN — Medication 650 MILLIGRAM(S): at 23:47

## 2022-10-13 RX ADMIN — Medication 4: at 16:00

## 2022-10-13 RX ADMIN — ENOXAPARIN SODIUM 40 MILLIGRAM(S): 100 INJECTION SUBCUTANEOUS at 15:31

## 2022-10-13 RX ADMIN — GABAPENTIN 100 MILLIGRAM(S): 400 CAPSULE ORAL at 15:32

## 2022-10-13 RX ADMIN — DONEPEZIL HYDROCHLORIDE 10 MILLIGRAM(S): 10 TABLET, FILM COATED ORAL at 21:16

## 2022-10-13 NOTE — ED PROVIDER NOTE - ATTENDING APP SHARED VISIT CONTRIBUTION OF CARE
78 yo M with hx of dementia, HTN, gout, OA, liver disease here for assessment of head, neck and knee pain sp mechanical fall down the stairs. Patient lost his footing about intermediate down and fell. No LOC, no AC use.    VS notable for tachcyardia, HTN. One exam has non focal neuro exam, contusion to frontal area, no midline CTL spine ttp, ttp over lateral L chest wall with contusion, bruising/abrasion to bilateral knees, soft, NT, ND abdomen.    Given age, mechanism, pan CT scan done.

## 2022-10-13 NOTE — ED ADULT NURSE NOTE - NSIMPLEMENTINTERV_GEN_ALL_ED
Implemented All Fall Risk Interventions:  [place] to call system. Call bell, personal items and telephone within reach. Instruct patient to call for assistance. Room bathroom lighting operational. Non-slip footwear when patient is off stretcher. Physically safe environment: no spills, clutter or unnecessary equipment. Stretcher in lowest position, wheels locked, appropriate side rails in place. Provide visual cue, wrist band, yellow gown, etc. Monitor gait and stability. Monitor for mental status changes and reorient to person, place, and time. Review medications for side effects contributing to fall risk. Reinforce activity limits and safety measures with patient and family.

## 2022-10-13 NOTE — CONSULT NOTE ADULT - SUBJECTIVE AND OBJECTIVE BOX
Neurosurgery consulted for C1 fx noted on trauma workup. Pt seen and evaluated at bedside in ED wearing C-collar.     76 yo M pmhx DM, dementia s/p fall down the stairs + HT - LOC. Pt was ambulatory after fall. Reports slight dull generalized headache. Denies neck pain, back pain, dizziness and vision changes.      OVERNIGHT EVENTS:  Vital Signs Last 24 Hrs  T(C): 36.6 (13 Oct 2022 06:22), Max: 36.8 (13 Oct 2022 01:25)  T(F): 97.8 (13 Oct 2022 06:22), Max: 98.3 (13 Oct 2022 01:25)  HR: 102 (13 Oct 2022 07:02) (102 - 110)  BP: 137/76 (13 Oct 2022 07:02) (131/58 - 141/63)  BP(mean): --  RR: 18 (13 Oct 2022 07:02) (16 - 18)  SpO2: 98% (13 Oct 2022 07:02) (95% - 98%)    Parameters below as of 13 Oct 2022 07:02  Patient On (Oxygen Delivery Method): room air    PHYSICAL EXAM:  Neurological:  A&O x1 (hx of dementia - pt is at baseline as per family)  Speaking in full sentences  Follows commands   PERRL EOMI     Motor exam:  5/5 bl UE & LE strength   No ttp midline, CTL  5/5  strength  Neg Thomas's sign   Neg pronator drift   Sensation intact     LABS:                        13.9   10.26 )-----------( 171      ( 13 Oct 2022 02:10 )             38.4     10-13    137  |  97<L>  |  14  ----------------------------<  181<H>  5.2<H>   |  25  |  0.9    Ca    9.2      13 Oct 2022 02:10  Mg     1.4     10-13    TPro  6.8  /  Alb  3.0<L>  /  TBili  1.3<H>  /  DBili  x   /  AST  69<H>  /  ALT  19  /  AlkPhos  139<H>  10-13    PT/INR - ( 13 Oct 2022 02:10 )   PT: 13.30 sec;   INR: 1.16 ratio         PTT - ( 13 Oct 2022 02:10 )  PTT:35.4 sec    CARDIAC MARKERS ( 13 Oct 2022 02:10 )  x     / <0.01 ng/mL / x     / x     / x          Allergies    Keflex (Hives)  Keflex (Unknown)    MEDICATIONS:  Antibiotics:    Neuro:    Anticoagulation:    OTHER:    IVF:    CULTURES:    RADIOLOGY & ADDITIONAL TESTS:      ASSESSMENT:  77y Male s/p    ^FALL    FH: type 2 diabetes mellitus    MEWS Score    Prior    Diabetes    Depression    Gout    Benign essential HTN    Osteoarthritis    Multiple fractures of ribs of left side    No significant past surgical history    Cataracts, both eyes    S/P cholecystectomy    S/P knee surgery    FALL    90+    Head injury      PLAN:  CT reviewed by Dr. Damon - stable C1 fx   No neurosurgical intervention at this time   Maintain hard c-collar ([place]) at all times x 2 weeks   Will be cleared outpt in office in 2 weeks after dc   Avoid any heavy lifting, driving, strenuous activity  Neurosurgery consulted for C1 fx noted on trauma workup. Pt seen and evaluated at bedside in ED wearing C-collar.     78 yo M pmhx DM, dementia s/p fall down the stairs + HT - LOC. Pt was ambulatory after fall. Reports slight dull generalized headache. Denies neck pain, back pain, dizziness and vision changes.      OVERNIGHT EVENTS:  Vital Signs Last 24 Hrs  T(C): 36.6 (13 Oct 2022 06:22), Max: 36.8 (13 Oct 2022 01:25)  T(F): 97.8 (13 Oct 2022 06:22), Max: 98.3 (13 Oct 2022 01:25)  HR: 102 (13 Oct 2022 07:02) (102 - 110)  BP: 137/76 (13 Oct 2022 07:02) (131/58 - 141/63)  BP(mean): --  RR: 18 (13 Oct 2022 07:02) (16 - 18)  SpO2: 98% (13 Oct 2022 07:02) (95% - 98%)    Parameters below as of 13 Oct 2022 07:02  Patient On (Oxygen Delivery Method): room air    PHYSICAL EXAM:  Neurological:  A&O x1 (hx of dementia - pt is at baseline as per family)  Speaking in full sentences  Follows commands   PERRL EOMI     Motor exam:  5/5 bl UE & LE strength   No ttp midline, CTL  5/5  strength  Neg Thomas's sign   Neg pronator drift   Sensation intact     LABS:                        13.9   10.26 )-----------( 171      ( 13 Oct 2022 02:10 )             38.4     10-13    137  |  97<L>  |  14  ----------------------------<  181<H>  5.2<H>   |  25  |  0.9    Ca    9.2      13 Oct 2022 02:10  Mg     1.4     10-13    TPro  6.8  /  Alb  3.0<L>  /  TBili  1.3<H>  /  DBili  x   /  AST  69<H>  /  ALT  19  /  AlkPhos  139<H>  10-13    PT/INR - ( 13 Oct 2022 02:10 )   PT: 13.30 sec;   INR: 1.16 ratio         PTT - ( 13 Oct 2022 02:10 )  PTT:35.4 sec    CARDIAC MARKERS ( 13 Oct 2022 02:10 )  x     / <0.01 ng/mL / x     / x     / x          Allergies    Keflex (Hives)  Keflex (Unknown)    MEDICATIONS:  Antibiotics:    Neuro:    Anticoagulation:    OTHER:    IVF:    CULTURES:    RADIOLOGY & ADDITIONAL TESTS:      ASSESSMENT:  77y Male s/p fall down the stairs + HT - LOC. Pt was ambulatory after fall. Reports slight dull generalized headache. Denies neck pain, back pain, dizziness and vision changes.      FH: type 2 diabetes mellitus    MEWS Score    Prior    Diabetes    Depression    Gout    Benign essential HTN    Osteoarthritis    Multiple fractures of ribs of left side    No significant past surgical history    Cataracts, both eyes    S/P cholecystectomy    S/P knee surgery    FALL    90+    Head injury      PLAN:  CT reviewed by Dr. Damon - stable C1 fx   No neurosurgical intervention at this time   Maintain hard c-collar ([place]) at all times x 2 weeks   Will be cleared outpt in office in 2 weeks after dc   Avoid any heavy lifting, driving, strenuous activity

## 2022-10-13 NOTE — CHART NOTE - NSCHARTNOTEFT_GEN_A_CORE
ED RN called and notified SICU team that patient's pupils were constricted and not reactive to light, but no change in mental status. I saw patient in ED and re-examined him; he is a/o x3 (person, place, and time), pupils 2mm and briskly reactive bilaterally, strength 5/5 in upper and lower bilateral extremities with sensation intact, no focal deficits. Will continue to monitor.

## 2022-10-13 NOTE — CONSULT NOTE ADULT - ASSESSMENT
Assessment & Plan     77-year-old male with PMH dementia, DM2 (on metformin  and glipizide), HTN (on quinapril), cirrhosis, and depression who presented to the ED s/p fall down the stairs with +HT and -LOC. SICU was consulted for respiratory monitoring in the setting of multiple rib fractures and C1 fx.     NEURO:  #C1 fx  -Q4 neuro checks   -CT reviewed by Dr. Damon from neurosurgery; stable CT fx; maintain hard c-collar ([place] J) x 2 weeks  -As per neurosurgery no neurosurgical intervention needed at this time. Rec f/u outpt 2 weeks after d/c.     #Acute pain  -APAP 650 mg PO q 6  -Lidocaine 4% patch q 24  -Gabapentin 100mg q 8   -Assess pain and adjust medications PRN    #Dementia   -as per family pt is A&O x 2 at baseline  -restarted home donepezil and memantine   -will confirm carbidopa-levodopa  -Home meds: donepezil 10mg PO at bedtime, memantine 10mg PO daily; carbidopa-levodopa TID     RESP:   #Multiple L rib fx  -pain control   -encourage IS   -Daily AM CXR    #Oxygenation  -saturating 98% on 2L NC   -encourage IS; currently pulling 750 on IS     #Activity  -Pt uses walker at baseline     -increase as tolerated  -PT and physiatry consulted    CARDS:   #HTN   -Q1 vitals   -started on Lisinopril 20mg PO daily; 1:1 ratio as per pharmacy  -home meds: quinapril 20mg 1 x day     Labs: cardiac enzymes ordered, f/u   GI/NUTR:   #Diet, Regular  Consistent Carbohydrate No Snacks  DASH/TLC Sodium & Cholesterol Restricted (10-13-22 @ 12:18) [Active]    -aspiration precautions, HOB 30  #GI Prophylaxis    -not indicated  #Bowel regimen    --Senna 2 tablets oral at bedtime     /RENAL:   #urine output in critically ill  voiding     Labs:          BUN/Cr- 14/0.9  -->          Electrolytes-Na 137 // K 5.2 // Mg 1.4 //  Phos -- (10-13 @ 02:10)  -f/u BMP, Mg, Phos, Hepatic function panel  -UA ordered, f/u    #Fluids  -Dextrose 5% 50 cc/hr   HEME/ONC:   #DVT prophylaxis    -enoxaparin Injectable 40mg SubQ  -SCDs    Labs: Hb/Hct:  13.9/38.4  -->                      Plts:  171  -->                 PTT/INR:  35.4/1.16  --->     f/u CBC and coags     Type and Screen Expires 10/16  Home meds: ASA 81 mg; continued       ID:  #leukocytosis   WBC- 10.26  --->>  Temp trend- 24hrs T(F): 97.8 (10-13 @ 06:22), Max: 98.3 (10-13 @ 01:25)  Afebrile, not on abx            ENDO:  #hx DM    Glucose, Serum: 181 (10-13 @ 02:10)  -on ISS  -ACHS fingersticks     Home meds: glipizide, metformin  -A1C ordered, f/u     MSK:  #C1 fx  -stable as per neurosurgery; pt in [place] collar     #Multiple rib fx  -pain control  -encourage IS   -daily AM CXR    -Physiatry consulted   -PT consulted     LINES/DRAINS:  PIV, Rae Graves, TLC    ADVANCED DIRECTIVES:  Full Code    HCP/Emergency Contact-    INDICATION FOR SICU/SDU: MULTIPLE LT SIDE RIB FXS;HEAD INJURY;CLOSED BURST FX CERVICAL VERTEBRA.             DISPO:   SICU; case discussed with Dr. Ball Assessment & Plan     77-year-old male with PMH dementia, DM2 (on metformin  and glipizide), HTN (on quinapril), cirrhosis, and depression who presented to the ED s/p fall down the stairs with +HT and -LOC. SICU was consulted for respiratory monitoring in the setting of multiple rib fractures and C1 fx.     NEURO:  #C1 fx  -Q4 neuro checks   -CT reviewed by Dr. Damon from neurosurgery; stable CT fx; maintain hard c-collar ([place]) x 2 weeks  -As per neurosurgery no neurosurgical intervention needed at this time. Rec f/u outpt 2 weeks after d/c.     #Acute pain  -APAP 650 mg PO q 6  -Lidocaine 4% patch q 24  -Gabapentin 100mg q 8   -Assess pain and adjust medications PRN    #Dementia   -as per family pt is A&O x 2 at baseline  -restarted home donepezil and memantine   -will confirm carbidopa-levodopa  -Home meds: donepezil 10mg PO at bedtime, memantine 10mg PO daily; carbidopa-levodopa TID     RESP:   #Multiple L rib fx  -pain control   -encourage IS   -Daily AM CXR    #Oxygenation  -saturating 98% on 2L NC   -encourage IS; currently pulling 750 on IS     #Activity  -Pt uses walker at baseline     -increase as tolerated  -PT and physiatry consulted    CARDS:   #HTN   -Q1 vitals   -started on Lisinopril 20mg PO daily; 1:1 ratio as per pharmacy  -home meds: quinapril 20mg 1 x day     Labs: cardiac enzymes ordered, f/u   GI/NUTR:   #Diet, Regular  Consistent Carbohydrate No Snacks  DASH/TLC Sodium & Cholesterol Restricted (10-13-22 @ 12:18) [Active]    -aspiration precautions, HOB 30  #GI Prophylaxis    -not indicated  #Bowel regimen    --Senna 2 tablets oral at bedtime     /RENAL:   #urine output in critically ill  voiding     Labs:          BUN/Cr- 14/0.9  -->          Electrolytes-Na 137 // K 5.2 // Mg 1.4 //  Phos -- (10-13 @ 02:10)  -f/u BMP, Mg, Phos, Hepatic function panel  -UA ordered, f/u    #Fluids  -Dextrose 5% 50 cc/hr   HEME/ONC:   #DVT prophylaxis    -enoxaparin Injectable 40mg SubQ  -SCDs    Labs: Hb/Hct:  13.9/38.4  -->                      Plts:  171  -->                 PTT/INR:  35.4/1.16  --->     f/u CBC and coags     Type and Screen Expires 10/16  Home meds: ASA 81 mg; continued       ID:  #leukocytosis   WBC- 10.26  --->>  Temp trend- 24hrs T(F): 97.8 (10-13 @ 06:22), Max: 98.3 (10-13 @ 01:25)  Afebrile, not on abx            ENDO:  #hx DM    Glucose, Serum: 181 (10-13 @ 02:10)  -on ISS  -ACHS fingersticks     Home meds: glipizide, metformin  -A1C ordered, f/u     MSK:  #C1 fx  -stable as per neurosurgery; pt in [place] [facility]     #Multiple rib fx  -pain control  -encourage IS   -daily AM CXR    -Frailty score 4/5   -Physiatry consulted   -PT consulted     LINES/DRAINS:  CHRISTOPHER, Rae Graves, TLC    ADVANCED DIRECTIVES:  Full Code    HCP/Emergency Contact-    INDICATION FOR SICU/SDU: MULTIPLE LT SIDE RIB FXS;HEAD INJURY;CLOSED BURST FX CERVICAL VERTEBRA.             DISPO:   SICU; case discussed with Dr. Ball Assessment & Plan     77-year-old male with PMH dementia, DM2 (on metformin  and glipizide), HTN (on quinapril), cirrhosis, and depression who presented to the ED s/p fall down the stairs with +HT and -LOC. SICU was consulted for respiratory monitoring in the setting of multiple rib fractures and C1 fx.     NEURO:  #C1 fx  -Q4 neuro checks   -CT reviewed by Dr. Damon from neurosurgery; stable CT fx; maintain hard c-collar ([place] J) x 2 weeks  -As per neurosurgery no neurosurgical intervention needed at this time. Rec f/u outpt 2 weeks after d/c.     #Acute pain  -APAP 650 mg PO q 6  -Lidocaine 4% patch q 24  -Gabapentin 100mg q 8   -Assess pain and adjust medications PRN    #Dementia   -as per family pt is A&O x 2 at baseline  -restarted home donepezil and memantine   -Home meds: donepezil 10mg PO at bedtime, memantine 10mg PO daily    #hx of tremors  -restarted home Sinemet    RESP:   #Multiple L rib fx  -pain control   -encourage IS   -Daily AM CXR    #Oxygenation  -saturating 98% on 2L NC   -encourage IS; currently pulling 750 on IS     #Activity  -Pt uses walker at baseline     -increase as tolerated  -PT and physiatry consulted    CARDS:   #HTN   -Q1 vitals   -started on Lisinopril 20mg PO daily; 1:1 ratio as per pharmacy  -home meds: quinapril 20mg 1 x day     Labs: cardiac enzymes ordered, f/u   GI/NUTR:   #Diet, Regular  Consistent Carbohydrate No Snacks  DASH/TLC Sodium & Cholesterol Restricted (10-13-22 @ 12:18) [Active]    -aspiration precautions, HOB 30  #GI Prophylaxis    -not indicated  #Bowel regimen    --Senna 2 tablets oral at bedtime     /RENAL:   #urine output in critically ill  voiding     Labs:          BUN/Cr- 14/0.9  -->          Electrolytes-Na 137 // K 5.2 // Mg 1.4 //  Phos -- (10-13 @ 02:10)  -f/u BMP, Mg, Phos, Hepatic function panel  -UA ordered, f/u    #Fluids  -Dextrose 5% 50 cc/hr   HEME/ONC:   #DVT prophylaxis    -enoxaparin Injectable 40mg SubQ  -SCDs    Labs: Hb/Hct:  13.9/38.4  -->                      Plts:  171  -->                 PTT/INR:  35.4/1.16  --->     f/u CBC and coags     Type and Screen Expires 10/16  Home meds: ASA 81 mg; continued       ID:  #leukocytosis   WBC- 10.26  --->>  Temp trend- 24hrs T(F): 97.8 (10-13 @ 06:22), Max: 98.3 (10-13 @ 01:25)  Afebrile, not on abx            ENDO:  #hx DM    Glucose, Serum: 181 (10-13 @ 02:10)  -on ISS  -ACHS fingersticks     Home meds: glipizide, metformin  -A1C ordered, f/u     MSK:  #C1 fx  -stable as per neurosurgery; pt in [place] [facility]     #Multiple rib fx  -pain control  -encourage IS   -daily AM CXR    -Frailty score 4/5   -Physiatry consulted   -PT consulted     LINES/DRAINS:  PIV, Graves, [place], TLC    ADVANCED DIRECTIVES:  Full Code    HCP/Emergency Contact-    INDICATION FOR SICU/SDU: MULTIPLE LT SIDE RIB FXS;HEAD INJURY;CLOSED BURST FX CERVICAL VERTEBRA.             DISPO:   SICU; case discussed with Dr. Ball

## 2022-10-13 NOTE — H&P ADULT - NSHPPHYSICALEXAM_GEN_ALL_CORE
Primary Survey:    A - airway intact  B - bilateral breath sounds and good chest rise  C - palpable pulses in all extremities  D - GCS 15 on arrival, PENA  Exposure obtained    Vital Signs Last 24 Hrs  T(C): 36.6 (13 Oct 2022 06:22), Max: 36.8 (13 Oct 2022 01:25)  T(F): 97.8 (13 Oct 2022 06:22), Max: 98.3 (13 Oct 2022 01:25)  HR: 102 (13 Oct 2022 07:02) (102 - 110)  BP: 137/76 (13 Oct 2022 07:02) (131/58 - 141/63)  BP(mean): --  RR: 18 (13 Oct 2022 07:02) (16 - 18)  SpO2: 98% (13 Oct 2022 07:02) (95% - 98%)    Parameters below as of 13 Oct 2022 07:02  Patient On (Oxygen Delivery Method): room air        Secondary Survey:   General: NAD  HEENT: Normocephalic, atraumatic, EOMI, PEERLA. no scalp lacerations   Neck: Soft, midline trachea. no c-spine tenderness  Chest: No chest wall tenderness, no subcutaneous emphysema   Cardiac: S1, S2, RRR  Respiratory: Bilateral breath sounds, clear and equal bilaterally  Abdomen: Soft, non-distended, non-tender, no rebound, no guarding.  Groin: Normal appearing, pelvis stable   Ext:  Moving b/l upper and lower extremities. Palpable Radial b/l UE, b/l DP palpable in LE.   Back: No T/L/S spine tenderness, No palpable runoff/stepoff/deformity  Rectal: No rashida blood, JAYLA with good tone    FAST: Negative

## 2022-10-13 NOTE — ED ADULT NURSE REASSESSMENT NOTE - NS ED NURSE REASSESS COMMENT FT1
Bedside report given @ 4542.
Report given to charge RN Hallie. Charge RN will give report to crit lead. Transport at bedside.
Pt received from [facility], Pt A&Ox2, C-collar placed by MD Vivar, Trauma Alert called upon arrival

## 2022-10-13 NOTE — H&P ADULT - HISTORY OF PRESENT ILLNESS
TRAUMA ACTIVATION LEVEL:  CODE / ALERT  / CONSULT  ACTIVATED BY: EMS**  /  ED**  INTUBATED: YES** / NO**      MECHANISM OF INJURY:   [] Blunt     [] MVC	  [] Fall	  [] Pedestrian Struck	  [] Motorcycle     [] Assault     [] Bicycle collision    [] Sports injury    [] Penetrating    [] Gun Shot Wound      [] Stab Wound    GCS: 14 	E: 4	V: 4	M: 6    HPI:  ***THIS IS AN INCOMPLETE NOTE***  77yM w/ PMHx of *** seen as (Code Trauma / Trauma Alert / Trauma Consult) s/p ******.  Trauma assessment in ED: ABCs intact , GCS 15 , AAOx3.    PAST MEDICAL & SURGICAL HISTORY:  Diabetes      Depression      Gout      Benign essential HTN      Osteoarthritis      Cataracts, both eyes      S/P cholecystectomy      S/P knee surgery          Allergies    Keflex (Hives)  Keflex (Unknown)    Intolerances        Home Medications:  aspirin 81 mg oral tablet: 1 tab(s) orally once a day (10 Oct 2019 21:41)  buPROPion 150 mg/24 hours (XL) oral tablet, extended release: 1 tab(s) orally every 24 hours (14 Feb 2022 09:33)  donepezil 10 mg oral tablet: 1 tab(s) orally once a day (at bedtime) (14 Feb 2022 09:30)  DULoxetine 60 mg oral delayed release capsule: 1 cap(s) orally once a day (14 Feb 2022 09:30)  glipiZIDE 2.5 mg oral tablet, extended release: 1 tab(s) orally once a day (10 Oct 2019 21:41)  memantine 10 mg oral tablet: 1 tab(s) orally 2 times a day (14 Feb 2022 09:31)  metFORMIN 1000 mg oral tablet: 1 tab(s) orally 2 times a day (10 Oct 2019 21:41)  quinapril 20 mg oral tablet: 1 tab(s) orally once a day (14 Feb 2022 09:31)  Vitamin B6 100 mg oral tablet: 1 tab(s) orally once a day (10 Oct 2019 21:41)  Vitamin D3 2000 intl units oral tablet: 1 tab(s) orally once a day (10 Oct 2019 21:47)      ROS: 10-system review is otherwise negative except HPI above.   TRAUMA ACTIVATION LEVEL: ALERT  ACTIVATED BY: EMS  INTUBATED: NO    MECHANISM OF INJURY: [x] Fall    GCS: 14 	E: 4	V: 4	M: 6    HPI:  77yM w/ PMHx of Diabetes, Depression, Gout, Benign essential HTN, Osteoarthritis, Cataracts, both eyes, S/P open cholecystectomy, S/P knee surgery seen as Trauma Alert s/p mechanical fall last night. +HT, -LOC, -AC (takes ASA 81 every other day)  Trauma assessment in ED: ABCs intact , GCS 15 , AAOx2 (at baseline). External signs of trauma include: hematoma/abrasion to left frontotemporal scalp and bilateral knee abrasion. Patient's wife and son are at bedside to assist with history, they state they heard a the patient fall down stairs, they do not know if he fell from the top or bottom of the stairs but that they found him right after the fall at the bottom of the stairs. The patient was brought to [facility] ED, he was transferred to [facility] when found to have multiple rib fx, the patient arrived to Trauma bay without c-collar. Collar was placed upon arrival to [place] after it was found out patient had c1 burst fracture while he was en route.     PAST MEDICAL & SURGICAL HISTORY:  Diabetes  Depression  Gout  Benign essential HTN  Osteoarthritis  Cataracts, both eyes  S/P open cholecystectomy  S/P knee surgery    Allergies  Keflex (Hives)  Keflex (Unknown)    Home Medications:  aspirin 81 mg oral tablet: 1 tab(s) orally once a day (10 Oct 2019 21:41)  buPROPion 150 mg/24 hours (XL) oral tablet, extended release: 1 tab(s) orally every 24 hours (14 Feb 2022 09:33)  donepezil 10 mg oral tablet: 1 tab(s) orally once a day (at bedtime) (14 Feb 2022 09:30)  DULoxetine 60 mg oral delayed release capsule: 1 cap(s) orally once a day (14 Feb 2022 09:30)  glipiZIDE 2.5 mg oral tablet, extended release: 1 tab(s) orally once a day (10 Oct 2019 21:41)  memantine 10 mg oral tablet: 1 tab(s) orally 2 times a day (14 Feb 2022 09:31)  metFORMIN 1000 mg oral tablet: 1 tab(s) orally 2 times a day (10 Oct 2019 21:41)  quinapril 20 mg oral tablet: 1 tab(s) orally once a day (14 Feb 2022 09:31)  Vitamin B6 100 mg oral tablet: 1 tab(s) orally once a day (10 Oct 2019 21:41)  Vitamin D3 2000 intl units oral tablet: 1 tab(s) orally once a day (10 Oct 2019 21:47)    ROS: 10-system review is otherwise negative except HPI above.

## 2022-10-13 NOTE — ED PROVIDER NOTE - CARE PLAN
1 Principal Discharge DX:	Multiple fractures of ribs of left side  Secondary Diagnosis:	Head injury   Principal Discharge DX:	Multiple fractures of ribs of left side  Secondary Diagnosis:	Head injury  Secondary Diagnosis:	Closed burst fracture of cervical vertebra

## 2022-10-13 NOTE — H&P ADULT - ASSESSMENT
ASSESSMENT:  77y Male  w/ PMHx of *** seen as (Code Trauma / Trauma Alert / Trauma Consult) s/p ****** with complaint of *** , external signs of trauma include *** . Trauma assessment in ED: ABCs intact , GCS 15 , AAOx3,  PENA.     Injuries identified:   -   -   -     PLAN:   - Admitted to SICU   ***THIS IS AN INCOMPLETE NOTE***    Disposition pending results of above labs and imaging  Above plan discussed with Trauma attending,  ***  , patient, patient family, and ED team  --------------------------------------------------------------------------------------  10-13-22 @ 10:27 ASSESSMENT:  7yM w/ PMHx of Diabetes, Depression, Gout, Benign essential HTN, Osteoarthritis, Cataracts, both eyes, S/P open cholecystectomy, S/P knee surgery seen as Trauma Alert s/p mechanical fall last night. +HT, -LOC, -AC (takes ASA 81 every other day)  Trauma assessment in ED: ABCs intact , GCS 15 , AAOx2 (at baseline). External signs of trauma include: hematoma/abrasion to left frontotemporal scalp and bilateral knee abrasion. PENA.     Injuries identified:   - C1 right anterior arch burst fracture.   - longitudinal fracture posterior left first rib  - Large left frontotemporal scalp hematoma without evidence of subjacent   fracture.  - Multiple bilateral chronic and acute rib fractures, such as acute rib fractures on the left 1st, 4-6th, and 8th-10th ribs    PLAN:   - Admit to SICU for trauma burden, comorbidities, monitoring for respiratory failure given 7 rib fx  - Diet: as toelrated  - DVT ppx  - GI ppx  - hemodynamic monitoring/vital signs q4h  - Strict Is and Os q4h  - aspiration precautions    #) L rib 1, 4-6, 8-10 fractures  - Pain control  - incentive spirometer  - PT/Rehab consults    #)C1 burst fx  - pain control  - seen by NSGY->Hard collar ATC-> needs [place] J x 2 weeks  - Will be cleared outpt in office in 2 weeks after dc   - Avoid any heavy lifting, driving, strenuous activity     #)DM  - holding home metformin and glipizide  - ISS    #)HTN  - quinapril->lisinopril    - Dispo: From home  - PT/Rehab consults      Above plan discussed with Trauma attending, Dr. Hodges, patient, patient family, and ED team  --------------------------------------------------------------------------------------  10-13-22 @ 10:27

## 2022-10-13 NOTE — CONSULT NOTE ADULT - SUBJECTIVE AND OBJECTIVE BOX
HPI:  TRAUMA ACTIVATION LEVEL: ALERT  ACTIVATED BY: EMS  INTUBATED: NO    MECHANISM OF INJURY: [x] Fall    GCS: 14 	E: 4	V: 4	M: 6    HPI:  77yM w/ PMHx of Diabetes, Depression, Gout, Benign essential HTN, Osteoarthritis, Cataracts, both eyes, S/P open cholecystectomy, S/P knee surgery seen as Trauma Alert s/p mechanical fall last night. +HT, -LOC, -AC (takes ASA 81 every other day)  Trauma assessment in ED: ABCs intact , GCS 15 , AAOx2 (at baseline). External signs of trauma include: hematoma/abrasion to left frontotemporal scalp and bilateral knee abrasion. Patient's wife and son are at bedside to assist with history, they state they heard a the patient fall down stairs, they do not know if he fell from the top or bottom of the stairs but that they found him right after the fall at the bottom of the stairs. The patient was brought to [facility] ED, he was transferred to [facility] when found to have multiple rib fx, the patient arrived to Trauma bay without c-collar. Collar was placed upon arrival to [place] after it was found out patient had c1 burst fracture while he was en route.   Trauma w/u showed C1 burst fx, acute left 1.4-6,8-10 rib fx, but xray of sheila knee showed no fx    PAST MEDICAL & SURGICAL HISTORY:  Diabetes  Depression  Gout  Benign essential HTN  Osteoarthritis  Cataracts, both eyes  S/P open cholecystectomy  S/P knee surgery    Allergies  Keflex (Hives)  Keflex (Unknown)    Home Medications:  aspirin 81 mg oral tablet: 1 tab(s) orally once a day (10 Oct 2019 21:41)  buPROPion 150 mg/24 hours (XL) oral tablet, extended release: 1 tab(s) orally every 24 hours (14 Feb 2022 09:33)  donepezil 10 mg oral tablet: 1 tab(s) orally once a day (at bedtime) (14 Feb 2022 09:30)  DULoxetine 60 mg oral delayed release capsule: 1 cap(s) orally once a day (14 Feb 2022 09:30)  glipiZIDE 2.5 mg oral tablet, extended release: 1 tab(s) orally once a day (10 Oct 2019 21:41)  memantine 10 mg oral tablet: 1 tab(s) orally 2 times a day (14 Feb 2022 09:31)  metFORMIN 1000 mg oral tablet: 1 tab(s) orally 2 times a day (10 Oct 2019 21:41)  quinapril 20 mg oral tablet: 1 tab(s) orally once a day (14 Feb 2022 09:31)  Vitamin B6 100 mg oral tablet: 1 tab(s) orally once a day (10 Oct 2019 21:41)  Vitamin D3 2000 intl units oral tablet: 1 tab(s) orally once a day (10 Oct 2019 21:47)    ROS: 10-system review is otherwise negative except HPI above.   (13 Oct 2022 10:24)      PAST MEDICAL & SURGICAL HISTORY:  Diabetes      Depression      Gout      Benign essential HTN      Osteoarthritis      Cataracts, both eyes      S/P cholecystectomy      S/P knee surgery          Hospital Course:    TODAY'S SUBJECTIVE & REVIEW OF SYMPTOMS:     Constitutional WNL   Cardio WNL   Resp WNL   GI WNL  Heme WNL  Endo WNL  Skin WNL  MSK pain  Neuro WNL  Cognitive WNL  Psych WNL      MEDICATIONS  (STANDING):  acetaminophen     Tablet .. 650 milliGRAM(s) Oral every 6 hours  aspirin enteric coated 81 milliGRAM(s) Oral daily  carbidopa/levodopa  25/100 1 Tablet(s) Oral <User Schedule>  chlorhexidine 2% Cloths 1 Application(s) Topical <User Schedule>  dextrose 5%. 1000 milliLiter(s) (50 mL/Hr) IV Continuous <Continuous>  dextrose 50% Injectable 25 Gram(s) IV Push once  donepezil 10 milliGRAM(s) Oral at bedtime  DULoxetine 60 milliGRAM(s) Oral daily  enoxaparin Injectable 40 milliGRAM(s) SubCutaneous every 24 hours  gabapentin 100 milliGRAM(s) Oral three times a day  glucagon  Injectable 1 milliGRAM(s) IntraMuscular once  influenza  Vaccine (HIGH DOSE) 0.7 milliLiter(s) IntraMuscular once  insulin lispro (ADMELOG) corrective regimen sliding scale   SubCutaneous three times a day before meals  lactulose Syrup 10 Gram(s) Oral two times a day  latanoprost 0.005% Ophthalmic Solution 1 Drop(s) Both EYES at bedtime  lidocaine   4% Patch 1 Patch Transdermal every 24 hours  lisinopril 20 milliGRAM(s) Oral daily  memantine 10 milliGRAM(s) Oral daily  senna 2 Tablet(s) Oral at bedtime    MEDICATIONS  (PRN):  dextrose Oral Gel 15 Gram(s) Oral once PRN Blood Glucose LESS THAN 70 milliGRAM(s)/deciliter      FAMILY HISTORY:      Allergies    Keflex (Hives)  Keflex (Unknown)    Intolerances        SOCIAL HISTORY:    [  ] Etoh  [  ] Smoking  [  ] Substance abuse     Home Environment:  [   ] Home Alone  [  x ] Lives with Family  [   ] Home Health Aid    Dwelling:  [   ] Apartment  [x   ] Private House  [   ] Adult Home  [   ] Skilled Nursing Facility      [   ] Short Term  [   ] Long Term  [ x  ] Stairs       Elevator [   ]    FUNCTIONAL STATUS PTA: (Check all that apply)  Ambulation: [ x   ]Independent    [   ] Dependent     [   ] Non-Ambulatory  Assistive Device: [   ] SA Cane  [   ]  Q Cane  [  x ] Walker  [   ]  Wheelchair  ADL : [   ] Independent  [  x  ]  Dependent       Vital Signs Last 24 Hrs  T(C): 36.5 (13 Oct 2022 14:00), Max: 36.8 (13 Oct 2022 01:25)  T(F): 97.7 (13 Oct 2022 14:00), Max: 98.3 (13 Oct 2022 01:25)  HR: 88 (13 Oct 2022 14:00) (88 - 110)  BP: 112/59 (13 Oct 2022 14:00) (112/59 - 141/63)  BP(mean): 80 (13 Oct 2022 14:00) (80 - 80)  RR: 25 (13 Oct 2022 14:00) (16 - 25)  SpO2: 97% (13 Oct 2022 14:00) (95% - 98%)    Parameters below as of 13 Oct 2022 14:00  Patient On (Oxygen Delivery Method): nasal cannula  O2 Flow (L/min): 2        PHYSICAL EXAM: Awake & Alert  GENERAL: NAD  HEAD:  Normocephalic  CHEST/LUNG: Clear   HEART: S1S2+  ABDOMEN: Soft, Nontender  EXTREMITIES:  no calf tenderness, + tenderness sheila knee with left knee abrasion and right knee ecchymosis     NERVOUS SYSTEM:  Cranial Nerves 2-12 intact [   ] Abnormal  [   ]  ROM: WFL all extremities [   ]  Abnormal [   ]able to move all ext equally   Motor Strength: WFL all extremities  [   ]  Abnormal [   ]  Sensation: intact to light touch [ x  ] Abnormal [   ]    FUNCTIONAL STATUS:  Bed Mobility: Independent [   ]  Supervision [   ]  Needs Assistance [ x  ]  N/A [   ]  Transfers: Independent [   ]  Supervision [   ]  Needs Assistance [   ]  N/A [   ]   Ambulation: Independent [   ]  Supervision [   ]  Needs Assistance [   ]  N/A [   ]  ADL: Independent [   ] Requires Assistance [   ] N/A [   ]      LABS:                        13.0   7.59  )-----------( 155      ( 13 Oct 2022 13:44 )             35.5     10-13    140  |  102  |  17  ----------------------------<  185<H>  5.2<H>   |  26  |  0.8    Ca    8.6      13 Oct 2022 13:44  Phos  3.8     10-13  Mg     1.8     10-13    TPro  6.1  /  Alb  2.8<L>  /  TBili  1.9<H>  /  DBili  0.5<H>  /  AST  40  /  ALT  29  /  AlkPhos  114  10-13    PT/INR - ( 13 Oct 2022 02:10 )   PT: 13.30 sec;   INR: 1.16 ratio         PTT - ( 13 Oct 2022 02:10 )  PTT:35.4 sec      RADIOLOGY & ADDITIONAL STUDIES:

## 2022-10-13 NOTE — CONSULT NOTE ADULT - ASSESSMENT
IMPRESSION: Rehab of multitrauma /  s/p fall  - C1 burst fx                                                                     - L 1,4-6,8-10 rib fx                                                                     - sheila knee contussion    PRECAUTIONS: [   ] Cardiac  [   ] Respiratory  [   ] Seizures [   ] Contact Isolation  [   ] Droplet Isolation  [   ] Other    Weight Bearing Status:     RECOMMENDATION:    Out of Bed to Chair     DVT/Decubiti Prophylaxis    REHAB PLAN:     [  x  ] Bedside P/T 3-5 times a week   [ x   ]   Bedside O/T  2-3 times a week             [    ] Speech Therapy               [    ]  No Rehab Therapy Indicated   Conditioning/ROM                                    ADL  Bed Mobility                                               Conditioning/ROM  Transfers                                                     Bed Mobility  Sitting /Standing Balance                         Transfers                                        Gait Training                                               Sitting/Standing Balance  Stair Training [   ]Applicable                    Home equipment Eval                                                                        Splinting  [   ] Only      GOALS:   ADL   [ x   ]   Independent                    Transfers  [  x  ] Independent                          Ambulation  [  x  ] Independent     [     ] With device                            [    ]  CG                                                         [    ]  CG                                                                  [    ] CGx                            [    ] Min A                                                   [    ] Min A                                                              [    ] Min  A          DISCHARGE PLAN:   [    ]  Good candidate for Intensive Rehabilitation/Hospital based                                             Will tolerate 3hrs Intensive Rehab Daily                                       [     ]  Short Term Rehab in Skilled Nursing Facility                                       [     ]  Home with Outpatient or  services                                         [   x  ]  Possible Candidate for Intensive Hospital based Rehab

## 2022-10-13 NOTE — CONSULT NOTE ADULT - ATTENDING COMMENTS
Critical Care: [id number]   This patient has a high probability of sudden, clinically significant deterioration, which requires the highest level of physician preparedness to intervene urgently. I managed/supervised life or organ supporting interventions that required frequent physician assessment. I devoted my full attention in the ICU to the direct care of this patient for the period of time indicated below. Time I spent with the family or surrogate(s) is included only if the patient was incapable of providing the necessary information or participating in medical decision making. Time devoted to teaching and to any procedures I billed separately is not included.     STACY CARDENAS 77y Male admitted to [x ] SICU /[ ] SDU with multiple ribs fractures, C1 fracture, high risk for hemodynamic instability, airway at risk, electrolytes abnormalities   Patient is examined and evaluated at the bedside with SICU team. Treatment plan discussed with SICU team, nurses and primary team.   Chest X-ray and all relevant studies reviewed during rounds.  Will continue hemodynamic monitoring as per protocol in SICU.    Neuro:  GCS [15 ]   [x ]  Neurovascular checks as per SICU protocol                 [ ] 3% NaCl                     Paralysis [ ] Yes  [x ]  No      Sedated/Pain control with                 [ ] Dilaudid drip, [ ]  Ketamine drip, [ ] Fentanyl drip, [ ] Propofol, [ ] Precedex, [ ] Versed drip, [ ] Ativan drip,                           [ ] OxyContin standing,  [ ] OxyContin PRN, [ ] Dilaudid PRN pushes, [ ] Fentanyl PRN pushes, [ ] PCA,                [x ] Tylenol IV/PO, [ x] Gabapentin, [ ]  Ketorolac, [ x] Tramadol,  [x ] Lidoderm Patch       Other Medications               [ ] Seroquel, [ ] Zyprexa, [ ] Haldol,  [ ] Clonazepam [ ] Xanax, [ ] Versed/Ativan PRN, [ ] Valium [x ] None               [ ] Robaxin   [ ] Baclofen  [ ] Flexeril               [ ] Keppra  [ ] Lamictal  [ ] Depakote  [ ] Dilantin               [ ] CIWA (Ativan/Valium/ Librium)  CV: continue to monitor  On pressors [ ]  Yes  [ x]  No          [ ]  Levophed, [ ] Federico-Synephrine, [ ] Vasopressin, [ ]  Epinephrine          [ ] Dobutamine, [ ] Milrinone, [ ]  Midodrine,  [ ] Others    Other Cardiac Meds          [ ] Amiodarone IV/PO, [ ] Digoxin, [ ] Cardizem drip, [ ] Cleviprex drip, [ ] Esmolol drip    Respiratory: Acute respiratory insufficiency -> continue monitoring                        None Invasive Support  [x ] Incentive Spirometer                                  [ ] BiPAP   [ ] CPAP/NIV   [ ] HFNC   [ ] NR Face Mask   [ x] NC  [ ] Trach Caller                        Ventilatory support  [ ] Yes [x ] No                            [ ] SBT                                  [ ] PC    [ ] VC   [ ] AC/PRVC   [ ] BiVent/APRV   [ ]CPAP   GI  [x ]  bowel regiment  [x ] BM none [x ] Flatus +             [ ] Ostomy   [ ] NG tube        Prophylaxis [ x] PPI  [ ] H2 Blockers  [ ] Others  Nutrition: continue   [x ] Diet  NPO for now [ ] TPN/PPN   [ ] calories count   [ ]  Tube Feeds     Renal: Continue I&Os monitoring, Graves catheter  [ ] Yes,  [x ]   No ,  [ ] Consideration for discontinuation [ ] Taxes cath/PrimaFit  [ ] TOV  [ ] HD/CVVH       Lytes/Acid-base: replete hypokalemia, hypomagnesemia, hypocalcemia, hypophosphatemia        IV Fluids   [x ] LR@75ml/hr,  [ ] NS  [ ] D5W1/2NS [ ] Bicarbonate Drip  [ ] Albumin [ ] Lasix  ID: leukocytosis -> continue to monitor:         IV Abx [ ] Yes, [x ] No;  ID consulted [ ] Yes, [ x] No        Cultures send  [ ] Respiratory   [ ] Blood   [ ] Urine  [ ] Fluids  [ ] Tissue  [x ]  None  Lines:   [ ] Right   [ ] Left  [ ] Bilateral                     [ ] Subclavian TLC        [ ]  Internal Jugular TLC     [ ]  Femoral TLC                     [ ] Subclavian Cordis    [ ] Internal Jugular Cordis   [ ] Femoral Cordis                     [ ] HD catheter    [ ] PICC     [ ]  Midline   [x ] Peripheral IVs                                                 [ ] Right   [ ] Left   [x ] None                     [ ] Radial A-Line    [ ] Femoral A-Line   [ ] Axillary A-Line               Heme: continue to evaluate for acute blood loss anemia- trend Hg/Hct                     AC Reversal Indicated [ ] Yes  [x ] No                    Transfused  indicated  [ ] Yes, [x ] No    [ ] PRBCs   [ ] Platelets   [ ] FFPs   [ ] Cryoprecipitate                    Should be started on or continued with  following  [ ] Yes,  [x ] No                               [ ] Lovenox  [ ] Coumadin  [ ] Heparin drip  [ ] NOVACs  [ ] ASA  [ ] Antiplatelets   Endocrine: Prevent and treat hyperglycemia with insulin as needed,                                 Insuline drip [ ] Yes, [x ] No   PV: follow pulse exam  Skin: decub precautions  DVT Prophylaxis:  [x ] SCDs  [ ] Heparin SQ  [ x] Lovenox  SQ  Stress Gastritis Prophylaxis: PPI/H2 Blockers if indicated  Mobility: patient is evaluated at the bedside with mobility team and the goals for today are discussed with PT [ x] out of bed to chair with C-caller    PATIENT/FAMILY/SURROGATE CONFERENCE:  [x ] Yes with patient at the bedside. [ ] No  PURPOSE: To obtain necessary information, To discuss treatment options under consideration today.    I saw and evaluated the patient personally. I have reviewed and agree with note above. Treatment plan discussed with SICU team, nurses and primary team at the time of the multidisciplinary rounds. The above note is NOT written at the time of rounds and will reflect all changes throughout management of the patient for the day note is written for.    Celia Ball MD, FACS  Trauma/ACS/SCC Attending

## 2022-10-13 NOTE — CONSULT NOTE ADULT - SUBJECTIVE AND OBJECTIVE BOX
STACY CARDENAS     77y Male    MRN-[id number]    Admit Date: 10-13-22  Hospital LOS:   ICU Admission Date:        ============================  HPI   HPI: This is a 77-year-old male with PMH dementia, DM2 (on metformin  and glipizide), HTN (on quinapril), cirrhosis, and depression who presented to the ED s/p fall down the stairs with +HT and -LOC. Patient's wife reports that she did not witness the fall but found pt on floor immediately after. As per wife, pt was ambulatory after the fall.  CT in the ED found multiple bilateral chronic and acute rib fractures, including acute rib fractures on the left 1st, 4-6th, and 8th-10th ribs as well as C1 right anterior arch burst fracture. SICU was consulted for respiratory monitoring in the setting of multiple rib fractures and C1 fx.     On examination in the ED by SICU provider, pt was A&O to person but not place or time. He reported mild pain over the left ribs. Pt denied neck pain, back pain, dizziness, shortness of breath, chest pain, palpitations, or paresthesias. BP was 129/73 with HR 96. Pt was able to blow 750 on IS.    Pt's frailty score was calculated to be 4/5 (pt reports fatigue all of the time, is unable to ambulate without assistance, 9% weight loss [20 lbs] over the last year). Pt's wife reported that patient uses walker at home and needs assistance ambulating.     TRAUMA ACTIVATION LEVEL:  CODE / ALERT  / CONSULT  ACTIVATED BY: EMS**  /  ED**  INTUBATED: YES** / NO**      MECHANISM OF INJURY:   [] Blunt     [] MVC	  [X] Fall	  [] Pedestrian Struck	  [] Motorcycle     [] Assault     [] Bicycle collision    [] Sports injury    [] Penetrating    [] Gun Shot Wound      [] Stab Wound    GCS: 14 	E: 4	V: 4	M: 6        Consults-  Consult Note Adult-Neurosurgery Physician Assistant Student [RADHA Rose] (10-13-22)  Consult Note Adult-Orthopedics Physician Assistant/Attending [DELANO Gar] (02-15-22)  Consult Note Adult-CT Surgery Resident/Attending [RADHA Gonzalez] (10-14-19)  Consult Note Adult-Physiatry Attending [MAGY Mosley] (10-11-19)  Consult Note Adult-SICU PA/Attending [TRELL Mcghee] (10-11-19)        [X] A ten-point review of systems was otherwise negative except as noted above.  [  ] Due to altered mental status/intubation, subjective information was not attained from the patient. History was obtained, to the extent possible, from review of the chart and collateral sources of information.    ==========================    PMH  PAST MEDICAL & SURGICAL HISTORY:  Diabetes  Depression  Gout  Benign essential HTN  Osteoarthritis  Cataracts, both eyes  S/P cholecystectomy  S/P knee surgery    Home Meds:  Home Medications:  aspirin 81 mg oral tablet: 1 tab(s) orally once a day (10 Oct 2019 21:41)  buPROPion 150 mg/24 hours (XL) oral tablet, extended release: 1 tab(s) orally every 24 hours (14 Feb 2022 09:33)  donepezil 10 mg oral tablet: 1 tab(s) orally once a day (at bedtime) (14 Feb 2022 09:30)  DULoxetine 60 mg oral delayed release capsule: 1 cap(s) orally once a day (14 Feb 2022 09:30)  glipiZIDE 2.5 mg oral tablet, extended release: 1 tab(s) orally once a day (10 Oct 2019 21:41)  memantine 10 mg oral tablet: 1 tab(s) orally 2 times a day (14 Feb 2022 09:31)  metFORMIN 1000 mg oral tablet: 1 tab(s) orally 2 times a day (10 Oct 2019 21:41)  quinapril 20 mg oral tablet: 1 tab(s) orally once a day (14 Feb 2022 09:31)  Vitamin B6 100 mg oral tablet: 1 tab(s) orally once a day (10 Oct 2019 21:41)  Vitamin D3 2000 intl units oral tablet: 1 tab(s) orally once a day (10 Oct 2019 21:47)       Allergies  Allergies    Keflex (Hives)  Keflex (Unknown)    Intolerances    Current Medications:      VITAL SIGNS, INS/OUTS (Last 24hours):    ICU Vital Signs Last 24 Hrs  T(C): 36.6 (13 Oct 2022 06:22), Max: 36.8 (13 Oct 2022 01:25)  T(F): 97.8 (13 Oct 2022 06:22), Max: 98.3 (13 Oct 2022 01:25)  HR: 102 (13 Oct 2022 07:02) (102 - 110)  BP: 137/76 (13 Oct 2022 07:02) (131/58 - 141/63)  RR: 18 (13 Oct 2022 07:02) (16 - 18)  SpO2: 98% (13 Oct 2022 07:02) (95% - 98%)    O2 Parameters below as of 13 Oct 2022 07:02  Patient On (Oxygen Delivery Method): room air      INTERPRETATION:  CLINICAL INDICATION: C1 fracture.    TECHNIQUE:  CTA of the neck was performed after the intravenous   administration of of contrast. 3-D reconstructions were performed under   physician supervision on a separate workstation and reviewed. A total of   100 mL Omnipaque 350 nonionic IV contrast was administered.    NASCET CRITERIA FOR CAROTID STENOSIS:  Mild: 0% to 49%, Moderate: 50% to 69%, Severe: 70% to 99%, Complete   Occlusion    COMPARISON: None available.    FINDINGS:    AORTIC ARCH: There are mild calcific atherosclerotic plaques without   flow-limiting stenosis.    RIGHT ANTERIOR CIRCULATION:  There is moderate focal narrowing in the proximal common carotid artery   (CCA) due to vascular turn. There is mild noncalcific/calcific   atherosclerosis in the carotid bulb without flow-limiting stenosis. The   external carotid artery (ECA) and its proximal branches are patent   without stenosis. The cervical internal carotid artery (ICA) is tortuous   in course but remains patent without stenosis. There is mild calcific   atherosclerosis in the carotid siphon resulting in mild stenosis in the   supraclinoid ICA.    The anterior and middle cerebral arteries (JAYDEN/MCA) are patent without   stenosis.      LEFT ANTERIOR CIRCULATION:  The CCA is patent up to the bulb without stenosis. The carotid bulb is   patent. The ECA and its proximal branches are patent without stenosis.   There is mild calcific plaque in the proximal cervical ICA without   flow-limiting stenosis. The remaining cervical ICA is tortuous but   remains patent patent without stenosis. There is mild calcific   atherosclerosis in the carotid siphon resulting in mild stenosis in the   supraclinoid ICA.    The anterior and middle cerebral arteries are patent without stenosis.      POSTERIOR CIRCULATION:  There is mild focal stenosis in the proximal V2 segment of the right   vertebral artery (4-158). The left vertebral artery is patent. The   basilar artery is patent without stenosis. The proximal branch   vasculature of the posterior circulation are within normal limits.    The posterior cerebral arteries are patent without stenosis.    There is no evidence for saccular aneurysm, vascular malformation, or   large vessel occlusion.      OTHERS:  Please see separately dictated same day CT head for the intracranial   findings.    Redemonstrated C1 fracture and left first rib fracture      IMPRESSION:    No evidence of vascular injury. No large vessel occlusion, aneurysm, or   vascular malformation.    Mild scattered atherosclerotic disease as above.    Redemonstrated acute C1 and left first rib fractures.    --- End of Report ---    ACC: [id number] EXAM:  CT ABDOMEN AND PELVIS IC                          PROCEDURE DATE:  10/13/2022          INTERPRETATION:  CLINICAL HISTORY/REASON FOR EXAM: Trauma; fall down   stairs.    TECHNIQUE: Contiguous axial CT images were obtained from the thoracic   inlet to the pubic symphysis following administration of 90 cc Omnipaque   350 intravenous contrast. 10 cc contrast discarded.  Oral contrast was   not administered. Reformatted images in the coronal and sagittal planes   were acquired. 3D (MIP) images obtained.    COMPARISON: CT ABDOMEN/PELVIS 10/10/2019, CT chest 5/9/2022.      FINDINGS:    CHEST:    LUNGS, PLEURA, AIRWAYS: Bibasilar subsegmental atelectatic changes.   Patent central tracheobronchial tree. No pleural effusion. No   pneumothorax. Redemonstrated is elevated right hemidiaphragm.    THORACIC NODES: Unremarkable.    MEDIASTINUM/GREAT VESSELS: Coronary artery calcifications. No pericardial   effusion. While the heart is not enlarged, there is apparent left   ventricular wall thickening. The aorta and main pulmonary artery are of   normal caliber.    ABDOMEN/PELVIS:    HEPATOBILIARY: Cholecystectomy. Liver is nodular in contour, and there   are perisplenic, gastrohepatic, and omental varices, compatible with   cirrhosis. Patent portal vein.    SPLEEN: Unremarkable.    PANCREAS: Unremarkable.    ADRENAL GLANDS: Unremarkable.    KIDNEYS: No obstructing calculus or hydronephrosis. Symmetric renal   enhancement. Right renal lower pole cyst.    ABDOMINOPELVIC NODES: Unremarkable.    PELVIC ORGANS: Unremarkable.    PERITONEUM/MESENTERY/BOWEL: No bowel obstruction or wall thickening. No   pneumoperitoneum or ascites. Normal caliber appendix. Fecal-like material   in the small bowel loops compatible with delayed transition    BONES/SOFT TISSUES: Multiple bilateral chronic and acute rib fractures,   such as acute rib fractures on the left 1st, 4-6th, and 8th-10th ribs.   Chronic appearing left posterior T11 and T12 rib fractures with callus   formation. Degenerative changes of the spine. Severe degenerative changes   of the left shoulder and thoracolumbar spine.    OTHER: Atherosclerotic calcifications of the normal caliber aorta. There   is a small fat-containing right inguinal hernia.      IMPRESSION:      Multiple bilateral chronic and acute rib fractures, such as acute rib   fractures on the left 1st, 4-6th, and 8th-10th ribs.    Liver nodularity with perisplenic, gastrohepatic, and omental varices,   compatible with cirrhosis and portal hypertension.    Lungs are suspicious for left ventricular hypertrophy. Further evaluation   with echocardiogram is recommended.    --- End of Report ---      INTERPRETATION:  CLINICAL HISTORY/REASON FOR EXAM: Trauma; fall down   stairs.    TECHNIQUE: Contiguous axial CT images were obtained from the thoracic   inlet to the pubic symphysis following administration of 90 cc Omnipaque   350 intravenous contrast. 10 cc contrast discarded.  Oral contrast was   not administered. Reformatted images in the coronal and sagittal planes   were acquired. 3D (MIP) images obtained.    COMPARISON: CT ABDOMEN/PELVIS 10/10/2019, CT chest 5/9/2022.      FINDINGS:    CHEST:    LUNGS, PLEURA, AIRWAYS: Bibasilar subsegmental atelectatic changes.   Patent central tracheobronchial tree. No pleural effusion. No   pneumothorax. Redemonstrated is elevated right hemidiaphragm.    THORACIC NODES: Unremarkable.    MEDIASTINUM/GREAT VESSELS: Coronary artery calcifications. No pericardial   effusion. While the heart is not enlarged, there is apparent left   ventricular wall thickening. The aorta and main pulmonary artery are of   normal caliber.    ABDOMEN/PELVIS:    HEPATOBILIARY: Cholecystectomy. Liver is nodular in contour, and there   are perisplenic, gastrohepatic, and omental varices, compatible with   cirrhosis. Patent portal vein.    SPLEEN: Unremarkable.    PANCREAS: Unremarkable.    ADRENAL GLANDS: Unremarkable.    KIDNEYS: No obstructing calculus or hydronephrosis. Symmetric renal   enhancement. Right renal lower pole cyst.    ABDOMINOPELVIC NODES: Unremarkable.    PELVIC ORGANS: Unremarkable.    PERITONEUM/MESENTERY/BOWEL: No bowel obstruction or wall thickening. No   pneumoperitoneum or ascites. Normal caliber appendix. Fecal-like material   in the small bowel loops compatible with delayed transition    BONES/SOFT TISSUES: Multiple bilateral chronic and acute rib fractures,   such as acute rib fractures on the left 1st, 4-6th, and 8th-10th ribs.   Chronic appearing left posterior T11 and T12 rib fractures with callus   formation. Degenerative changes of the spine. Severe degenerative changes   of the left shoulder and thoracolumbar spine.    OTHER: Atherosclerotic calcifications of the normal caliber aorta. There   is a small fat-containing right inguinal hernia.      IMPRESSION:      Multiple bilateral chronic and acute rib fractures, such as acute rib   fractures on the left 1st, 4-6th, and 8th-10th ribs.    Liver nodularity with perisplenic, gastrohepatic, and omental varices,   compatible with cirrhosis and portal hypertension.    Lungs are suspicious for left ventricular hypertrophy. Further evaluation   with echocardiogram is recommended.    --- End of Report ---    TECHNIQUE: CT of the cervical spine was performed without the   administration of intravenous contrast.    COMPARISON: CT cervical spine 10/10/2019    FINDINGS:    C1 right anterior arch burst fracture. There is a longitudinal fracture   involving posterior left first rib    There is no evidence of compression deformity or facet subluxation of the   cervical spine.    The atlantoaxial relationships are maintained. The posterior elements are   intact.There is no significant prevertebral soft tissue swelling. The   visualized paraspinal soft tissues are unremarkable.    Mild multilevel endplate degenerative changes as evidenced by marginal   osteophyte formation, uncovertebral spurring, loss of normal disc space   height as well as facet joint space compartment narrowing.    No evidence for high-grade spinal canal stenosis. There is moderate   neuroforaminal narrowing at the C5-C6 level. Please note that examination   of spinal canal contents is limited inherent to CT technique.    IMPRESSION:    C1 right anterior arch burst fracture    Additional fracture involving left first rib      Dr. Dhruv Martinez discussed preliminary findings with RICHIE ZABALA PA   on 10/13/2022 6:42 AM with readback.    --- End of Report ---  CT Head No Con    No evidence of intracranial hemorrhage, territorial infarct, or mass   effect.    Large left frontotemporal scalp hematoma without evidence of subjacent   fracture.      Physical Exam:   ----------------------------------------------------------------------------------------------------------  GCS:      Exam: A & O x 1. No focal deficits. Sensation intact bilaterally. 5/5 strength in all extremities CN II-XII intact. EOMI. PEERLA.     RESPIRATORY:  Normal expansion/effort. Clear to auscultation bilaterally. Mild tenderness elicited over left ribs.     CARDIOVASCULAR: Normal S1/S2 heard. RRR.   No peripheral edema    GASTROINTESTINAL:  Abdomen soft, non-tender, non-distended.    MUSCULOSKELETAL:  Extremities warm, pink, well-perfused. [place] J collar in place. Mild tenderness elicited over left ribs. Unable to assess neck tenderness as pt is in hard collar.     DERM:  No skin breakdown     : Voiding.     Height (cm): 167.6 (10-13-22)  Weight (kg): 95.3 (10-13-22)  BMI (kg/m2): 33.9 (10-13-22)  BSA (m2): 2.04 (10-13-22)    Tubes/Lines/Drains   ----------------------------------------------------------------------------------------------------------  [x] Peripheral IV L and R arm            STACY CARDENAS     77y Male    MRN-[id number]    Admit Date: 10-13-22  Hospital LOS:   ICU Admission Date:        ============================  HPI   HPI: This is a 77-year-old male with PMH dementia, DM2 (on metformin  and glipizide), HTN (on quinapril), cirrhosis, and depression who presented to the ED s/p fall down the stairs with +HT and -LOC. Patient's wife reports that she did not witness the fall but found pt on floor immediately after. As per wife, pt was ambulatory after the fall.  CT in the ED found multiple bilateral chronic and acute rib fractures, including acute rib fractures on the left 1st, 4-6th, and 8th-10th ribs as well as C1 right anterior arch burst fracture. SICU was consulted for respiratory monitoring in the setting of multiple rib fractures and C1 fx.     On examination in the ED by SICU provider, pt was A&O to person but not place or time. He reported mild pain over the left ribs. Pt denied neck pain, back pain, dizziness, shortness of breath, chest pain, palpitations, or paresthesias. BP was 129/73 with HR 96. Pt was able to blow 750 on IS.    Pt's frailty score was calculated to be 4/5 (pt reports fatigue all of the time, is unable to ambulate without assistance, 9% weight loss [20 lbs] over the last year). Pt's wife reported that patient uses walker at home and needs assistance ambulating.     TRAUMA ACTIVATION LEVEL:  CODE / ALERT  / CONSULT  ACTIVATED BY: EMS**  /  ED**  INTUBATED: YES** / NO**      MECHANISM OF INJURY:   [] Blunt     [] MVC	  [X] Fall	  [] Pedestrian Struck	  [] Motorcycle     [] Assault     [] Bicycle collision    [] Sports injury    [] Penetrating    [] Gun Shot Wound      [] Stab Wound    GCS: 14 	E: 4	V: 4	M: 6        Consults-  Consult Note Adult-Neurosurgery Physician Assistant Student [RADHA Rose] (10-13-22)  Consult Note Adult-Orthopedics Physician Assistant/Attending [DELANO Gar] (02-15-22)  Consult Note Adult-CT Surgery Resident/Attending [RADHA Gonzalez] (10-14-19)  Consult Note Adult-Physiatry Attending [MAGY Mosley] (10-11-19)  Consult Note Adult-SICU PA/Attending [TRELL Mcghee] (10-11-19)        [X] A ten-point review of systems was otherwise negative except as noted above.  [  ] Due to altered mental status/intubation, subjective information was not attained from the patient. History was obtained, to the extent possible, from review of the chart and collateral sources of information.    ==========================    PMH  PAST MEDICAL & SURGICAL HISTORY:  Diabetes  Depression  Gout  Benign essential HTN  Osteoarthritis  Cataracts, both eyes  S/P cholecystectomy  S/P knee surgery    Home Meds:  Home Medications:  aspirin 81 mg oral tablet: 1 tab(s) orally once a day (10 Oct 2019 21:41)  buPROPion 150 mg/24 hours (XL) oral tablet, extended release: 1 tab(s) orally every 24 hours (14 Feb 2022 09:33)  donepezil 10 mg oral tablet: 1 tab(s) orally once a day (at bedtime) (14 Feb 2022 09:30)  DULoxetine 60 mg oral delayed release capsule: 1 cap(s) orally once a day (14 Feb 2022 09:30)  glipiZIDE 2.5 mg oral tablet, extended release: 1 tab(s) orally once a day (10 Oct 2019 21:41)  memantine 10 mg oral tablet: 1 tab(s) orally 2 times a day (14 Feb 2022 09:31)  metFORMIN 1000 mg oral tablet: 1 tab(s) orally 2 times a day (10 Oct 2019 21:41)  quinapril 20 mg oral tablet: 1 tab(s) orally once a day (14 Feb 2022 09:31)  Vitamin B6 100 mg oral tablet: 1 tab(s) orally once a day (10 Oct 2019 21:41)  Vitamin D3 2000 intl units oral tablet: 1 tab(s) orally once a day (10 Oct 2019 21:47)       Allergies  Allergies    Keflex (Hives)  Keflex (Unknown)    Intolerances    Current Medications:      VITAL SIGNS, INS/OUTS (Last 24hours):    ICU Vital Signs Last 24 Hrs  T(C): 36.6 (13 Oct 2022 06:22), Max: 36.8 (13 Oct 2022 01:25)  T(F): 97.8 (13 Oct 2022 06:22), Max: 98.3 (13 Oct 2022 01:25)  HR: 102 (13 Oct 2022 07:02) (102 - 110)  BP: 137/76 (13 Oct 2022 07:02) (131/58 - 141/63)  RR: 18 (13 Oct 2022 07:02) (16 - 18)  SpO2: 98% (13 Oct 2022 07:02) (95% - 98%)    O2 Parameters below as of 13 Oct 2022 07:02  Patient On (Oxygen Delivery Method): room air      INTERPRETATION:  CLINICAL INDICATION: C1 fracture.    TECHNIQUE:  CTA of the neck was performed after the intravenous   administration of of contrast. 3-D reconstructions were performed under   physician supervision on a separate workstation and reviewed. A total of   100 mL Omnipaque 350 nonionic IV contrast was administered.    NASCET CRITERIA FOR CAROTID STENOSIS:  Mild: 0% to 49%, Moderate: 50% to 69%, Severe: 70% to 99%, Complete   Occlusion    COMPARISON: None available.    FINDINGS:    AORTIC ARCH: There are mild calcific atherosclerotic plaques without   flow-limiting stenosis.    RIGHT ANTERIOR CIRCULATION:  There is moderate focal narrowing in the proximal common carotid artery   (CCA) due to vascular turn. There is mild noncalcific/calcific   atherosclerosis in the carotid bulb without flow-limiting stenosis. The   external carotid artery (ECA) and its proximal branches are patent   without stenosis. The cervical internal carotid artery (ICA) is tortuous   in course but remains patent without stenosis. There is mild calcific   atherosclerosis in the carotid siphon resulting in mild stenosis in the   supraclinoid ICA.    The anterior and middle cerebral arteries (JAYDEN/MCA) are patent without   stenosis.      LEFT ANTERIOR CIRCULATION:  The CCA is patent up to the bulb without stenosis. The carotid bulb is   patent. The ECA and its proximal branches are patent without stenosis.   There is mild calcific plaque in the proximal cervical ICA without   flow-limiting stenosis. The remaining cervical ICA is tortuous but   remains patent patent without stenosis. There is mild calcific   atherosclerosis in the carotid siphon resulting in mild stenosis in the   supraclinoid ICA.    The anterior and middle cerebral arteries are patent without stenosis.      POSTERIOR CIRCULATION:  There is mild focal stenosis in the proximal V2 segment of the right   vertebral artery (4-158). The left vertebral artery is patent. The   basilar artery is patent without stenosis. The proximal branch   vasculature of the posterior circulation are within normal limits.    The posterior cerebral arteries are patent without stenosis.    There is no evidence for saccular aneurysm, vascular malformation, or   large vessel occlusion.      OTHERS:  Please see separately dictated same day CT head for the intracranial   findings.    Redemonstrated C1 fracture and left first rib fracture      IMPRESSION:    No evidence of vascular injury. No large vessel occlusion, aneurysm, or   vascular malformation.    Mild scattered atherosclerotic disease as above.    Redemonstrated acute C1 and left first rib fractures.    --- End of Report ---    ACC: [id number] EXAM:  CT ABDOMEN AND PELVIS IC                          PROCEDURE DATE:  10/13/2022          INTERPRETATION:  CLINICAL HISTORY/REASON FOR EXAM: Trauma; fall down   stairs.    TECHNIQUE: Contiguous axial CT images were obtained from the thoracic   inlet to the pubic symphysis following administration of 90 cc Omnipaque   350 intravenous contrast. 10 cc contrast discarded.  Oral contrast was   not administered. Reformatted images in the coronal and sagittal planes   were acquired. 3D (MIP) images obtained.    COMPARISON: CT ABDOMEN/PELVIS 10/10/2019, CT chest 5/9/2022.      FINDINGS:    CHEST:    LUNGS, PLEURA, AIRWAYS: Bibasilar subsegmental atelectatic changes.   Patent central tracheobronchial tree. No pleural effusion. No   pneumothorax. Redemonstrated is elevated right hemidiaphragm.    THORACIC NODES: Unremarkable.    MEDIASTINUM/GREAT VESSELS: Coronary artery calcifications. No pericardial   effusion. While the heart is not enlarged, there is apparent left   ventricular wall thickening. The aorta and main pulmonary artery are of   normal caliber.    ABDOMEN/PELVIS:    HEPATOBILIARY: Cholecystectomy. Liver is nodular in contour, and there   are perisplenic, gastrohepatic, and omental varices, compatible with   cirrhosis. Patent portal vein.    SPLEEN: Unremarkable.    PANCREAS: Unremarkable.    ADRENAL GLANDS: Unremarkable.    KIDNEYS: No obstructing calculus or hydronephrosis. Symmetric renal   enhancement. Right renal lower pole cyst.    ABDOMINOPELVIC NODES: Unremarkable.    PELVIC ORGANS: Unremarkable.    PERITONEUM/MESENTERY/BOWEL: No bowel obstruction or wall thickening. No   pneumoperitoneum or ascites. Normal caliber appendix. Fecal-like material   in the small bowel loops compatible with delayed transition    BONES/SOFT TISSUES: Multiple bilateral chronic and acute rib fractures,   such as acute rib fractures on the left 1st, 4-6th, and 8th-10th ribs.   Chronic appearing left posterior T11 and T12 rib fractures with callus   formation. Degenerative changes of the spine. Severe degenerative changes   of the left shoulder and thoracolumbar spine.    OTHER: Atherosclerotic calcifications of the normal caliber aorta. There   is a small fat-containing right inguinal hernia.      IMPRESSION:      Multiple bilateral chronic and acute rib fractures, such as acute rib   fractures on the left 1st, 4-6th, and 8th-10th ribs.    Liver nodularity with perisplenic, gastrohepatic, and omental varices,   compatible with cirrhosis and portal hypertension.    Lungs are suspicious for left ventricular hypertrophy. Further evaluation   with echocardiogram is recommended.    --- End of Report ---      INTERPRETATION:  CLINICAL HISTORY/REASON FOR EXAM: Trauma; fall down   stairs.    TECHNIQUE: Contiguous axial CT images were obtained from the thoracic   inlet to the pubic symphysis following administration of 90 cc Omnipaque   350 intravenous contrast. 10 cc contrast discarded.  Oral contrast was   not administered. Reformatted images in the coronal and sagittal planes   were acquired. 3D (MIP) images obtained.    COMPARISON: CT ABDOMEN/PELVIS 10/10/2019, CT chest 5/9/2022.      FINDINGS:    CHEST:    LUNGS, PLEURA, AIRWAYS: Bibasilar subsegmental atelectatic changes.   Patent central tracheobronchial tree. No pleural effusion. No   pneumothorax. Redemonstrated is elevated right hemidiaphragm.    THORACIC NODES: Unremarkable.    MEDIASTINUM/GREAT VESSELS: Coronary artery calcifications. No pericardial   effusion. While the heart is not enlarged, there is apparent left   ventricular wall thickening. The aorta and main pulmonary artery are of   normal caliber.    ABDOMEN/PELVIS:    HEPATOBILIARY: Cholecystectomy. Liver is nodular in contour, and there   are perisplenic, gastrohepatic, and omental varices, compatible with   cirrhosis. Patent portal vein.    SPLEEN: Unremarkable.    PANCREAS: Unremarkable.    ADRENAL GLANDS: Unremarkable.    KIDNEYS: No obstructing calculus or hydronephrosis. Symmetric renal   enhancement. Right renal lower pole cyst.    ABDOMINOPELVIC NODES: Unremarkable.    PELVIC ORGANS: Unremarkable.    PERITONEUM/MESENTERY/BOWEL: No bowel obstruction or wall thickening. No   pneumoperitoneum or ascites. Normal caliber appendix. Fecal-like material   in the small bowel loops compatible with delayed transition    BONES/SOFT TISSUES: Multiple bilateral chronic and acute rib fractures,   such as acute rib fractures on the left 1st, 4-6th, and 8th-10th ribs.   Chronic appearing left posterior T11 and T12 rib fractures with callus   formation. Degenerative changes of the spine. Severe degenerative changes   of the left shoulder and thoracolumbar spine.    OTHER: Atherosclerotic calcifications of the normal caliber aorta. There   is a small fat-containing right inguinal hernia.      IMPRESSION:      Multiple bilateral chronic and acute rib fractures, such as acute rib   fractures on the left 1st, 4-6th, and 8th-10th ribs.    Liver nodularity with perisplenic, gastrohepatic, and omental varices,   compatible with cirrhosis and portal hypertension.    Lungs are suspicious for left ventricular hypertrophy. Further evaluation   with echocardiogram is recommended.    --- End of Report ---    TECHNIQUE: CT of the cervical spine was performed without the   administration of intravenous contrast.    COMPARISON: CT cervical spine 10/10/2019    FINDINGS:    C1 right anterior arch burst fracture. There is a longitudinal fracture   involving posterior left first rib    There is no evidence of compression deformity or facet subluxation of the   cervical spine.    The atlantoaxial relationships are maintained. The posterior elements are   intact.There is no significant prevertebral soft tissue swelling. The   visualized paraspinal soft tissues are unremarkable.    Mild multilevel endplate degenerative changes as evidenced by marginal   osteophyte formation, uncovertebral spurring, loss of normal disc space   height as well as facet joint space compartment narrowing.    No evidence for high-grade spinal canal stenosis. There is moderate   neuroforaminal narrowing at the C5-C6 level. Please note that examination   of spinal canal contents is limited inherent to CT technique.    IMPRESSION:    C1 right anterior arch burst fracture    Additional fracture involving left first rib      Dr. Dhruv Martinez discussed preliminary findings with RICHIE ZABALA PA   on 10/13/2022 6:42 AM with readback.    --- End of Report ---  CT Head No Con    No evidence of intracranial hemorrhage, territorial infarct, or mass   effect.    Large left frontotemporal scalp hematoma without evidence of subjacent   fracture.      Physical Exam:   ----------------------------------------------------------------------------------------------------------     Exam: A & O x 1. No focal deficits. Sensation intact bilaterally. 5/5 strength in all extremities CN II-XII intact. EOMI. PEERLA.     RESPIRATORY:  Normal expansion/effort. Clear to auscultation bilaterally. Mild tenderness elicited over left ribs.     CARDIOVASCULAR: Normal S1/S2 heard. RRR.   No peripheral edema    GASTROINTESTINAL:  Abdomen soft, non-tender, non-distended.    MUSCULOSKELETAL:  Extremities warm, pink, well-perfused. [place] J collar in place. Mild tenderness elicited over left ribs. Unable to assess neck tenderness as pt is in hard collar.     DERM:  No skin breakdown     : Voiding.     Height (cm): 167.6 (10-13-22)  Weight (kg): 95.3 (10-13-22)  BMI (kg/m2): 33.9 (10-13-22)  BSA (m2): 2.04 (10-13-22)    Tubes/Lines/Drains   ----------------------------------------------------------------------------------------------------------  [x] Peripheral IV L and R arm            STACY CARDENAS     77y Male    [id number]    Admit Date: 10-13-22  Hospital LOS:   ICU Admission Date:        ============================  HPI   HPI: This is a 77-year-old male with PMH dementia, DM2 (on metformin  and glipizide), HTN (on quinapril), cirrhosis, and depression who presented to the ED s/p fall down the stairs with +HT and -LOC. Patient's wife reports that she did not witness the fall but found pt on floor immediately after. As per wife, pt was ambulatory after the fall.  CT in the ED found multiple bilateral chronic and acute rib fractures, including acute rib fractures on the left 1st, 4-6th, and 8th-10th ribs as well as C1 right anterior arch burst fracture. SICU was consulted for respiratory monitoring in the setting of multiple rib fractures and C1 fx.     On examination in the ED by SICU provider, pt was A&O to person but not place or time. He reported mild pain over the left ribs. Pt denied neck pain, back pain, dizziness, shortness of breath, chest pain, palpitations, or paresthesias. BP was 129/73 with HR 96. Pt was able to blow 750 on IS.    Pt's frailty score was calculated to be 4/5 (pt reports fatigue all of the time, is unable to ambulate without assistance, 9% weight loss [20 lbs] over the last year). Pt's wife reported that patient uses walker at home and needs assistance ambulating.     TRAUMA ACTIVATION LEVEL:  CODE / ALERT  / CONSULT  ACTIVATED BY: EMS**  /  ED**  INTUBATED: YES** / NO**      MECHANISM OF INJURY:   [] Blunt     [] MVC	  [X] Fall	  [] Pedestrian Struck	  [] Motorcycle     [] Assault     [] Bicycle collision    [] Sports injury    [] Penetrating    [] Gun Shot Wound      [] Stab Wound    GCS: 14 	E: 4	V: 4	M: 6        Consults-  Consult Note Adult-Neurosurgery Physician Assistant Student [RADHA Rose] (10-13-22)  Consult Note Adult-Orthopedics Physician Assistant/Attending [DELANO Gar] (02-15-22)  Consult Note Adult-CT Surgery Resident/Attending [RADHA Gonzalez] (10-14-19)  Consult Note Adult-Physiatry Attending [MAGY Mosley] (10-11-19)  Consult Note Adult-SICU PA/Attending [TRELL Mcghee] (10-11-19)        [X] A ten-point review of systems was otherwise negative except as noted above.  [  ] Due to altered mental status/intubation, subjective information was not attained from the patient. History was obtained, to the extent possible, from review of the chart and collateral sources of information.    ==========================    PMH  PAST MEDICAL & SURGICAL HISTORY:  Diabetes  Depression  Gout  Benign essential HTN  Osteoarthritis  Cataracts, both eyes  S/P cholecystectomy  S/P knee surgery    Home Meds:  Home Medications:  aspirin 81 mg oral tablet: 1 tab(s) orally once a day (10 Oct 2019 21:41)  buPROPion 150 mg/24 hours (XL) oral tablet, extended release: 1 tab(s) orally every 24 hours (14 Feb 2022 09:33)  donepezil 10 mg oral tablet: 1 tab(s) orally once a day (at bedtime) (14 Feb 2022 09:30)  DULoxetine 60 mg oral delayed release capsule: 1 cap(s) orally once a day (14 Feb 2022 09:30)  glipiZIDE 2.5 mg oral tablet, extended release: 1 tab(s) orally once a day (10 Oct 2019 21:41)  memantine 10 mg oral tablet: 1 tab(s) orally 2 times a day (14 Feb 2022 09:31)  metFORMIN 1000 mg oral tablet: 1 tab(s) orally 2 times a day (10 Oct 2019 21:41)  quinapril 20 mg oral tablet: 1 tab(s) orally once a day (14 Feb 2022 09:31)  Vitamin B6 100 mg oral tablet: 1 tab(s) orally once a day (10 Oct 2019 21:41)  Vitamin D3 2000 intl units oral tablet: 1 tab(s) orally once a day (10 Oct 2019 21:47)       Allergies  Allergies    Keflex (Hives)  Keflex (Unknown)    Intolerances    Current Medications:      VITAL SIGNS, INS/OUTS (Last 24hours):    ICU Vital Signs Last 24 Hrs  T(C): 36.6 (13 Oct 2022 06:22), Max: 36.8 (13 Oct 2022 01:25)  T(F): 97.8 (13 Oct 2022 06:22), Max: 98.3 (13 Oct 2022 01:25)  HR: 102 (13 Oct 2022 07:02) (102 - 110)  BP: 137/76 (13 Oct 2022 07:02) (131/58 - 141/63)  RR: 18 (13 Oct 2022 07:02) (16 - 18)  SpO2: 98% (13 Oct 2022 07:02) (95% - 98%)    O2 Parameters below as of 13 Oct 2022 07:02  Patient On (Oxygen Delivery Method): room air      INTERPRETATION:  CLINICAL INDICATION: C1 fracture.    TECHNIQUE:  CTA of the neck was performed after the intravenous   administration of of contrast. 3-D reconstructions were performed under   physician supervision on a separate workstation and reviewed. A total of   100 mL Omnipaque 350 nonionic IV contrast was administered.    NASCET CRITERIA FOR CAROTID STENOSIS:  Mild: 0% to 49%, Moderate: 50% to 69%, Severe: 70% to 99%, Complete   Occlusion    COMPARISON: None available.    FINDINGS:    AORTIC ARCH: There are mild calcific atherosclerotic plaques without   flow-limiting stenosis.    RIGHT ANTERIOR CIRCULATION:  There is moderate focal narrowing in the proximal common carotid artery   (CCA) due to vascular turn. There is mild noncalcific/calcific   atherosclerosis in the carotid bulb without flow-limiting stenosis. The   external carotid artery (ECA) and its proximal branches are patent   without stenosis. The cervical internal carotid artery (ICA) is tortuous   in course but remains patent without stenosis. There is mild calcific   atherosclerosis in the carotid siphon resulting in mild stenosis in the   supraclinoid ICA.    The anterior and middle cerebral arteries (JAYDEN/MCA) are patent without   stenosis.      LEFT ANTERIOR CIRCULATION:  The CCA is patent up to the bulb without stenosis. The carotid bulb is   patent. The ECA and its proximal branches are patent without stenosis.   There is mild calcific plaque in the proximal cervical ICA without   flow-limiting stenosis. The remaining cervical ICA is tortuous but   remains patent patent without stenosis. There is mild calcific   atherosclerosis in the carotid siphon resulting in mild stenosis in the   supraclinoid ICA.    The anterior and middle cerebral arteries are patent without stenosis.      POSTERIOR CIRCULATION:  There is mild focal stenosis in the proximal V2 segment of the right   vertebral artery (4-158). The left vertebral artery is patent. The   basilar artery is patent without stenosis. The proximal branch   vasculature of the posterior circulation are within normal limits.    The posterior cerebral arteries are patent without stenosis.    There is no evidence for saccular aneurysm, vascular malformation, or   large vessel occlusion.      OTHERS:  Please see separately dictated same day CT head for the intracranial   findings.    Redemonstrated C1 fracture and left first rib fracture      IMPRESSION:    No evidence of vascular injury. No large vessel occlusion, aneurysm, or   vascular malformation.    Mild scattered atherosclerotic disease as above.    Redemonstrated acute C1 and left first rib fractures.    --- End of Report ---    ACC: [id number] EXAM:  CT ABDOMEN AND PELVIS IC                          PROCEDURE DATE:  10/13/2022          INTERPRETATION:  CLINICAL HISTORY/REASON FOR EXAM: Trauma; fall down   stairs.    TECHNIQUE: Contiguous axial CT images were obtained from the thoracic   inlet to the pubic symphysis following administration of 90 cc Omnipaque   350 intravenous contrast. 10 cc contrast discarded.  Oral contrast was   not administered. Reformatted images in the coronal and sagittal planes   were acquired. 3D (MIP) images obtained.    COMPARISON: CT ABDOMEN/PELVIS 10/10/2019, CT chest 5/9/2022.      FINDINGS:    CHEST:    LUNGS, PLEURA, AIRWAYS: Bibasilar subsegmental atelectatic changes.   Patent central tracheobronchial tree. No pleural effusion. No   pneumothorax. Redemonstrated is elevated right hemidiaphragm.    THORACIC NODES: Unremarkable.    MEDIASTINUM/GREAT VESSELS: Coronary artery calcifications. No pericardial   effusion. While the heart is not enlarged, there is apparent left   ventricular wall thickening. The aorta and main pulmonary artery are of   normal caliber.    ABDOMEN/PELVIS:    HEPATOBILIARY: Cholecystectomy. Liver is nodular in contour, and there   are perisplenic, gastrohepatic, and omental varices, compatible with   cirrhosis. Patent portal vein.    SPLEEN: Unremarkable.    PANCREAS: Unremarkable.    ADRENAL GLANDS: Unremarkable.    KIDNEYS: No obstructing calculus or hydronephrosis. Symmetric renal   enhancement. Right renal lower pole cyst.    ABDOMINOPELVIC NODES: Unremarkable.    PELVIC ORGANS: Unremarkable.    PERITONEUM/MESENTERY/BOWEL: No bowel obstruction or wall thickening. No   pneumoperitoneum or ascites. Normal caliber appendix. Fecal-like material   in the small bowel loops compatible with delayed transition    BONES/SOFT TISSUES: Multiple bilateral chronic and acute rib fractures,   such as acute rib fractures on the left 1st, 4-6th, and 8th-10th ribs.   Chronic appearing left posterior T11 and T12 rib fractures with callus   formation. Degenerative changes of the spine. Severe degenerative changes   of the left shoulder and thoracolumbar spine.    OTHER: Atherosclerotic calcifications of the normal caliber aorta. There   is a small fat-containing right inguinal hernia.      IMPRESSION:      Multiple bilateral chronic and acute rib fractures, such as acute rib   fractures on the left 1st, 4-6th, and 8th-10th ribs.    Liver nodularity with perisplenic, gastrohepatic, and omental varices,   compatible with cirrhosis and portal hypertension.    Lungs are suspicious for left ventricular hypertrophy. Further evaluation   with echocardiogram is recommended.    --- End of Report ---      INTERPRETATION:  CLINICAL HISTORY/REASON FOR EXAM: Trauma; fall down   stairs.    TECHNIQUE: Contiguous axial CT images were obtained from the thoracic   inlet to the pubic symphysis following administration of 90 cc Omnipaque   350 intravenous contrast. 10 cc contrast discarded.  Oral contrast was   not administered. Reformatted images in the coronal and sagittal planes   were acquired. 3D (MIP) images obtained.    COMPARISON: CT ABDOMEN/PELVIS 10/10/2019, CT chest 5/9/2022.      FINDINGS:    CHEST:    LUNGS, PLEURA, AIRWAYS: Bibasilar subsegmental atelectatic changes.   Patent central tracheobronchial tree. No pleural effusion. No   pneumothorax. Redemonstrated is elevated right hemidiaphragm.    THORACIC NODES: Unremarkable.    MEDIASTINUM/GREAT VESSELS: Coronary artery calcifications. No pericardial   effusion. While the heart is not enlarged, there is apparent left   ventricular wall thickening. The aorta and main pulmonary artery are of   normal caliber.    ABDOMEN/PELVIS:    HEPATOBILIARY: Cholecystectomy. Liver is nodular in contour, and there   are perisplenic, gastrohepatic, and omental varices, compatible with   cirrhosis. Patent portal vein.    SPLEEN: Unremarkable.    PANCREAS: Unremarkable.    ADRENAL GLANDS: Unremarkable.    KIDNEYS: No obstructing calculus or hydronephrosis. Symmetric renal   enhancement. Right renal lower pole cyst.    ABDOMINOPELVIC NODES: Unremarkable.    PELVIC ORGANS: Unremarkable.    PERITONEUM/MESENTERY/BOWEL: No bowel obstruction or wall thickening. No   pneumoperitoneum or ascites. Normal caliber appendix. Fecal-like material   in the small bowel loops compatible with delayed transition    BONES/SOFT TISSUES: Multiple bilateral chronic and acute rib fractures,   such as acute rib fractures on the left 1st, 4-6th, and 8th-10th ribs.   Chronic appearing left posterior T11 and T12 rib fractures with callus   formation. Degenerative changes of the spine. Severe degenerative changes   of the left shoulder and thoracolumbar spine.    OTHER: Atherosclerotic calcifications of the normal caliber aorta. There   is a small fat-containing right inguinal hernia.      IMPRESSION:      Multiple bilateral chronic and acute rib fractures, such as acute rib   fractures on the left 1st, 4-6th, and 8th-10th ribs.    Liver nodularity with perisplenic, gastrohepatic, and omental varices,   compatible with cirrhosis and portal hypertension.    Lungs are suspicious for left ventricular hypertrophy. Further evaluation   with echocardiogram is recommended.    --- End of Report ---    TECHNIQUE: CT of the cervical spine was performed without the   administration of intravenous contrast.    COMPARISON: CT cervical spine 10/10/2019    FINDINGS:    C1 right anterior arch burst fracture. There is a longitudinal fracture   involving posterior left first rib    There is no evidence of compression deformity or facet subluxation of the   cervical spine.    The atlantoaxial relationships are maintained. The posterior elements are   intact.There is no significant prevertebral soft tissue swelling. The   visualized paraspinal soft tissues are unremarkable.    Mild multilevel endplate degenerative changes as evidenced by marginal   osteophyte formation, uncovertebral spurring, loss of normal disc space   height as well as facet joint space compartment narrowing.    No evidence for high-grade spinal canal stenosis. There is moderate   neuroforaminal narrowing at the C5-C6 level. Please note that examination   of spinal canal contents is limited inherent to CT technique.    IMPRESSION:    C1 right anterior arch burst fracture    Additional fracture involving left first rib      Dr. Dhruv Martinez discussed preliminary findings with RICHIE ZABALA PA   on 10/13/2022 6:42 AM with readback.    --- End of Report ---  CT Head No Con    No evidence of intracranial hemorrhage, territorial infarct, or mass   effect.    Large left frontotemporal scalp hematoma without evidence of subjacent   fracture.      Physical Exam:   ----------------------------------------------------------------------------------------------------------     Exam: A & O x 1. No focal deficits. Sensation intact bilaterally. 5/5 strength in all extremities CN II-XII intact. EOMI. PEERLA.     RESPIRATORY:  Normal expansion/effort. Clear to auscultation bilaterally. Mild tenderness elicited over left ribs.     CARDIOVASCULAR: Normal S1/S2 heard. RRR.   No peripheral edema    GASTROINTESTINAL:  Abdomen soft, non-tender, non-distended.    MUSCULOSKELETAL:  Extremities warm, pink, well-perfused. [place] collar in place. Mild tenderness elicited over left ribs. Unable to assess neck tenderness as pt is in hard collar.     DERM:  No skin breakdown     : Voiding.     Height (cm): 167.6 (10-13-22)  Weight (kg): 95.3 (10-13-22)  BMI (kg/m2): 33.9 (10-13-22)  BSA (m2): 2.04 (10-13-22)    Tubes/Lines/Drains   ----------------------------------------------------------------------------------------------------------  [x] Peripheral IV L and R arm            STACY CARDENAS     77y Male    [id number]    Admit Date: 10-13-22  Hospital LOS:   ICU Admission Date:        ============================  HPI   HPI: This is a 77-year-old male with PMH dementia, DM2 (on metformin  and glipizide), HTN (on quinapril), cirrhosis, and depression who presented to the ED s/p fall down the stairs with +HT and -LOC. Patient's wife reports that she did not witness the fall but found pt on floor immediately after. As per wife, pt was ambulatory after the fall.  CT in the ED found multiple bilateral chronic and acute rib fractures, including acute rib fractures on the left 1st, 4-6th, and 8th-10th ribs as well as C1 right anterior arch burst fracture. SICU was consulted for respiratory monitoring in the setting of multiple rib fractures and C1 fx.     On examination in the ED by SICU provider, pt was A&O to person but not place or time. He reported mild pain over the left ribs. Pt denied neck pain, back pain, dizziness, shortness of breath, chest pain, palpitations, or paresthesias. BP was 129/73 with HR 96. Pt was able to blow 750 on IS.    Pt's frailty score was calculated to be 4/5 (pt reports fatigue all of the time, is unable to ambulate without assistance, 9% weight loss [20 lbs] over the last year). Pt's wife reported that patient uses walker at home and needs assistance ambulating.     TRAUMA ACTIVATION LEVEL:  CODE / ALERT  / CONSULT  ACTIVATED BY: EMS**  /  ED**  INTUBATED: YES** / NO**      MECHANISM OF INJURY:   [] Blunt     [] MVC	  [X] Fall	  [] Pedestrian Struck	  [] Motorcycle     [] Assault     [] Bicycle collision    [] Sports injury    [] Penetrating    [] Gun Shot Wound      [] Stab Wound    GCS: 14 	E: 4	V: 4	M: 6        Consults-  Consult Note Adult-Neurosurgery Physician Assistant Student [RADHA Rose] (10-13-22)  Consult Note Adult-Orthopedics Physician Assistant/Attending [DELANO Gar] (02-15-22)  Consult Note Adult-CT Surgery Resident/Attending [RADHA Gonzalez] (10-14-19)  Consult Note Adult-Physiatry Attending [MAGY Mosley] (10-11-19)  Consult Note Adult-SICU PA/Attending [TRELL Mcghee] (10-11-19)        [X] A ten-point review of systems was otherwise negative except as noted above.  [  ] Due to altered mental status/intubation, subjective information was not attained from the patient. History was obtained, to the extent possible, from review of the chart and collateral sources of information.    ==========================    PMH  PAST MEDICAL & SURGICAL HISTORY:  Diabetes  Depression  Gout  Benign essential HTN  Osteoarthritis  Cataracts, both eyes  S/P cholecystectomy  S/P knee surgery    Home Meds:  Home Medications:  aspirin 81 mg oral tablet: 1 tab(s) orally once a day (10 Oct 2019 21:41)  buPROPion 150 mg/24 hours (XL) oral tablet, extended release: 1 tab(s) orally every 24 hours (14 Feb 2022 09:33)  donepezil 10 mg oral tablet: 1 tab(s) orally once a day (at bedtime) (14 Feb 2022 09:30)  DULoxetine 60 mg oral delayed release capsule: 1 cap(s) orally once a day (14 Feb 2022 09:30)  glipiZIDE 2.5 mg oral tablet, extended release: 1 tab(s) orally once a day (10 Oct 2019 21:41)  memantine 10 mg oral tablet: 1 tab(s) orally 2 times a day (14 Feb 2022 09:31)  metFORMIN 1000 mg oral tablet: 1 tab(s) orally 2 times a day (10 Oct 2019 21:41)  quinapril 20 mg oral tablet: 1 tab(s) orally once a day (14 Feb 2022 09:31)  Vitamin B6 100 mg oral tablet: 1 tab(s) orally once a day (10 Oct 2019 21:41)  Vitamin D3 2000 intl units oral tablet: 1 tab(s) orally once a day (10 Oct 2019 21:47)       Allergies  Allergies    Keflex (Hives)  Keflex (Unknown)    Intolerances    Current Medications:      VITAL SIGNS, INS/OUTS (Last 24hours):    ICU Vital Signs Last 24 Hrs  T(C): 36.6 (13 Oct 2022 06:22), Max: 36.8 (13 Oct 2022 01:25)  T(F): 97.8 (13 Oct 2022 06:22), Max: 98.3 (13 Oct 2022 01:25)  HR: 102 (13 Oct 2022 07:02) (102 - 110)  BP: 137/76 (13 Oct 2022 07:02) (131/58 - 141/63)  RR: 18 (13 Oct 2022 07:02) (16 - 18)  SpO2: 98% (13 Oct 2022 07:02) (95% - 98%)    O2 Parameters below as of 13 Oct 2022 07:02  Patient On (Oxygen Delivery Method): room air      INTERPRETATION:  CLINICAL INDICATION: C1 fracture.    TECHNIQUE:  CTA of the neck was performed after the intravenous   administration of of contrast. 3-D reconstructions were performed under   physician supervision on a separate workstation and reviewed. A total of   100 mL Omnipaque 350 nonionic IV contrast was administered.    NASCET CRITERIA FOR CAROTID STENOSIS:  Mild: 0% to 49%, Moderate: 50% to 69%, Severe: 70% to 99%, Complete   Occlusion    COMPARISON: None available.    FINDINGS:    AORTIC ARCH: There are mild calcific atherosclerotic plaques without   flow-limiting stenosis.    RIGHT ANTERIOR CIRCULATION:  There is moderate focal narrowing in the proximal common carotid artery   (CCA) due to vascular turn. There is mild noncalcific/calcific   atherosclerosis in the carotid bulb without flow-limiting stenosis. The   external carotid artery (ECA) and its proximal branches are patent   without stenosis. The cervical internal carotid artery (ICA) is tortuous   in course but remains patent without stenosis. There is mild calcific   atherosclerosis in the carotid siphon resulting in mild stenosis in the   supraclinoid ICA.    The anterior and middle cerebral arteries (JAYDEN/MCA) are patent without   stenosis.      LEFT ANTERIOR CIRCULATION:  The CCA is patent up to the bulb without stenosis. The carotid bulb is   patent. The ECA and its proximal branches are patent without stenosis.   There is mild calcific plaque in the proximal cervical ICA without   flow-limiting stenosis. The remaining cervical ICA is tortuous but   remains patent patent without stenosis. There is mild calcific   atherosclerosis in the carotid siphon resulting in mild stenosis in the   supraclinoid ICA.    The anterior and middle cerebral arteries are patent without stenosis.      POSTERIOR CIRCULATION:  There is mild focal stenosis in the proximal V2 segment of the right   vertebral artery (4-158). The left vertebral artery is patent. The   basilar artery is patent without stenosis. The proximal branch   vasculature of the posterior circulation are within normal limits.    The posterior cerebral arteries are patent without stenosis.    There is no evidence for saccular aneurysm, vascular malformation, or   large vessel occlusion.      OTHERS:  Please see separately dictated same day CT head for the intracranial   findings.    Redemonstrated C1 fracture and left first rib fracture      IMPRESSION:    No evidence of vascular injury. No large vessel occlusion, aneurysm, or   vascular malformation.    Mild scattered atherosclerotic disease as above.    Redemonstrated acute C1 and left first rib fractures.    --- End of Report ---    ACC: [id number] EXAM:  CT ABDOMEN AND PELVIS IC                          PROCEDURE DATE:  10/13/2022          INTERPRETATION:  CLINICAL HISTORY/REASON FOR EXAM: Trauma; fall down   stairs.    TECHNIQUE: Contiguous axial CT images were obtained from the thoracic   inlet to the pubic symphysis following administration of 90 cc Omnipaque   350 intravenous contrast. 10 cc contrast discarded.  Oral contrast was   not administered. Reformatted images in the coronal and sagittal planes   were acquired. 3D (MIP) images obtained.    COMPARISON: CT ABDOMEN/PELVIS 10/10/2019, CT chest 5/9/2022.      FINDINGS:    CHEST:    LUNGS, PLEURA, AIRWAYS: Bibasilar subsegmental atelectatic changes.   Patent central tracheobronchial tree. No pleural effusion. No   pneumothorax. Redemonstrated is elevated right hemidiaphragm.    THORACIC NODES: Unremarkable.    MEDIASTINUM/GREAT VESSELS: Coronary artery calcifications. No pericardial   effusion. While the heart is not enlarged, there is apparent left   ventricular wall thickening. The aorta and main pulmonary artery are of   normal caliber.    ABDOMEN/PELVIS:    HEPATOBILIARY: Cholecystectomy. Liver is nodular in contour, and there   are perisplenic, gastrohepatic, and omental varices, compatible with   cirrhosis. Patent portal vein.    SPLEEN: Unremarkable.    PANCREAS: Unremarkable.    ADRENAL GLANDS: Unremarkable.    KIDNEYS: No obstructing calculus or hydronephrosis. Symmetric renal   enhancement. Right renal lower pole cyst.    ABDOMINOPELVIC NODES: Unremarkable.    PELVIC ORGANS: Unremarkable.    PERITONEUM/MESENTERY/BOWEL: No bowel obstruction or wall thickening. No   pneumoperitoneum or ascites. Normal caliber appendix. Fecal-like material   in the small bowel loops compatible with delayed transition    BONES/SOFT TISSUES: Multiple bilateral chronic and acute rib fractures,   such as acute rib fractures on the left 1st, 4-6th, and 8th-10th ribs.   Chronic appearing left posterior T11 and T12 rib fractures with callus   formation. Degenerative changes of the spine. Severe degenerative changes   of the left shoulder and thoracolumbar spine.    OTHER: Atherosclerotic calcifications of the normal caliber aorta. There   is a small fat-containing right inguinal hernia.      IMPRESSION:      Multiple bilateral chronic and acute rib fractures, such as acute rib   fractures on the left 1st, 4-6th, and 8th-10th ribs.    Liver nodularity with perisplenic, gastrohepatic, and omental varices,   compatible with cirrhosis and portal hypertension.    Lungs are suspicious for left ventricular hypertrophy. Further evaluation   with echocardiogram is recommended.    --- End of Report ---      INTERPRETATION:  CLINICAL HISTORY/REASON FOR EXAM: Trauma; fall down   stairs.    TECHNIQUE: Contiguous axial CT images were obtained from the thoracic   inlet to the pubic symphysis following administration of 90 cc Omnipaque   350 intravenous contrast. 10 cc contrast discarded.  Oral contrast was   not administered. Reformatted images in the coronal and sagittal planes   were acquired. 3D (MIP) images obtained.    COMPARISON: CT ABDOMEN/PELVIS 10/10/2019, CT chest 5/9/2022.      FINDINGS:    CHEST:    LUNGS, PLEURA, AIRWAYS: Bibasilar subsegmental atelectatic changes.   Patent central tracheobronchial tree. No pleural effusion. No   pneumothorax. Redemonstrated is elevated right hemidiaphragm.    THORACIC NODES: Unremarkable.    MEDIASTINUM/GREAT VESSELS: Coronary artery calcifications. No pericardial   effusion. While the heart is not enlarged, there is apparent left   ventricular wall thickening. The aorta and main pulmonary artery are of   normal caliber.    ABDOMEN/PELVIS:    HEPATOBILIARY: Cholecystectomy. Liver is nodular in contour, and there   are perisplenic, gastrohepatic, and omental varices, compatible with   cirrhosis. Patent portal vein.    SPLEEN: Unremarkable.    PANCREAS: Unremarkable.    ADRENAL GLANDS: Unremarkable.    KIDNEYS: No obstructing calculus or hydronephrosis. Symmetric renal   enhancement. Right renal lower pole cyst.    ABDOMINOPELVIC NODES: Unremarkable.    PELVIC ORGANS: Unremarkable.    PERITONEUM/MESENTERY/BOWEL: No bowel obstruction or wall thickening. No   pneumoperitoneum or ascites. Normal caliber appendix. Fecal-like material   in the small bowel loops compatible with delayed transition    BONES/SOFT TISSUES: Multiple bilateral chronic and acute rib fractures,   such as acute rib fractures on the left 1st, 4-6th, and 8th-10th ribs.   Chronic appearing left posterior T11 and T12 rib fractures with callus   formation. Degenerative changes of the spine. Severe degenerative changes   of the left shoulder and thoracolumbar spine.    OTHER: Atherosclerotic calcifications of the normal caliber aorta. There   is a small fat-containing right inguinal hernia.      IMPRESSION:      Multiple bilateral chronic and acute rib fractures, such as acute rib   fractures on the left 1st, 4-6th, and 8th-10th ribs.    Liver nodularity with perisplenic, gastrohepatic, and omental varices,   compatible with cirrhosis and portal hypertension.    Lungs are suspicious for left ventricular hypertrophy. Further evaluation   with echocardiogram is recommended.    --- End of Report ---    TECHNIQUE: CT of the cervical spine was performed without the   administration of intravenous contrast.    COMPARISON: CT cervical spine 10/10/2019    FINDINGS:    C1 right anterior arch burst fracture. There is a longitudinal fracture   involving posterior left first rib    There is no evidence of compression deformity or facet subluxation of the   cervical spine.    The atlantoaxial relationships are maintained. The posterior elements are   intact.There is no significant prevertebral soft tissue swelling. The   visualized paraspinal soft tissues are unremarkable.    Mild multilevel endplate degenerative changes as evidenced by marginal   osteophyte formation, uncovertebral spurring, loss of normal disc space   height as well as facet joint space compartment narrowing.    No evidence for high-grade spinal canal stenosis. There is moderate   neuroforaminal narrowing at the C5-C6 level. Please note that examination   of spinal canal contents is limited inherent to CT technique.    IMPRESSION:    C1 right anterior arch burst fracture    Additional fracture involving left first rib      Dr. Dhruv Martinez discussed preliminary findings with RICHIE ZABALA PA   on 10/13/2022 6:42 AM with readback.    --- End of Report ---  CT Head No Con    No evidence of intracranial hemorrhage, territorial infarct, or mass   effect.    Large left frontotemporal scalp hematoma without evidence of subjacent   fracture.      Physical Exam:   ----------------------------------------------------------------------------------------------------------     Exam: A & O x 1. No focal deficits. Sensation intact bilaterally. 5/5 strength in all extremities CN II-XII intact. EOMI. PEERLA.     RESPIRATORY:  Normal expansion/effort. Clear to auscultation bilaterally. Mild tenderness elicited over left ribs.     CARDIOVASCULAR: Normal S1/S2 heard. RRR.   No peripheral edema    GASTROINTESTINAL:  Abdomen soft, non-tender, non-distended.    MUSCULOSKELETAL:  Extremities warm, pink, well-perfused. [place] collar in place. Mild tenderness elicited over left ribs. Unable to assess neck tenderness as pt is in collar.     DERM:  No skin breakdown     : Voiding.     Height (cm): 167.6 (10-13-22)  Weight (kg): 95.3 (10-13-22)  BMI (kg/m2): 33.9 (10-13-22)  BSA (m2): 2.04 (10-13-22)    Tubes/Lines/Drains   ----------------------------------------------------------------------------------------------------------  [x] Peripheral IV L and R arm

## 2022-10-13 NOTE — PATIENT PROFILE ADULT - FALL HARM RISK - HARM RISK INTERVENTIONS

## 2022-10-13 NOTE — ED PROVIDER NOTE - PROGRESS NOTE DETAILS
CXR with multiple rib fx, no ptx, pending CTs D/w Radiology resident - states ~7posterior rib fracture no PNTX - final report to be dictated soon. D/w [place] Dr Duffy accepts transfer. D/w family at beside Elena: called radiology for wet read as we had high suspicion for multiple rib fx, prelim read with 7 posterior rib fx, no ptx, fernando, will need trasnfer to [facility] for trauma eval Elena: C spine initially cleared based on negative prelim read, however received call for overread with C1 arch fracture. Patient already en route to [facility], Dr. Nesbitt aware of change in read and need for C spine immobilization on arrival RK: [place], trauma alert was called overhead. patient ABC intact, AAOX3. Pending trauma evaluation. C-collar replaced on patient as soon as he arrived. RK: PT arrived from south, trauma alert was called overhead. patient ABC intact, AAOX3. trauma surgery Dr. Fitzgerald is at bedside. C-collar replaced on patient as soon as he arrived. RK: PT arrived from [place], trauma alert was called overhead. patient ABC intact, AAOX3. trauma surgery Dr. Fitzgerald is at bedside. C-collar replaced on patient as soon as he arrived. \    Neurosurgery surgery consulted, spoke with JULIA Waters. RK: Spoke with SICU JULIA Reece, discussed case, in setting of multiple rib fx and c1 burst fx.

## 2022-10-13 NOTE — H&P ADULT - ATTENDING COMMENTS
Patient seen and examined with surgery resident in ED hallway and discussed management plans. spoke to son by bedside unsure how many steps fall. Family at fall immediately available and transferred from south after initial imaging for further trauma evaluation and management.  Cervical collar in place patient awake and verbal with local orbital area ecchymosis only. Images reviewed. ICU consult and trauma admission.

## 2022-10-13 NOTE — H&P ADULT - NSHPLABSRESULTS_GEN_ALL_CORE
Labs:                    13.9   10.26 )-----------( 171      ( 13 Oct 2022 02:10 )             38.4       Auto Neutrophil %: 70.8 % (10-13-22 @ 02:10)  Auto Immature Granulocyte %: 0.6 % (10-13-22 @ 02:10)    10-13  137  |  97<L>  |  14  ----------------------------<  181<H>  5.2<H>   |  25  |  0.9    Calcium, Total Serum: 9.2 mg/dL (10-13-22 @ 02:10)    LFTs:          6.8  | 1.3  | 69       ------------------[139     ( 13 Oct 2022 02:10 )  3.0  | x    | 19           Coags:  13.30  ----< 1.16    ( 13 Oct 2022 02:10 )     35.4      CARDIAC MARKERS ( 13 Oct 2022 02:10 )  x     / <0.01 ng/mL / x     / x     / x            RADIOLOGY & ADDITIONAL STUDIES:  --------------------------------------------------------------------------------------- Labs:                    13.9   10.26 )-----------( 171      ( 13 Oct 2022 02:10 )             38.4       Auto Neutrophil %: 70.8 % (10-13-22 @ 02:10)  Auto Immature Granulocyte %: 0.6 % (10-13-22 @ 02:10)    10-13  137  |  97<L>  |  14  ----------------------------<  181<H>  5.2<H>   |  25  |  0.9    Calcium, Total Serum: 9.2 mg/dL (10-13-22 @ 02:10)    LFTs:          6.8  | 1.3  | 69       ------------------[139     ( 13 Oct 2022 02:10 )  3.0  | x    | 19           Coags:  13.30  ----< 1.16    ( 13 Oct 2022 02:10 )     35.4      CARDIAC MARKERS ( 13 Oct 2022 02:10 )  x     / <0.01 ng/mL / x     / x     / x          RADIOLOGY & ADDITIONAL STUDIES:  < from: CT Head No Cont (10.13.22 @ 04:30) >  Impression:  No evidence of intracranial hemorrhage, territorial infarct, or mass   effect.    Large left frontotemporal scalp hematoma without evidence of subjacent   fracture.  < end of copied text >    < from: CT Angio Neck w/ IV Cont (10.13.22 @ 09:03) >  IMPRESSION:  No evidence of vascular injury. No large vessel occlusion, aneurysm, or   vascular malformation.    Mild scattered atherosclerotic disease as above.    Redemonstrated acute C1 and left first rib fractures.  < end of copied text >    < from: CT Cervical Spine No Cont (10.13.22 @ 04:30) >  FINDINGS:  C1 right anterior arch burst fracture. There is a longitudinal fracture   involving posterior left first rib  < end of copied text >    < from: CT Abdomen and Pelvis w/ IV Cont (10.13.22 @ 04:30) >  IMPRESSION:  Multiple bilateral chronic and acute rib fractures, such as acute rib   fractures on the left 1st, 4-6th, and 8th-10th ribs.    Liver nodularity with perisplenic, gastrohepatic, and omental varices,   compatible with cirrhosis and portal hypertension.    Lungs are suspicious for left ventricular hypertrophy. Further evaluation   with echocardiogram is recommended.  < end of copied text >      < from: Xray Chest 1 View-PORTABLE IMMEDIATE (10.13.22 @ 02:34) >  Skeleton/soft tissues: See subsequently performed chest CT for evaluation   of rib fractures.  Impression:  See subsequently performed chest CT for evaluation of rib fractures   (acute and chronic).  --- End of Report ---  < end of copied text >    < from: Xray Pelvis AP only (10.13.22 @ 02:33) >  Impression:  No acute fracture or acute articular abnormality.  < end of copied text >    < from: Xray Knee 3 Views, Bilateral (10.13.22 @ 02:32) >  impression:  No evidence of acute fracture or dislocation. Mild degenerative changes   bilaterally. No appreciable joint effusion.  < end of copied text >

## 2022-10-13 NOTE — ED PROVIDER NOTE - PHYSICAL EXAMINATION
Physical Exam    Vital Signs: I have reviewed the initial vital signs.  Constitutional: well-nourished, appears stated age, no acute distress  Eyes: Conjunctiva pink, Sclera clear, PERRLA, EOMI.  Cardiovascular: S1 and S2, regular rate, regular rhythm, well-perfused extremities, radial pulses equal and 2+  Respiratory: unlabored respiratory effort, clear to auscultation bilaterally no wheezing, rales and rhonchi + left rib pain  Gastrointestinal: soft, non-tender abdomen, no pulsatile mass, normal bowl sounds  Musculoskeletal: supple neck, no lower extremity edema, no midline tenderness  Integumentary: warm, dry, no rash + ecchymosis to b/l knees and left scalp tenderness and swelling noted with ecchymosis.   Neurologic: awake, alert, cranial nerves II-XII grossly intact, extremities’ motor and sensory functions grossly intact  Psychiatric: appropriate mood, appropriate affect

## 2022-10-13 NOTE — PATIENT PROFILE ADULT - NSPROPASSIVESMOKEEXPOSURE_GEN_A_NUR
Spoke to mom, mom had questions concerning covid testing and quarantine.  is positive so family is in quarantine, advised that if pt is having symptoms can be tested for covid, if has questions over weekend can speak to on call nurse.    No

## 2022-10-14 LAB
A1C WITH ESTIMATED AVERAGE GLUCOSE RESULT: 6.1 % — HIGH (ref 4–5.6)
ANION GAP SERPL CALC-SCNC: 11 MMOL/L — SIGNIFICANT CHANGE UP (ref 7–14)
ANION GAP SERPL CALC-SCNC: 9 MMOL/L — SIGNIFICANT CHANGE UP (ref 7–14)
ANION GAP SERPL CALC-SCNC: 9 MMOL/L — SIGNIFICANT CHANGE UP (ref 7–14)
BUN SERPL-MCNC: 21 MG/DL — HIGH (ref 10–20)
BUN SERPL-MCNC: 25 MG/DL — HIGH (ref 10–20)
BUN SERPL-MCNC: 26 MG/DL — HIGH (ref 10–20)
CALCIUM SERPL-MCNC: 8.4 MG/DL — SIGNIFICANT CHANGE UP (ref 8.4–10.5)
CALCIUM SERPL-MCNC: 8.6 MG/DL — SIGNIFICANT CHANGE UP (ref 8.4–10.5)
CALCIUM SERPL-MCNC: 8.8 MG/DL — SIGNIFICANT CHANGE UP (ref 8.4–10.5)
CHLORIDE SERPL-SCNC: 100 MMOL/L — SIGNIFICANT CHANGE UP (ref 98–110)
CHLORIDE SERPL-SCNC: 100 MMOL/L — SIGNIFICANT CHANGE UP (ref 98–110)
CHLORIDE SERPL-SCNC: 102 MMOL/L — SIGNIFICANT CHANGE UP (ref 98–110)
CO2 SERPL-SCNC: 25 MMOL/L — SIGNIFICANT CHANGE UP (ref 17–32)
CO2 SERPL-SCNC: 27 MMOL/L — SIGNIFICANT CHANGE UP (ref 17–32)
CO2 SERPL-SCNC: 29 MMOL/L — SIGNIFICANT CHANGE UP (ref 17–32)
CREAT SERPL-MCNC: 0.9 MG/DL — SIGNIFICANT CHANGE UP (ref 0.7–1.5)
CREAT SERPL-MCNC: 0.9 MG/DL — SIGNIFICANT CHANGE UP (ref 0.7–1.5)
CREAT SERPL-MCNC: 1.1 MG/DL — SIGNIFICANT CHANGE UP (ref 0.7–1.5)
EGFR: 69 ML/MIN/1.73M2 — SIGNIFICANT CHANGE UP
EGFR: 88 ML/MIN/1.73M2 — SIGNIFICANT CHANGE UP
EGFR: 88 ML/MIN/1.73M2 — SIGNIFICANT CHANGE UP
ESTIMATED AVERAGE GLUCOSE: 128 MG/DL — HIGH (ref 68–114)
GLUCOSE BLDC GLUCOMTR-MCNC: 189 MG/DL — HIGH (ref 70–99)
GLUCOSE BLDC GLUCOMTR-MCNC: 221 MG/DL — HIGH (ref 70–99)
GLUCOSE BLDC GLUCOMTR-MCNC: 351 MG/DL — HIGH (ref 70–99)
GLUCOSE SERPL-MCNC: 235 MG/DL — HIGH (ref 70–99)
GLUCOSE SERPL-MCNC: 287 MG/DL — HIGH (ref 70–99)
GLUCOSE SERPL-MCNC: 309 MG/DL — HIGH (ref 70–99)
HCT VFR BLD CALC: 28.9 % — LOW (ref 42–52)
HCT VFR BLD CALC: 35.7 % — LOW (ref 42–52)
HGB BLD-MCNC: 10.6 G/DL — LOW (ref 14–18)
HGB BLD-MCNC: 12.7 G/DL — LOW (ref 14–18)
MAGNESIUM SERPL-MCNC: 1.8 MG/DL — SIGNIFICANT CHANGE UP (ref 1.8–2.4)
MAGNESIUM SERPL-MCNC: 2.4 MG/DL — SIGNIFICANT CHANGE UP (ref 1.8–2.4)
MCHC RBC-ENTMCNC: 35.5 PG — HIGH (ref 27–31)
MCHC RBC-ENTMCNC: 35.6 G/DL — SIGNIFICANT CHANGE UP (ref 32–37)
MCHC RBC-ENTMCNC: 36.2 PG — HIGH (ref 27–31)
MCHC RBC-ENTMCNC: 36.7 G/DL — SIGNIFICANT CHANGE UP (ref 32–37)
MCV RBC AUTO: 98.6 FL — HIGH (ref 80–94)
MCV RBC AUTO: 99.7 FL — HIGH (ref 80–94)
NRBC # BLD: 0 /100 WBCS — SIGNIFICANT CHANGE UP (ref 0–0)
NRBC # BLD: 0 /100 WBCS — SIGNIFICANT CHANGE UP (ref 0–0)
PHOSPHATE SERPL-MCNC: 3.4 MG/DL — SIGNIFICANT CHANGE UP (ref 2.1–4.9)
PHOSPHATE SERPL-MCNC: 4.9 MG/DL — SIGNIFICANT CHANGE UP (ref 2.1–4.9)
PLATELET # BLD AUTO: 120 K/UL — LOW (ref 130–400)
PLATELET # BLD AUTO: 146 K/UL — SIGNIFICANT CHANGE UP (ref 130–400)
POTASSIUM SERPL-MCNC: 4.6 MMOL/L — SIGNIFICANT CHANGE UP (ref 3.5–5)
POTASSIUM SERPL-MCNC: 4.6 MMOL/L — SIGNIFICANT CHANGE UP (ref 3.5–5)
POTASSIUM SERPL-MCNC: 4.8 MMOL/L — SIGNIFICANT CHANGE UP (ref 3.5–5)
POTASSIUM SERPL-SCNC: 4.6 MMOL/L — SIGNIFICANT CHANGE UP (ref 3.5–5)
POTASSIUM SERPL-SCNC: 4.6 MMOL/L — SIGNIFICANT CHANGE UP (ref 3.5–5)
POTASSIUM SERPL-SCNC: 4.8 MMOL/L — SIGNIFICANT CHANGE UP (ref 3.5–5)
RBC # BLD: 2.93 M/UL — LOW (ref 4.7–6.1)
RBC # BLD: 3.58 M/UL — LOW (ref 4.7–6.1)
RBC # FLD: 13.8 % — SIGNIFICANT CHANGE UP (ref 11.5–14.5)
RBC # FLD: 14.1 % — SIGNIFICANT CHANGE UP (ref 11.5–14.5)
SODIUM SERPL-SCNC: 136 MMOL/L — SIGNIFICANT CHANGE UP (ref 135–146)
SODIUM SERPL-SCNC: 136 MMOL/L — SIGNIFICANT CHANGE UP (ref 135–146)
SODIUM SERPL-SCNC: 140 MMOL/L — SIGNIFICANT CHANGE UP (ref 135–146)
WBC # BLD: 7.07 K/UL — SIGNIFICANT CHANGE UP (ref 4.8–10.8)
WBC # BLD: 7.18 K/UL — SIGNIFICANT CHANGE UP (ref 4.8–10.8)
WBC # FLD AUTO: 7.07 K/UL — SIGNIFICANT CHANGE UP (ref 4.8–10.8)
WBC # FLD AUTO: 7.18 K/UL — SIGNIFICANT CHANGE UP (ref 4.8–10.8)

## 2022-10-14 PROCEDURE — 99291 CRITICAL CARE FIRST HOUR: CPT | Mod: 24,25

## 2022-10-14 PROCEDURE — 71045 X-RAY EXAM CHEST 1 VIEW: CPT | Mod: 26

## 2022-10-14 RX ORDER — SODIUM CHLORIDE 9 MG/ML
1000 INJECTION, SOLUTION INTRAVENOUS
Refills: 0 | Status: DISCONTINUED | OUTPATIENT
Start: 2022-10-14 | End: 2022-10-14

## 2022-10-14 RX ORDER — INSULIN LISPRO 100/ML
VIAL (ML) SUBCUTANEOUS
Refills: 0 | Status: DISCONTINUED | OUTPATIENT
Start: 2022-10-14 | End: 2022-10-19

## 2022-10-14 RX ORDER — SODIUM CHLORIDE 9 MG/ML
1000 INJECTION, SOLUTION INTRAVENOUS
Refills: 0 | Status: DISCONTINUED | OUTPATIENT
Start: 2022-10-14 | End: 2022-10-16

## 2022-10-14 RX ORDER — MAGNESIUM SULFATE 500 MG/ML
2 VIAL (ML) INJECTION ONCE
Refills: 0 | Status: COMPLETED | OUTPATIENT
Start: 2022-10-14 | End: 2022-10-14

## 2022-10-14 RX ADMIN — LACTULOSE 10 GRAM(S): 10 SOLUTION ORAL at 05:33

## 2022-10-14 RX ADMIN — CARBIDOPA AND LEVODOPA 1 TABLET(S): 25; 100 TABLET ORAL at 05:33

## 2022-10-14 RX ADMIN — LACTULOSE 10 GRAM(S): 10 SOLUTION ORAL at 17:46

## 2022-10-14 RX ADMIN — Medication 5: at 17:46

## 2022-10-14 RX ADMIN — Medication 650 MILLIGRAM(S): at 06:43

## 2022-10-14 RX ADMIN — MEMANTINE HYDROCHLORIDE 10 MILLIGRAM(S): 10 TABLET ORAL at 11:49

## 2022-10-14 RX ADMIN — GABAPENTIN 100 MILLIGRAM(S): 400 CAPSULE ORAL at 15:42

## 2022-10-14 RX ADMIN — Medication 650 MILLIGRAM(S): at 19:00

## 2022-10-14 RX ADMIN — GABAPENTIN 100 MILLIGRAM(S): 400 CAPSULE ORAL at 05:32

## 2022-10-14 RX ADMIN — DONEPEZIL HYDROCHLORIDE 10 MILLIGRAM(S): 10 TABLET, FILM COATED ORAL at 21:15

## 2022-10-14 RX ADMIN — LIDOCAINE 1 PATCH: 4 CREAM TOPICAL at 13:00

## 2022-10-14 RX ADMIN — Medication 650 MILLIGRAM(S): at 05:32

## 2022-10-14 RX ADMIN — ENOXAPARIN SODIUM 40 MILLIGRAM(S): 100 INJECTION SUBCUTANEOUS at 15:46

## 2022-10-14 RX ADMIN — Medication 650 MILLIGRAM(S): at 11:49

## 2022-10-14 RX ADMIN — CHLORHEXIDINE GLUCONATE 1 APPLICATION(S): 213 SOLUTION TOPICAL at 05:33

## 2022-10-14 RX ADMIN — LIDOCAINE 1 PATCH: 4 CREAM TOPICAL at 15:41

## 2022-10-14 RX ADMIN — Medication 81 MILLIGRAM(S): at 11:49

## 2022-10-14 RX ADMIN — GABAPENTIN 100 MILLIGRAM(S): 400 CAPSULE ORAL at 21:15

## 2022-10-14 RX ADMIN — LATANOPROST 1 DROP(S): 0.05 SOLUTION/ DROPS OPHTHALMIC; TOPICAL at 21:14

## 2022-10-14 RX ADMIN — Medication 650 MILLIGRAM(S): at 17:46

## 2022-10-14 RX ADMIN — Medication 6: at 06:25

## 2022-10-14 RX ADMIN — Medication 13: at 11:50

## 2022-10-14 RX ADMIN — Medication 650 MILLIGRAM(S): at 13:35

## 2022-10-14 RX ADMIN — DULOXETINE HYDROCHLORIDE 60 MILLIGRAM(S): 30 CAPSULE, DELAYED RELEASE ORAL at 11:49

## 2022-10-14 RX ADMIN — CARBIDOPA AND LEVODOPA 1 TABLET(S): 25; 100 TABLET ORAL at 17:46

## 2022-10-14 NOTE — OCCUPATIONAL THERAPY INITIAL EVALUATION ADULT - PERTINENT HX OF CURRENT PROBLEM, REHAB EVAL
77yM w/ PMHx of Diabetes, Depression, Gout, Benign essential HTN, Osteoarthritis, Cataracts, both eyes, S/P open cholecystectomy, S/P knee surgery seen as Trauma Alert s/p mechanical fall last night. +HT, -LOC, -AC (takes ASA 81 every other day)  Trauma assessment in ED: ABCs intact , GCS 15 , AAOx2 (at baseline). External signs of trauma include: hematoma/abrasion to left frontotemporal scalp and bilateral knee abrasion

## 2022-10-14 NOTE — PROGRESS NOTE ADULT - SUBJECTIVE AND OBJECTIVE BOX
STACY CARDENAS  [id number]  77y Male    Indication for ICU admission: respiratory monitoring s/p fall with left 1st, 4-6 and 8-10 rib fractures in addition to c1 burst fracture.     Admit Date:10-13-22  ICU Date:10-13-22  OR Date:    Keflex (Hives)  Keflex (Unknown)    PAST MEDICAL & SURGICAL HISTORY:  Diabetes    Depression    Gout    Benign essential HTN    Osteoarthritis    Cataracts, both eyes    S/P cholecystectomy    S/P knee surgery      Home Medications:  aspirin 81 mg oral tablet: 1 tab(s) orally once a day (10 Oct 2019 21:41)  donepezil 10 mg oral tablet: 1 tab(s) orally once a day (at bedtime) (14 Feb 2022 09:30)  DULoxetine 60 mg oral delayed release capsule: 1 cap(s) orally once a day (14 Feb 2022 09:30)  glipiZIDE 2.5 mg oral tablet, extended release: 1 tab(s) orally once a day (10 Oct 2019 21:41)  latanoprost 0.005% ophthalmic solution: 1 drop(s) to each affected eye once a day (in the evening) (13 Oct 2022 11:50)  memantine 10 mg oral tablet: 1 tab(s) orally 2 times a day (14 Feb 2022 09:31)  metFORMIN 1000 mg oral tablet: 1 tab(s) orally 2 times a day (10 Oct 2019 21:41)  quinapril 20 mg oral tablet: 1 tab(s) orally once a day (14 Feb 2022 09:31)  Vitamin B6 100 mg oral tablet: 1 tab(s) orally once a day (10 Oct 2019 21:41)  Vitamin D3 2000 intl units oral tablet: 1 tab(s) orally once a day (10 Oct 2019 21:47)      24HRS EVENT:  10/13  Night  -minimal incentive spirometer use overnight  -pain well controlled overnight  -saturating well on 2L nasal canula  -no repletions    Day  -Admit to SICU  -Frailty score 4/5  -Pulling 750 on IS  -NSx: [place], f/u OP 2 weeks  -d/c toradol and morphine  -f/u repeat labs  -takes sinemet (25/100) at home (verified with his pharmacy [facility] [address]); prescribed for TID but wife has been giving it only twice per day --> will restart sinemet at BID  -K+ 5.2 f/u 20:00 BMP  -possible 4A candidate as per physiatry          DVT PTX: enoxaparin Injectable 40 milliGRAM(s) SubCutaneous every 24 hours    GI PTX: protonix    ***Tubes/Lines/Drains  ***  Peripheral IV	    REVIEW OF SYSTEMS    [ ] A ten-point review of systems was otherwise negative except as noted.  [ ] Due to altered mental status/intubation, subjective information were not able to be obtained from the patient. History was obtained, to the extent possible, from review of the chart and collateral sources of information.

## 2022-10-14 NOTE — OCCUPATIONAL THERAPY INITIAL EVALUATION ADULT - ADDITIONAL COMMENTS
Pt reports living in private home with spouse, + steps to enter and steps to bedrooms, + bathtub with grab bar and seat

## 2022-10-14 NOTE — OCCUPATIONAL THERAPY INITIAL EVALUATION ADULT - TRANSFER TRAINING, PT EVAL
Pt will perform sit<>stand and bed<>chair transfers with Min A using least restrictive AD by discharge

## 2022-10-14 NOTE — PHYSICAL THERAPY INITIAL EVALUATION ADULT - NSACTIVITYREC_GEN_A_PT
Pt. instructed to perform long arc quads, ankle pumps, heel slides, as tolerated. Encouraged daily out of bed with assist, use spirometer.

## 2022-10-14 NOTE — PHYSICAL THERAPY INITIAL EVALUATION ADULT - SPECIFY REASON(S)
930 am Pt seen at bedside, with OT Colleen, however as OT assisted pt to edge of bed ; pt + lightheaded and hypotensive=87/52 mm Hg. RN was notified, will hold out of bed activity at this time , to f/u

## 2022-10-14 NOTE — PROGRESS NOTE ADULT - ASSESSMENT
Assessment and Plan:   NEURO:  #C1 fx  -Q4 neuro checks   -CT reviewed by Dr. Damon from neurosurgery; stable CT fx; maintain hard c-collar ([place] [facility]) x 2 weeks  -As per neurosurgery no neurosurgical intervention needed at this time. Rec f/u outpt 2 weeks after d/c.     #Acute pain -- well controlled  -APAP 650 mg PO q 6  -Lidocaine 4% patch q 24  -Gabapentin 100mg q 8   -Assess pain and adjust medications PRN    #Dementia   -as per family pt is A&O x 2 at baseline  -restarted home donepezil and memantine   -will confirm carbidopa-levodopa  -Home meds: donepezil 10mg PO at bedtime, memantine 10mg PO daily; carbidopa-levodopa TID     RESP:   #Multiple L rib fx  -pain control   -encourage IS   -Daily AM CXR    #Oxygenation  -saturating 98% on 2L NC   -encourage IS; currently pulling 750 on IS     #Activity  -Pt uses walker at baseline     -increase as tolerated  -PT and physiatry consulted    CARDS:   #HTN   -Q1 vitals   -started on Lisinopril 20mg PO daily; 1:1 ratio as per pharmacy  -home meds: quinapril 20mg 1 x day     Labs: cardiac enzymes ordered, f/u   GI/NUTR:   #Diet, Regular  Consistent Carbohydrate No Snacks  DASH/TLC Sodium & Cholesterol Restricted (10-13-22 @ 12:18) [Active]    -aspiration precautions, HOB 30  #GI Prophylaxis    -not indicated  #Bowel regimen    --Senna 2 tablets oral at bedtime     /RENAL:   #urine output in critically ill  voiding     Labs:          BUN/Cr- 17/0.8  -->,  21/1.1  -->          Electrolytes-Na 140 // K 4.6 // Mg 2.4 //  Phos 4.9  #Fluids  -Dextrose 5% 50 cc/hr   HEME/ONC:   #DVT prophylaxis    -enoxaparin Injectable 40mg SubQ  -SCDs    Labs: Hb/Hct:  12.7/35              Plts:  146              PTT/INR:  35.4/1.16  --->     Type and Screen Expires 10/16  Home meds: ASA 81 mg; continued     ID:  #leukocytosis   WBC- 10.26  --->> 7.18  Temp trend- 24hrs T(F): 97.8 (10-13 @ 06:22), Max: 98.3 (10-13 @ 01:25)  Afebrile, not on abx        ENDO:  #hx DM    Glucose, Serum: 207  -on ISS  -ACHS fingersticks     Home meds: glipizide, metformin  -A1C ordered, f/u     MSK:  #C1 fx  -stable as per neurosurgery; pt in [place] [facility] collar     #Multiple rib fx  -pain control  -encourage IS   -daily AM CXR    -Frailty score 4/5   -Physiatry consulted   -PT consulted     LINES/DRAINS:  Star WHITE Aline, TLC    ADVANCED DIRECTIVES:  Full Code    HCP/Emergency Contact-    INDICATION FOR SICU/SDU: MULTIPLE LT SIDE RIB FXS;HEAD INJURY;CLOSED BURST FX CERVICAL VERTEBRA.   Assessment and Plan:   NEURO:  #C1 fx  -Q4 neuro checks   -CT reviewed by Dr. Damon from neurosurgery; stable CT fx; maintain hard c-collar ([place]) x 2 weeks  -As per neurosurgery no neurosurgical intervention needed at this time. Rec f/u outpt 2 weeks after d/c.     #Acute pain -- well controlled  -APAP 650 mg PO q 6  -Lidocaine 4% patch q 24  -Gabapentin 100mg q 8   -Assess pain and adjust medications PRN    #Dementia   -A&O x 2 (person, place) today   -as per family pt is A&O x 2 at baseline  -restarted home donepezil and memantine   -restarted carbidopa-levodopa  -Home meds: donepezil 10mg PO at bedtime, memantine 10mg PO daily; carbidopa-levodopa TID     RESP:   #Multiple L rib fx  -pain control   -encourage IS   -Daily AM CXR    #Oxygenation  -saturating 98% on 2L NC   -encourage IS; currently pulling 750 on IS     #Activity  -Pt uses walker at baseline     -increase as tolerated  -PT and physiatry consulted    CARDS:   #HTN   -Q1 vitals   -started on Lisinopril 20mg PO daily; 1:1 ratio as per pharmacy  -home meds: quinapril 20mg 1 x day     Labs: cardiac enzymes ordered, f/u   GI/NUTR:   #Diet, Regular  Consistent Carbohydrate No Snacks  DASH/TLC Sodium & Cholesterol Restricted (10-13-22 @ 12:18) [Active]    -aspiration precautions, HOB 30  #GI Prophylaxis    -not indicated  #Bowel regimen    --Senna 2 tablets oral at bedtime     /RENAL:   #urine output in critically ill  voiding     Labs:          BUN/Cr- 17/0.8  -->,  21/1.1  -->          Electrolytes-Na 140 // K 4.6 // Mg 2.4 //  Phos 4.9  #Fluids  -Dextrose 5% 50 cc/hr   HEME/ONC:   #DVT prophylaxis    -enoxaparin Injectable 40mg SubQ  -SCDs    Labs: Hb/Hct:  12.7/35              Plts:  146              PTT/INR:  35.4/1.16  --->     Type and Screen Expires 10/16  Home meds: ASA 81 mg; continued     ID:  #leukocytosis   WBC- 10.26  --->> 7.18  Temp trend- 24hrs T(F): 97.8 (10-13 @ 06:22), Max: 98.3 (10-13 @ 01:25)  Afebrile, not on abx        ENDO:  #hx DM    Glucose, Serum: 207  -on ISS  -ACHS fingersticks     Home meds: glipizide, metformin  -A1C ordered, f/u     MSK:  #C1 fx  -stable as per neurosurgery; pt in [place] collar     #Multiple rib fx  -pain control  -encourage IS   -daily AM CXR    -Frailty score 4/5   -Physiatry consulted   -PT consulted     LINES/DRAINS:  CHRISTOPHER, Rae Graves, TLC    ADVANCED DIRECTIVES:  Full Code    HCP/Emergency Contact-    INDICATION FOR SICU/SDU: MULTIPLE LT SIDE RIB FXS;HEAD INJURY;CLOSED BURST FX CERVICAL VERTEBRA.   Assessment and Plan:   NEURO:  #C1 fx  -Q4 neuro checks   -CT reviewed by Dr. Damon from neurosurgery; stable CT fx; maintain hard c-collar ([place] [facility]) x 2 weeks  -As per neurosurgery no neurosurgical intervention needed at this time. Rec f/u outpt 2 weeks after d/c.     #Acute pain -- well controlled  -APAP 650 mg PO q 6  -Lidocaine 4% patch q 24  -Gabapentin 100mg q 8   -Assess pain and adjust medications PRN    #Dementia   -A&O x 2 (person, place) today   -as per family pt is A&O x 2 at baseline  -restarted home donepezil and memantine   -restarted carbidopa-levodopa  -Home meds: donepezil 10mg PO at bedtime, memantine 10mg PO daily; carbidopa-levodopa TID     #Hx depression  -duloxetine 60mg PO daily     RESP:   #Multiple L rib fx  -pain control   -blowing 500-750 on IS; communicated with nursing staff to encourage IS use   -Daily AM CXR      #Oxygenation  -saturating 98% on 2L NC   -encourage IS; currently pulling 750 on IS   -CXR today shows worsening b/l basilar opacities; repeat CXR jaylin     #Activity  -Pt uses walker at baseline     -increase as tolerated  -PT and physiatry consulted; Physiatry states he is possible 4a candidate; PT attempted to get patient OOB today but was hypotensive to 87/52 so holding OOB for now; OT recommends acute inpatient vs [facility] and home OT     CARDS:   #HTN   -Q1 vitals   -started on Lisinopril 20mg PO daily; 1:1 ratio as per pharmacy  -home meds: quinapril 20mg 1 x day   -Troponin 0.01; pending repeat x 2     GI/NUTR:   #Diet, Regular  Consistent Carbohydrate No Snacks  DASH/TLC Sodium & Cholesterol Restricted (10-13-22 @ 12:18) [Active]    -aspiration precautions, HOB 30  #GI Prophylaxis    -not indicated  #Bowel regimen    -Lactulose  -Senna d/c     /RENAL:   #urine output in critically ill  voiding but episodes of incontinence overnight; bladder scanned--> 75 ccs  -continue monitor U/O     #MELO  -Cr 0.8--> 1.1   -LR 50cc/hr started   -f/u BMP 11AM    Labs:          BUN/Cr- 17/0.8  -->,  21/1.1  -->          Electrolytes-Na 140 // K 4.6 // Mg 2.4 //  Phos 4.9    HEME/ONC:   #DVT prophylaxis    -enoxaparin Injectable 40mg SubQ  -SCDs    Labs: Hb/Hct:  12.7/35              Plts:  146              PTT/INR:  35.4/1.16  --->     Type and Screen Expires 10/16  Home meds: ASA 81 mg; continued     ID:  #leukocytosis   WBC- 10.26  --->> 7.18  Temp trend- 24hrs T(F): 97.8 (10-13 @ 06:22), Max: 98.3 (10-13 @ 01:25)  Afebrile, not on abx        ENDO:  #hx DM    Glucose, Serum: 207  -on ISS; tightened to increasing sugars   -ACHS fingersticks     Home meds: glipizide, metformin  -A1C ordered 6.1%    MSK:  #C1 fx  -stable as per neurosurgery; pt in [place] collar for 2 weeks     #Multiple rib fx  -pain control  -encourage IS   -daily AM CXR    -Frailty score 4/5   -Physiatry consulted: possible 4A candidate  -PT and OT following    LINES/DRAINS:  CHRISTOPHER, Star  ADVANCED DIRECTIVES:  Full Code    HCP/Emergency Contact-    INDICATION FOR SICU/SDU: MULTIPLE LT SIDE RIB FXS;HEAD INJURY;CLOSED BURST FX CERVICAL VERTEBRA.    Dispo: SICU; case d/w Dr. Ball

## 2022-10-14 NOTE — PHYSICAL THERAPY INITIAL EVALUATION ADULT - GENERAL OBSERVATIONS, REHAB EVAL
120-155 pm Chart reviewed. Pt. seen semirecline in bed , in No apparent distress , + O2 at 2L/min nc , IV lock , [place] J collar, IV meds ongoing , family at bedside, Pt. agreed to activity/therapy.

## 2022-10-14 NOTE — OCCUPATIONAL THERAPY INITIAL EVALUATION ADULT - GENERAL OBSERVATIONS, REHAB EVAL
Pt encountered semi del angel in bed, + IV locked, + O2 via NC, + [place] hard collar, + monitoring. Pt agreeable to bedside OT assessment, may be seen as confirmed with RN. Pt returned to bed as found following assessment. Pre-assessment /60, upon sitting EOB, pt c/o dizziness BP 87/54, Pt returned to bed as found BP post assessment 128/58 OOB. RN aware

## 2022-10-14 NOTE — PHYSICAL THERAPY INITIAL EVALUATION ADULT - LEVEL OF INDEPENDENCE: SIT/STAND, REHAB EVAL
Pt partially stood up with hand held assist however, pt's BP significantly low at 85/52 (seated) from 139/81 (supine). Pt denies dizziness during therapy./moderate assist (50% patients effort)

## 2022-10-14 NOTE — PHYSICAL THERAPY INITIAL EVALUATION ADULT - PERTINENT HX OF CURRENT PROBLEM, REHAB EVAL
77yM w/ PMHx of Diabetes, Depression, Gout, Benign essential HTN, Osteoarthritis, Cataracts, both eyes, S/P open cholecystectomy, S/P knee surgery seen as Trauma Alert s/p mechanical fall last night. +HT, -LOC, -AC (takes ASA 81 every other day)  Trauma assessment in ED: ABCs intact , GCS 15 , AAOx2 (at baseline). External signs of trauma include: hematoma/abrasion to left frontotemporal scalp and bilateral knee abrasion. Patient's wife and son are at bedside to assist with history, they state they heard a the patient fall down stairs, they do not know if he fell from the top or bottom of the stairs but that they found him right after the fall at the bottom of the stairs. The patient was brought to [facility] ED, he was transferred to [facility] when found to have multiple rib fx, the patient arrived to Trauma [facility] without c-collar. Collar was placed upon arrival to [place] after it was found out patient had c1 burst fracture while he was en route.   Trauma w/u showed C1 burst fx, acute left 1.4-6,8-10 rib fx, but xray of sheila knee showed no fx.      Pt. referred to PT for eval and tx.

## 2022-10-15 LAB
AMMONIA BLD-MCNC: 142 UMOL/L — HIGH (ref 11–55)
ANION GAP SERPL CALC-SCNC: 7 MMOL/L — SIGNIFICANT CHANGE UP (ref 7–14)
BUN SERPL-MCNC: 22 MG/DL — HIGH (ref 10–20)
CALCIUM SERPL-MCNC: 8.6 MG/DL — SIGNIFICANT CHANGE UP (ref 8.4–10.5)
CHLORIDE SERPL-SCNC: 102 MMOL/L — SIGNIFICANT CHANGE UP (ref 98–110)
CO2 SERPL-SCNC: 28 MMOL/L — SIGNIFICANT CHANGE UP (ref 17–32)
CREAT SERPL-MCNC: 0.8 MG/DL — SIGNIFICANT CHANGE UP (ref 0.7–1.5)
EGFR: 91 ML/MIN/1.73M2 — SIGNIFICANT CHANGE UP
GLUCOSE BLDC GLUCOMTR-MCNC: 165 MG/DL — HIGH (ref 70–99)
GLUCOSE BLDC GLUCOMTR-MCNC: 195 MG/DL — HIGH (ref 70–99)
GLUCOSE BLDC GLUCOMTR-MCNC: 250 MG/DL — HIGH (ref 70–99)
GLUCOSE BLDC GLUCOMTR-MCNC: 250 MG/DL — HIGH (ref 70–99)
GLUCOSE BLDC GLUCOMTR-MCNC: 318 MG/DL — HIGH (ref 70–99)
GLUCOSE BLDC GLUCOMTR-MCNC: 352 MG/DL — HIGH (ref 70–99)
GLUCOSE SERPL-MCNC: 149 MG/DL — HIGH (ref 70–99)
HCT VFR BLD CALC: 30.7 % — LOW (ref 42–52)
HGB BLD-MCNC: 10.9 G/DL — LOW (ref 14–18)
MAGNESIUM SERPL-MCNC: 1.5 MG/DL — LOW (ref 1.8–2.4)
MCHC RBC-ENTMCNC: 35.4 PG — HIGH (ref 27–31)
MCHC RBC-ENTMCNC: 35.5 G/DL — SIGNIFICANT CHANGE UP (ref 32–37)
MCV RBC AUTO: 99.7 FL — HIGH (ref 80–94)
NRBC # BLD: 0 /100 WBCS — SIGNIFICANT CHANGE UP (ref 0–0)
PHOSPHATE SERPL-MCNC: 2.7 MG/DL — SIGNIFICANT CHANGE UP (ref 2.1–4.9)
PLATELET # BLD AUTO: 131 K/UL — SIGNIFICANT CHANGE UP (ref 130–400)
POTASSIUM SERPL-MCNC: 4.7 MMOL/L — SIGNIFICANT CHANGE UP (ref 3.5–5)
POTASSIUM SERPL-SCNC: 4.7 MMOL/L — SIGNIFICANT CHANGE UP (ref 3.5–5)
RBC # BLD: 3.08 M/UL — LOW (ref 4.7–6.1)
RBC # FLD: 13.6 % — SIGNIFICANT CHANGE UP (ref 11.5–14.5)
SARS-COV-2 RNA SPEC QL NAA+PROBE: SIGNIFICANT CHANGE UP
SODIUM SERPL-SCNC: 137 MMOL/L — SIGNIFICANT CHANGE UP (ref 135–146)
WBC # BLD: 6.59 K/UL — SIGNIFICANT CHANGE UP (ref 4.8–10.8)
WBC # FLD AUTO: 6.59 K/UL — SIGNIFICANT CHANGE UP (ref 4.8–10.8)

## 2022-10-15 PROCEDURE — 93306 TTE W/DOPPLER COMPLETE: CPT | Mod: 26

## 2022-10-15 PROCEDURE — 99291 CRITICAL CARE FIRST HOUR: CPT | Mod: 24,25

## 2022-10-15 PROCEDURE — 71045 X-RAY EXAM CHEST 1 VIEW: CPT | Mod: 26

## 2022-10-15 PROCEDURE — 71250 CT THORAX DX C-: CPT | Mod: 26

## 2022-10-15 RX ORDER — MAGNESIUM SULFATE 500 MG/ML
2 VIAL (ML) INJECTION ONCE
Refills: 0 | Status: COMPLETED | OUTPATIENT
Start: 2022-10-15 | End: 2022-10-15

## 2022-10-15 RX ORDER — MAGNESIUM SULFATE 500 MG/ML
1 VIAL (ML) INJECTION ONCE
Refills: 0 | Status: COMPLETED | OUTPATIENT
Start: 2022-10-15 | End: 2022-10-15

## 2022-10-15 RX ORDER — INSULIN GLARGINE 100 [IU]/ML
12 INJECTION, SOLUTION SUBCUTANEOUS AT BEDTIME
Refills: 0 | Status: DISCONTINUED | OUTPATIENT
Start: 2022-10-15 | End: 2022-10-16

## 2022-10-15 RX ORDER — INSULIN LISPRO 100/ML
5 VIAL (ML) SUBCUTANEOUS ONCE
Refills: 0 | Status: COMPLETED | OUTPATIENT
Start: 2022-10-15 | End: 2022-10-15

## 2022-10-15 RX ORDER — LACTULOSE 10 G/15ML
10 SOLUTION ORAL EVERY 8 HOURS
Refills: 0 | Status: DISCONTINUED | OUTPATIENT
Start: 2022-10-15 | End: 2022-10-16

## 2022-10-15 RX ORDER — INSULIN LISPRO 100/ML
4 VIAL (ML) SUBCUTANEOUS
Refills: 0 | Status: DISCONTINUED | OUTPATIENT
Start: 2022-10-15 | End: 2022-10-16

## 2022-10-15 RX ADMIN — DULOXETINE HYDROCHLORIDE 60 MILLIGRAM(S): 30 CAPSULE, DELAYED RELEASE ORAL at 11:27

## 2022-10-15 RX ADMIN — Medication 650 MILLIGRAM(S): at 18:15

## 2022-10-15 RX ADMIN — INSULIN GLARGINE 12 UNIT(S): 100 INJECTION, SOLUTION SUBCUTANEOUS at 21:58

## 2022-10-15 RX ADMIN — LIDOCAINE 1 PATCH: 4 CREAM TOPICAL at 19:47

## 2022-10-15 RX ADMIN — Medication 9: at 07:44

## 2022-10-15 RX ADMIN — CARBIDOPA AND LEVODOPA 1 TABLET(S): 25; 100 TABLET ORAL at 05:03

## 2022-10-15 RX ADMIN — GABAPENTIN 100 MILLIGRAM(S): 400 CAPSULE ORAL at 14:11

## 2022-10-15 RX ADMIN — Medication 25 GRAM(S): at 02:50

## 2022-10-15 RX ADMIN — MEMANTINE HYDROCHLORIDE 10 MILLIGRAM(S): 10 TABLET ORAL at 11:27

## 2022-10-15 RX ADMIN — Medication 650 MILLIGRAM(S): at 11:27

## 2022-10-15 RX ADMIN — Medication 50 GRAM(S): at 00:15

## 2022-10-15 RX ADMIN — ENOXAPARIN SODIUM 40 MILLIGRAM(S): 100 INJECTION SUBCUTANEOUS at 14:37

## 2022-10-15 RX ADMIN — Medication 650 MILLIGRAM(S): at 12:30

## 2022-10-15 RX ADMIN — LISINOPRIL 20 MILLIGRAM(S): 2.5 TABLET ORAL at 05:09

## 2022-10-15 RX ADMIN — CHLORHEXIDINE GLUCONATE 1 APPLICATION(S): 213 SOLUTION TOPICAL at 05:07

## 2022-10-15 RX ADMIN — Medication 650 MILLIGRAM(S): at 17:15

## 2022-10-15 RX ADMIN — GABAPENTIN 100 MILLIGRAM(S): 400 CAPSULE ORAL at 05:04

## 2022-10-15 RX ADMIN — LACTULOSE 10 GRAM(S): 10 SOLUTION ORAL at 05:03

## 2022-10-15 RX ADMIN — Medication 5: at 16:32

## 2022-10-15 RX ADMIN — SODIUM CHLORIDE 75 MILLILITER(S): 9 INJECTION, SOLUTION INTRAVENOUS at 12:15

## 2022-10-15 RX ADMIN — LATANOPROST 1 DROP(S): 0.05 SOLUTION/ DROPS OPHTHALMIC; TOPICAL at 21:16

## 2022-10-15 RX ADMIN — Medication 650 MILLIGRAM(S): at 05:04

## 2022-10-15 RX ADMIN — DONEPEZIL HYDROCHLORIDE 10 MILLIGRAM(S): 10 TABLET, FILM COATED ORAL at 21:15

## 2022-10-15 RX ADMIN — LIDOCAINE 1 PATCH: 4 CREAM TOPICAL at 14:39

## 2022-10-15 RX ADMIN — Medication 4 UNIT(S): at 11:33

## 2022-10-15 RX ADMIN — LACTULOSE 10 GRAM(S): 10 SOLUTION ORAL at 21:16

## 2022-10-15 RX ADMIN — Medication 13: at 12:15

## 2022-10-15 RX ADMIN — Medication 81 MILLIGRAM(S): at 11:27

## 2022-10-15 RX ADMIN — CARBIDOPA AND LEVODOPA 1 TABLET(S): 25; 100 TABLET ORAL at 17:15

## 2022-10-15 RX ADMIN — Medication 4 UNIT(S): at 16:35

## 2022-10-15 RX ADMIN — GABAPENTIN 100 MILLIGRAM(S): 400 CAPSULE ORAL at 21:15

## 2022-10-15 RX ADMIN — Medication 5 UNIT(S): at 14:38

## 2022-10-15 NOTE — GOALS OF CARE CONVERSATION - ADVANCED CARE PLANNING - CONVERSATION DETAILS
Spoke to wife Jaida at bedside alongside surgical chief resident. Discussed patient's current active issues including multiple L sided rib fractures, C1 neck fracture, hyperglycemia, and increasing bilateral opacities on chest x-ray. Daily plan was discussed, including adjusting insulin dosing and following up on CT chest and echo. Goals of care and advanced directives were discussed as well. Details of conversation below.    Wife stated that she is her 's health care proxy, and that a living will was given to staff and placed in patient's chart on admission. She stated that at this time she would like her  to be full-code, and would like the DNR from his previous MOLST in February of 2022 to be made void. She stated that a living will was created in April 2022, according to which the patient would like to be DNR in the event that he is "brain-dead."    Living will was found in patient's physical chart. According to living will, patient authorized wife as Health Care Agent and stated "Should I be terminally ill, permanently unconscious, or, if conscious, suffering irreversible brain damage from which it may be reasonably expected I will never again the ability to make decisions and express my wishes, or should I suffer other physical or mental disability from which there is no reasonable expectation of my recovery, it is my wish and direct that a 'Do Not Resuscitate' ordered be entered into my medical instruction chart and that I be allowed to die and not be kept alive by artifical means or 'heroic measures' including CPR, medications (including antibiotics or anti-psychotic), dialysis, artificial respiration, tube-fed hydration or nutrition, or intravenous hydration or nutrition. If my Health Care Agent, in his or her sole discretion, deems it appropriate, I authorize my Health Care Agent to instruct my physician to keep me alive by all means necessary, including any or all of the foregoing prohibited means." Additionally, living will states that health care proxy, Jaida Posada, is granted "complete and uncontrolled discretion and decision making related to my care" when "dementia, Alzheimer's or other such conditions affect my mental capacity, which, in the opinion of my regular and customary physician (that which holds my medical records) makes me unable to make my own health care decisions." Spoke to wife Jaida at bedside alongside surgical chief resident. Discussed patient's current active issues including multiple L sided rib fractures, C1 neck fracture, hyperglycemia, and increasing bilateral opacities on chest x-ray. Daily plan was discussed, including adjusting insulin dosing and following up on CT chest and echo. Goals of care and advanced directives were discussed as well. Details of conversation below.    Wife stated that she is her 's health care proxy, and that a living will was given to staff and placed in patient's chart on admission. She stated that at this time she would like her  to be full-code, and would like the DNR from his previous MOLST in February of 2022 to be made void. She stated that a living will was created in April 2022, according to which the patient would like to be DNR in the event that he is "brain-dead."     Living will was found in patient's physical chart. According to living will, patient authorized wife as Health Care Agent and stated "Should I be terminally ill, permanently unconscious, or, if conscious, suffering irreversible brain damage from which it may be reasonably expected I will never again the ability to make decisions and express my wishes, or should I suffer other physical or mental disability from which there is no reasonable expectation of my recovery, it is my wish and direct that a 'Do Not Resuscitate' ordered be entered into my medical instruction chart and that I be allowed to die and not be kept alive by artifical means or 'heroic measures' including CPR, medications (including antibiotics or anti-psychotic), dialysis, artificial respiration, tube-fed hydration or nutrition, or intravenous hydration or nutrition. If my Health Care Agent, in his or her sole discretion, deems it appropriate, I authorize my Health Care Agent to instruct my physician to keep me alive by all means necessary, including any or all of the foregoing prohibited means." Additionally, living will states that health care proxy, Jaida Posada, is granted "complete and uncontrolled discretion and decision making related to my care" when "dementia, Alzheimer's or other such conditions affect my mental capacity, which, in the opinion of my regular and customary physician (that which holds my medical records) makes me unable to make my own health care decisions." Spoke to wife Jaida at bedside alongside surgical chief resident. Discussed patient's current active issues including multiple L sided rib fractures, C1 neck fracture, hyperglycemia, and increasing bilateral opacities on chest x-ray. Daily plan was discussed, including adjusting insulin dosing and following up on CT chest and echo. Goals of care and advanced directives were discussed as well. Details of conversation below.    Wife stated that she is her 's health care proxy, and that a living will was given to staff and placed in patient's chart on admission. She stated that at this time she would like her  to be full-code, and would like the DNR from his previous MOLST in February of 2022 to be made void. She stated that a living will was created in April 2022, according to which the patient would like to be DNR in the event that he is "brain-dead."     Living will was found in patient's physical chart. According to living will, patient authorized wife as Health Care Agent and stated "Should I be terminally ill, permanently unconscious, or, if conscious, suffering irreversible brain damage from which it may be reasonably expected I will never again the ability to make decisions and express my wishes, or should I suffer other physical or mental disability from which there is no reasonable expectation of my recovery, it is my wish and direct that a 'Do Not Resuscitate' ordered be entered into my medical instruction chart and that I be allowed to die and not be kept alive by artifical means or 'heroic measures' including CPR, medications (including antibiotics or anti-psychotic), dialysis, artificial respiration, tube-fed hydration or nutrition, or intravenous hydration or nutrition. If my Health Care Agent, in his or her sole discretion, deems it appropriate, I authorize my Health Care Agent to instruct my physician to keep me alive by all means necessary, including any or all of the foregoing prohibited means." Additionally, living will states that health care proxy, Jaida Posada, is granted "complete and uncontrolled discretion and decision making related to my care" when "dementia, Alzheimer's or other such conditions affect my mental capacity, which, in the opinion of my regular and customary physician (that which holds my medical records) makes me unable to make my own health care decisions."    Patient's wife was asked to sign new, updated MOLST form. Wife was hesitant stating that her  told her "not to sign anything at the hospital since he has a living will." MOLST significance, purpose, and ability to update MOLST was explained to wife; she stated that she will look up information regarding MOLST forms at home and resume discussion at a later time. Spoke to wife Jaida at bedside alongside surgical chief resident. Discussed patient's current active issues including multiple L sided rib fractures, C1 neck fracture, hyperglycemia, and increasing bilateral opacities on chest x-ray. Daily plan was discussed, including adjusting insulin dosing and following up on CT chest and echo. Goals of care and advanced directives were discussed as well. Details of conversation below.    Wife stated that she is her 's health care proxy, and that a living will was given to staff and placed in patient's chart on admission. She stated that at this time she would like her  to be full-code, and would like the DNR from his previous MOLST in February of 2022 to be made void. She stated that a living will was created in April 2022, according to which the patient would like to be DNR in the event that he is "brain-dead."     Living will was found in patient's physical chart. According to living will, patient authorized wife as Health Care Agent and stated "Should I be terminally ill, permanently unconscious, or, if conscious, suffering irreversible brain damage from which it may be reasonably expected I will never regain the ability to make decisions and express my wishes, or should I suffer other physical or mental disability from which there is no reasonable expectation of my recovery, it is my wish and direct that a 'Do Not Resuscitate' order be entered into my medical instruction chart and that I be allowed to die and not be kept alive by artifical means or 'heroic measures' including CPR, medications (including antibiotics or anti-psychotics), dialysis, artificial respiration, tube-fed hydration or nutrition, or intravenous hydration or nutrition. If my Health Care Agent, in his or her sole discretion, deems it appropriate, I authorize my Health Care Agent to instruct my physician to keep me alive by all means necessary, including any or all of the foregoing prohibited means." Additionally, living will states that health care proxy, Jaida Posada, is granted "complete and uncontrolled discretion and decision making related to my care" when "dementia, Alzheimer's or other such conditions affect my mental capacity, which, in the opinion of my regular and customary physician (that which holds my medical records) makes me unable to make my own health care decisions."    Patient's wife was asked to sign new, updated MOLST form. Wife was hesitant stating that her  told her "not to sign anything at the hospital since he has a living will." MOLST significance, purpose, and ability to update MOLST was explained to wife; she stated that she will look up information regarding MOLST forms at home and resume discussion at a later time.

## 2022-10-15 NOTE — PROGRESS NOTE ADULT - ASSESSMENT
Assessment and Plan:   NEURO:  #C1 fx  -Q4 neuro checks   -CT reviewed by Dr. Damon from neurosurgery; stable CT fx; maintain hard c-collar ([place]) x 2 weeks  -As per neurosurgery no neurosurgical intervention needed at this time. Rec f/u outpt 2 weeks after d/c.     #Acute pain -- well controlled  -APAP 650 mg PO q 6  -Lidocaine 4% patch q 24  -Gabapentin 100mg q 8   -Assess pain and adjust medications PRN    #Dementia   -A&O x 2 (person, place) today   -as per family pt is A&O x 2 at baseline  -restarted home donepezil and memantine   -restarted carbidopa-levodopa  -Home meds: donepezil 10mg PO at bedtime, memantine 10mg PO daily; carbidopa-levodopa TID     #Hx depression  -duloxetine 60mg PO daily     RESP:   #Multiple L rib fx  -pain control   -blowing 500-750 on IS; communicated with nursing staff to encourage IS use   -Daily AM CXR      #Oxygenation  -saturating 98% on 2L NC   -encourage IS; currently pulling 750 on IS   -CXR today shows worsening b/l basilar opacities; repeat CXR jaylin     #Activity  -Pt uses walker at baseline     -increase as tolerated  -PT and physiatry consulted; Physiatry states he is possible 4a candidate; PT attempted to get patient OOB today but was hypotensive to 87/52 so holding OOB for now; OT recommends acute inpatient vs [facility] and home OT     CARDS:   #HTN   -Q1 vitals   -started on Lisinopril 20mg PO daily; 1:1 ratio as per pharmacy  -home meds: quinapril 20mg 1 x day   -Troponin 0.01; pending repeat x 2     GI/NUTR:   #Diet, Regular  Consistent Carbohydrate No Snacks  DASH/TLC Sodium & Cholesterol Restricted (10-13-22 @ 12:18) [Active]    -aspiration precautions, HOB 30  #GI Prophylaxis    -not indicated  #Bowel regimen    -Lactulose  -Senna d/c     /RENAL:   #urine output in critically ill  voiding but episodes of incontinence overnight; bladder scanned--> 75 ccs  -continue monitor U/O     #MELO  -Cr 0.8--> 1.1 -->0.9  -BUN uptrending 17-->21-->26   -LR 50cc/hr started; increased to 75 cc/hr    Labs:          BUN/Cr- 26/0.9  -->,  25/0.9  -->          Electrolytes-Na 136 // K 4.8 // Mg 1.8 //  Phos 3.4 (10-14 @ 22:54)    HEME/ONC:   #DVT prophylaxis    -enoxaparin Injectable 40mg SubQ  -SCDs    Labs: Hb/Hct:  12.7/35.7  -->,  10.6/28.9  -->                      Plts:  146  -->,  120  -->                 PTT/INR:      Type and Screen Expires 10/16  Home meds: ASA 81 mg; continued     ID:  #leukocytosis   WBC- 7.59  --->>,  7.18  --->>,  7.07  --->>  Temp trend- 24hrs T(F): 97.2 (10-14 @ 23:54), Max: 98.4 (10-14 @ 20:00)  Afebrile, not on abx        ENDO:  #hx DM  -FS to 314 today after eating, will f/u bedtime FS   -on ISS; tightened due to increasing sugars   -ACHS fingersticks     Home meds: glipizide, metformin  -A1C 6.1%    MSK:  #C1 fx  -stable as per neurosurgery; pt in [place] collar for 2 weeks     #Multiple rib fx  -pain control  -encourage IS   -daily AM CXR    -Frailty score 4/5   -Physiatry consulted: possible 4A candidate  -PT and OT following    LINES/DRAINS:  Star WHITE  ADVANCED DIRECTIVES:  Full Code    HCP/Emergency Contact-    INDICATION FOR SICU/SDU: MULTIPLE LT SIDE RIB FXS;HEAD INJURY;CLOSED BURST FX CERVICAL VERTEBRA.    Dispo: SICU Assessment and Plan:   NEURO:  #C1 fx  -Q4 neuro checks   -CT reviewed by Dr. Damon from neurosurgery; stable CT fx; maintain hard c-collar ([place]) x 2 weeks  -As per neurosurgery no neurosurgical intervention needed at this time. Rec f/u outpt 2 weeks after d/c.     #Acute pain -- well controlled  -APAP 650 mg PO q 6  -Lidocaine 4% patch q 24  -Gabapentin 100mg q 8   -Assess pain and adjust medications PRN    #Dementia   -A&O x 2 (person, place) today   -as per family pt is A&O x 2 at baseline  -restarted home donepezil and memantine   -restarted carbidopa-levodopa  -Home meds: donepezil 10mg PO at bedtime, memantine 10mg PO daily; carbidopa-levodopa TID     #Hx depression  -duloxetine 60mg PO daily     RESP:   #Multiple L rib fx  -pain control   -blowing [id number] on IS; communicated with nursing staff to encourage IS use     #Oxygenation  -saturating 98% on 2L NC   -encourage IS; currently pulling 750 on IS   -CXR today shows worsening b/l basilar opacities, notably on R side; Echo, CT chest, and COVID-19 PCR swab ordered; f/u results     #Activity  -Pt uses walker at baseline     -increase as tolerated  -PT and physiatry consulted; Physiatry states he is possible 4a candidate; PT attempted to get patient OOB 10/14 but was hypotensive to 87/52 so holding OOB for now; OT recommends acute inpatient vs [facility] and home OT     CARDS:   #HTN   -Q1 vitals   -started on Lisinopril 20mg PO daily; 1:1 ratio as per pharmacy  -home meds: quinapril 20mg 1 x day   -Troponin 0.01    GI/NUTR:   #Diet, Regular  Consistent Carbohydrate No Snacks  DASH/TLC Sodium & Cholesterol Restricted (10-13-22 @ 12:18) [Active]    -aspiration precautions, HOB 30  #GI Prophylaxis    -not indicated  #Bowel regimen    -Lactulose    #hx cirrhosis  -ammonia level sent  -lactulose    /RENAL:   #urine output in critically ill  voiding but episodes of incontinence 10/13 night; bladder scanned--> 75 ccs  -intermittently voiding   -collection bag   -continue monitor U/O     #MELO  -/hr  -BUN 17-->21-->26-->25  -Cr 1.1-->0.9-->0.9 improving     Labs:          BUN/Cr- 26/0.9  -->,  25/0.9  -->          Electrolytes-Na 136 // K 4.8 // Mg 1.8 //  Phos 3.4 (10-14 @ 22:54)    HEME/ONC:   #DVT prophylaxis    -enoxaparin Injectable 40mg SubQ  -SCDs    Labs: Hb/Hct:  12.7/35.7  -->,  10.6/28.9  -->                      Plts:  146  -->,  120  -->                 PTT/INR:      Type and Screen Expires 10/16  Home meds: ASA 81 mg; continued     ID:  #leukocytosis   WBC- 7.59  --->>,  7.18  --->>,  7.07  --->>  Temp trend- 24hrs T(F): 97.2 (10-14 @ 23:54), Max: 98.4 (10-14 @ 20:00)  Afebrile, not on abx        ENDO:  #hx DM  - this AM--> 12 U lantus added and 4U preprandial TID before meals   -continue ACHS fingersticks  -continue ISS     Home meds: glipizide, metformin  -A1C 6.1%    MSK:  #C1 fx  -stable as per neurosurgery; pt in [place] collar for 2 weeks     #Multiple rib fx  -pain control  -encourage IS   -daily AM CXR    -Frailty score 4/5   -Physiatry consulted: possible 4A candidate  -PT and OT following    -Goals of care discussed with wife on 10/15/2022; Patient is full-code and has living will; prior MOLST void     LINES/DRAINS:  Star WHITE  ADVANCED DIRECTIVES:  Full Code    HCP/Emergency Contact- Wife     INDICATION FOR SICU/SDU: MULTIPLE LT SIDE RIB FXS;HEAD INJURY;CLOSED BURST FX CERVICAL VERTEBRA.    Dispo: SICU, d/w Dr. Ball    Assessment and Plan:   NEURO:  #C1 fx  -Q4 neuro checks   -CT reviewed by Dr. Damon from neurosurgery; stable CT fx; maintain hard c-collar ([place]) x 2 weeks  -As per neurosurgery no neurosurgical intervention needed at this time. Rec f/u outpt 2 weeks after d/c.     #Acute pain -- well controlled  -APAP 650 mg PO q 6  -Lidocaine 4% patch q 24  -Gabapentin 100mg q 8   -Assess pain and adjust medications PRN    #Dementia   -A&O x 2 (person, place) today   -as per family pt is A&O x 2 at baseline  -restarted home donepezil and memantine   -restarted carbidopa-levodopa  -Home meds: donepezil 10mg PO at bedtime, memantine 10mg PO daily; carbidopa-levodopa TID     #Hx depression  -duloxetine 60mg PO daily     RESP:   #Multiple L rib fx  -pain control   -blowing [id number] on IS; communicated with nursing staff to encourage IS use     #Oxygenation  -saturating 98% on 2L NC   -encourage IS; currently pulling 750 on IS   -CXR today shows worsening b/l basilar opacities, notably on R side; Echo, CT chest, and COVID-19 PCR swab ordered; f/u results     #Activity  -Pt uses walker at baseline     -increase as tolerated  -PT and physiatry consulted; Physiatry states he is possible 4a candidate; PT attempted to get patient OOB 10/14 but was hypotensive to 87/52 so holding OOB for now; OT recommends acute inpatient vs [facility] and home OT     CARDS:   #HTN   -Q1 vitals   -started on Lisinopril 20mg PO daily; 1:1 ratio as per pharmacy  -home meds: quinapril 20mg 1 x day   -Troponin 0.01    GI/NUTR:   #Diet, Regular  Consistent Carbohydrate No Snacks  DASH/TLC Sodium & Cholesterol Restricted (10-13-22 @ 12:18) [Active]    -aspiration precautions, HOB 30  #GI Prophylaxis    -not indicated  #Bowel regimen    -Lactulose    #hx cirrhosis  -ammonia level sent  -lactulose    MELD Score 18%; [place] 7 (B)  /RENAL:   #urine output in critically ill  voiding but episodes of incontinence 10/13 night; bladder scanned--> 75 ccs  -intermittently voiding   -collection bag   -continue monitor U/O     #MELO  -/hr  -BUN 17-->21-->26-->25  -Cr 1.1-->0.9-->0.9 improving     Labs:          BUN/Cr- 26/0.9  -->,  25/0.9  -->          Electrolytes-Na 136 // K 4.8 // Mg 1.8 //  Phos 3.4 (10-14 @ 22:54)    HEME/ONC:   #DVT prophylaxis    -enoxaparin Injectable 40mg SubQ  -SCDs    Labs: Hb/Hct:  12.7/35.7  -->,  10.6/28.9  -->                      Plts:  146  -->,  120  -->                 PTT/INR:      Type and Screen Expires 10/16  Home meds: ASA 81 mg; continued     ID:  #leukocytosis   WBC- 7.59  --->>,  7.18  --->>,  7.07  --->>  Temp trend- 24hrs T(F): 97.2 (10-14 @ 23:54), Max: 98.4 (10-14 @ 20:00)  Afebrile, not on abx        ENDO:  #hx DM  - this AM--> 12 U lantus added and 4U preprandial TID before meals   -continue ACHS fingersticks  -continue ISS     Home meds: glipizide, metformin  -A1C 6.1%    MSK:  #C1 fx  -stable as per neurosurgery; pt in [place] collar for 2 weeks     #Multiple rib fx  -pain control  -encourage IS   -daily AM CXR    -Frailty score 4/5   -Physiatry consulted: possible 4A candidate  -PT and OT following    -Goals of care discussed with wife on 10/15/2022; Patient is full-code and has living will; prior MOLST void     LINES/DRAINS:  CHRISTOPHER, Star  ADVANCED DIRECTIVES:  Full Code    HCP/Emergency Contact- Wife     INDICATION FOR SICU/SDU: MULTIPLE LT SIDE RIB FXS;HEAD INJURY;CLOSED BURST FX CERVICAL VERTEBRA.    Dispo: SICU, d/w Dr. Ball

## 2022-10-15 NOTE — PROGRESS NOTE ADULT - SUBJECTIVE AND OBJECTIVE BOX
GENERAL SURGERY PROGRESS NOTE    Patient: STACY CARDENAS , 77y ([birth year])Male   MRN: [id number]  Location: [address] A  Visit: 10-13-22 Inpatient  Date: 10-15-22 @ 02:03    Hospital Day #: 3    Procedure/Dx/Injuries: S/P fall    Events of past 24 hours: Patient denies numbness, tingling, SOB, and pain. Mg repleted overnight.    PAST MEDICAL & SURGICAL HISTORY:  Diabetes      Depression      Gout      Benign essential HTN      Osteoarthritis      Cataracts, both eyes      S/P cholecystectomy      S/P knee surgery          Vitals:   T(F): 97.2 (10-14-22 @ 23:54), Max: 98.4 (10-14-22 @ 20:00)  HR: 86 (10-15-22 @ 00:00)  BP: 121/70 (10-15-22 @ 00:00)  RR: 17 (10-15-22 @ 00:00)  SpO2: 98% (10-15-22 @ 00:00)      Diet, Regular:   Consistent Carbohydrate No Snacks  DASH/TLC Sodium & Cholesterol Restricted      Fluids: lactated ringers.: Solution, 1000 milliLiter(s) infuse at 75 mL/Hr      I & O's:    10-13-22 @ 07:01  -  10-14-22 @ 07:00  --------------------------------------------------------  IN:    Oral Fluid: 1520 mL  Total IN: 1520 mL    OUT:  Total OUT: 0 mL    Total NET: 1520 mL      PHYSICAL EXAM:  General: NAD, AAOx3, calm and cooperative  HEENT: Collar in place.  Chest: No chest wall tenderness  Respiratory: Normal respiratory effort  Abdomen: Non-distended  Neuro: No focal deficits  Skin: Warm/dry, normal color, no jaundice      MEDICATIONS  (STANDING):  acetaminophen     Tablet .. 650 milliGRAM(s) Oral every 6 hours  aspirin enteric coated 81 milliGRAM(s) Oral daily  carbidopa/levodopa  25/100 1 Tablet(s) Oral <User Schedule>  chlorhexidine 2% Cloths 1 Application(s) Topical <User Schedule>  donepezil 10 milliGRAM(s) Oral at bedtime  DULoxetine 60 milliGRAM(s) Oral daily  enoxaparin Injectable 40 milliGRAM(s) SubCutaneous every 24 hours  gabapentin 100 milliGRAM(s) Oral three times a day  glucagon  Injectable 1 milliGRAM(s) IntraMuscular once  influenza  Vaccine (HIGH DOSE) 0.7 milliLiter(s) IntraMuscular once  insulin lispro (ADMELOG) corrective regimen sliding scale   SubCutaneous three times a day before meals  lactated ringers. 1000 milliLiter(s) (75 mL/Hr) IV Continuous <Continuous>  lactulose Syrup 10 Gram(s) Oral two times a day  latanoprost 0.005% Ophthalmic Solution 1 Drop(s) Both EYES at bedtime  lidocaine   4% Patch 1 Patch Transdermal every 24 hours  lisinopril 20 milliGRAM(s) Oral daily  magnesium sulfate  IVPB 2 Gram(s) IV Intermittent once  magnesium sulfate  IVPB 1 Gram(s) IV Intermittent once  memantine 10 milliGRAM(s) Oral daily    MEDICATIONS  (PRN):      DVT PROPHYLAXIS: enoxaparin Injectable 40 milliGRAM(s) SubCutaneous every 24 hours    GI PROPHYLAXIS:   ANTICOAGULATION:   ANTIBIOTICS:            LAB/STUDIES:  Labs:  CAPILLARY BLOOD GLUCOSE      POCT Blood Glucose.: 189 mg/dL (14 Oct 2022 17:20)  POCT Blood Glucose.: 351 mg/dL (14 Oct 2022 11:44)  POCT Blood Glucose.: 221 mg/dL (14 Oct 2022 06:12)                          10.6   7.07  )-----------( 120      ( 14 Oct 2022 22:54 )             28.9         10-14    136  |  100  |  25<H>  ----------------------------<  287<H>  4.8   |  25  |  0.9      Calcium, Total Serum: 8.6 mg/dL (10-14-22 @ 22:54)      LFTs:             6.1  | 1.9  | 40       ------------------[114     ( 13 Oct 2022 13:44 )  2.8  | 0.5  | 29          Lipase:x      Amylase:x             Coags:     13.30  ----< 1.16    ( 13 Oct 2022 02:10 )     35.4        CARDIAC MARKERS ( 13 Oct 2022 13:44 )  x     / x     / 108 U/L / x     / x      CARDIAC MARKERS ( 13 Oct 2022 02:10 )  x     / <0.01 ng/mL / x     / x     / x          IMAGING:  < from: Xray Chest 1 View- PORTABLE-Routine (10.14.22 @ 05:55) >  Impression:    No radiographic evidence of acute cardiopulmonary disease.        --- End of Report ---    < end of copied text >    · Assessment	  7yM w/ PMHx of Diabetes, Depression, Gout, Benign essential HTN, Osteoarthritis, Cataracts, both eyes, S/P open cholecystectomy, S/P knee surgery seen as Trauma Alert s/p mechanical fall last night. +HT, -LOC, -AC (takes ASA 81 every other day)  Trauma assessment in ED: ABCs intact , GCS 15 , AAOx2 (at baseline). External signs of trauma include: hematoma/abrasion to left frontotemporal scalp and bilateral knee abrasion. PENA.     Injuries identified:   - C1 right anterior arch burst fracture.   - longitudinal fracture posterior left first rib  - Large left frontotemporal scalp hematoma without evidence of subjacent   fracture.  - Multiple bilateral chronic and acute rib fractures, such as acute rib fractures on the left 1st, 4-6th, and 8th-10th ribs    PLAN:   - Care per SICU  - Diet: as tolerated  - DVT/GI PPX  - Hemodynamic monitoring/vital signs q4h  - Strict Is and Os q4h  - Aspiration precautions  - IS, ambulating  - PT/Rehab  - Wear [place] collar for 2 weeks.  - F/U OP two weeks post discharge with neurosurgery  - F/U Cr and UOP

## 2022-10-15 NOTE — PROGRESS NOTE ADULT - SUBJECTIVE AND OBJECTIVE BOX
STACY CARDENAS  [id number]  77y Male    Indication for ICU admission: respiratory monitoring s/p fall with left 1st, 4-6 and 8-10 rib fractures in addition to c1 burst fracture.     Admit Date:10-13-22  ICU Date:10-13-22  OR Date:    Keflex (Hives)  Keflex (Unknown)    PAST MEDICAL & SURGICAL HISTORY:  Diabetes    Depression    Gout    Benign essential HTN    Osteoarthritis    Cataracts, both eyes    S/P cholecystectomy    S/P knee surgery      Home Medications:  aspirin 81 mg oral tablet: 1 tab(s) orally once a day (10 Oct 2019 21:41)  donepezil 10 mg oral tablet: 1 tab(s) orally once a day (at bedtime) (14 Feb 2022 09:30)  DULoxetine 60 mg oral delayed release capsule: 1 cap(s) orally once a day (14 Feb 2022 09:30)  glipiZIDE 2.5 mg oral tablet, extended release: 1 tab(s) orally once a day (10 Oct 2019 21:41)  latanoprost 0.005% ophthalmic solution: 1 drop(s) to each affected eye once a day (in the evening) (13 Oct 2022 11:50)  memantine 10 mg oral tablet: 1 tab(s) orally 2 times a day (14 Feb 2022 09:31)  metFORMIN 1000 mg oral tablet: 1 tab(s) orally 2 times a day (10 Oct 2019 21:41)  quinapril 20 mg oral tablet: 1 tab(s) orally once a day (14 Feb 2022 09:31)  Vitamin B6 100 mg oral tablet: 1 tab(s) orally once a day (10 Oct 2019 21:41)  Vitamin D3 2000 intl units oral tablet: 1 tab(s) orally once a day (10 Oct 2019 21:47)      24HRS EVENT:  10/14  Night  -Cr  -mag repleted    Day  -Cr 1.1 from 0.8-->repeat 0.9 11AM  -LR started at 50  -BUN uptrending--> 17-->21-->26; LR increased to 75 cc/hr--> f/u 20:00 BMP    bladder scan- 75 ccs   -CXR: worsening b/l basilar opacities  -F/U PT  -750 IS  - A1C 6.1  - fingerstick elevated--> tightened ISS  -pt hypotensive to 87/52 when OOB with phys therapy; hold OOB for now  -OT recs acute inpt rehab and home OT; no weight bearing restrictions   -FS of 314 after eating--> 17:30 --> continue following         DVT PTX: enoxaparin Injectable 40 milliGRAM(s) SubCutaneous every 24 hours    GI PTX: protonix    ***Tubes/Lines/Drains  ***  Peripheral IV	    REVIEW OF SYSTEMS    [x] A ten-point review of systems was otherwise negative except as noted.  [ ] Due to altered mental status/intubation, subjective information were not able to be obtained from the patient. History was obtained, to the extent possible, from review of the chart and collateral sources of information.       STACY CARDENAS  [id number]  77y Male    Indication for ICU admission: respiratory monitoring s/p fall with left 1st, 4-6 and 8-10 rib fractures in addition to c1 burst fracture.     Admit Date:10-13-22  ICU Date:10-13-22    Keflex (Hives)    PAST MEDICAL & SURGICAL HISTORY:  Diabetes    Depression    Gout    Benign essential HTN    Osteoarthritis    Cataracts, both eyes    S/P cholecystectomy    S/P knee surgery      Home Medications:  aspirin 81 mg oral tablet: 1 tab(s) orally once a day (10 Oct 2019 21:41)  donepezil 10 mg oral tablet: 1 tab(s) orally once a day (at bedtime) (14 Feb 2022 09:30)  DULoxetine 60 mg oral delayed release capsule: 1 cap(s) orally once a day (14 Feb 2022 09:30)  glipiZIDE 2.5 mg oral tablet, extended release: 1 tab(s) orally once a day (10 Oct 2019 21:41)  latanoprost 0.005% ophthalmic solution: 1 drop(s) to each affected eye once a day (in the evening) (13 Oct 2022 11:50)  memantine 10 mg oral tablet: 1 tab(s) orally 2 times a day (14 Feb 2022 09:31)  metFORMIN 1000 mg oral tablet: 1 tab(s) orally 2 times a day (10 Oct 2019 21:41)  quinapril 20 mg oral tablet: 1 tab(s) orally once a day (14 Feb 2022 09:31)  Vitamin B6 100 mg oral tablet: 1 tab(s) orally once a day (10 Oct 2019 21:41)  Vitamin D3 2000 intl units oral tablet: 1 tab(s) orally once a day (10 Oct 2019 21:47)      24HRS EVENT:  10/14  Night  -Cr  -mag repleted    Day  -Cr 1.1 from 0.8-->repeat 0.9 11AM  -LR started at 50  -BUN uptrending--> 17-->21-->26; LR increased to 75 cc/hr--> f/u 20:00 BMP    bladder scan- 75 ccs   -CXR: worsening b/l basilar opacities  -F/U PT  -750 IS  - A1C 6.1  - fingerstick elevated--> tightened ISS  -pt hypotensive to 87/52 when OOB with phys therapy; hold OOB for now  -OT recs acute inpt rehab and home OT; no weight bearing restrictions   -FS of 314 after eating--> 17:30 --> continue following         DVT PTX: enoxaparin Injectable 40 milliGRAM(s) SubCutaneous every 24 hours    GI PTX: protonix    ***Tubes/Lines/Drains  ***  Peripheral IV	    REVIEW OF SYSTEMS    [x] A ten-point review of systems was otherwise negative except as noted.  [ ] Due to altered mental status/intubation, subjective information were not able to be obtained from the patient. History was obtained, to the extent possible, from review of the chart and collateral sources of information.       STACY CARDENAS  [id number]  77y Male    Indication for ICU admission: respiratory monitoring s/p fall with left 1st, 4-6 and 8-10 rib fractures in addition to c1 burst fracture.     Admit Date:10-13-22  ICU Date:10-13-22    Keflex (Hives)    PAST MEDICAL & SURGICAL HISTORY:  Diabetes    Depression    Gout    Benign essential HTN    Osteoarthritis    Cataracts, both eyes    S/P cholecystectomy    S/P knee surgery      Home Medications:  aspirin 81 mg oral tablet: 1 tab(s) orally once a day (10 Oct 2019 21:41)  donepezil 10 mg oral tablet: 1 tab(s) orally once a day (at bedtime) (14 Feb 2022 09:30)  DULoxetine 60 mg oral delayed release capsule: 1 cap(s) orally once a day (14 Feb 2022 09:30)  glipiZIDE 2.5 mg oral tablet, extended release: 1 tab(s) orally once a day (10 Oct 2019 21:41)  latanoprost 0.005% ophthalmic solution: 1 drop(s) to each affected eye once a day (in the evening) (13 Oct 2022 11:50)  memantine 10 mg oral tablet: 1 tab(s) orally 2 times a day (14 Feb 2022 09:31)  metFORMIN 1000 mg oral tablet: 1 tab(s) orally 2 times a day (10 Oct 2019 21:41)  quinapril 20 mg oral tablet: 1 tab(s) orally once a day (14 Feb 2022 09:31)  Vitamin B6 100 mg oral tablet: 1 tab(s) orally once a day (10 Oct 2019 21:41)  Vitamin D3 2000 intl units oral tablet: 1 tab(s) orally once a day (10 Oct 2019 21:47)      24HRS EVENT:  10/14  Night  -Cr  -mag repleted    Day  -Cr 1.1 from 0.8-->repeat 0.9 11AM  -LR started at 50  -BUN uptrending--> 17-->21-->26; LR increased to 75 cc/hr--> f/u 20:00 BMP    bladder scan- 75 ccs   -CXR: worsening b/l basilar opacities  -F/U PT  -750 IS  - A1C 6.1  - fingerstick elevated--> tightened ISS  -pt hypotensive to 87/52 when OOB with phys therapy; hold OOB for now  -OT recs acute inpt rehab and home OT; no weight bearing restrictions   -FS of 314 after eating--> 17:30 --> continue following         DVT PTX: enoxaparin Injectable 40 milliGRAM(s) SubCutaneous every 24 hours    GI PTX: protonix    ***Tubes/Lines/Drains  ***  Peripheral IV	    REVIEW OF SYSTEMS    [x] A ten-point review of systems was otherwise negative except as noted.  [ ] Due to altered mental status/intubation, subjective information were not able to be obtained from the patient. History was obtained, to the extent possible, from review of the chart and collateral sources of information.        Daily     Daily     Diet, Regular:   Consistent Carbohydrate No Snacks  DASH/TLC Sodium & Cholesterol Restricted (10-13-22 @ 12:18)      CURRENT MEDS:  Neurologic Medications  acetaminophen     Tablet .. 650 milliGRAM(s) Oral every 6 hours  carbidopa/levodopa  25/100 1 Tablet(s) Oral <User Schedule>  donepezil 10 milliGRAM(s) Oral at bedtime  DULoxetine 60 milliGRAM(s) Oral daily  gabapentin 100 milliGRAM(s) Oral three times a day  memantine 10 milliGRAM(s) Oral daily    Respiratory Medications    Cardiovascular Medications  lisinopril 20 milliGRAM(s) Oral daily    Gastrointestinal Medications  lactated ringers. 1000 milliLiter(s) IV Continuous <Continuous>  lactulose Syrup 10 Gram(s) Oral two times a day  magnesium sulfate  IVPB 1 Gram(s) IV Intermittent once    Genitourinary Medications    Hematologic/Oncologic Medications  aspirin enteric coated 81 milliGRAM(s) Oral daily  enoxaparin Injectable 40 milliGRAM(s) SubCutaneous every 24 hours  influenza  Vaccine (HIGH DOSE) 0.7 milliLiter(s) IntraMuscular once    Antimicrobial/Immunologic Medications    Endocrine/Metabolic Medications  insulin glargine Injectable (LANTUS) 12 Unit(s) SubCutaneous at bedtime  insulin lispro (ADMELOG) corrective regimen sliding scale   SubCutaneous three times a day before meals  insulin lispro Injectable (ADMELOG) 4 Unit(s) SubCutaneous three times a day before meals    Topical/Other Medications  chlorhexidine 2% Cloths 1 Application(s) Topical <User Schedule>  latanoprost 0.005% Ophthalmic Solution 1 Drop(s) Both EYES at bedtime  lidocaine   4% Patch 1 Patch Transdermal every 24 hours      ICU Vital Signs Last 24 Hrs  T(C): 36.1 (15 Oct 2022 07:55), Max: 36.9 (14 Oct 2022 20:00)  T(F): 97 (15 Oct 2022 07:55), Max: 98.4 (14 Oct 2022 20:00)  HR: 78 (15 Oct 2022 10:00) (76 - 92)  BP: 108/52 (15 Oct 2022 10:00) (104/63 - 143/63)  BP(mean): 75 (15 Oct 2022 10:00) (75 - 94)  ABP: --  ABP(mean): --  RR: 19 (15 Oct 2022 10:00) (15 - 31)  SpO2: 98% (15 Oct 2022 10:00) (94% - 99%)    O2 Parameters below as of 15 Oct 2022 11:00  Patient On (Oxygen Delivery Method): nasal cannula  O2 Flow (L/min): 2          Adult Advanced Hemodynamics Last 24 Hrs  CVP(mm Hg): --  CVP(cm H2O): --  CO: --  CI: --  PA: --  PA(mean): --  PCWP: --  SVR: --  SVRI: --  PVR: --  PVRI: --          I&O's Summary    14 Oct 2022 07:01  -  15 Oct 2022 07:00  --------------------------------------------------------  IN: 1690 mL / OUT: 400 mL / NET: 1290 mL    15 Oct 2022 07:01  -  15 Oct 2022 11:56  --------------------------------------------------------  IN: 540 mL / OUT: 0 mL / NET: 540 mL      I&O's Detail    14 Oct 2022 07:01  -  15 Oct 2022 07:00  --------------------------------------------------------  IN:    Lactated Ringers: 250 mL    Lactated Ringers: 1200 mL    Oral Fluid: 240 mL  Total IN: 1690 mL    OUT:    Voided (mL): 400 mL  Total OUT: 400 mL    Total NET: 1290 mL      15 Oct 2022 07:01  -  15 Oct 2022 11:56  --------------------------------------------------------  IN:    Lactated Ringers: 300 mL    Oral Fluid: 240 mL  Total IN: 540 mL    OUT:  Total OUT: 0 mL    Total NET: 540 mL          PHYSICAL EXAM:  ----------------------------------------------------------------------------------------------------------     General/Neuro: A & O to person and place but not time. No focal deficits. Sensation intact bilaterally. 5/5 strength in all extremities CN II-XII intact. EOMI. PEERLA.     RESPIRATORY:  Normal expansion/effort. Clear to auscultation bilaterally. Mild tenderness elicited over left ribs.     CARDIOVASCULAR: Normal S1/S2 heard. RRR.   No peripheral edema    GASTROINTESTINAL:  Abdomen soft, non-tender, non-distended.    MUSCULOSKELETAL:  Extremities warm, pink, well-perfused. [place] collar in place. Mild tenderness elicited over left ribs. Unable to assess neck tenderness as pt is in collar.     DERM:  No skin breakdown     : Voiding.         CXR:     LABS:  CAPILLARY BLOOD GLUCOSE      POCT Blood Glucose.: 352 mg/dL (15 Oct 2022 11:17)  POCT Blood Glucose.: 250 mg/dL (15 Oct 2022 07:36)  POCT Blood Glucose.: 189 mg/dL (14 Oct 2022 17:20)                          10.6   7.07  )-----------( 120      ( 14 Oct 2022 22:54 )             28.9       10-14    136  |  100  |  25<H>  ----------------------------<  287<H>  4.8   |  25  |  0.9    Ca    8.6      14 Oct 2022 22:54  Phos  3.4     10-14  Mg     1.8     10-14    TPro  6.1  /  Alb  2.8<L>  /  TBili  1.9<H>  /  DBili  0.5<H>  /  AST  40  /  ALT  29  /  AlkPhos  114  10-13        CARDIAC MARKERS ( 13 Oct 2022 13:44 )  x     / x     / 108 U/L / x     / x                 STACY CARDENAS  [id number]  77y Male    Indication for ICU admission: respiratory monitoring s/p fall with left 1st, 4-6 and 8-10 rib fractures in addition to c1 burst fracture.     Admit Date:10-13-22  ICU Date:10-13-22    Keflex (Hives)    PAST MEDICAL & SURGICAL HISTORY:  Diabetes    Depression    Gout    Benign essential HTN    Osteoarthritis    Cataracts, both eyes    S/P cholecystectomy    S/P knee surgery      Home Medications:  aspirin 81 mg oral tablet: 1 tab(s) orally once a day (10 Oct 2019 21:41)  donepezil 10 mg oral tablet: 1 tab(s) orally once a day (at bedtime) (14 Feb 2022 09:30)  DULoxetine 60 mg oral delayed release capsule: 1 cap(s) orally once a day (14 Feb 2022 09:30)  glipiZIDE 2.5 mg oral tablet, extended release: 1 tab(s) orally once a day (10 Oct 2019 21:41)  latanoprost 0.005% ophthalmic solution: 1 drop(s) to each affected eye once a day (in the evening) (13 Oct 2022 11:50)  memantine 10 mg oral tablet: 1 tab(s) orally 2 times a day (14 Feb 2022 09:31)  metFORMIN 1000 mg oral tablet: 1 tab(s) orally 2 times a day (10 Oct 2019 21:41)  quinapril 20 mg oral tablet: 1 tab(s) orally once a day (14 Feb 2022 09:31)  Vitamin B6 100 mg oral tablet: 1 tab(s) orally once a day (10 Oct 2019 21:41)  Vitamin D3 2000 intl units oral tablet: 1 tab(s) orally once a day (10 Oct 2019 21:47)      24HRS EVENT:  10/14  Night  -Cr  -mag repleted    Day  -Cr 1.1 from 0.8-->repeat 0.9 11AM  -LR started at 50  -BUN uptrending--> 17-->21-->26; LR increased to 75 cc/hr--> f/u 20:00 BMP    bladder scan- 75 ccs   -CXR: worsening b/l basilar opacities  -F/U PT  -750 IS  - A1C 6.1  - fingerstick elevated--> tightened ISS  -pt hypotensive to 87/52 when OOB with phys therapy; hold OOB for now  -OT recs acute inpt rehab and home OT; no weight bearing restrictions   -FS of 314 after eating--> 17:30 --> continue following         DVT PTX: enoxaparin Injectable 40 milliGRAM(s) SubCutaneous every 24 hours    GI PTX: protonix    ***Tubes/Lines/Drains  ***  Peripheral IV	    REVIEW OF SYSTEMS    [x] A ten-point review of systems was otherwise negative except as noted.  [ ] Due to altered mental status/intubation, subjective information were not able to be obtained from the patient. History was obtained, to the extent possible, from review of the chart and collateral sources of information.        Daily     Daily     Diet, Regular:   Consistent Carbohydrate No Snacks  DASH/TLC Sodium & Cholesterol Restricted (10-13-22 @ 12:18)      CURRENT MEDS:  Neurologic Medications  acetaminophen     Tablet .. 650 milliGRAM(s) Oral every 6 hours  carbidopa/levodopa  25/100 1 Tablet(s) Oral <User Schedule>  donepezil 10 milliGRAM(s) Oral at bedtime  DULoxetine 60 milliGRAM(s) Oral daily  gabapentin 100 milliGRAM(s) Oral three times a day  memantine 10 milliGRAM(s) Oral daily    Respiratory Medications    Cardiovascular Medications  lisinopril 20 milliGRAM(s) Oral daily    Gastrointestinal Medications  lactated ringers. 1000 milliLiter(s) IV Continuous <Continuous>  lactulose Syrup 10 Gram(s) Oral two times a day  magnesium sulfate  IVPB 1 Gram(s) IV Intermittent once    Genitourinary Medications    Hematologic/Oncologic Medications  aspirin enteric coated 81 milliGRAM(s) Oral daily  enoxaparin Injectable 40 milliGRAM(s) SubCutaneous every 24 hours  influenza  Vaccine (HIGH DOSE) 0.7 milliLiter(s) IntraMuscular once    Antimicrobial/Immunologic Medications    Endocrine/Metabolic Medications  insulin glargine Injectable (LANTUS) 12 Unit(s) SubCutaneous at bedtime  insulin lispro (ADMELOG) corrective regimen sliding scale   SubCutaneous three times a day before meals  insulin lispro Injectable (ADMELOG) 4 Unit(s) SubCutaneous three times a day before meals    Topical/Other Medications  chlorhexidine 2% Cloths 1 Application(s) Topical <User Schedule>  latanoprost 0.005% Ophthalmic Solution 1 Drop(s) Both EYES at bedtime  lidocaine   4% Patch 1 Patch Transdermal every 24 hours      ICU Vital Signs Last 24 Hrs  T(C): 36.1 (15 Oct 2022 07:55), Max: 36.9 (14 Oct 2022 20:00)  T(F): 97 (15 Oct 2022 07:55), Max: 98.4 (14 Oct 2022 20:00)  HR: 78 (15 Oct 2022 10:00) (76 - 92)  BP: 108/52 (15 Oct 2022 10:00) (104/63 - 143/63)  BP(mean): 75 (15 Oct 2022 10:00) (75 - 94)  ABP: --  ABP(mean): --  RR: 19 (15 Oct 2022 10:00) (15 - 31)  SpO2: 98% (15 Oct 2022 10:00) (94% - 99%)    O2 Parameters below as of 15 Oct 2022 11:00  Patient On (Oxygen Delivery Method): nasal cannula  O2 Flow (L/min): 2          Adult Advanced Hemodynamics Last 24 Hrs  CVP(mm Hg): --  CVP(cm H2O): --  CO: --  CI: --  PA: --  PA(mean): --  PCWP: --  SVR: --  SVRI: --  PVR: --  PVRI: --          I&O's Summary    14 Oct 2022 07:01  -  15 Oct 2022 07:00  --------------------------------------------------------  IN: 1690 mL / OUT: 400 mL / NET: 1290 mL    15 Oct 2022 07:01  -  15 Oct 2022 11:56  --------------------------------------------------------  IN: 540 mL / OUT: 0 mL / NET: 540 mL      I&O's Detail    14 Oct 2022 07:01  -  15 Oct 2022 07:00  --------------------------------------------------------  IN:    Lactated Ringers: 250 mL    Lactated Ringers: 1200 mL    Oral Fluid: 240 mL  Total IN: 1690 mL    OUT:    Voided (mL): 400 mL  Total OUT: 400 mL    Total NET: 1290 mL      15 Oct 2022 07:01  -  15 Oct 2022 11:56  --------------------------------------------------------  IN:    Lactated Ringers: 300 mL    Oral Fluid: 240 mL  Total IN: 540 mL    OUT:  Total OUT: 0 mL    Total NET: 540 mL          PHYSICAL EXAM:  ----------------------------------------------------------------------------------------------------------     General/Neuro: A & O to person and place but not time. No focal deficits. Sensation intact bilaterally. 5/5 strength in all extremities CN II-XII intact. EOMI. PEERLA.     RESPIRATORY:  Normal expansion/effort. Clear to auscultation bilaterally. Mild tenderness elicited over left ribs.     CARDIOVASCULAR: Normal S1/S2 heard. RRR.   No peripheral edema    GASTROINTESTINAL:  Abdomen soft, non-tender, non-distended.    MUSCULOSKELETAL:  Extremities warm, pink, well-perfused. [place] collar in place. Mild tenderness elicited over left ribs. Unable to assess neck tenderness as pt is in collar.     DERM:  No skin breakdown     : Voiding.         CXR:     LABS:  CAPILLARY BLOOD GLUCOSE      POCT Blood Glucose.: 352 mg/dL (15 Oct 2022 11:17)  POCT Blood Glucose.: 250 mg/dL (15 Oct 2022 07:36)  POCT Blood Glucose.: 189 mg/dL (14 Oct 2022 17:20)                          10.6   7.07  )-----------( 120      ( 14 Oct 2022 22:54 )             28.9       10-14    136  |  100  |  25<H>  ----------------------------<  287<H>  4.8   |  25  |  0.9    Ca    8.6      14 Oct 2022 22:54  Phos  3.4     10-14  Mg     1.8     10-14    TPro  6.1  /  Alb  2.8<L>  /  TBili  1.9<H>  /  DBili  0.5<H>  /  AST  40  /  ALT  29  /  AlkPhos  114  10-13        CARDIAC MARKERS ( 13 Oct 2022 13:44 )  x     / x     / 108 U/L / x     / x

## 2022-10-16 ENCOUNTER — TRANSCRIPTION ENCOUNTER (OUTPATIENT)
Age: 77
End: 2022-10-16

## 2022-10-16 LAB
AMMONIA BLD-MCNC: 136 UMOL/L — HIGH (ref 11–55)
ANION GAP SERPL CALC-SCNC: 10 MMOL/L — SIGNIFICANT CHANGE UP (ref 7–14)
BUN SERPL-MCNC: 20 MG/DL — SIGNIFICANT CHANGE UP (ref 10–20)
CALCIUM SERPL-MCNC: 8.4 MG/DL — SIGNIFICANT CHANGE UP (ref 8.4–10.5)
CHLORIDE SERPL-SCNC: 107 MMOL/L — SIGNIFICANT CHANGE UP (ref 98–110)
CO2 SERPL-SCNC: 26 MMOL/L — SIGNIFICANT CHANGE UP (ref 17–32)
CREAT SERPL-MCNC: 0.7 MG/DL — SIGNIFICANT CHANGE UP (ref 0.7–1.5)
EGFR: 95 ML/MIN/1.73M2 — SIGNIFICANT CHANGE UP
GLUCOSE BLDC GLUCOMTR-MCNC: 181 MG/DL — HIGH (ref 70–99)
GLUCOSE BLDC GLUCOMTR-MCNC: 200 MG/DL — HIGH (ref 70–99)
GLUCOSE BLDC GLUCOMTR-MCNC: 212 MG/DL — HIGH (ref 70–99)
GLUCOSE BLDC GLUCOMTR-MCNC: 310 MG/DL — HIGH (ref 70–99)
GLUCOSE BLDC GLUCOMTR-MCNC: 316 MG/DL — HIGH (ref 70–99)
GLUCOSE SERPL-MCNC: 243 MG/DL — HIGH (ref 70–99)
HCT VFR BLD CALC: 29.4 % — LOW (ref 42–52)
HGB BLD-MCNC: 10.7 G/DL — LOW (ref 14–18)
MAGNESIUM SERPL-MCNC: 1.5 MG/DL — LOW (ref 1.8–2.4)
MCHC RBC-ENTMCNC: 35.4 PG — HIGH (ref 27–31)
MCHC RBC-ENTMCNC: 36.4 G/DL — SIGNIFICANT CHANGE UP (ref 32–37)
MCV RBC AUTO: 97.4 FL — HIGH (ref 80–94)
NRBC # BLD: 0 /100 WBCS — SIGNIFICANT CHANGE UP (ref 0–0)
PHOSPHATE SERPL-MCNC: 2.9 MG/DL — SIGNIFICANT CHANGE UP (ref 2.1–4.9)
PLATELET # BLD AUTO: 136 K/UL — SIGNIFICANT CHANGE UP (ref 130–400)
POTASSIUM SERPL-MCNC: 5 MMOL/L — SIGNIFICANT CHANGE UP (ref 3.5–5)
POTASSIUM SERPL-SCNC: 5 MMOL/L — SIGNIFICANT CHANGE UP (ref 3.5–5)
RBC # BLD: 3.02 M/UL — LOW (ref 4.7–6.1)
RBC # FLD: 13.8 % — SIGNIFICANT CHANGE UP (ref 11.5–14.5)
SODIUM SERPL-SCNC: 143 MMOL/L — SIGNIFICANT CHANGE UP (ref 135–146)
WBC # BLD: 5.61 K/UL — SIGNIFICANT CHANGE UP (ref 4.8–10.8)
WBC # FLD AUTO: 5.61 K/UL — SIGNIFICANT CHANGE UP (ref 4.8–10.8)

## 2022-10-16 PROCEDURE — 99291 CRITICAL CARE FIRST HOUR: CPT | Mod: 24,25

## 2022-10-16 PROCEDURE — 71045 X-RAY EXAM CHEST 1 VIEW: CPT | Mod: 26

## 2022-10-16 PROCEDURE — 93010 ELECTROCARDIOGRAM REPORT: CPT

## 2022-10-16 RX ORDER — INSULIN LISPRO 100/ML
6 VIAL (ML) SUBCUTANEOUS
Refills: 0 | Status: DISCONTINUED | OUTPATIENT
Start: 2022-10-16 | End: 2022-10-19

## 2022-10-16 RX ORDER — INSULIN GLARGINE 100 [IU]/ML
14 INJECTION, SOLUTION SUBCUTANEOUS AT BEDTIME
Refills: 0 | Status: DISCONTINUED | OUTPATIENT
Start: 2022-10-16 | End: 2022-10-18

## 2022-10-16 RX ORDER — OLANZAPINE 15 MG/1
5 TABLET, FILM COATED ORAL ONCE
Refills: 0 | Status: COMPLETED | OUTPATIENT
Start: 2022-10-16 | End: 2022-10-16

## 2022-10-16 RX ORDER — POLYETHYLENE GLYCOL 3350 17 G/17G
17 POWDER, FOR SOLUTION ORAL EVERY 12 HOURS
Refills: 0 | Status: DISCONTINUED | OUTPATIENT
Start: 2022-10-16 | End: 2022-10-19

## 2022-10-16 RX ORDER — ACETAMINOPHEN 500 MG
650 TABLET ORAL EVERY 6 HOURS
Refills: 0 | Status: DISCONTINUED | OUTPATIENT
Start: 2022-10-16 | End: 2022-10-19

## 2022-10-16 RX ORDER — MAGNESIUM SULFATE 500 MG/ML
2 VIAL (ML) INJECTION ONCE
Refills: 0 | Status: COMPLETED | OUTPATIENT
Start: 2022-10-16 | End: 2022-10-16

## 2022-10-16 RX ORDER — LACTULOSE 10 G/15ML
15 SOLUTION ORAL EVERY 8 HOURS
Refills: 0 | Status: DISCONTINUED | OUTPATIENT
Start: 2022-10-16 | End: 2022-10-19

## 2022-10-16 RX ADMIN — DULOXETINE HYDROCHLORIDE 60 MILLIGRAM(S): 30 CAPSULE, DELAYED RELEASE ORAL at 11:49

## 2022-10-16 RX ADMIN — Medication 650 MILLIGRAM(S): at 12:50

## 2022-10-16 RX ADMIN — Medication 7: at 16:19

## 2022-10-16 RX ADMIN — MEMANTINE HYDROCHLORIDE 10 MILLIGRAM(S): 10 TABLET ORAL at 11:49

## 2022-10-16 RX ADMIN — Medication 4 UNIT(S): at 11:42

## 2022-10-16 RX ADMIN — LACTULOSE 15 GRAM(S): 10 SOLUTION ORAL at 21:11

## 2022-10-16 RX ADMIN — LIDOCAINE 1 PATCH: 4 CREAM TOPICAL at 03:09

## 2022-10-16 RX ADMIN — Medication 650 MILLIGRAM(S): at 00:20

## 2022-10-16 RX ADMIN — Medication 650 MILLIGRAM(S): at 01:00

## 2022-10-16 RX ADMIN — Medication 25 GRAM(S): at 00:26

## 2022-10-16 RX ADMIN — Medication 650 MILLIGRAM(S): at 11:49

## 2022-10-16 RX ADMIN — POLYETHYLENE GLYCOL 3350 17 GRAM(S): 17 POWDER, FOR SOLUTION ORAL at 21:11

## 2022-10-16 RX ADMIN — Medication 6 UNIT(S): at 16:19

## 2022-10-16 RX ADMIN — Medication 650 MILLIGRAM(S): at 05:20

## 2022-10-16 RX ADMIN — LACTULOSE 10 GRAM(S): 10 SOLUTION ORAL at 05:20

## 2022-10-16 RX ADMIN — Medication 62.5 MILLIMOLE(S): at 01:06

## 2022-10-16 RX ADMIN — CHLORHEXIDINE GLUCONATE 1 APPLICATION(S): 213 SOLUTION TOPICAL at 05:11

## 2022-10-16 RX ADMIN — GABAPENTIN 100 MILLIGRAM(S): 400 CAPSULE ORAL at 21:12

## 2022-10-16 RX ADMIN — LATANOPROST 1 DROP(S): 0.05 SOLUTION/ DROPS OPHTHALMIC; TOPICAL at 21:12

## 2022-10-16 RX ADMIN — LISINOPRIL 20 MILLIGRAM(S): 2.5 TABLET ORAL at 05:20

## 2022-10-16 RX ADMIN — Medication 13: at 11:42

## 2022-10-16 RX ADMIN — GABAPENTIN 100 MILLIGRAM(S): 400 CAPSULE ORAL at 05:20

## 2022-10-16 RX ADMIN — LACTULOSE 10 GRAM(S): 10 SOLUTION ORAL at 14:07

## 2022-10-16 RX ADMIN — CARBIDOPA AND LEVODOPA 1 TABLET(S): 25; 100 TABLET ORAL at 05:20

## 2022-10-16 RX ADMIN — LIDOCAINE 1 PATCH: 4 CREAM TOPICAL at 19:31

## 2022-10-16 RX ADMIN — GABAPENTIN 100 MILLIGRAM(S): 400 CAPSULE ORAL at 14:09

## 2022-10-16 RX ADMIN — Medication 5: at 07:44

## 2022-10-16 RX ADMIN — LIDOCAINE 1 PATCH: 4 CREAM TOPICAL at 16:18

## 2022-10-16 RX ADMIN — INSULIN GLARGINE 14 UNIT(S): 100 INJECTION, SOLUTION SUBCUTANEOUS at 21:11

## 2022-10-16 RX ADMIN — Medication 25 GRAM(S): at 23:13

## 2022-10-16 RX ADMIN — Medication 81 MILLIGRAM(S): at 11:49

## 2022-10-16 RX ADMIN — ENOXAPARIN SODIUM 40 MILLIGRAM(S): 100 INJECTION SUBCUTANEOUS at 16:18

## 2022-10-16 RX ADMIN — CARBIDOPA AND LEVODOPA 1 TABLET(S): 25; 100 TABLET ORAL at 17:43

## 2022-10-16 RX ADMIN — DONEPEZIL HYDROCHLORIDE 10 MILLIGRAM(S): 10 TABLET, FILM COATED ORAL at 21:12

## 2022-10-16 RX ADMIN — OLANZAPINE 5 MILLIGRAM(S): 15 TABLET, FILM COATED ORAL at 20:42

## 2022-10-16 RX ADMIN — Medication 650 MILLIGRAM(S): at 07:27

## 2022-10-16 RX ADMIN — Medication 4 UNIT(S): at 07:59

## 2022-10-16 NOTE — DISCHARGE NOTE NURSING/CASE MANAGEMENT/SOCIAL WORK - FLU SEASON?
SUBJECTIVE:                                                    Jc Vela is a 13 year old male who presents to clinic today with father because of:    Chief Complaint   Patient presents with     Ear Problem     both ears plugged right with pain for more than a week ear issues x 1 year dad questions if need to see a speialist       HPI:  ENT/Cough Symptoms    Problem started: a few days ago  Fever: no  Runny nose: no  Congestion: no  Sore Throat: no  Cough: no  Eye discharge/redness:  no  Ear Pain: YES- both will feel plugged at times, but right ear started to get painful over the weekend  Wheeze: no   Sick contacts: School;  Strep exposure: None;  Therapies Tried: Yawning, moving external ear around    Dad states ear symptoms are a common complaint with Jc.  He will often complain of ear discomfort, or plugged feeling.  He has had ceruminosis in the past.        ROS:  Negative for constitutional, eye, ear, nose, throat, skin, respiratory, cardiac, and gastrointestinal other than those outlined in the HPI.    PROBLEM LIST:  Patient Active Problem List    Diagnosis Date Noted     Simple tics      Priority: Medium     Anxiety state      Priority: Medium     Problem list name updated by automated process. Provider to review       ADHD (attention deficit hyperactivity disorder) 08/30/2013     Priority: Medium      MEDICATIONS:  Current Outpatient Prescriptions   Medication Sig Dispense Refill     neomycin-polymyxin-hydrocortisone (CORTISPORIN) 3.5-91478-5 otic suspension Place 3 drops in ear(s) 3 times daily for 5 days 3 mL 0     dexmethylphenidate (FOCALIN XR) 20 MG 24 hr capsule Take 20 mg by mouth daily       GuanFACINE HCl (INTUNIV PO) Take 3 mg by mouth every morning        ALLERGIES:  No Known Allergies    Problem list and histories reviewed & adjusted, as indicated.    OBJECTIVE:                                                      BP 90/60 (BP Location: Left arm, Patient Position: Chair, Cuff Size: Adult  "Regular)  Pulse 64  Temp 96.9  F (36.1  C) (Tympanic)  Resp 14  Ht 5' 7.4\" (1.712 m)  Wt 129 lb 9.6 oz (58.8 kg)  BMI 20.06 kg/m2   Blood pressure percentiles are 1 % systolic and 34 % diastolic based on [facility]'s 4th Report. Blood pressure percentile targets: 90: 127/80, 95: 131/84, 99 + 5 mmH/97.    GENERAL: Active, alert, in no acute distress.  RIGHT EAR: occluded with wax  LEFT EAR: mild amount of wax near ear canal opening, full TM visualized and normal    Patient agreed to ear irrigation.  After one large container of warm water, large cerumen plug was able to be extracted using alligator forceps under direct magnified visualization.  Patient tolerated procedure well.  Ear canal irritation (mild) was noted after.      ASSESSMENT/PLAN:                                                    1. Ceruminosis, right  Drops prescribed to help with irritation.  They will consider ENT referral is symptoms return in short order.  Follow-up in the interim as needed.  - REMOVE IMPACTED CERUMEN    2. Irritation of external ear canal, right  - neomycin-polymyxin-hydrocortisone (CORTISPORIN) 3.5-19364-2 otic suspension; Place 3 drops in ear(s) 3 times daily for 5 days  Dispense: 3 mL; Refill: 0    FOLLOW UP: If not improving or if worsening    Juana Roach MD    " Yes...

## 2022-10-16 NOTE — PROGRESS NOTE ADULT - ASSESSMENT
Assessment and Plan:   NEURO:  #C1 fx  -Q4 neuro checks   -CT reviewed by Dr. Damon from neurosurgery; stable CT fx; maintain hard c-collar ([place] [facility]) x 2 weeks  -As per neurosurgery no neurosurgical intervention needed at this time. Rec f/u outpt 2 weeks after d/c.     #Acute pain -- well controlled  -APAP 650 mg PO q 6  -Lidocaine 4% patch q 24  -Gabapentin 100mg q 8   -Assess pain and adjust medications PRN    #Dementia   -A&O x 2 (person, place) today   -as per family pt is A&O x 2 at baseline  -restarted home donepezil and memantine   -restarted carbidopa-levodopa  -Home meds: donepezil 10mg PO at bedtime, memantine 10mg PO daily; carbidopa-levodopa TID     #Hx depression  -duloxetine 60mg PO daily     RESP:   #Multiple L rib fx  -pain control   -blowing [id number] on IS; communicated with nursing staff to encourage IS use     #Oxygenation  -saturating 98% on 2L NC   -encourage IS; currently pulling 750 on IS   -CXR today shows worsening b/l basilar opacities, notably on R side; Echo, CT chest, and COVID-19 PCR swab ordered; f/u results     #Activity  -Pt uses walker at baseline     -increase as tolerated  -PT and physiatry consulted; Physiatry states he is possible 4a candidate; PT attempted to get patient OOB 10/14 but was hypotensive to 87/52 so holding OOB for now; OT recommends acute inpatient vs [facility] and home OT     CARDS:   #HTN   -Q1 vitals   -started on Lisinopril 20mg PO daily; 1:1 ratio as per pharmacy  -home meds: quinapril 20mg 1 x day   -Troponin 0.01    GI/NUTR:   #Diet, Regular  Consistent Carbohydrate No Snacks  DASH/TLC Sodium & Cholesterol Restricted (10-13-22 @ 12:18) [Active]    -aspiration precautions, HOB 30  #GI Prophylaxis    -not indicated  #Bowel regimen    -Lactulose    #hx cirrhosis  -ammonia level sent  -lactulose    /RENAL:   #urine output in critically ill  voiding but episodes of incontinence 10/13 night; bladder scanned--> 75 ccs  -intermittently voiding   -collection bag   -continue monitor U/O     #MELO  -/hr  -BUN 17-->21-->26-->25  -Cr 1.1-->0.9-->0.9 improving     Labs:          BUN/Cr- 25/0.9  -->,  22/0.8  -->          Electrolytes-Na 137 // K 4.7 // Mg 1.5 //  Phos 2.7 (10-15 @ 22:47)    HEME/ONC:   #DVT prophylaxis    -enoxaparin Injectable 40mg SubQ  -SCDs    Labs: Hb/Hct:  10.6/28.9  -->,  10.9/30.7  -->                      Plts:  120  -->,  131  -->                 PTT/INR:      Type and Screen Expires 10/16  Home meds: ASA 81 mg; continued     ID:  #leukocytosis   WBC- 7.18  --->>,  7.07  --->>,  6.59  --->>  Temp trend- 24hrs T(F): 97.6 (10-16 @ 00:00), Max: 98 (10-15 @ 04:00)  Afebrile, not on abx        ENDO:  #hx DM  - this AM--> 12 U lantus added and 4U preprandial TID before meals   -continue ACHS fingersticks  -continue ISS     Home meds: glipizide, metformin  -A1C 6.1%    MSK:  #C1 fx  -stable as per neurosurgery; pt in [place] collar for 2 weeks     #Multiple rib fx  -pain control  -encourage IS   -daily AM CXR    -Frailty score 4/5   -Physiatry consulted: possible 4A candidate  -PT and OT following    -Goals of care discussed with wife on 10/15/2022; Patient is full-code and has living will; prior MOLST void     LINES/DRAINS:  [place]  ADVANCED DIRECTIVES:  Full Code    HCP/Emergency Contact- Wife     INDICATION FOR SICU/SDU: MULTIPLE LT SIDE RIB FXS;HEAD INJURY;CLOSED BURST FX CERVICAL VERTEBRA.    Dispo: SICU, d/w Dr. Ball  Assessment and Plan:     NEURO:  #C1 fx  -Q4 neuro checks   -CT reviewed by Dr. Damon from neurosurgery; stable CT fx; maintain hard c-collar ([place]) x 2 weeks  -As per neurosurgery no neurosurgical intervention needed at this time. Rec f/u outpt 2 weeks after d/c.     #Acute pain -- well controlled  -APAP 650 mg PO q 6  -Lidocaine 4% patch q 24  -Gabapentin 100mg q 8   -Assess pain and adjust medications PRN    #Dementia   -A&O x 2 (person, place), occasionally to time  -as per family pt is A&O x 2 at baseline  -restarted home donepezil and memantine   -restarted carbidopa-levodopa  -Home meds: donepezil 10mg PO at bedtime, memantine 10mg PO daily; carbidopa-levodopa TID     #Hx depression  -duloxetine 60mg PO daily     RESP:   #Multiple L sided rib fxs  -pain control   -IS [id number]     #Oxygenation  -saturating 98% on 2L NC   -encourage IS; currently pulling 750 on IS   -CXR shows worsening b/l basilar opacities, notably on R side.  10/15 CT chest --> Bibasilar atelectatic opacities.    #Activity  -Pt uses walker at baseline     -increase as tolerated  -PT and physiatry consulted; Physiatry states he is possible 4a candidate; PT attempted to get patient OOB 10/14 but was hypotensive to 87/52 so holding OOB for now; OT recommends acute inpatient vs [facility] and home OT     CARDS:   #HTN   -Q1 vitals   -started on Lisinopril 20mg PO daily; 1:1 ratio as per pharmacy  -home meds: quinapril 20mg 1 x day   -Troponin 0.01    GI/NUTR:   #Diet, Regular  Consistent Carbohydrate No Snacks  DASH/TLC Sodium & Cholesterol Restricted (10-13-22 @ 12:18) [Active]    -aspiration precautions, HOB 30  #GI Prophylaxis    -not indicated  #Bowel regimen    -Lactulose 10g q8    #hx cirrhosis  -ammonia level 142, rept @ 2000  -increased lactulose 10g q8 on 10/15    /RENAL:   #urine output in critically ill  voiding but episodes of incontinence 10/13 night; bladder scanned--> 75 ccs  -collection bag   -continue monitor U/O     #MELO  -LR 75 /hr  -BUN 17-->21-->26-->25->22  -Cr 1.1-->0.9-->0.9->0.8 improving     Labs:          BUN/Cr- 25/0.9  -->,  22/0.8  -->          Electrolytes-Na 137 // K 4.7 // Mg 1.5 //  Phos 2.7 (10-15 @ 22:47)    HEME/ONC:   #DVT prophylaxis    -enoxaparin Injectable 40mg SubQ  -SCDs    Labs: Hb/Hct:  10.6/28.9  -->,  10.9/30.7  -->                      Plts:  120  -->,  131  -->                 PTT/INR:      Type and Screen Expires 10/16  Home meds: ASA 81 mg; continued     ID:  #leukocytosis   WBC- 7.18  --->>,  7.07  --->>,  6.59  --->>  Temp trend- 24hrs T(F): 97.6 (10-16 @ 00:00), Max: 98 (10-15 @ 04:00)  Afebrile, not on abx        ENDO:  #hx DM  - this AM--> 12 U lantus added and 4U preprandial TID before meals   -continue ACHS fingersticks  -continue ISS     Home meds: glipizide, metformin  -A1C 6.1%    MSK:  #C1 fx  -stable as per neurosurgery; pt in [place] collar for 2 weeks     #Multiple rib fx  -pain control  -encourage IS   -daily AM CXR    -Frailty score 4/5   -Physiatry consulted: possible 4A candidate  -PT and OT following    -Goals of care discussed with wife on 10/15/2022; Patient is full-code and has living will; prior MOLST void     LINES/DRAINS:  PIV  ADVANCED DIRECTIVES:  Full Code    HCP/Emergency Contact- Wife     INDICATION FOR SICU/SDU: MULTIPLE LT SIDE RIB FXS;HEAD INJURY;CLOSED BURST FX CERVICAL VERTEBRA.    Dispo: SICU Assessment and Plan:     NEURO:  #C1 fx  -Q4 neuro checks   -CT reviewed by Dr. Damon from neurosurgery; stable CT fx; maintain hard c-collar ([place] [facility]) x 2 weeks  -As per neurosurgery no neurosurgical intervention needed at this time. Rec f/u outpt 2 weeks after d/c.     #Acute pain -- well controlled  -APAP 650 mg PO q 6  -Lidocaine 4% patch q 24  -Gabapentin 100mg q 8   -Assess pain and adjust medications PRN    #Dementia   -A&O x 2 (person, place), occasionally to time  -as per family pt is A&O x 2 at baseline  -restarted home donepezil and memantine   -restarted carbidopa-levodopa  -Home meds: donepezil 10mg PO at bedtime, memantine 10mg PO daily; carbidopa-levodopa TID     #Hx depression  -duloxetine 60mg PO daily     RESP:   #Multiple L sided rib fxs  -pain control   -IS [id number]     #Oxygenation  -saturating 98% on 2L NC   -encourage IS; currently pulling 750 on IS   -CXR shows worsening b/l basilar opacities, notably on R side.  10/15 CT chest --> Bibasilar atelectatic opacities.    #Activity  -Pt uses walker at baseline     -increase as tolerated  -PT and physiatry consulted; Physiatry states he is possible 4a candidate; PT attempted to get patient OOB 10/14 but was hypotensive to 87/52 so holding OOB for now; OT recommends acute inpatient vs [facility] and home OT     CARDS:   #HTN   -Q1 vitals   -started on Lisinopril 20mg PO daily; 1:1 ratio as per pharmacy  -home meds: quinapril 20mg 1 x day   -Troponin 0.01  -10/15 Echo: EF normal, GIDD, mitral & tricuspid regurg, no evidence of pericardial effusion.    GI/NUTR:   #Diet, Regular  Consistent Carbohydrate No Snacks  DASH/TLC Sodium & Cholesterol Restricted (10-13-22 @ 12:18) [Active]    -aspiration precautions, HOB 30  #GI Prophylaxis    -not indicated  #Bowel regimen    -Lactulose 10g q8    #hx cirrhosis  -ammonia level 142, rept @ 2000  -increased lactulose 10g q8 on 10/15    /RENAL:   #urine output in critically ill  voiding but episodes of incontinence 10/13 night; bladder scanned--> 75 ccs  -collection bag   -continue monitor U/O     #MELO  -LR 75 /hr  -BUN 17-->21-->26-->25->22  -Cr 1.1-->0.9-->0.9->0.8 improving     Labs:          BUN/Cr- 25/0.9  -->,  22/0.8  -->          Electrolytes-Na 137 // K 4.7 // Mg 1.5 //  Phos 2.7 (10-15 @ 22:47)    HEME/ONC:   #DVT prophylaxis    -enoxaparin Injectable 40mg SubQ  -SCDs    Labs: Hb/Hct:  10.6/28.9  -->,  10.9/30.7  -->                      Plts:  120  -->,  131  -->                 PTT/INR:      Type and Screen Expires 10/16  Home meds: ASA 81 mg; continued     ID:  #leukocytosis   WBC- 7.18  --->>,  7.07  --->>,  6.59  --->>  Temp trend- 24hrs T(F): 97.6 (10-16 @ 00:00), Max: 98 (10-15 @ 04:00)  Afebrile, not on abx   -Rept Covid 10/15-> Negative       ENDO:  #hx DM  - this AM--> 12 U lantus added and 4U preprandial TID before meals   -continue ACHS fingersticks  -continue ISS     Home meds: glipizide, metformin  -A1C 6.1%    MSK:  #C1 fx  -stable as per neurosurgery; pt in [place] collar for 2 weeks     #Multiple rib fx  -pain control  -encourage IS   -daily AM CXR    -Frailty score 4/5   -Physiatry consulted: possible 4A candidate  -PT and OT following    -Goals of care discussed with wife on 10/15/2022; Patient is full-code and has living will; prior MOLST void     LINES/DRAINS:  PIV  ADVANCED DIRECTIVES:  Full Code    HCP/Emergency Contact- Wife     INDICATION FOR SICU/SDU: MULTIPLE LT SIDE RIB FXS;HEAD INJURY;CLOSED BURST FX CERVICAL VERTEBRA.    Dispo: SICU

## 2022-10-16 NOTE — PROGRESS NOTE ADULT - ASSESSMENT
77yM w/ PMHx of Diabetes, Depression, Gout, Benign essential HTN, Osteoarthritis, Cataracts, both eyes, S/P open cholecystectomy, S/P knee surgery seen as Trauma Alert s/p mechanical fall last night. +HT, -LOC, -AC (takes ASA 81 every other day)  Trauma assessment in ED: ABCs intact , GCS 15 , AAOx2 (at baseline). External signs of trauma include: hematoma/abrasion to left frontotemporal scalp and bilateral knee abrasion. PENA.     Injuries identified:   - C1 right anterior arch burst fracture.   - longitudinal fracture posterior left first rib  - Large left frontotemporal scalp hematoma without evidence of subjacent   fracture.  - Multiple bilateral chronic and acute rib fractures, such as acute rib fractures on the left 1st, 4-6th, and 8th-10th ribs    PLAN:   - Care per SICU  - Diet: as tolerated  - DVT/GI PPX  - Hemodynamic monitoring/vital signs q4h  - Strict Is and Os q4h  - Aspiration precautions  - IS, ambulating - in setting of bibasilar atelectasis on CT chest, patient would benefit greatly from ambulating  - PT/Rehab - called PT - stated they would see him 10/16 for d/c planning  - Wear [place] collar for 2 weeks.  - F/U OP two weeks post discharge with neurosurgery  - F/U Cr and UOP    Trauma Surgery [id number]

## 2022-10-16 NOTE — PROGRESS NOTE ADULT - SUBJECTIVE AND OBJECTIVE BOX
STACY CARDENAS  [id number]  77y Male    Indication for ICU admission: respiratory monitoring s/p fall with left 1st, 4-6 and 8-10 rib fractures in addition to c1 burst fracture.     Admit Date:10-13-22  ICU Date:10-13-22  OR Date:    Keflex (Hives)  Keflex (Unknown)    PAST MEDICAL & SURGICAL HISTORY:  Diabetes    Depression    Gout    Benign essential HTN    Osteoarthritis    Cataracts, both eyes    S/P cholecystectomy    S/P knee surgery      Home Medications:  aspirin 81 mg oral tablet: 1 tab(s) orally once a day (10 Oct 2019 21:41)  donepezil 10 mg oral tablet: 1 tab(s) orally once a day (at bedtime) (14 Feb 2022 09:30)  DULoxetine 60 mg oral delayed release capsule: 1 cap(s) orally once a day (14 Feb 2022 09:30)  glipiZIDE 2.5 mg oral tablet, extended release: 1 tab(s) orally once a day (10 Oct 2019 21:41)  latanoprost 0.005% ophthalmic solution: 1 drop(s) to each affected eye once a day (in the evening) (13 Oct 2022 11:50)  memantine 10 mg oral tablet: 1 tab(s) orally 2 times a day (14 Feb 2022 09:31)  metFORMIN 1000 mg oral tablet: 1 tab(s) orally 2 times a day (10 Oct 2019 21:41)  quinapril 20 mg oral tablet: 1 tab(s) orally once a day (14 Feb 2022 09:31)  Vitamin B6 100 mg oral tablet: 1 tab(s) orally once a day (10 Oct 2019 21:41)  Vitamin D3 2000 intl units oral tablet: 1 tab(s) orally once a day (10 Oct 2019 21:47)      24HRS EVENT:  10/15  Night  - Na phos, mag repleted    Day  -Echo: LV systolic dysfunction, mitral & tricuspid regurg, no evidence of pericardial effusion+; PV Max Velocity: 1.67 m/s PV   - Rpt CT chest: Bibasilar atelectatic opacities  - Rpt COVID test- negative  - calculate MELD score- 18; Donna- Pickett- B  - send ammonia level: elevated at 142  - F/U with family- wife about goals of care- full code, rescinding DNR  - added lantus 12u at bedtime, 4 pre prandial, Rpt FS- 250 additional 5 U Lipro given in pm; last    - ammonia 142-- increased lactulose to TID        DVT PTX: enoxaparin Injectable 40 milliGRAM(s) SubCutaneous every 24 hours    GI PTX: protonix    ***Tubes/Lines/Drains  ***  Peripheral IV	    REVIEW OF SYSTEMS    [x] A ten-point review of systems was otherwise negative except as noted.  [ ] Due to altered mental status/intubation, subjective information were not able to be obtained from the patient. History was obtained, to the extent possible, from review of the chart and collateral sources of information.       STACY CARDENAS  [id number]  77y Male    Indication for ICU admission: respiratory monitoring s/p fall with left 1st, 4-6 and 8-10 rib fractures in addition to c1 burst fracture.     Admit Date:10-13-22  ICU Date:10-13-22  OR Date:    Keflex (Hives)  Keflex (Unknown)    PAST MEDICAL & SURGICAL HISTORY:  Diabetes    Depression    Gout    Benign essential HTN    Osteoarthritis    Cataracts, both eyes    S/P cholecystectomy    S/P knee surgery      Home Medications:  aspirin 81 mg oral tablet: 1 tab(s) orally once a day (10 Oct 2019 21:41)  donepezil 10 mg oral tablet: 1 tab(s) orally once a day (at bedtime) (14 Feb 2022 09:30)  DULoxetine 60 mg oral delayed release capsule: 1 cap(s) orally once a day (14 Feb 2022 09:30)  glipiZIDE 2.5 mg oral tablet, extended release: 1 tab(s) orally once a day (10 Oct 2019 21:41)  latanoprost 0.005% ophthalmic solution: 1 drop(s) to each affected eye once a day (in the evening) (13 Oct 2022 11:50)  memantine 10 mg oral tablet: 1 tab(s) orally 2 times a day (14 Feb 2022 09:31)  metFORMIN 1000 mg oral tablet: 1 tab(s) orally 2 times a day (10 Oct 2019 21:41)  quinapril 20 mg oral tablet: 1 tab(s) orally once a day (14 Feb 2022 09:31)  Vitamin B6 100 mg oral tablet: 1 tab(s) orally once a day (10 Oct 2019 21:41)  Vitamin D3 2000 intl units oral tablet: 1 tab(s) orally once a day (10 Oct 2019 21:47)      24HRS EVENT:  10/15  Night  - Na phos, mag repleted    Day  -Echo: LV systolic dysfunction, mitral & tricuspid regurg, no evidence of pericardial effusion+; PV Max Velocity: 1.67 m/s PV   - Rpt CT chest: Bibasilar atelectatic opacities  - Rpt COVID test- negative  - calculate MELD score- 18; Donna- Pickett- B  - send ammonia level: elevated at 142  - F/U with family- wife about goals of care- full code, rescinding DNR  - added lantus 12u at bedtime, 4 pre prandial, Rpt FS- 250 additional 5 U Lipro given in pm; last    - ammonia 142-- increased lactulose to TID        DVT PTX: enoxaparin Injectable 40 milliGRAM(s) SubCutaneous every 24 hours    GI PTX: protonix    ***Tubes/Lines/Drains  ***  Peripheral IV	    REVIEW OF SYSTEMS    [x] A ten-point review of systems was otherwise negative except as noted.  [ ] Due to altered mental status/intubation, subjective information were not able to be obtained from the patient. History was obtained, to the extent possible, from review of the chart and collateral sources of information.      CURRENT MEDS:  Neurologic Medications  acetaminophen     Tablet .. 650 milliGRAM(s) Oral every 6 hours  carbidopa/levodopa  25/100 1 Tablet(s) Oral <User Schedule>  donepezil 10 milliGRAM(s) Oral at bedtime  DULoxetine 60 milliGRAM(s) Oral daily  gabapentin 100 milliGRAM(s) Oral three times a day  memantine 10 milliGRAM(s) Oral daily    Respiratory Medications    Cardiovascular Medications  lisinopril 20 milliGRAM(s) Oral daily    Gastrointestinal Medications  lactated ringers. 1000 milliLiter(s) IV Continuous <Continuous>  lactulose Syrup 10 Gram(s) Oral every 8 hours    Genitourinary Medications    Hematologic/Oncologic Medications  aspirin enteric coated 81 milliGRAM(s) Oral daily  enoxaparin Injectable 40 milliGRAM(s) SubCutaneous every 24 hours  influenza  Vaccine (HIGH DOSE) 0.7 milliLiter(s) IntraMuscular once    Antimicrobial/Immunologic Medications    Endocrine/Metabolic Medications  insulin glargine Injectable (LANTUS) 12 Unit(s) SubCutaneous at bedtime  insulin lispro (ADMELOG) corrective regimen sliding scale   SubCutaneous three times a day before meals  insulin lispro Injectable (ADMELOG) 4 Unit(s) SubCutaneous three times a day before meals    Topical/Other Medications  chlorhexidine 2% Cloths 1 Application(s) Topical <User Schedule>  latanoprost 0.005% Ophthalmic Solution 1 Drop(s) Both EYES at bedtime  lidocaine   4% Patch 1 Patch Transdermal every 24 hours      ICU Vital Signs Last 24 Hrs  T(C): 35.7 (16 Oct 2022 04:00), Max: 36.4 (16 Oct 2022 00:00)  T(F): 96.3 (16 Oct 2022 04:00), Max: 97.6 (16 Oct 2022 00:00)  HR: 74 (16 Oct 2022 07:00) (72 - 89)  BP: 141/67 (16 Oct 2022 07:00) (97/56 - 149/66)  BP(mean): 96 (16 Oct 2022 07:00) (73 - 100)  ABP: --  ABP(mean): --  RR: 22 (16 Oct 2022 07:00) (14 - 22)  SpO2: 93% (16 Oct 2022 07:00) (93% - 100%)    O2 Parameters below as of 16 Oct 2022 07:00  Patient On (Oxygen Delivery Method): nasal cannula  O2 Flow (L/min): 2        I&O's Detail    15 Oct 2022 07:01  -  16 Oct 2022 07:00  --------------------------------------------------------  IN:    IV PiggyBack: 50 mL    IV PiggyBack: 250 mL    Lactated Ringers: 1800 mL    Oral Fluid: 1260 mL  Total IN: 3360 mL    OUT:    Voided (mL): 825 mL  Total OUT: 825 mL    Total NET: 2535 mL        I&O's Summary    15 Oct 2022 07:01  -  16 Oct 2022 07:00  --------------------------------------------------------  IN: 3360 mL / OUT: 825 mL / NET: 2535 mL        LABS:    CAPILLARY BLOOD GLUCOSE    POCT Blood Glucose.: 181 mg/dL (16 Oct 2022 07:33)  POCT Blood Glucose.: 165 mg/dL (15 Oct 2022 21:14)  POCT Blood Glucose.: 195 mg/dL (15 Oct 2022 16:22)  POCT Blood Glucose.: 250 mg/dL (15 Oct 2022 14:24)  POCT Blood Glucose.: 318 mg/dL (15 Oct 2022 12:07)  POCT Blood Glucose.: 352 mg/dL (15 Oct 2022 11:17)                          10.9   6.59  )-----------( 131      ( 15 Oct 2022 22:47 )             30.7         10-15    137  |  102  |  22<H>  ----------------------------<  149<H>  4.7   |  28  |  0.8      Calcium, Total Serum: 8.6 mg/dL (10-15-22 @ 22:47)          PHYSICAL EXAM:      General/Neuro    Deficits:  alert & oriented x 2-3, was able to state the year, but not the president, the no focal deficits  [place]-J collar in place  Lungs:  clear to auscultation, Normal expansion/effort.   Cardiovascular : S1, S2.  Regular rate and rhythm.    Cardiac Rhythm: Normal Sinus Rhythm  GI: Abdomen soft, Non-tender, Non-distended.    Extremities: Extremities warm, well-perfused.  No Peripheral edema b/l LE  Derm: Good skin turgor, no skin breakdown.    : No Graves catheter, voiding.               STACY CARDENAS  [id number]  77y Male    Indication for ICU admission: respiratory monitoring s/p fall with left 1st, 4-6 and 8-10 rib fractures in addition to c1 burst fracture.     Admit Date:10-13-22  ICU Date:10-13-22  OR Date:    Keflex (Hives)  Keflex (Unknown)    PAST MEDICAL & SURGICAL HISTORY:  Diabetes    Depression    Gout    Benign essential HTN    Osteoarthritis    Cataracts, both eyes    S/P cholecystectomy    S/P knee surgery      Home Medications:  aspirin 81 mg oral tablet: 1 tab(s) orally once a day (10 Oct 2019 21:41)  donepezil 10 mg oral tablet: 1 tab(s) orally once a day (at bedtime) (14 Feb 2022 09:30)  DULoxetine 60 mg oral delayed release capsule: 1 cap(s) orally once a day (14 Feb 2022 09:30)  glipiZIDE 2.5 mg oral tablet, extended release: 1 tab(s) orally once a day (10 Oct 2019 21:41)  latanoprost 0.005% ophthalmic solution: 1 drop(s) to each affected eye once a day (in the evening) (13 Oct 2022 11:50)  memantine 10 mg oral tablet: 1 tab(s) orally 2 times a day (14 Feb 2022 09:31)  metFORMIN 1000 mg oral tablet: 1 tab(s) orally 2 times a day (10 Oct 2019 21:41)  quinapril 20 mg oral tablet: 1 tab(s) orally once a day (14 Feb 2022 09:31)  Vitamin B6 100 mg oral tablet: 1 tab(s) orally once a day (10 Oct 2019 21:41)  Vitamin D3 2000 intl units oral tablet: 1 tab(s) orally once a day (10 Oct 2019 21:47)      24HRS EVENT:  10/15  Night  - Na phos, mag repleted    Day  -Echo: EF normal, GIDD, mitral & tricuspid regurg, no evidence of pericardial effusion;   - Rpt CT chest: Bibasilar atelectatic opacities  - Rpt COVID test- negative  - calculate MELD score- 18; Donna- Pickett- B  - send ammonia level: elevated at 142  - F/U with family- wife about goals of care- full code, rescinding DNR  - added lantus 12u at bedtime, 4 pre prandial, Rpt FS- 250 additional 5 U Lipro given in pm; last    - ammonia 142-- increased lactulose to TID        DVT PTX: enoxaparin Injectable 40 milliGRAM(s) SubCutaneous every 24 hours    GI PTX: protonix    ***Tubes/Lines/Drains  ***  Peripheral IV	    REVIEW OF SYSTEMS    [x] A ten-point review of systems was otherwise negative except as noted.  [ ] Due to altered mental status/intubation, subjective information were not able to be obtained from the patient. History was obtained, to the extent possible, from review of the chart and collateral sources of information.      CURRENT MEDS:  Neurologic Medications  acetaminophen     Tablet .. 650 milliGRAM(s) Oral every 6 hours  carbidopa/levodopa  25/100 1 Tablet(s) Oral <User Schedule>  donepezil 10 milliGRAM(s) Oral at bedtime  DULoxetine 60 milliGRAM(s) Oral daily  gabapentin 100 milliGRAM(s) Oral three times a day  memantine 10 milliGRAM(s) Oral daily    Respiratory Medications    Cardiovascular Medications  lisinopril 20 milliGRAM(s) Oral daily    Gastrointestinal Medications  lactated ringers. 1000 milliLiter(s) IV Continuous <Continuous>  lactulose Syrup 10 Gram(s) Oral every 8 hours    Genitourinary Medications    Hematologic/Oncologic Medications  aspirin enteric coated 81 milliGRAM(s) Oral daily  enoxaparin Injectable 40 milliGRAM(s) SubCutaneous every 24 hours  influenza  Vaccine (HIGH DOSE) 0.7 milliLiter(s) IntraMuscular once    Antimicrobial/Immunologic Medications    Endocrine/Metabolic Medications  insulin glargine Injectable (LANTUS) 12 Unit(s) SubCutaneous at bedtime  insulin lispro (ADMELOG) corrective regimen sliding scale   SubCutaneous three times a day before meals  insulin lispro Injectable (ADMELOG) 4 Unit(s) SubCutaneous three times a day before meals    Topical/Other Medications  chlorhexidine 2% Cloths 1 Application(s) Topical <User Schedule>  latanoprost 0.005% Ophthalmic Solution 1 Drop(s) Both EYES at bedtime  lidocaine   4% Patch 1 Patch Transdermal every 24 hours      ICU Vital Signs Last 24 Hrs  T(C): 35.7 (16 Oct 2022 04:00), Max: 36.4 (16 Oct 2022 00:00)  T(F): 96.3 (16 Oct 2022 04:00), Max: 97.6 (16 Oct 2022 00:00)  HR: 74 (16 Oct 2022 07:00) (72 - 89)  BP: 141/67 (16 Oct 2022 07:00) (97/56 - 149/66)  BP(mean): 96 (16 Oct 2022 07:00) (73 - 100)  ABP: --  ABP(mean): --  RR: 22 (16 Oct 2022 07:00) (14 - 22)  SpO2: 93% (16 Oct 2022 07:00) (93% - 100%)    O2 Parameters below as of 16 Oct 2022 07:00  Patient On (Oxygen Delivery Method): nasal cannula  O2 Flow (L/min): 2        I&O's Detail    15 Oct 2022 07:01  -  16 Oct 2022 07:00  --------------------------------------------------------  IN:    IV PiggyBack: 50 mL    IV PiggyBack: 250 mL    Lactated Ringers: 1800 mL    Oral Fluid: 1260 mL  Total IN: 3360 mL    OUT:    Voided (mL): 825 mL  Total OUT: 825 mL    Total NET: 2535 mL        I&O's Summary    15 Oct 2022 07:01  -  16 Oct 2022 07:00  --------------------------------------------------------  IN: 3360 mL / OUT: 825 mL / NET: 2535 mL        LABS:    CAPILLARY BLOOD GLUCOSE    POCT Blood Glucose.: 181 mg/dL (16 Oct 2022 07:33)  POCT Blood Glucose.: 165 mg/dL (15 Oct 2022 21:14)  POCT Blood Glucose.: 195 mg/dL (15 Oct 2022 16:22)  POCT Blood Glucose.: 250 mg/dL (15 Oct 2022 14:24)  POCT Blood Glucose.: 318 mg/dL (15 Oct 2022 12:07)  POCT Blood Glucose.: 352 mg/dL (15 Oct 2022 11:17)                          10.9   6.59  )-----------( 131      ( 15 Oct 2022 22:47 )             30.7         10-15    137  |  102  |  22<H>  ----------------------------<  149<H>  4.7   |  28  |  0.8      Calcium, Total Serum: 8.6 mg/dL (10-15-22 @ 22:47)          PHYSICAL EXAM:      General/Neuro    Deficits:  alert & oriented x 2-3, was able to state the year, but not the president, the no focal deficits  [place]-J collar in place  Lungs:  clear to auscultation, Normal expansion/effort.   Cardiovascular : S1, S2.  Regular rate and rhythm.    Cardiac Rhythm: Normal Sinus Rhythm  GI: Abdomen soft, Non-tender, Non-distended.    Extremities: Extremities warm, well-perfused.  No Peripheral edema b/l LE  Derm: Good skin turgor, no skin breakdown.    : No Graves catheter, voiding.

## 2022-10-16 NOTE — PROGRESS NOTE ADULT - SUBJECTIVE AND OBJECTIVE BOX
GENERAL SURGERY PROGRESS NOTE    Patient: STACY CARDENAS , 77y ([birth year])Male   MRN: [id number]  Location: [address]12 A  Visit: 10-13-22 Inpatient  Date: 10-16-22 @ 00:49    Hospital Day #: 4    Events of past 24 hours:  Patient reporting SOB. CT Chest showed bibasilar atelectasis.    Patient seen and examined at the bedside.  States pain controlled.  Tolerating diet. Denies nausea/vomiting.  Denies flatus but endorses BM.  Graves.  Denies ambulating.      PAST MEDICAL & SURGICAL HISTORY:  Diabetes    Depression    Gout    Benign essential HTN    Osteoarthritis    Cataracts, both eyes    S/P cholecystectomy    S/P knee surgery      Vitals:   T(F): 97.6 (10-16-22 @ 00:00), Max: 98 (10-15-22 @ 04:00)  HR: 83 (10-16-22 @ 00:00)  BP: 127/60 (10-16-22 @ 00:00)  RR: 16 (10-16-22 @ 00:00)  SpO2: 98% (10-16-22 @ 00:00)      Diet, Regular:   Consistent Carbohydrate No Snacks  DASH/TLC Sodium & Cholesterol Restricted      Fluids:     I & O's:    10-14-22 @ 07:01  -  10-15-22 @ 07:00  --------------------------------------------------------  IN:    Lactated Ringers: 250 mL    Lactated Ringers: 1200 mL    Oral Fluid: 240 mL  Total IN: 1690 mL    OUT:    Voided (mL): 400 mL  Total OUT: 400 mL    Total NET: 1290 mL    PHYSICAL EXAM:  General: NAD, calm and cooperative.  Cardiac: RRR.  Respiratory: On 2L NC. Speaks in complete sentences. No accessory muscles of respiration in use. Bilateral chest rise.  Abdomen: Soft, non-distended, non-tender, no rebound, no guarding.     MEDICATIONS  (STANDING):  acetaminophen     Tablet .. 650 milliGRAM(s) Oral every 6 hours  aspirin enteric coated 81 milliGRAM(s) Oral daily  carbidopa/levodopa  25/100 1 Tablet(s) Oral <User Schedule>  chlorhexidine 2% Cloths 1 Application(s) Topical <User Schedule>  donepezil 10 milliGRAM(s) Oral at bedtime  DULoxetine 60 milliGRAM(s) Oral daily  enoxaparin Injectable 40 milliGRAM(s) SubCutaneous every 24 hours  gabapentin 100 milliGRAM(s) Oral three times a day  influenza  Vaccine (HIGH DOSE) 0.7 milliLiter(s) IntraMuscular once  insulin glargine Injectable (LANTUS) 12 Unit(s) SubCutaneous at bedtime  insulin lispro (ADMELOG) corrective regimen sliding scale   SubCutaneous three times a day before meals  insulin lispro Injectable (ADMELOG) 4 Unit(s) SubCutaneous three times a day before meals  lactated ringers. 1000 milliLiter(s) (75 mL/Hr) IV Continuous <Continuous>  lactulose Syrup 10 Gram(s) Oral every 8 hours  latanoprost 0.005% Ophthalmic Solution 1 Drop(s) Both EYES at bedtime  lidocaine   4% Patch 1 Patch Transdermal every 24 hours  lisinopril 20 milliGRAM(s) Oral daily  memantine 10 milliGRAM(s) Oral daily  sodium phosphate IVPB 15 milliMole(s) IV Intermittent once    MEDICATIONS  (PRN):      DVT PROPHYLAXIS: enoxaparin Injectable 40 milliGRAM(s) SubCutaneous every 24 hours    GI PROPHYLAXIS:   ANTICOAGULATION:   ANTIBIOTICS:        LAB/STUDIES:  Labs:  CAPILLARY BLOOD GLUCOSE      POCT Blood Glucose.: 165 mg/dL (15 Oct 2022 21:14)  POCT Blood Glucose.: 195 mg/dL (15 Oct 2022 16:22)  POCT Blood Glucose.: 250 mg/dL (15 Oct 2022 14:24)  POCT Blood Glucose.: 318 mg/dL (15 Oct 2022 12:07)  POCT Blood Glucose.: 352 mg/dL (15 Oct 2022 11:17)  POCT Blood Glucose.: 250 mg/dL (15 Oct 2022 07:36)                          10.9   6.59  )-----------( 131      ( 15 Oct 2022 22:47 )             30.7     10-15    137  |  102  |  22<H>  ----------------------------<  149<H>  4.7   |  28  |  0.8      Calcium, Total Serum: 8.6 mg/dL (10-15-22 @ 22:47)      IMAGING:  10/15 - CT Chest - bibasilar atelectasis    ACCESS/ DEVICES:  [ ] Peripheral IV  [ ] Urinary Catheter         GENERAL SURGERY PROGRESS NOTE    Patient: STACY CARDENAS , 77y ([birth year])Male   MRN: [id number]  Location: [address] 012 A  Visit: 10-13-22 Inpatient  Date: 10-16-22 @ 00:49    Hospital Day #: 4    Events of past 24 hours:  Patient reporting SOB. CT Chest showed bibasilar atelectasis. Covid test negative.  Blood sugar consistently elevated. Basal/bolus insulin added by SICU.   Ammonia 142. Lactulose increased to TID by SICU.    Patient seen and examined at the bedside.  States pain controlled.  Tolerating diet. Denies nausea/vomiting.  Denies flatus but endorses BM.  Graves.  Denies ambulating.      PAST MEDICAL & SURGICAL HISTORY:  Diabetes    Depression    Gout    Benign essential HTN    Osteoarthritis    Cataracts, both eyes    S/P cholecystectomy    S/P knee surgery      Vitals:   T(F): 97.6 (10-16-22 @ 00:00), Max: 98 (10-15-22 @ 04:00)  HR: 83 (10-16-22 @ 00:00)  BP: 127/60 (10-16-22 @ 00:00)  RR: 16 (10-16-22 @ 00:00)  SpO2: 98% (10-16-22 @ 00:00)      Diet, Regular:   Consistent Carbohydrate No Snacks  DASH/TLC Sodium & Cholesterol Restricted      Fluids:     I & O's:    10-14-22 @ 07:01  -  10-15-22 @ 07:00  --------------------------------------------------------  IN:    Lactated Ringers: 250 mL    Lactated Ringers: 1200 mL    Oral Fluid: 240 mL  Total IN: 1690 mL    OUT:    Voided (mL): 400 mL  Total OUT: 400 mL    Total NET: 1290 mL    PHYSICAL EXAM:  General: NAD, calm and cooperative.  Cardiac: RRR.  Respiratory: On 2L NC. Speaks in complete sentences. No accessory muscles of respiration in use. Bilateral chest rise.  Abdomen: Soft, non-distended, non-tender, no rebound, no guarding.     MEDICATIONS  (STANDING):  acetaminophen     Tablet .. 650 milliGRAM(s) Oral every 6 hours  aspirin enteric coated 81 milliGRAM(s) Oral daily  carbidopa/levodopa  25/100 1 Tablet(s) Oral <User Schedule>  chlorhexidine 2% Cloths 1 Application(s) Topical <User Schedule>  donepezil 10 milliGRAM(s) Oral at bedtime  DULoxetine 60 milliGRAM(s) Oral daily  enoxaparin Injectable 40 milliGRAM(s) SubCutaneous every 24 hours  gabapentin 100 milliGRAM(s) Oral three times a day  influenza  Vaccine (HIGH DOSE) 0.7 milliLiter(s) IntraMuscular once  insulin glargine Injectable (LANTUS) 12 Unit(s) SubCutaneous at bedtime  insulin lispro (ADMELOG) corrective regimen sliding scale   SubCutaneous three times a day before meals  insulin lispro Injectable (ADMELOG) 4 Unit(s) SubCutaneous three times a day before meals  lactated ringers. 1000 milliLiter(s) (75 mL/Hr) IV Continuous <Continuous>  lactulose Syrup 10 Gram(s) Oral every 8 hours  latanoprost 0.005% Ophthalmic Solution 1 Drop(s) Both EYES at bedtime  lidocaine   4% Patch 1 Patch Transdermal every 24 hours  lisinopril 20 milliGRAM(s) Oral daily  memantine 10 milliGRAM(s) Oral daily  sodium phosphate IVPB 15 milliMole(s) IV Intermittent once    MEDICATIONS  (PRN):      DVT PROPHYLAXIS: enoxaparin Injectable 40 milliGRAM(s) SubCutaneous every 24 hours    GI PROPHYLAXIS:   ANTICOAGULATION:   ANTIBIOTICS:        LAB/STUDIES:  Labs:  CAPILLARY BLOOD GLUCOSE      POCT Blood Glucose.: 165 mg/dL (15 Oct 2022 21:14)  POCT Blood Glucose.: 195 mg/dL (15 Oct 2022 16:22)  POCT Blood Glucose.: 250 mg/dL (15 Oct 2022 14:24)  POCT Blood Glucose.: 318 mg/dL (15 Oct 2022 12:07)  POCT Blood Glucose.: 352 mg/dL (15 Oct 2022 11:17)  POCT Blood Glucose.: 250 mg/dL (15 Oct 2022 07:36)                          10.9   6.59  )-----------( 131      ( 15 Oct 2022 22:47 )             30.7     10-15    137  |  102  |  22<H>  ----------------------------<  149<H>  4.7   |  28  |  0.8      Calcium, Total Serum: 8.6 mg/dL (10-15-22 @ 22:47)      IMAGING:  10/15 - CT Chest - bibasilar atelectasis    ACCESS/ DEVICES:  [ ] Peripheral IV  [ ] Urinary Catheter

## 2022-10-16 NOTE — DISCHARGE NOTE NURSING/CASE MANAGEMENT/SOCIAL WORK - NSDCVIVACCINE_GEN_ALL_CORE_FT
Tdap; 19-Oct-2018 14:41; Deepthi Hamilton); [email or web address]; [id number] (Exp. Date: 10-Oct-2020); IntraMuscular; Deltoid Right.; 0.5 milliLiter(s); VIS (VIS Published: 09-May-2013, VIS Presented: 19-Oct-2018);

## 2022-10-16 NOTE — DISCHARGE NOTE NURSING/CASE MANAGEMENT/SOCIAL WORK - PATIENT PORTAL LINK FT
You can access the FollowMyHealth Patient Portal offered by [facility] by registering at the following website: [email or web address]. By joining [employer]’s FollowMyHealth portal, you will also be able to view your health information using other applications (apps) compatible with our system.

## 2022-10-17 LAB
ANION GAP SERPL CALC-SCNC: 6 MMOL/L — LOW (ref 7–14)
BASOPHILS # BLD AUTO: 0.04 K/UL — SIGNIFICANT CHANGE UP (ref 0–0.2)
BASOPHILS NFR BLD AUTO: 0.8 % — SIGNIFICANT CHANGE UP (ref 0–1)
BLD GP AB SCN SERPL QL: SIGNIFICANT CHANGE UP
BUN SERPL-MCNC: 16 MG/DL — SIGNIFICANT CHANGE UP (ref 10–20)
CALCIUM SERPL-MCNC: 8.8 MG/DL — SIGNIFICANT CHANGE UP (ref 8.4–10.4)
CHLORIDE SERPL-SCNC: 107 MMOL/L — SIGNIFICANT CHANGE UP (ref 98–110)
CO2 SERPL-SCNC: 29 MMOL/L — SIGNIFICANT CHANGE UP (ref 17–32)
CREAT SERPL-MCNC: 0.7 MG/DL — SIGNIFICANT CHANGE UP (ref 0.7–1.5)
EGFR: 95 ML/MIN/1.73M2 — SIGNIFICANT CHANGE UP
EOSINOPHIL # BLD AUTO: 0.29 K/UL — SIGNIFICANT CHANGE UP (ref 0–0.7)
EOSINOPHIL NFR BLD AUTO: 5.7 % — SIGNIFICANT CHANGE UP (ref 0–8)
GLUCOSE BLDC GLUCOMTR-MCNC: 114 MG/DL — HIGH (ref 70–99)
GLUCOSE BLDC GLUCOMTR-MCNC: 194 MG/DL — HIGH (ref 70–99)
GLUCOSE BLDC GLUCOMTR-MCNC: 199 MG/DL — HIGH (ref 70–99)
GLUCOSE BLDC GLUCOMTR-MCNC: 215 MG/DL — HIGH (ref 70–99)
GLUCOSE SERPL-MCNC: 186 MG/DL — HIGH (ref 70–99)
HCT VFR BLD CALC: 31.2 % — LOW (ref 42–52)
HGB BLD-MCNC: 10.8 G/DL — LOW (ref 14–18)
IMM GRANULOCYTES NFR BLD AUTO: 0.2 % — SIGNIFICANT CHANGE UP (ref 0.1–0.3)
LYMPHOCYTES # BLD AUTO: 1.42 K/UL — SIGNIFICANT CHANGE UP (ref 1.2–3.4)
LYMPHOCYTES # BLD AUTO: 27.8 % — SIGNIFICANT CHANGE UP (ref 20.5–51.1)
MAGNESIUM SERPL-MCNC: 1.6 MG/DL — LOW (ref 1.8–2.4)
MCHC RBC-ENTMCNC: 34.6 G/DL — SIGNIFICANT CHANGE UP (ref 32–37)
MCHC RBC-ENTMCNC: 35.3 PG — HIGH (ref 27–31)
MCV RBC AUTO: 102 FL — HIGH (ref 80–94)
MONOCYTES # BLD AUTO: 0.7 K/UL — HIGH (ref 0.1–0.6)
MONOCYTES NFR BLD AUTO: 13.7 % — HIGH (ref 1.7–9.3)
NEUTROPHILS # BLD AUTO: 2.64 K/UL — SIGNIFICANT CHANGE UP (ref 1.4–6.5)
NEUTROPHILS NFR BLD AUTO: 51.8 % — SIGNIFICANT CHANGE UP (ref 42.2–75.2)
NRBC # BLD: 0 /100 WBCS — SIGNIFICANT CHANGE UP (ref 0–0)
PHOSPHATE SERPL-MCNC: 3.5 MG/DL — SIGNIFICANT CHANGE UP (ref 2.1–4.9)
PLATELET # BLD AUTO: 145 K/UL — SIGNIFICANT CHANGE UP (ref 130–400)
POTASSIUM SERPL-MCNC: 4.8 MMOL/L — SIGNIFICANT CHANGE UP (ref 3.5–5)
POTASSIUM SERPL-SCNC: 4.8 MMOL/L — SIGNIFICANT CHANGE UP (ref 3.5–5)
RBC # BLD: 3.06 M/UL — LOW (ref 4.7–6.1)
RBC # FLD: 14.6 % — HIGH (ref 11.5–14.5)
SODIUM SERPL-SCNC: 142 MMOL/L — SIGNIFICANT CHANGE UP (ref 135–146)
WBC # BLD: 5.1 K/UL — SIGNIFICANT CHANGE UP (ref 4.8–10.8)
WBC # FLD AUTO: 5.1 K/UL — SIGNIFICANT CHANGE UP (ref 4.8–10.8)

## 2022-10-17 PROCEDURE — 71045 X-RAY EXAM CHEST 1 VIEW: CPT | Mod: 26

## 2022-10-17 PROCEDURE — 99233 SBSQ HOSP IP/OBS HIGH 50: CPT

## 2022-10-17 RX ADMIN — MEMANTINE HYDROCHLORIDE 10 MILLIGRAM(S): 10 TABLET ORAL at 11:04

## 2022-10-17 RX ADMIN — LIDOCAINE 1 PATCH: 4 CREAM TOPICAL at 19:07

## 2022-10-17 RX ADMIN — INSULIN GLARGINE 14 UNIT(S): 100 INJECTION, SOLUTION SUBCUTANEOUS at 21:05

## 2022-10-17 RX ADMIN — LACTULOSE 15 GRAM(S): 10 SOLUTION ORAL at 06:19

## 2022-10-17 RX ADMIN — DULOXETINE HYDROCHLORIDE 60 MILLIGRAM(S): 30 CAPSULE, DELAYED RELEASE ORAL at 11:04

## 2022-10-17 RX ADMIN — Medication 81 MILLIGRAM(S): at 11:04

## 2022-10-17 RX ADMIN — Medication 7: at 11:41

## 2022-10-17 RX ADMIN — CARBIDOPA AND LEVODOPA 1 TABLET(S): 25; 100 TABLET ORAL at 06:19

## 2022-10-17 RX ADMIN — DONEPEZIL HYDROCHLORIDE 10 MILLIGRAM(S): 10 TABLET, FILM COATED ORAL at 21:04

## 2022-10-17 RX ADMIN — POLYETHYLENE GLYCOL 3350 17 GRAM(S): 17 POWDER, FOR SOLUTION ORAL at 06:19

## 2022-10-17 RX ADMIN — GABAPENTIN 100 MILLIGRAM(S): 400 CAPSULE ORAL at 06:19

## 2022-10-17 RX ADMIN — ENOXAPARIN SODIUM 40 MILLIGRAM(S): 100 INJECTION SUBCUTANEOUS at 14:23

## 2022-10-17 RX ADMIN — Medication 6 UNIT(S): at 11:41

## 2022-10-17 RX ADMIN — LATANOPROST 1 DROP(S): 0.05 SOLUTION/ DROPS OPHTHALMIC; TOPICAL at 21:04

## 2022-10-17 RX ADMIN — CARBIDOPA AND LEVODOPA 1 TABLET(S): 25; 100 TABLET ORAL at 17:06

## 2022-10-17 RX ADMIN — POLYETHYLENE GLYCOL 3350 17 GRAM(S): 17 POWDER, FOR SOLUTION ORAL at 17:07

## 2022-10-17 RX ADMIN — Medication 6 UNIT(S): at 17:04

## 2022-10-17 RX ADMIN — LACTULOSE 15 GRAM(S): 10 SOLUTION ORAL at 13:17

## 2022-10-17 RX ADMIN — LIDOCAINE 1 PATCH: 4 CREAM TOPICAL at 14:23

## 2022-10-17 RX ADMIN — LISINOPRIL 20 MILLIGRAM(S): 2.5 TABLET ORAL at 06:19

## 2022-10-17 RX ADMIN — CHLORHEXIDINE GLUCONATE 1 APPLICATION(S): 213 SOLUTION TOPICAL at 06:20

## 2022-10-17 RX ADMIN — Medication 5: at 08:38

## 2022-10-17 RX ADMIN — LACTULOSE 15 GRAM(S): 10 SOLUTION ORAL at 21:05

## 2022-10-17 RX ADMIN — GABAPENTIN 100 MILLIGRAM(S): 400 CAPSULE ORAL at 13:17

## 2022-10-17 RX ADMIN — GABAPENTIN 100 MILLIGRAM(S): 400 CAPSULE ORAL at 21:05

## 2022-10-17 NOTE — PROGRESS NOTE ADULT - SUBJECTIVE AND OBJECTIVE BOX
GENERAL SURGERY PROGRESS NOTE    Patient: STACY CARDENAS , 77y ([birth year])Male   MRN: [id number]  Location: [facility] A7-SICU 009 A  Visit: 10-13-22 Inpatient  Date: 10-17-22 @ 10:35    Hospital Day #: 5    Events of past 24 hours:  Patient sun downs. Was agitated. Zyprexa 5mg x1.  Lantus and Lispro doses increased.  Spouse to decide home w/ home health vs STR today.  Lactulose dose increased. Miralax added.    IS 1250ml.    PAST MEDICAL & SURGICAL HISTORY:  Diabetes    Depression    Gout    Benign essential HTN    Osteoarthritis    Cataracts, both eyes    S/P cholecystectomy    S/P knee surgery      Vitals:   T(F): 98 (10-17-22 @ 08:00), Max: 98.5 (10-17-22 @ 00:00)  HR: 99 (10-17-22 @ 08:00)  BP: 115/56 (10-17-22 @ 08:00)  RR: 20 (10-17-22 @ 08:00)  SpO2: 98% (10-17-22 @ 08:00)      Diet, Regular:   Consistent Carbohydrate No Snacks  DASH/TLC Sodium & Cholesterol Restricted      Fluids:     I & O's:    10-16-22 @ 07:01  -  10-17-22 @ 07:00  --------------------------------------------------------  IN:    IV PiggyBack: 50 mL    Lactated Ringers: 150 mL    Oral Fluid: 1500 mL  Total IN: 1700 mL    OUT:    Voided (mL): 1350 mL  Total OUT: 1350 mL    Total NET: 350 mL    PHYSICAL EXAM:  General: NAD, calm and cooperative. C-Collar in place.  Cardiac: RRR.  Respiratory: On RA. Speaks in complete sentences. No accessory muscles of respiration in use. Bilateral chest rise.  Abdomen: Soft, non-distended, non-tender, no rebound, no guarding.   Neuro: Moves all extremities.  Vascular: Pulses 2+ throughout, extremities well perfused.  Skin: Warm/dry, normal color, no jaundice.      MEDICATIONS  (STANDING):  aspirin enteric coated 81 milliGRAM(s) Oral daily  carbidopa/levodopa  25/100 1 Tablet(s) Oral <User Schedule>  chlorhexidine 2% Cloths 1 Application(s) Topical <User Schedule>  donepezil 10 milliGRAM(s) Oral at bedtime  DULoxetine 60 milliGRAM(s) Oral daily  enoxaparin Injectable 40 milliGRAM(s) SubCutaneous every 24 hours  gabapentin 100 milliGRAM(s) Oral three times a day  influenza  Vaccine (HIGH DOSE) 0.7 milliLiter(s) IntraMuscular once  insulin glargine Injectable (LANTUS) 14 Unit(s) SubCutaneous at bedtime  insulin lispro (ADMELOG) corrective regimen sliding scale   SubCutaneous three times a day before meals  insulin lispro Injectable (ADMELOG) 6 Unit(s) SubCutaneous three times a day before meals  lactulose Syrup 15 Gram(s) Oral every 8 hours  latanoprost 0.005% Ophthalmic Solution 1 Drop(s) Both EYES at bedtime  lidocaine   4% Patch 1 Patch Transdermal every 24 hours  lisinopril 20 milliGRAM(s) Oral daily  memantine 10 milliGRAM(s) Oral daily  polyethylene glycol 3350 17 Gram(s) Oral every 12 hours    MEDICATIONS  (PRN):  acetaminophen     Tablet .. 650 milliGRAM(s) Oral every 6 hours PRN Temp greater or equal to 38C (100.4F), Mild Pain (1 - 3)      DVT PROPHYLAXIS: enoxaparin Injectable 40 milliGRAM(s) SubCutaneous every 24 hours    GI PROPHYLAXIS:   ANTICOAGULATION:   ANTIBIOTICS:        LAB/STUDIES:  Labs:  CAPILLARY BLOOD GLUCOSE      POCT Blood Glucose.: 194 mg/dL (17 Oct 2022 08:02)  POCT Blood Glucose.: 200 mg/dL (16 Oct 2022 21:09)  POCT Blood Glucose.: 212 mg/dL (16 Oct 2022 16:12)  POCT Blood Glucose.: 310 mg/dL (16 Oct 2022 12:15)  POCT Blood Glucose.: 316 mg/dL (16 Oct 2022 11:17)                          10.7   5.61  )-----------( 136      ( 16 Oct 2022 22:26 )             29.4         10-16    143  |  107  |  20  ----------------------------<  243<H>  5.0   |  26  |  0.7      Calcium, Total Serum: 8.4 mg/dL (10-16-22 @ 22:26)      IMAGING:  CXR 10/17 - Unchanged bibasilar opacities/effusions.    ACCESS/ DEVICES:  [ ] Peripheral IV

## 2022-10-17 NOTE — PROGRESS NOTE ADULT - ASSESSMENT
Imp: rehab of multitrauma - C1 fx burst fx, L 1, 4-6,8-10 rib fx, sheila knee contusion   Plan: continue bedside therapy as tolerated           Pt can tolerate 3hr PT/OT daily and motivated           Good 4a acute inpatient rehab candidate

## 2022-10-17 NOTE — PROGRESS NOTE ADULT - SUBJECTIVE AND OBJECTIVE BOX
Patient is a 77y old  Male who presents with a chief complaint of s/p fall       HPI:  TRAUMA ACTIVATION LEVEL: ALERT  ACTIVATED BY: EMS  INTUBATED: NO    MECHANISM OF INJURY: [x] Fall    GCS: 14 	E: 4	V: 4	M: 6    HPI:  77yM w/ PMHx of Diabetes, Depression, Gout, Benign essential HTN, Osteoarthritis, Cataracts, both eyes, S/P open cholecystectomy, S/P knee surgery seen as Trauma Alert s/p mechanical fall last night. +HT, -LOC, -AC (takes ASA 81 every other day)  Trauma assessment in ED: ABCs intact , GCS 15 , AAOx2 (at baseline). External signs of trauma include: hematoma/abrasion to left frontotemporal scalp and bilateral knee abrasion. Patient's wife and son are at bedside to assist with history, they state they heard a the patient fall down stairs, they do not know if he fell from the top or bottom of the stairs but that they found him right after the fall at the bottom of the stairs. The patient was brought to [facility] ED, he was transferred to [facility] when found to have multiple rib fx, the patient arrived to Trauma bay without c-collar. Collar was placed upon arrival to [place] after it was found out patient had c1 burst fracture while he was en route.     PAST MEDICAL & SURGICAL HISTORY:  Diabetes  Depression  Gout  Benign essential HTN  Osteoarthritis  Cataracts, both eyes  S/P open cholecystectomy  S/P knee surgery    Allergies  Keflex (Hives)  Keflex (Unknown)    Home Medications:  aspirin 81 mg oral tablet: 1 tab(s) orally once a day (10 Oct 2019 21:41)  buPROPion 150 mg/24 hours (XL) oral tablet, extended release: 1 tab(s) orally every 24 hours (14 Feb 2022 09:33)  donepezil 10 mg oral tablet: 1 tab(s) orally once a day (at bedtime) (14 Feb 2022 09:30)  DULoxetine 60 mg oral delayed release capsule: 1 cap(s) orally once a day (14 Feb 2022 09:30)  glipiZIDE 2.5 mg oral tablet, extended release: 1 tab(s) orally once a day (10 Oct 2019 21:41)  memantine 10 mg oral tablet: 1 tab(s) orally 2 times a day (14 Feb 2022 09:31)  metFORMIN 1000 mg oral tablet: 1 tab(s) orally 2 times a day (10 Oct 2019 21:41)  quinapril 20 mg oral tablet: 1 tab(s) orally once a day (14 Feb 2022 09:31)  Vitamin B6 100 mg oral tablet: 1 tab(s) orally once a day (10 Oct 2019 21:41)  Vitamin D3 2000 intl units oral tablet: 1 tab(s) orally once a day (10 Oct 2019 21:47)    ROS: 10-system review is otherwise negative except HPI above.   Trauma w/u showed    - C1 burst fx    - L 1,4-6,8-10 rib fx    - sheila knee contusion         PHYSICAL EXAM    Vital Signs Last 24 Hrs  T(C): 36.7 (17 Oct 2022 08:00), Max: 36.9 (17 Oct 2022 00:00)  T(F): 98 (17 Oct 2022 08:00), Max: 98.5 (17 Oct 2022 00:00)  HR: 99 (17 Oct 2022 08:00) (82 - 113)  BP: 115/56 (17 Oct 2022 08:00) (99/73 - 175/67)  BP(mean): 79 (17 Oct 2022 08:00) (71 - 131)  RR: 20 (17 Oct 2022 08:00) (16 - 25)  SpO2: 98% (17 Oct 2022 08:00) (90% - 100%)    Parameters below as of 17 Oct 2022 08:00  Patient On (Oxygen Delivery Method): nasal cannula  O2 Flow (L/min): 2      Constitutional - NAD  Chest - CTA  Cardiovascular - S1S2+  Abdomen -  Soft  Extremities -  No calf tenderness   Function : bed mobility and transfer min A                 ambulate 50'RW min A    acetaminophen     Tablet .. 650 milliGRAM(s) Oral every 6 hours PRN  aspirin enteric coated 81 milliGRAM(s) Oral daily  carbidopa/levodopa  25/100 1 Tablet(s) Oral <User Schedule>  chlorhexidine 2% Cloths 1 Application(s) Topical <User Schedule>  donepezil 10 milliGRAM(s) Oral at bedtime  DULoxetine 60 milliGRAM(s) Oral daily  enoxaparin Injectable 40 milliGRAM(s) SubCutaneous every 24 hours  gabapentin 100 milliGRAM(s) Oral three times a day  influenza  Vaccine (HIGH DOSE) 0.7 milliLiter(s) IntraMuscular once  insulin glargine Injectable (LANTUS) 14 Unit(s) SubCutaneous at bedtime  insulin lispro (ADMELOG) corrective regimen sliding scale   SubCutaneous three times a day before meals  insulin lispro Injectable (ADMELOG) 6 Unit(s) SubCutaneous three times a day before meals  lactulose Syrup 15 Gram(s) Oral every 8 hours  latanoprost 0.005% Ophthalmic Solution 1 Drop(s) Both EYES at bedtime  lidocaine   4% Patch 1 Patch Transdermal every 24 hours  lisinopril 20 milliGRAM(s) Oral daily  memantine 10 milliGRAM(s) Oral daily  polyethylene glycol 3350 17 Gram(s) Oral every 12 hours      RECENT LABS/IMAGING                        10.7   5.61  )-----------( 136      ( 16 Oct 2022 22:26 )             29.4     10-16    143  |  107  |  20  ----------------------------<  243<H>  5.0   |  26  |  0.7    Ca    8.4      16 Oct 2022 22:26  Phos  2.9     10-16  Mg     1.5     10-16

## 2022-10-17 NOTE — PROGRESS NOTE ADULT - ASSESSMENT
77yM w/ PMHx of Diabetes, Depression, Gout, Benign essential HTN, Osteoarthritis, Cataracts, both eyes, S/P open cholecystectomy, S/P knee surgery seen as Trauma Alert s/p mechanical fall last night. +HT, -LOC, -AC (takes ASA 81 every other day)  Trauma assessment in ED: ABCs intact , GCS 15 , AAOx2 (at baseline). External signs of trauma include: hematoma/abrasion to left frontotemporal scalp and bilateral knee abrasion. PENA.     Injuries identified:   - C1 right anterior arch burst fracture.   - longitudinal fracture posterior left first rib  - Large left frontotemporal scalp hematoma without evidence of subjacent   fracture.  - Multiple bilateral chronic and acute rib fractures, such as acute rib fractures on the left 1st, 4-6th, and 8th-10th ribs    PLAN:   - Care per SICU  - Diet: as tolerated  - DVT/GI PPX  - Hemodynamic monitoring/vital signs q4h  - Strict Is and Os q4h  - Aspiration precautions  - IS, ambulating - in setting of bibasilar atelectasis on CT chest, patient would benefit greatly from ambulating  - PT/Rehab - Home w/ home health vs SNF. Spouse to decide today.  - Wear [place] J collar for 2 weeks. Will exchange collar w/ short collar.  - F/U OP two weeks post discharge with neurosurgery  - F/U Cr and UOP    Trauma Surgery [id number]

## 2022-10-17 NOTE — CHART NOTE - NSCHARTNOTEFT_GEN_A_CORE
76 y/o M monitoring s/p fall with left 1st, 4-6 and 8-10 rib fractures in addition to c1 burst fracture.     NEURO:  #C1 fx  -Q4 neuro checks   -CT reviewed by Dr. Damon from neurosurgery; stable CT fx; maintain hard c-collar ([place] J) x 2 weeks  -As per neurosurgery no neurosurgical intervention needed at this time. Rec f/u outpt 2 weeks after d/c.     #Acute pain -- well controlled  -APAP 650 mg PO q 6 PRN  -Lidocaine 4% patch q 24  -Gabapentin 100mg q 8   -Assess pain and adjust medications PRN    #Dementia   -A&O x 2 (person, place), occasionally to time  - much more awake during the day, towards pm sundowns, 1:1 sit, restraints, s/p zyprexa 5mg x1  -as per family pt is A&O x 2 at baseline  -restarted home donepezil and memantine   -restarted carbidopa-levodopa  -Home meds: donepezil 10mg PO at bedtime, memantine 10mg PO daily; carbidopa-levodopa TID     #Hx depression  -duloxetine 60mg PO daily     RESP:   #Multiple L sided rib fxs  -pain control   -IS [id number]     #Oxygenation  -saturating 98% on 2L NC   -encourage IS; currently pulling 750 on IS   -CXR shows worsening b/l basilar opacities, notably on R side.  10/15 CT chest --> Bibasilar atelectatic opacities.    #Activity  -Pt uses walker at baseline     -increase as tolerated  -PT and physiatry consulted; Physiatry states he is possible 4a candidate; PT attempted to get patient OOB 10/14 but was hypotensive to 87/52 so holding OOB for now; OT recommends acute inpatient vs [facility] and home OT     CARDS:   #HTN   -Q1 vitals   -started on Lisinopril 20mg PO daily; 1:1 ratio as per pharmacy  -home meds: quinapril 20mg 1 x day   -Troponin 0.01  -10/15 Echo: EF normal, GIDD, mitral & tricuspid regurg, no evidence of pericardial effusion.    GI/NUTR:   #Diet, Regular  Consistent Carbohydrate No Snacks  DASH/TLC Sodium & Cholesterol Restricted (10-13-22 @ 12:18) [Active]    -aspiration precautions, HOB 30  #GI Prophylaxis    -not indicated  #Bowel regimen    -Lactulose 15g q8    -added miralax    #hx cirrhosis  -ammonia level 142> 136,   -increased lactulose 15g q8 on 10/16    /RENAL:   #urine output in critically ill  voiding but episodes of incontinence 10/13 night; bladder scanned--> 75 ccs  -collection bag   -continue monitor U/O     #MELO  -resolved  -IVL    Labs:          BUN/Cr- 25/0.9  -->,  22/0.8  -->20/0.7          Electrolytes-Na 143 // K 5.0 // Mg 1.5 //  Phos 2.9 10-16 @ 22:26  -hypomagnesemia repleted     HEME/ONC:   #DVT prophylaxis    -enoxaparin Injectable 40mg SubQ  -SCDs    Labs: Hb/Hct:  10.6/28.9  -->,  10.9/30.7  -->,  10.7/29.4  -->            Plts:  120  -->,  131  -->,  136  -->              PTT/INR:      Type and Screen Expires 10/16  Home meds: ASA 81 mg; continued     ID:  #leukocytosis   WBC- 7.18  --->>,  7.07  --->>,  6.59  --->> 5.6  Afebrile, not on abx   -Rept Covid 10/15-> Negative       ENDO:  #hx DM  -FS elevated--> increased lantus to 14U, and pre-prandil to 6U preprandial   -continue ACHS fingersticks  -continue ISS     Home meds: glipizide, metformin  -A1C 6.1%    MSK:  #C1 fx  -stable as per neurosurgery; pt in [place] J collar for 2 weeks     #Multiple rib fx  -pain control  -encourage IS   -daily AM CXR    -Frailty score 4/5   -Physiatry consulted: possible 4A candidate  -PT and OT following    -Goals of care discussed with wife on 10/15/2022; Patient is full-code and has living will; prior MOLST void     f/u  -8pm labs  -BM  -1:1 sit in am, d/c restraints    full sign out given to primary team 78 y/o M monitoring s/p fall with left 1st, 4-6 and 8-10 rib fractures in addition to c1 burst fracture.     NEURO:  #C1 fx  -Q4 neuro checks   -CT reviewed by Dr. Damon from neurosurgery; stable CT fx; maintain hard c-collar ([place] J) x 2 weeks  -As per neurosurgery no neurosurgical intervention needed at this time. Rec f/u outpt 2 weeks after d/c.     #Acute pain -- well controlled  -APAP 650 mg PO q 6 PRN  -Lidocaine 4% patch q 24  -Gabapentin 100mg q 8   -Assess pain and adjust medications PRN    #Dementia   -A&O x 2 (person, place), occasionally to time  - much more awake during the day, towards pm sundowns, 1:1 sit, restraints, s/p zyprexa 5mg x1  -as per family pt is A&O x 2 at baseline  -restarted home donepezil and memantine   -restarted carbidopa-levodopa  -Home meds: donepezil 10mg PO at bedtime, memantine 10mg PO daily; carbidopa-levodopa TID     #Hx depression  -duloxetine 60mg PO daily     RESP:   #Multiple L sided rib fxs  -pain control   -IS [id number]     #Oxygenation  -saturating 98% on 2L NC   -encourage IS; currently pulling 750 on IS   -CXR shows worsening b/l basilar opacities, notably on R side.  10/15 CT chest --> Bibasilar atelectatic opacities.    #Activity  -Pt uses walker at baseline     -increase as tolerated  -PT and physiatry consulted; Physiatry states he is possible 4a candidate; PT attempted to get patient OOB 10/14 but was hypotensive to 87/52 so holding OOB for now; OT recommends acute inpatient vs [facility] and home OT     CARDS:   #HTN   -started on Lisinopril 20mg PO daily; 1:1 ratio as per pharmacy  -home meds: quinapril 20mg 1 x day   -Troponin 0.01  -10/15 Echo: EF normal, GIDD, mitral & tricuspid regurg, no evidence of pericardial effusion.    GI/NUTR:   #Diet, Regular  Consistent Carbohydrate No Snacks  DASH/TLC Sodium & Cholesterol Restricted (10-13-22 @ 12:18) [Active]    -aspiration precautions, HOB 30  #GI Prophylaxis    -not indicated  #Bowel regimen    -Lactulose 15g q8    -added miralax    #hx cirrhosis  -ammonia level 142> 136,   -increased lactulose 15g q8 on 10/16    /RENAL:   #urine output in critically ill  voiding but episodes of incontinence 10/13 night; bladder scanned--> 75 ccs  -collection bag   -continue monitor U/O     #MELO  -resolved  -IVL    Labs:          BUN/Cr- 25/0.9  -->,  22/0.8  -->20/0.7          Electrolytes-Na 143 // K 5.0 // Mg 1.5 //  Phos 2.9 10-16 @ 22:26  -hypomagnesemia repleted     HEME/ONC:   #DVT prophylaxis    -enoxaparin Injectable 40mg SubQ  -SCDs    Labs: Hb/Hct:  10.6/28.9  -->,  10.9/30.7  -->,  10.7/29.4  -->            Plts:  120  -->,  131  -->,  136  -->              PTT/INR:      Type and Screen Expires 10/16  Home meds: ASA 81 mg; continued     ID:  #leukocytosis   WBC- 7.18  --->>,  7.07  --->>,  6.59  --->> 5.6  Afebrile, not on abx   -Rept Covid 10/15-> Negative       ENDO:  #hx DM  -FS elevated--> increased lantus to 14U, and pre-prandial to 6U preprandial   -continue ACHS fingersticks  -continue ISS     Home meds: glipizide, metformin  -A1C 6.1%    MSK:  #C1 fx  -stable as per neurosurgery; pt in [place] J collar for 2 weeks     #Multiple rib fx  -pain control  -encourage IS   -daily AM CXR    -Frailty score 4/5   -Physiatry consulted: possible 4A candidate  -PT and OT following    -Goals of care discussed with wife on 10/15/2022; Patient is full-code and has living will; prior MOLST void     f/u  -8pm labs  -BM  -1:1 sit in am, d/c restraints    full sign out given to primary team Dr Hernandez @ 1am

## 2022-10-17 NOTE — PROGRESS NOTE ADULT - SUBJECTIVE AND OBJECTIVE BOX
STACY CARDENAS  [id number]  77y Male    Indication for ICU admission: respiratory monitoring s/p fall with left 1st, 4-6 and 8-10 rib fractures in addition to c1 burst fracture.     Admit Date:10-13-22  ICU Date:10-13-22  OR Date:    Keflex (Hives)  Keflex (Unknown)    PAST MEDICAL & SURGICAL HISTORY:  Diabetes    Depression    Gout    Benign essential HTN    Osteoarthritis    Cataracts, both eyes    S/P cholecystectomy    S/P knee surgery      Home Medications:  aspirin 81 mg oral tablet: 1 tab(s) orally once a day (10 Oct 2019 21:41)  donepezil 10 mg oral tablet: 1 tab(s) orally once a day (at bedtime) (14 Feb 2022 09:30)  DULoxetine 60 mg oral delayed release capsule: 1 cap(s) orally once a day (14 Feb 2022 09:30)  glipiZIDE 2.5 mg oral tablet, extended release: 1 tab(s) orally once a day (10 Oct 2019 21:41)  latanoprost 0.005% ophthalmic solution: 1 drop(s) to each affected eye once a day (in the evening) (13 Oct 2022 11:50)  memantine 10 mg oral tablet: 1 tab(s) orally 2 times a day (14 Feb 2022 09:31)  metFORMIN 1000 mg oral tablet: 1 tab(s) orally 2 times a day (10 Oct 2019 21:41)  quinapril 20 mg oral tablet: 1 tab(s) orally once a day (14 Feb 2022 09:31)  Vitamin B6 100 mg oral tablet: 1 tab(s) orally once a day (10 Oct 2019 21:41)  Vitamin D3 2000 intl units oral tablet: 1 tab(s) orally once a day (10 Oct 2019 21:47)      24HRS EVENT:  10/16  OV  incr lactulose to 15mg q8, added miralax   agitated, zyprexa 5mg x1  EKG-     Day:  - much more awake during the day, towards pm sundowns, 1:1 sit  -tolerating full diet  -IVL  - change to Tylenol PRN  - Increase Lantus to 14 units QHS  - glucose 316>increase preprandial to 6  - Approved for 4A, but no beds and patients already on waiting list-->Katina should be sent today        DVT PTX: enoxaparin Injectable 40 milliGRAM(s) SubCutaneous every 24 hours    GI PTX: protonix    ***Tubes/Lines/Drains  ***  Peripheral IV	    REVIEW OF SYSTEMS    [x] A ten-point review of systems was otherwise negative except as noted.  [ ] Due to altered mental status/intubation, subjective information were not able to be obtained from the patient. History was obtained, to the extent possible, from review of the chart and collateral sources of information.

## 2022-10-17 NOTE — CONSULT NOTE ADULT - TIME BILLING
77M PMHx DM2, depression, HTN, gout, cirrhosis here with fall, found to have C1 burst fx, rib fxs.    IMP  #Fall, c/b C1 burst fx, BL rib fxs    cth with L frontotemporal scalp hematoma    ct chest/ abd with multiple bl rib fxs, cirrhosis    tte unrevealing  #Acute hypoxic resp failure, presumed due to atelectasis    ct chest bl ggo; presumed atelectasis  #DM2  #Depression  #HTN  #Gout  #Cirrhosis    PLAN  repeat mg  increase lantus from 14 to 18  IS  wean off nc if able  check orthostatics     Sonam  [id number]

## 2022-10-17 NOTE — PROGRESS NOTE ADULT - ASSESSMENT
Assessment and Plan:     NEURO:  #C1 fx  -Q4 neuro checks   -CT reviewed by Dr. Damon from neurosurgery; stable CT fx; maintain hard c-collar ([place] J) x 2 weeks  -As per neurosurgery no neurosurgical intervention needed at this time. Rec f/u outpt 2 weeks after d/c.     #Acute pain -- well controlled  -APAP 650 mg PO q 6 PRN  -Lidocaine 4% patch q 24  -Gabapentin 100mg q 8   -Assess pain and adjust medications PRN    #Dementia   -A&O x 2 (person, place), occasionally to time  - much more awake during the day, towards pm sundowns, 1:1 sit  -as per family pt is A&O x 2 at baseline  -restarted home donepezil and memantine   -restarted carbidopa-levodopa  -Home meds: donepezil 10mg PO at bedtime, memantine 10mg PO daily; carbidopa-levodopa TID     #Hx depression  -duloxetine 60mg PO daily     RESP:   #Multiple L sided rib fxs  -pain control   -IS [id number]     #Oxygenation  -saturating 98% on 2L NC   -encourage IS; currently pulling 750 on IS   -CXR shows worsening b/l basilar opacities, notably on R side.  10/15 CT chest --> Bibasilar atelectatic opacities.    #Activity  -Pt uses walker at baseline     -increase as tolerated  -PT and physiatry consulted; Physiatry states he is possible 4a candidate; PT attempted to get patient OOB 10/14 but was hypotensive to 87/52 so holding OOB for now; OT recommends acute inpatient vs [facility] and home OT     CARDS:   #HTN   -Q1 vitals   -started on Lisinopril 20mg PO daily; 1:1 ratio as per pharmacy  -home meds: quinapril 20mg 1 x day   -Troponin 0.01  -10/15 Echo: EF normal, GIDD, mitral & tricuspid regurg, no evidence of pericardial effusion.    GI/NUTR:   #Diet, Regular  Consistent Carbohydrate No Snacks  DASH/TLC Sodium & Cholesterol Restricted (10-13-22 @ 12:18) [Active]    -aspiration precautions, HOB 30  #GI Prophylaxis    -not indicated  #Bowel regimen    -Lactulose 15g q8    -added miralax    #hx cirrhosis  -ammonia level 142> 136,   -increased lactulose 15g q8 on 10/16    /RENAL:   #urine output in critically ill  voiding but episodes of incontinence 10/13 night; bladder scanned--> 75 ccs  -collection bag   -continue monitor U/O     #MELO  -resolved  -IVL    Labs:          BUN/Cr- 25/0.9  -->,  22/0.8  -->20/0.7          Electrolytes-Na 143 // K 5.0 // Mg 1.5 //  Phos 2.9 10-16 @ 22:26  -hypomagnesemia repleted     HEME/ONC:   #DVT prophylaxis    -enoxaparin Injectable 40mg SubQ  -SCDs    Labs: Hb/Hct:  10.6/28.9  -->,  10.9/30.7  -->,  10.7/29.4  -->            Plts:  120  -->,  131  -->,  136  -->              PTT/INR:      Type and Screen Expires 10/16  Home meds: ASA 81 mg; continued     ID:  #leukocytosis   WBC- 7.18  --->>,  7.07  --->>,  6.59  --->> 5.6  Afebrile, not on abx   -Rept Covid 10/15-> Negative       ENDO:  #hx DM  -FS elevated--> increased lantus to 14U, and pre-prandil to 6U preprandial   -continue ACHS fingersticks  -continue ISS     Home meds: glipizide, metformin  -A1C 6.1%    MSK:  #C1 fx  -stable as per neurosurgery; pt in [place] J collar for 2 weeks     #Multiple rib fx  -pain control  -encourage IS   -daily AM CXR    -Frailty score 4/5   -Physiatry consulted: possible 4A candidate  -PT and OT following    -Goals of care discussed with wife on 10/15/2022; Patient is full-code and has living will; prior MOLST void     LINES/DRAINS:  PIV  ADVANCED DIRECTIVES:  Full Code    HCP/Emergency Contact- Wife     INDICATION FOR SICU/SDU: MULTIPLE LT SIDE RIB FXS;HEAD INJURY;CLOSED BURST FX CERVICAL VERTEBRA.    Dispo: SICU  - Approved for 4A, but no beds and patients already on waiting list-->Katina should be sent today

## 2022-10-17 NOTE — CONSULT NOTE ADULT - SUBJECTIVE AND OBJECTIVE BOX
INTERVAL HPI/OVERNIGHT EVENTS:    SUBJECTIVE: Patient seen and examined at bedside.     77yM w/ PMHx of Diabetes, Depression, Gout, Benign essential HTN, Osteoarthritis, Cataracts, both eyes, S/P open cholecystectomy, S/P knee surgery seen as Trauma Alert s/p mechanical fall last night. +HT, -LOC, -AC (takes ASA 81 every other day)  Trauma assessment in ED: ABCs intact , GCS 15 , AAOx2 (at baseline). External signs of trauma include: hematoma/abrasion to left frontotemporal scalp and bilateral knee abrasion. Patient's wife and son are at bedside to assist with history, they state they heard a the patient fall down stairs, they do not know if he fell from the top or bottom of the stairs but that they found him right after the fall at the bottom of the stairs. The patient was brought to [facility] ED, he was transferred to [facility] when found to have multiple rib fx, the patient arrived to Trauma bay without c-collar. Collar was placed upon arrival to [place] after it was found out patient had c1 burst fracture while he was en route.     OBJECTIVE:    VITAL SIGNS:  Vital Signs Last 24 Hrs  T(C): 35.8 (17 Oct 2022 15:36), Max: 37 (17 Oct 2022 12:00)  T(F): 96.4 (17 Oct 2022 15:36), Max: 98.6 (17 Oct 2022 12:00)  HR: 86 (17 Oct 2022 15:36) (86 - 113)  BP: 108/55 (17 Oct 2022 15:36) (108/55 - 175/67)  BP(mean): 78 (17 Oct 2022 12:00) (71 - 131)  RR: 18 (17 Oct 2022 15:36) (16 - 25)  SpO2: 97% (17 Oct 2022 15:36) (93% - 100%)    Parameters below as of 17 Oct 2022 15:36  Patient On (Oxygen Delivery Method): nasal cannula          PHYSICAL EXAM:    General: NAD  HEENT: NC/AT; PERRL, clear conjunctiva  Neck: collar in place  Respiratory: CTA b/l  Cardiovascular: +S1/S2; RRR  Abdomen: soft, NT/ND; +BS x4  Extremities: WWP, 2+ peripheral pulses b/l; no LE edema  Skin:  Neurological:    MEDICATIONS:  MEDICATIONS  (STANDING):  aspirin enteric coated 81 milliGRAM(s) Oral daily  carbidopa/levodopa  25/100 1 Tablet(s) Oral <User Schedule>  chlorhexidine 2% Cloths 1 Application(s) Topical <User Schedule>  donepezil 10 milliGRAM(s) Oral at bedtime  DULoxetine 60 milliGRAM(s) Oral daily  enoxaparin Injectable 40 milliGRAM(s) SubCutaneous every 24 hours  gabapentin 100 milliGRAM(s) Oral three times a day  influenza  Vaccine (HIGH DOSE) 0.7 milliLiter(s) IntraMuscular once  insulin glargine Injectable (LANTUS) 14 Unit(s) SubCutaneous at bedtime  insulin lispro (ADMELOG) corrective regimen sliding scale   SubCutaneous three times a day before meals  insulin lispro Injectable (ADMELOG) 6 Unit(s) SubCutaneous three times a day before meals  lactulose Syrup 15 Gram(s) Oral every 8 hours  latanoprost 0.005% Ophthalmic Solution 1 Drop(s) Both EYES at bedtime  lidocaine   4% Patch 1 Patch Transdermal every 24 hours  lisinopril 20 milliGRAM(s) Oral daily  memantine 10 milliGRAM(s) Oral daily  polyethylene glycol 3350 17 Gram(s) Oral every 12 hours    MEDICATIONS  (PRN):  acetaminophen     Tablet .. 650 milliGRAM(s) Oral every 6 hours PRN Temp greater or equal to 38C (100.4F), Mild Pain (1 - 3)      ALLERGIES:  Allergies    Keflex (Hives)  Keflex (Unknown)    Intolerances        LABS:                        10.7   5.61  )-----------( 136      ( 16 Oct 2022 22:26 )             29.4     Hemoglobin: 10.7 g/dL (10-16 @ 22:26)  Hemoglobin: 10.9 g/dL (10-15 @ 22:47)  Hemoglobin: 10.6 g/dL (10-14 @ 22:54)  Hemoglobin: 12.7 g/dL (10-13 @ 23:20)  Hemoglobin: 13.0 g/dL (10-13 @ 13:44)    CBC Full  -  ( 16 Oct 2022 22:26 )  WBC Count : 5.61 K/uL  RBC Count : 3.02 M/uL  Hemoglobin : 10.7 g/dL  Hematocrit : 29.4 %  Platelet Count - Automated : 136 K/uL  Mean Cell Volume : 97.4 fL  Mean Cell Hemoglobin : 35.4 pg  Mean Cell Hemoglobin Concentration : 36.4 g/dL  Auto Neutrophil # : x  Auto Lymphocyte # : x  Auto Monocyte # : x  Auto Eosinophil # : x  Auto Basophil # : x  Auto Neutrophil % : x  Auto Lymphocyte % : x  Auto Monocyte % : x  Auto Eosinophil % : x  Auto Basophil % : x    10-16    143  |  107  |  20  ----------------------------<  243<H>  5.0   |  26  |  0.7    Ca    8.4      16 Oct 2022 22:26  Phos  2.9     10-16  Mg     1.5     10-16      Creatinine Trend: 0.7<--, 0.8<--, 0.9<--, 0.9<--, 1.1<--, 0.8<--        hs Troponin:              CSF:                      EKG:   MICROBIOLOGY:    IMAGING:      Labs, imaging, EKG personally reviewed    RADIOLOGY & ADDITIONAL TESTS: Reviewed.

## 2022-10-18 LAB
GLUCOSE BLDC GLUCOMTR-MCNC: 109 MG/DL — HIGH (ref 70–99)
GLUCOSE BLDC GLUCOMTR-MCNC: 199 MG/DL — HIGH (ref 70–99)
GLUCOSE BLDC GLUCOMTR-MCNC: 200 MG/DL — HIGH (ref 70–99)
GLUCOSE BLDC GLUCOMTR-MCNC: 215 MG/DL — HIGH (ref 70–99)
GLUCOSE BLDC GLUCOMTR-MCNC: 244 MG/DL — HIGH (ref 70–99)

## 2022-10-18 PROCEDURE — 99232 SBSQ HOSP IP/OBS MODERATE 35: CPT

## 2022-10-18 RX ORDER — MAGNESIUM SULFATE 500 MG/ML
2 VIAL (ML) INJECTION ONCE
Refills: 0 | Status: COMPLETED | OUTPATIENT
Start: 2022-10-18 | End: 2022-10-18

## 2022-10-18 RX ORDER — DEXTROSE 50 % IN WATER 50 %
12.5 SYRINGE (ML) INTRAVENOUS ONCE
Refills: 0 | Status: DISCONTINUED | OUTPATIENT
Start: 2022-10-18 | End: 2022-10-19

## 2022-10-18 RX ORDER — DEXTROSE 50 % IN WATER 50 %
15 SYRINGE (ML) INTRAVENOUS ONCE
Refills: 0 | Status: DISCONTINUED | OUTPATIENT
Start: 2022-10-18 | End: 2022-10-19

## 2022-10-18 RX ORDER — INSULIN GLARGINE 100 [IU]/ML
18 INJECTION, SOLUTION SUBCUTANEOUS AT BEDTIME
Refills: 0 | Status: DISCONTINUED | OUTPATIENT
Start: 2022-10-18 | End: 2022-10-19

## 2022-10-18 RX ORDER — GLUCAGON INJECTION, SOLUTION 0.5 MG/.1ML
1 INJECTION, SOLUTION SUBCUTANEOUS ONCE
Refills: 0 | Status: DISCONTINUED | OUTPATIENT
Start: 2022-10-18 | End: 2022-10-19

## 2022-10-18 RX ORDER — DEXTROSE 50 % IN WATER 50 %
25 SYRINGE (ML) INTRAVENOUS ONCE
Refills: 0 | Status: DISCONTINUED | OUTPATIENT
Start: 2022-10-18 | End: 2022-10-19

## 2022-10-18 RX ORDER — SODIUM CHLORIDE 9 MG/ML
1000 INJECTION, SOLUTION INTRAVENOUS
Refills: 0 | Status: DISCONTINUED | OUTPATIENT
Start: 2022-10-18 | End: 2022-10-19

## 2022-10-18 RX ADMIN — LATANOPROST 1 DROP(S): 0.05 SOLUTION/ DROPS OPHTHALMIC; TOPICAL at 21:10

## 2022-10-18 RX ADMIN — LIDOCAINE 1 PATCH: 4 CREAM TOPICAL at 17:01

## 2022-10-18 RX ADMIN — LACTULOSE 15 GRAM(S): 10 SOLUTION ORAL at 05:09

## 2022-10-18 RX ADMIN — DULOXETINE HYDROCHLORIDE 60 MILLIGRAM(S): 30 CAPSULE, DELAYED RELEASE ORAL at 11:59

## 2022-10-18 RX ADMIN — CHLORHEXIDINE GLUCONATE 1 APPLICATION(S): 213 SOLUTION TOPICAL at 05:09

## 2022-10-18 RX ADMIN — Medication 6 UNIT(S): at 08:04

## 2022-10-18 RX ADMIN — LIDOCAINE 1 PATCH: 4 CREAM TOPICAL at 02:32

## 2022-10-18 RX ADMIN — Medication 6 UNIT(S): at 17:00

## 2022-10-18 RX ADMIN — Medication 7: at 11:59

## 2022-10-18 RX ADMIN — GABAPENTIN 100 MILLIGRAM(S): 400 CAPSULE ORAL at 21:10

## 2022-10-18 RX ADMIN — POLYETHYLENE GLYCOL 3350 17 GRAM(S): 17 POWDER, FOR SOLUTION ORAL at 17:03

## 2022-10-18 RX ADMIN — ENOXAPARIN SODIUM 40 MILLIGRAM(S): 100 INJECTION SUBCUTANEOUS at 17:01

## 2022-10-18 RX ADMIN — Medication 9: at 17:00

## 2022-10-18 RX ADMIN — CARBIDOPA AND LEVODOPA 1 TABLET(S): 25; 100 TABLET ORAL at 05:09

## 2022-10-18 RX ADMIN — POLYETHYLENE GLYCOL 3350 17 GRAM(S): 17 POWDER, FOR SOLUTION ORAL at 05:09

## 2022-10-18 RX ADMIN — MEMANTINE HYDROCHLORIDE 10 MILLIGRAM(S): 10 TABLET ORAL at 11:59

## 2022-10-18 RX ADMIN — LACTULOSE 15 GRAM(S): 10 SOLUTION ORAL at 13:29

## 2022-10-18 RX ADMIN — CARBIDOPA AND LEVODOPA 1 TABLET(S): 25; 100 TABLET ORAL at 17:00

## 2022-10-18 RX ADMIN — Medication 25 GRAM(S): at 03:09

## 2022-10-18 RX ADMIN — Medication 6 UNIT(S): at 11:59

## 2022-10-18 RX ADMIN — Medication 5: at 08:04

## 2022-10-18 RX ADMIN — INSULIN GLARGINE 18 UNIT(S): 100 INJECTION, SOLUTION SUBCUTANEOUS at 21:10

## 2022-10-18 RX ADMIN — LIDOCAINE 1 PATCH: 4 CREAM TOPICAL at 20:00

## 2022-10-18 RX ADMIN — DONEPEZIL HYDROCHLORIDE 10 MILLIGRAM(S): 10 TABLET, FILM COATED ORAL at 21:10

## 2022-10-18 RX ADMIN — Medication 81 MILLIGRAM(S): at 11:59

## 2022-10-18 RX ADMIN — LISINOPRIL 20 MILLIGRAM(S): 2.5 TABLET ORAL at 05:08

## 2022-10-18 RX ADMIN — GABAPENTIN 100 MILLIGRAM(S): 400 CAPSULE ORAL at 13:29

## 2022-10-18 RX ADMIN — GABAPENTIN 100 MILLIGRAM(S): 400 CAPSULE ORAL at 05:09

## 2022-10-18 RX ADMIN — LACTULOSE 15 GRAM(S): 10 SOLUTION ORAL at 21:11

## 2022-10-18 NOTE — PROGRESS NOTE ADULT - SUBJECTIVE AND OBJECTIVE BOX
GENERAL SURGERY PROGRESS NOTE    Patient: STACY CARDENAS , 77y ([birth year])Male   MRN: [id number]  Location: [address]  Visit: 10-13-22 Inpatient  Date: 10-18-22 @ 10:58    Hospital Day #: 6    Events of past 24 hours:  NAEON  No sundowning/agitation events.    Patient has no complaints.  Magnesium repleted.    PAST MEDICAL & SURGICAL HISTORY:  Diabetes    Depression    Gout    Benign essential HTN    Osteoarthritis    Cataracts, both eyes    S/P cholecystectomy    S/P knee surgery      Vitals:   T(F): 96.1 (10-18-22 @ 08:49), Max: 98.7 (10-18-22 @ 05:28)  HR: 85 (10-18-22 @ 08:49)  BP: 152/70 (10-18-22 @ 08:49)  RR: 18 (10-18-22 @ 08:49)  SpO2: 100% (10-18-22 @ 08:49)      Diet, Regular:   Consistent Carbohydrate No Snacks  DASH/TLC Sodium & Cholesterol Restricted    PHYSICAL EXAM:  General: NAD, calm and cooperative.  Cardiac: RRR.  Respiratory: On RA. Speaks in complete sentences. No accessory muscles of respiration in use. Bilateral chest rise.  Abdomen: Soft, non-distended, non-tender, no rebound, no guarding.   Skin: Warm/dry, normal color, no jaundice.      MEDICATIONS  (STANDING):  aspirin enteric coated 81 milliGRAM(s) Oral daily  carbidopa/levodopa  25/100 1 Tablet(s) Oral <User Schedule>  chlorhexidine 2% Cloths 1 Application(s) Topical <User Schedule>  donepezil 10 milliGRAM(s) Oral at bedtime  DULoxetine 60 milliGRAM(s) Oral daily  enoxaparin Injectable 40 milliGRAM(s) SubCutaneous every 24 hours  gabapentin 100 milliGRAM(s) Oral three times a day  influenza  Vaccine (HIGH DOSE) 0.7 milliLiter(s) IntraMuscular once  insulin glargine Injectable (LANTUS) 14 Unit(s) SubCutaneous at bedtime  insulin lispro (ADMELOG) corrective regimen sliding scale   SubCutaneous three times a day before meals  insulin lispro Injectable (ADMELOG) 6 Unit(s) SubCutaneous three times a day before meals  lactulose Syrup 15 Gram(s) Oral every 8 hours  latanoprost 0.005% Ophthalmic Solution 1 Drop(s) Both EYES at bedtime  lidocaine   4% Patch 1 Patch Transdermal every 24 hours  lisinopril 20 milliGRAM(s) Oral daily  memantine 10 milliGRAM(s) Oral daily  polyethylene glycol 3350 17 Gram(s) Oral every 12 hours    MEDICATIONS  (PRN):  acetaminophen     Tablet .. 650 milliGRAM(s) Oral every 6 hours PRN Temp greater or equal to 38C (100.4F), Mild Pain (1 - 3)      DVT PROPHYLAXIS: enoxaparin Injectable 40 milliGRAM(s) SubCutaneous every 24 hours    GI PROPHYLAXIS:   ANTICOAGULATION:   ANTIBIOTICS:        LAB/STUDIES:  Labs:  CAPILLARY BLOOD GLUCOSE      POCT Blood Glucose.: 199 mg/dL (18 Oct 2022 07:59)  POCT Blood Glucose.: 199 mg/dL (17 Oct 2022 20:45)  POCT Blood Glucose.: 114 mg/dL (17 Oct 2022 17:00)  POCT Blood Glucose.: 215 mg/dL (17 Oct 2022 11:29)                          10.8   5.10  )-----------( 145      ( 17 Oct 2022 20:49 )             31.2       Auto Neutrophil %: 51.8 % (10-17-22 @ 20:49)  Auto Immature Granulocyte %: 0.2 % (10-17-22 @ 20:49)    10-17    142  |  107  |  16  ----------------------------<  186<H>  4.8   |  29  |  0.7      Calcium, Total Serum: 8.8 mg/dL (10-17-22 @ 20:49)      IMAGING:  No new imaging.    ACCESS/ DEVICES:  [ ] Peripheral IV

## 2022-10-18 NOTE — PROGRESS NOTE ADULT - SUBJECTIVE AND OBJECTIVE BOX
INTERVAL HPI/OVERNIGHT EVENTS:    SUBJECTIVE: Patient seen and examined at bedside.     77yM w/ PMHx of Diabetes, Depression, Gout, Benign essential HTN, Osteoarthritis, Cataracts, both eyes, S/P open cholecystectomy, S/P knee surgery seen as Trauma Alert s/p mechanical fall last night. +HT, -LOC, -AC (takes ASA 81 every other day)  Trauma assessment in ED: ABCs intact , GCS 15 , AAOx2 (at baseline). External signs of trauma include: hematoma/abrasion to left frontotemporal scalp and bilateral knee abrasion. Patient's wife and son are at bedside to assist with history, they state they heard a the patient fall down stairs, they do not know if he fell from the top or bottom of the stairs but that they found him right after the fall at the bottom of the stairs. The patient was brought to [facility] ED, he was transferred to [facility] when found to have multiple rib fx, the patient arrived to Trauma bay without c-collar. Collar was placed upon arrival to [place] after it was found out patient had c1 burst fracture while he was en route.     OBJECTIVE:    VITAL SIGNS:  Vital Signs Last 24 Hrs  T(C): 35.6 (18 Oct 2022 08:49), Max: 37.1 (18 Oct 2022 05:28)  T(F): 96.1 (18 Oct 2022 08:49), Max: 98.7 (18 Oct 2022 05:28)  HR: 85 (18 Oct 2022 08:49) (84 - 88)  BP: 152/70 (18 Oct 2022 08:49) (108/55 - 152/70)  BP(mean): --  RR: 18 (18 Oct 2022 08:49) (18 - 18)  SpO2: 100% (18 Oct 2022 08:49) (96% - 100%)    Parameters below as of 17 Oct 2022 20:28  Patient On (Oxygen Delivery Method): nasal cannula              PHYSICAL EXAM:    General: NAD  HEENT: NC/AT; PERRL, clear conjunctiva  Neck: collar in place  Respiratory: CTA b/l  Cardiovascular: +S1/S2; RRR  Abdomen: soft, NT/ND; +BS x4  Extremities: WWP, 2+ peripheral pulses b/l; no LE edema    MEDICATIONS:  MEDICATIONS  (STANDING):  aspirin enteric coated 81 milliGRAM(s) Oral daily  carbidopa/levodopa  25/100 1 Tablet(s) Oral <User Schedule>  chlorhexidine 2% Cloths 1 Application(s) Topical <User Schedule>  donepezil 10 milliGRAM(s) Oral at bedtime  DULoxetine 60 milliGRAM(s) Oral daily  enoxaparin Injectable 40 milliGRAM(s) SubCutaneous every 24 hours  gabapentin 100 milliGRAM(s) Oral three times a day  influenza  Vaccine (HIGH DOSE) 0.7 milliLiter(s) IntraMuscular once  insulin glargine Injectable (LANTUS) 14 Unit(s) SubCutaneous at bedtime  insulin lispro (ADMELOG) corrective regimen sliding scale   SubCutaneous three times a day before meals  insulin lispro Injectable (ADMELOG) 6 Unit(s) SubCutaneous three times a day before meals  lactulose Syrup 15 Gram(s) Oral every 8 hours  latanoprost 0.005% Ophthalmic Solution 1 Drop(s) Both EYES at bedtime  lidocaine   4% Patch 1 Patch Transdermal every 24 hours  lisinopril 20 milliGRAM(s) Oral daily  memantine 10 milliGRAM(s) Oral daily  polyethylene glycol 3350 17 Gram(s) Oral every 12 hours    MEDICATIONS  (PRN):  acetaminophen     Tablet .. 650 milliGRAM(s) Oral every 6 hours PRN Temp greater or equal to 38C (100.4F), Mild Pain (1 - 3)      ALLERGIES:  Allergies    Keflex (Hives)  Keflex (Unknown)    Intolerances        LABS:                        10.7   5.61  )-----------( 136      ( 16 Oct 2022 22:26 )             29.4     Hemoglobin: 10.7 g/dL (10-16 @ 22:26)  Hemoglobin: 10.9 g/dL (10-15 @ 22:47)  Hemoglobin: 10.6 g/dL (10-14 @ 22:54)  Hemoglobin: 12.7 g/dL (10-13 @ 23:20)  Hemoglobin: 13.0 g/dL (10-13 @ 13:44)    CBC Full  -  ( 16 Oct 2022 22:26 )  WBC Count : 5.61 K/uL  RBC Count : 3.02 M/uL  Hemoglobin : 10.7 g/dL  Hematocrit : 29.4 %  Platelet Count - Automated : 136 K/uL  Mean Cell Volume : 97.4 fL  Mean Cell Hemoglobin : 35.4 pg  Mean Cell Hemoglobin Concentration : 36.4 g/dL  Auto Neutrophil # : x  Auto Lymphocyte # : x  Auto Monocyte # : x  Auto Eosinophil # : x  Auto Basophil # : x  Auto Neutrophil % : x  Auto Lymphocyte % : x  Auto Monocyte % : x  Auto Eosinophil % : x  Auto Basophil % : x    10-16    143  |  107  |  20  ----------------------------<  243<H>  5.0   |  26  |  0.7    Ca    8.4      16 Oct 2022 22:26  Phos  2.9     10-16  Mg     1.5     10-16      Creatinine Trend: 0.7<--, 0.8<--, 0.9<--, 0.9<--, 1.1<--, 0.8<--        hs Troponin:              CSF:                      EKG:   MICROBIOLOGY:    IMAGING:      Labs, imaging, EKG personally reviewed    RADIOLOGY & ADDITIONAL TESTS: Reviewed.

## 2022-10-18 NOTE — PROGRESS NOTE ADULT - ASSESSMENT
77yM w/ PMHx of Diabetes, Depression, Gout, Benign essential HTN, Osteoarthritis, Cataracts, both eyes, S/P open cholecystectomy, S/P knee surgery seen as Trauma Alert s/p mechanical fall last night. +HT, -LOC, -AC (takes ASA 81 every other day)  Trauma assessment in ED: ABCs intact , GCS 15 , AAOx2 (at baseline). External signs of trauma include: hematoma/abrasion to left frontotemporal scalp and bilateral knee abrasion. PENA.     Injuries identified:   - C1 right anterior arch burst fracture.   - longitudinal fracture posterior left first rib  - Large left frontotemporal scalp hematoma without evidence of subjacent   fracture.  - Multiple bilateral chronic and acute rib fractures, such as acute rib fractures on the left 1st, 4-6th, and 8th-10th ribs    PLAN:   - Diet: as tolerated  - DVT/GI PPX  - Hemodynamic monitoring/vital signs q4h  - Strict Is and Os q4h  - Aspiration precautions  - IS, ambulating - in setting of bibasilar atelectasis on CT chest, patient would benefit greatly from ambulating  - PT/Rehab - 4A candidate. No beds on 4A. F/u w/ CM.   - Wear [place] collar for 2 weeks. Will exchange collar w/ short collar.  - F/U OP two weeks post discharge with neurosurgery  - F/U Cr and UOP    Trauma Surgery [id number]

## 2022-10-18 NOTE — PROGRESS NOTE ADULT - ASSESSMENT
77M PMHx DM2, depression, HTN, gout, cirrhosis here with fall, found to have C1 burst fx, rib fxs.    IMP  #Fall, c/b C1 burst fx, BL rib fxs    cth with L frontotemporal scalp hematoma    ct chest/ abd with multiple bl rib fxs, cirrhosis    tte unrevealing    #Acute hypoxic resp failure, presumed due to atelectasis    ct chest reported   Bibasilar atelectatic opacities; correlate for superimposed pneumonia in   the appropriate clinical setting.  would recommend ordering procalcitonin     #DM2  #Depression  #HTN  #Gout  #Cirrhosis    PLAN  repeat mg  increase lantus from 14 to 18  IS  wean off nc if able  check orthostatics   check procalcitonin. low suspicion of PNA

## 2022-10-19 ENCOUNTER — TRANSCRIPTION ENCOUNTER (OUTPATIENT)
Age: 77
End: 2022-10-19

## 2022-10-19 VITALS
RESPIRATION RATE: 18 BRPM | TEMPERATURE: 98 F | HEART RATE: 95 BPM | DIASTOLIC BLOOD PRESSURE: 70 MMHG | SYSTOLIC BLOOD PRESSURE: 142 MMHG | OXYGEN SATURATION: 98 %

## 2022-10-19 LAB
GLUCOSE BLDC GLUCOMTR-MCNC: 147 MG/DL — HIGH (ref 70–99)
GLUCOSE BLDC GLUCOMTR-MCNC: 219 MG/DL — HIGH (ref 70–99)
GLUCOSE BLDC GLUCOMTR-MCNC: 256 MG/DL — HIGH (ref 70–99)
SARS-COV-2 RNA SPEC QL NAA+PROBE: SIGNIFICANT CHANGE UP

## 2022-10-19 PROCEDURE — 99238 HOSP IP/OBS DSCHRG MGMT 30/<: CPT

## 2022-10-19 PROCEDURE — 99232 SBSQ HOSP IP/OBS MODERATE 35: CPT

## 2022-10-19 RX ADMIN — Medication 9: at 11:42

## 2022-10-19 RX ADMIN — Medication 81 MILLIGRAM(S): at 11:44

## 2022-10-19 RX ADMIN — LACTULOSE 15 GRAM(S): 10 SOLUTION ORAL at 05:00

## 2022-10-19 RX ADMIN — CARBIDOPA AND LEVODOPA 1 TABLET(S): 25; 100 TABLET ORAL at 17:41

## 2022-10-19 RX ADMIN — POLYETHYLENE GLYCOL 3350 17 GRAM(S): 17 POWDER, FOR SOLUTION ORAL at 17:41

## 2022-10-19 RX ADMIN — CARBIDOPA AND LEVODOPA 1 TABLET(S): 25; 100 TABLET ORAL at 05:00

## 2022-10-19 RX ADMIN — GABAPENTIN 100 MILLIGRAM(S): 400 CAPSULE ORAL at 05:00

## 2022-10-19 RX ADMIN — Medication 6 UNIT(S): at 11:44

## 2022-10-19 RX ADMIN — ENOXAPARIN SODIUM 40 MILLIGRAM(S): 100 INJECTION SUBCUTANEOUS at 15:06

## 2022-10-19 RX ADMIN — Medication 6 UNIT(S): at 08:09

## 2022-10-19 RX ADMIN — LIDOCAINE 1 PATCH: 4 CREAM TOPICAL at 17:41

## 2022-10-19 RX ADMIN — DULOXETINE HYDROCHLORIDE 60 MILLIGRAM(S): 30 CAPSULE, DELAYED RELEASE ORAL at 11:44

## 2022-10-19 RX ADMIN — LIDOCAINE 1 PATCH: 4 CREAM TOPICAL at 05:01

## 2022-10-19 RX ADMIN — CHLORHEXIDINE GLUCONATE 1 APPLICATION(S): 213 SOLUTION TOPICAL at 05:01

## 2022-10-19 RX ADMIN — GABAPENTIN 100 MILLIGRAM(S): 400 CAPSULE ORAL at 14:19

## 2022-10-19 RX ADMIN — LISINOPRIL 20 MILLIGRAM(S): 2.5 TABLET ORAL at 05:00

## 2022-10-19 RX ADMIN — MEMANTINE HYDROCHLORIDE 10 MILLIGRAM(S): 10 TABLET ORAL at 11:45

## 2022-10-19 RX ADMIN — POLYETHYLENE GLYCOL 3350 17 GRAM(S): 17 POWDER, FOR SOLUTION ORAL at 05:00

## 2022-10-19 RX ADMIN — LACTULOSE 15 GRAM(S): 10 SOLUTION ORAL at 14:19

## 2022-10-19 NOTE — PROGRESS NOTE ADULT - ASSESSMENT
Assessment:	  77yM w/ PMHx of Diabetes, Depression, Gout, Benign essential HTN, Osteoarthritis, Cataracts, both eyes, S/P open cholecystectomy, S/P knee surgery seen as Trauma Alert s/p mechanical fall last night. +HT, -LOC, -AC (takes ASA 81 every other day)  Trauma assessment in ED: ABCs intact , GCS 15 , AAOx2 (at baseline). External signs of trauma include: hematoma/abrasion to left frontotemporal scalp and bilateral knee abrasion. PENA.     Injuries identified:   - C1 right anterior arch burst fracture.   - longitudinal fracture posterior left first rib  - Large left frontotemporal scalp hematoma without evidence of subjacent   fracture.  - Multiple bilateral chronic and acute rib fractures, such as acute rib fractures on the left 1st, 4-6th, and 8th-10th ribs    PLAN:   - Diet: as tolerated  - DVT/GI PPX  - Hemodynamic monitoring/vital signs q4h  - Strict Is and Os q4h  - Aspiration precautions  - IS, ambulating - in setting of bibasilar atelectasis on CT chest, patient would benefit greatly from ambulating  - D/c to [facility]  - Wear [place] J collar for 2 weeks. Will exchange collar w/ short collar.  - F/U OP two weeks post discharge with neurosurgery  - F/U Cr and UOP    Trauma Surgery [id number]

## 2022-10-19 NOTE — PROGRESS NOTE ADULT - REASON FOR ADMISSION
multitrauma
fracture
s/p fall
Indication for ICU admission: respiratory monitoring s/p fall with left 1st, 4-6 and 8-10 rib fractures in addition to c1 burst fracture.

## 2022-10-19 NOTE — PROGRESS NOTE ADULT - SUBJECTIVE AND OBJECTIVE BOX
GENERAL SURGERY PROGRESS NOTE    Patient: STACY CARDENAS , 77y ([birth year])Male   MRN: [id number]  Location: [address]  Visit: 10-13-22 Inpatient  Date: 10-19-22 @ 08:48    Hospital Day #: 6    Events of past 24 hours:  KAYLAON.    Patient seen and examined at the bedside.  Patient states he has some dizziness.  States pain controlled.  Tolerating diet. Denies nausea/vomiting.  Endorses flatus and BM.  Endorses voiding.  Endorses ambulating.      PAST MEDICAL & SURGICAL HISTORY:  Diabetes    Depression    Gout    Benign essential HTN    Osteoarthritis    Cataracts, both eyes    S/P cholecystectomy    S/P knee surgery      Vitals:   T(F): 97.8 (10-19-22 @ 04:36), Max: 98.4 (10-19-22 @ 00:46)  HR: 83 (10-19-22 @ 04:36)  BP: 142/76 (10-19-22 @ 04:36)  RR: 18 (10-19-22 @ 04:36)  SpO2: 95% (10-19-22 @ 04:36)      Diet, Regular:   Consistent Carbohydrate No Snacks  DASH/TLC Sodium & Cholesterol Restricted      Fluids:     I & O's:    10-18-22 @ 07:01  -  10-19-22 @ 07:00  --------------------------------------------------------  IN:  Total IN: 0 mL    OUT:    Voided (mL): 300 mL  Total OUT: 300 mL    Total NET: -300 mL    PHYSICAL EXAM:  General: NAD, calm and cooperative. In C-Collar.  Cardiac: RRR.  Respiratory: On RA. Speaks in complete sentences. No accessory muscles of respiration in use. Bilateral chest rise.  Abdomen: Soft, non-distended, non-tender, no rebound, no guarding.   Neuro: Moves all extremities.  Skin: Warm/dry, normal color, no jaundice.      MEDICATIONS  (STANDING):  aspirin enteric coated 81 milliGRAM(s) Oral daily  carbidopa/levodopa  25/100 1 Tablet(s) Oral <User Schedule>  chlorhexidine 2% Cloths 1 Application(s) Topical <User Schedule>  dextrose 5%. 1000 milliLiter(s) (50 mL/Hr) IV Continuous <Continuous>  dextrose 5%. 1000 milliLiter(s) (100 mL/Hr) IV Continuous <Continuous>  dextrose 50% Injectable 25 Gram(s) IV Push once  dextrose 50% Injectable 12.5 Gram(s) IV Push once  dextrose 50% Injectable 25 Gram(s) IV Push once  donepezil 10 milliGRAM(s) Oral at bedtime  DULoxetine 60 milliGRAM(s) Oral daily  enoxaparin Injectable 40 milliGRAM(s) SubCutaneous every 24 hours  gabapentin 100 milliGRAM(s) Oral three times a day  glucagon  Injectable 1 milliGRAM(s) IntraMuscular once  influenza  Vaccine (HIGH DOSE) 0.7 milliLiter(s) IntraMuscular once  insulin glargine Injectable (LANTUS) 18 Unit(s) SubCutaneous at bedtime  insulin lispro (ADMELOG) corrective regimen sliding scale   SubCutaneous three times a day before meals  insulin lispro Injectable (ADMELOG) 6 Unit(s) SubCutaneous three times a day before meals  lactulose Syrup 15 Gram(s) Oral every 8 hours  latanoprost 0.005% Ophthalmic Solution 1 Drop(s) Both EYES at bedtime  lidocaine   4% Patch 1 Patch Transdermal every 24 hours  lisinopril 20 milliGRAM(s) Oral daily  memantine 10 milliGRAM(s) Oral daily  polyethylene glycol 3350 17 Gram(s) Oral every 12 hours    MEDICATIONS  (PRN):  acetaminophen     Tablet .. 650 milliGRAM(s) Oral every 6 hours PRN Temp greater or equal to 38C (100.4F), Mild Pain (1 - 3)  dextrose Oral Gel 15 Gram(s) Oral once PRN Blood Glucose LESS THAN 70 milliGRAM(s)/deciliter      DVT PROPHYLAXIS: enoxaparin Injectable 40 milliGRAM(s) SubCutaneous every 24 hours    GI PROPHYLAXIS:   ANTICOAGULATION:   ANTIBIOTICS:        LAB/STUDIES:  Labs:  CAPILLARY BLOOD GLUCOSE      POCT Blood Glucose.: 147 mg/dL (19 Oct 2022 07:41)  POCT Blood Glucose.: 109 mg/dL (18 Oct 2022 21:01)  POCT Blood Glucose.: 244 mg/dL (18 Oct 2022 16:48)  POCT Blood Glucose.: 215 mg/dL (18 Oct 2022 11:08)                          10.8   5.10  )-----------( 145      ( 17 Oct 2022 20:49 )             31.2         10-17    142  |  107  |  16  ----------------------------<  186<H>  4.8   |  29  |  0.7      IMAGING:  No new imaging.    ACCESS/ DEVICES:  [ ] Peripheral IV

## 2022-10-19 NOTE — DISCHARGE NOTE PROVIDER - HOSPITAL COURSE
77yM w/ PMHx of Diabetes, Depression, Gout, Benign essential HTN, Osteoarthritis, Cataracts, both eyes, S/P open cholecystectomy, S/P knee surgery seen as Trauma Alert s/p mechanical fall last night. +HT, -LOC, -AC (takes ASA 81 every other day)  Trauma assessment in ED: ABCs intact , GCS 15 , AAOx2 (at baseline). External signs of trauma include: hematoma/abrasion to left frontotemporal scalp and bilateral knee abrasion. Patient's wife and son are at bedside to assist with history, they state they heard a the patient fall down stairs, they do not know if he fell from the top or bottom of the stairs but that they found him right after the fall at the bottom of the stairs. The patient was brought to [facility] ED, he was transferred to [facility] when found to have multiple rib fx, the patient arrived to Trauma bay without c-collar. Collar was placed upon arrival to [place] after it was found out patient had c1 burst fracture while he was en route. PANSCAN reveals C1 right anterior arch burst fracture, longitudinal fracture posterior left first rib, large left frontotemporal scalp hematoma without evidence of subjacent fracture, and multiple bilateral chronic and acute rib fractures, such as acute rib fractures on the left 1st, 4-6th and 8-10th ribs. Patient was admitted to SICU for trauma burden, comorbidities, monitoring for respiratory failure give 7 rib fractures. PT evaluated and recommended rehab facility. Goals of care was conversed with family and patient, patient is full code, rescinding DNR. Patient has not had any sundown episodes in 48 hours. Patient is passing gas, voiding, having bowel movements, ambulating, tolerating diet. Patient is medically cleared to go to [facility].

## 2022-10-19 NOTE — DISCHARGE NOTE PROVIDER - NSDCCPCAREPLAN_GEN_ALL_CORE_FT
PRINCIPAL DISCHARGE DIAGNOSIS  Diagnosis: Multiple fractures of ribs of left side  Assessment and Plan of Treatment: Activity: Keep [place] J collar on for 2 weeks.   Pain control: You may take over-the-counter tylenol and motrin three times per day with food for up to 3 days.   Follow up: Please call the number provided to make an appointment with trauma clinic and neurosurgery in 1-2 weeks. Please call with any questions or concerns including fevers, worsening pain.      SECONDARY DISCHARGE DIAGNOSES  Diagnosis: Head injury  Assessment and Plan of Treatment:     Diagnosis: Closed burst fracture of cervical vertebra  Assessment and Plan of Treatment:

## 2022-10-19 NOTE — DISCHARGE NOTE PROVIDER - NSDCMRMEDTOKEN_GEN_ALL_CORE_FT
aspirin 81 mg oral tablet: 1 tab(s) orally once a day  donepezil 10 mg oral tablet: 1 tab(s) orally once a day (at bedtime)  DULoxetine 60 mg oral delayed release capsule: 1 cap(s) orally once a day  glipiZIDE 2.5 mg oral tablet, extended release: 1 tab(s) orally once a day  latanoprost 0.005% ophthalmic solution: 1 drop(s) to each affected eye once a day (in the evening)  memantine 10 mg oral tablet: 1 tab(s) orally 2 times a day  metFORMIN 1000 mg oral tablet: 1 tab(s) orally 2 times a day  quinapril 20 mg oral tablet: 1 tab(s) orally once a day  Vitamin B6 100 mg oral tablet: 1 tab(s) orally once a day  Vitamin D3 2000 intl units oral tablet: 1 tab(s) orally once a day

## 2022-10-19 NOTE — DISCHARGE NOTE PROVIDER - CARE PROVIDER_API CALL
Aquiles Peña)  Surgery  Neurosurgery  [address] [place] [address], [address]  [place] [address]  Phone: [phone]  Fax: [phone]  Follow Up Time:

## 2022-10-19 NOTE — PROGRESS NOTE ADULT - PROVIDER SPECIALTY LIST ADULT
SICU
Surgery
Hospitalist
Physiatry
SICU
SICU
Trauma Surgery
Hospitalist
SICU
Surgery

## 2022-10-19 NOTE — PROGRESS NOTE ADULT - ASSESSMENT
77M PMHx DM2, depression, HTN, gout, cirrhosis here with fall, found to have C1 burst fx, rib fxs.    #Fall, c/b C1 burst fx, BL rib fxs  -cth with L frontotemporal scalp hematoma  -ct chest/ abd with multiple bl rib fxs, cirrhosis  -tte unrevealing    #Acute hypoxic resp failure, presumed due to atelectasis  -ct chest: Bibasilar atelectatic opacities; correlate for superimposed pneumonia in the appropriate clinical setting.  -low suspicion for PNA; no fevers/leukocytosis, on RA; can order procal to be sure  -continue incentive spirometer    #DM2  -sugars still high  -keep b/t 140-180  -can increase lantus to 20u qhs, lispro to 7u qac, continue ISS    #Depression  -c/w home meds    #HTN  -stable  -c/w lisinopril    #Gout    #Cirrhosis  -c/w lactulose     #Hypomagnesemia  -replete, repeat level     Thank you for the courtesy of this consult. Please reach out to me at [phone] with any questions.

## 2022-10-19 NOTE — PROGRESS NOTE ADULT - TIME BILLING
Total time spent to complete patient's bedside assessment, physical examination, review medical chart including labs & imaging, discuss medical plan of care with housestaff was more than 25 minutes
patient's care

## 2022-10-19 NOTE — PROGRESS NOTE ADULT - SUBJECTIVE AND OBJECTIVE BOX
STACY CARDENAS  [facility] [place] [address] ([facility] [place])      Patient is a 77y old  Male who presents with a chief complaint of trauma alert s/p fall, +HT, -LOC, -AC (19 Oct 2022 11:41)        Interval events:  Patient seen and examined at bedside. He has no complaints. No events overnight. No sob, using incentive spirometer. No fevers or cough.     -PMHx: Diabetes    Depression    Gout    Benign essential HTN    Osteoarthritis      -PSHx:No significant past surgical history    Cataracts, both eyes    S/P cholecystectomy    S/P knee surgery            REVIEW OF SYSTEMS:  CONSTITUTIONAL: No fever, weight loss, or fatigue  RESPIRATORY: No cough, wheezing, chills or hemoptysis; No shortness of breath  CARDIOVASCULAR: No chest pain, palpitations, dizziness, or leg swelling  GASTROINTESTINAL: No abdominal or epigastric pain. No nausea, vomiting, or hematemesis; No diarrhea or constipation. No melena or hematochezia.  NEUROLOGICAL: No headaches  LYMPH NODES: No enlarged glands  MUSCULOSKELETAL: No joint pain or swelling; No muscle, back, or extremity pain      T(C): , Max: 36.9 (10-19-22 @ 00:46)  HR: 93 (10-19-22 @ 13:11)  BP: 132/65 (10-19-22 @ 13:11)  RR: 18 (10-19-22 @ 13:11)  SpO2: 95% (10-19-22 @ 13:11)  CAPILLARY BLOOD GLUCOSE      POCT Blood Glucose.: 256 mg/dL (19 Oct 2022 11:37)  POCT Blood Glucose.: 147 mg/dL (19 Oct 2022 07:41)  POCT Blood Glucose.: 109 mg/dL (18 Oct 2022 21:01)  POCT Blood Glucose.: 244 mg/dL (18 Oct 2022 16:48)      PHYSICAL EXAM:  General: NAD  HEENT: NC/AT; PERRL, clear conjunctiva  Neck: collar in place  Respiratory: CTA b/l  Cardiovascular: +S1/S2; RRR  Abdomen: soft, NT/ND; +BS x4  Extremities: WWP, 2+ peripheral pulses b/l; no LE edema     LABS:              Microbiology:      RADIOLOGY & ADDITIONAL TESTS:  < from: Xray Chest 1 View- PORTABLE-Routine (10.17.22 @ 08:32) >    IMPRESSION:    Unchanged bilateral opacities.    < end of copied text >        Medications:  acetaminophen     Tablet .. 650 milliGRAM(s) Oral every 6 hours PRN  aspirin enteric coated 81 milliGRAM(s) Oral daily  carbidopa/levodopa  25/100 1 Tablet(s) Oral <User Schedule>  chlorhexidine 2% Cloths 1 Application(s) Topical <User Schedule>  dextrose 5%. 1000 milliLiter(s) IV Continuous <Continuous>  dextrose 5%. 1000 milliLiter(s) IV Continuous <Continuous>  dextrose 50% Injectable 25 Gram(s) IV Push once  dextrose 50% Injectable 12.5 Gram(s) IV Push once  dextrose 50% Injectable 25 Gram(s) IV Push once  dextrose Oral Gel 15 Gram(s) Oral once PRN  donepezil 10 milliGRAM(s) Oral at bedtime  DULoxetine 60 milliGRAM(s) Oral daily  enoxaparin Injectable 40 milliGRAM(s) SubCutaneous every 24 hours  gabapentin 100 milliGRAM(s) Oral three times a day  glucagon  Injectable 1 milliGRAM(s) IntraMuscular once  influenza  Vaccine (HIGH DOSE) 0.7 milliLiter(s) IntraMuscular once  insulin glargine Injectable (LANTUS) 18 Unit(s) SubCutaneous at bedtime  insulin lispro (ADMELOG) corrective regimen sliding scale   SubCutaneous three times a day before meals  insulin lispro Injectable (ADMELOG) 6 Unit(s) SubCutaneous three times a day before meals  lactulose Syrup 15 Gram(s) Oral every 8 hours  latanoprost 0.005% Ophthalmic Solution 1 Drop(s) Both EYES at bedtime  lidocaine   4% Patch 1 Patch Transdermal every 24 hours  lisinopril 20 milliGRAM(s) Oral daily  memantine 10 milliGRAM(s) Oral daily  polyethylene glycol 3350 17 Gram(s) Oral every 12 hours

## 2022-10-19 NOTE — DISCHARGE NOTE PROVIDER - NSFOLLOWUPCLINICS_GEN_ALL_ED_FT
[facility]  Trauma Surgery  [address] [place] [address]  Phone: [phone]  Fax:   Follow Up Time: 2 weeks

## 2022-10-19 NOTE — DISCHARGE NOTE PROVIDER - NSDCFUSCHEDAPPT_GEN_ALL_CORE_FT
Aquiles Peña  [facility]  NEUROSURG [address] [facility]  Scheduled Appointment: 10/31/2022

## 2022-10-19 NOTE — PROGRESS NOTE ADULT - ATTENDING COMMENTS
Patient had some confusion and agitation last night and required Zyprexa.  AAO x3 this am.  Pain is controlled   On IS able to do 1250 ml  CXR shows mild atelectasis.  Has C-collar on.    ASSESSMENT:  78 y/o male, S/P Fall.  C1 Fracture.  Closed Left 1,4,6,8,9,10th Rib Fractures.  Acute pain due to trauma.  Atelectasis.  Elevated Ammonia.    PLAN:  - pain control  - keep C-collar at all time  - encourage incentive spirometer  - on Lactulose for elevate ammonia.  - PT  - DVT prophylaxis
Patient is neurologically stable  C-collar is on.  On IS able to do 1250 ml.  AAO x3  GCS 15. No signs of confusion.    ASSESSMENT:  76 y/o male, S/P Fall.  C1 Fracture.  Closed Left 1,4,6,8,9,10th Rib Fractures.  Acute pain due to trauma.  Atelectasis.    PLAN:  - keep C-collar on at all time  - encourage IS  - DVT prophylaxis  - PT  - SS for dischaerge planning
Critical Care: [id number]   This patient has a high probability of sudden, clinically significant deterioration, which requires the highest level of physician preparedness to intervene urgently. I managed/supervised life or organ supporting interventions that required frequent physician assessment. I devoted my full attention in the ICU to the direct care of this patient for the period of time indicated below. Time I spent with the family or surrogate(s) is included only if the patient was incapable of providing the necessary information or participating in medical decision making. Time devoted to teaching and to any procedures I billed separately is not included.     STACY CARDENAS 77y Male admitted to [x ] SICU /[ ] SDU with multiple ribs fractures, C1 fracture, high risk for hemodynamic instability, airway at risk, electrolytes abnormalities   Patient is examined and evaluated at the bedside with SICU team. Treatment plan discussed with SICU team, nurses and primary team.   Chest X-ray and all relevant studies reviewed during rounds.  Will continue hemodynamic monitoring as per protocol in SICU.    Neuro:  GCS [15 ]   [x ]  Neurovascular checks as per SICU protocol                 [ ] 3% NaCl                     Paralysis [ ] Yes  [x ]  No      Sedated/Pain control with                 [ ] Dilaudid drip, [ ]  Ketamine drip, [ ] Fentanyl drip, [ ] Propofol, [ ] Precedex, [ ] Versed drip, [ ] Ativan drip,                           [ ] OxyContin standing,  [ ] OxyContin PRN, [ ] Dilaudid PRN pushes, [ ] Fentanyl PRN pushes, [ ] PCA,                [x ] Tylenol IV/PO, [ x] Gabapentin, [ ]  Ketorolac, [ x] Tramadol,  [x ] Lidoderm Patch       Other Medications               [ ] Seroquel, [ ] Zyprexa, [ ] Haldol,  [ ] Clonazepam [ ] Xanax, [ ] Versed/Ativan PRN, [ ] Valium [x ] None               [ ] Robaxin   [ ] Baclofen  [ ] Flexeril               [ ] Keppra  [ ] Lamictal  [ ] Depakote  [ ] Dilantin               [ ] CIWA (Ativan/Valium/ Librium)  CV: continue to monitor  On pressors [ ]  Yes  [ x]  No          [ ]  Levophed, [ ] Federico-Synephrine, [ ] Vasopressin, [ ]  Epinephrine          [ ] Dobutamine, [ ] Milrinone, [ ]  Midodrine,  [ ] Others    Other Cardiac Meds          [ ] Amiodarone IV/PO, [ ] Digoxin, [ ] Cardizem drip, [ ] Cleviprex drip, [ ] Esmolol drip    Respiratory: Acute respiratory insufficiency -> continue monitoring                        None Invasive Support  [x ] Incentive Spirometer                                  [ ] BiPAP   [ ] CPAP/NIV   [ ] HFNC   [ ] NR Face Mask   [ x] NC  [ ] Trach Caller                        Ventilatory support  [ ] Yes [x ] No                            [ ] SBT                                  [ ] PC    [ ] VC   [ ] AC/PRVC   [ ] BiVent/APRV   [ ]CPAP   GI  [x ]  bowel regiment  [x ] BM none [x ] Flatus +             [ ] Ostomy   [ ] NG tube        Prophylaxis [ x] PPI  [ ] H2 Blockers  [ ] Others  Nutrition: continue   [x ] Diet  DASH [ ] TPN/PPN   [ ] calories count   [ ]  Tube Feeds     Renal: Continue I&Os monitoring, Graves catheter  [ ] Yes,  [x ]   No ,  [ ] Consideration for discontinuation [ ] Taxes cath/PrimaFit  [ ] TOV  [ ] HD/CVVH       Lytes/Acid-base: replete hypokalemia, hypomagnesemia, hypocalcemia, hypophosphatemia        IV Fluids   [x ] LR@75ml/hr,  [ ] NS  [ ] D5W1/2NS [ ] Bicarbonate Drip  [ ] Albumin [ ] Lasix  ID: leukocytosis -> continue to monitor:         IV Abx [ ] Yes, [x ] No;  ID consulted [ ] Yes, [ x] No        Cultures send  [ ] Respiratory   [ ] Blood   [ ] Urine  [ ] Fluids  [ ] Tissue  [x ]  None  Lines:   [ ] Right   [ ] Left  [ ] Bilateral                     [ ] Subclavian TLC        [ ]  Internal Jugular TLC     [ ]  Femoral TLC                     [ ] Subclavian Cordis    [ ] Internal Jugular Cordis   [ ] Femoral Cordis                     [ ] HD catheter    [ ] PICC     [ ]  Midline   [x ] Peripheral IVs                                                 [ ] Right   [ ] Left   [x ] None                     [ ] Radial A-Line    [ ] Femoral A-Line   [ ] Axillary A-Line               Heme: continue to evaluate for acute blood loss anemia- trend Hg/Hct                     AC Reversal Indicated [ ] Yes  [x ] No                    Transfused  indicated  [ ] Yes, [x ] No    [ ] PRBCs   [ ] Platelets   [ ] FFPs   [ ] Cryoprecipitate                    Should be started on or continued with  following  [ ] Yes,  [x ] No                               [ ] Lovenox  [ ] Coumadin  [ ] Heparin drip  [ ] NOVACs  [ ] ASA  [ ] Antiplatelets   Endocrine: Prevent and treat hyperglycemia with insulin as needed,                                 Insuline drip [ ] Yes, [x ] No   PV: follow pulse exam  Skin: decub precautions  DVT Prophylaxis:  [x ] SCDs  [ ] Heparin SQ  [ x] Lovenox  SQ  Stress Gastritis Prophylaxis: PPI/H2 Blockers if indicated  Mobility: patient is evaluated at the bedside with mobility team and the goals for today are discussed with PT [ x] out of bed to chair with C-caller    PATIENT/FAMILY/SURROGATE CONFERENCE:  [x ] Yes with patient at the bedside. [ ] No  PURPOSE: To obtain necessary information, To discuss treatment options under consideration today.    I saw and evaluated the patient personally. I have reviewed and agree with note above. Treatment plan discussed with SICU team, nurses and primary team at the time of the multidisciplinary rounds. The above note is NOT written at the time of rounds and will reflect all changes throughout management of the patient for the day note is written for.    Celia Ball MD, FACS  Trauma/ACS/SCC Attending
Patient is doing well.  Neuro intact.  Has C-collar on.    Assessment/Plan:  - keep C-collar on  - OOB with PT  - DVT prophylaxis  - encourage IS  - SS for discharge planning
Critical Care: [id number]   This patient has a high probability of sudden, clinically significant deterioration, which requires the highest level of physician preparedness to intervene urgently. I managed/supervised life or organ supporting interventions that required frequent physician assessment. I devoted my full attention in the ICU to the direct care of this patient for the period of time indicated below. Time I spent with the family or surrogate(s) is included only if the patient was incapable of providing the necessary information or participating in medical decision making. Time devoted to teaching and to any procedures I billed separately is not included.     STACY CARDENAS 77y Male admitted to [x ] SICU /[ ] SDU with multiple ribs fractures, C1 fracture, high risk for hemodynamic instability, airway at risk, electrolytes abnormalities   Patient is examined and evaluated at the bedside with SICU team. Treatment plan discussed with SICU team, nurses and primary team.   Chest X-ray and all relevant studies reviewed during rounds.  Will continue hemodynamic monitoring as per protocol in SICU.    Neuro:  GCS [15 ]   [x ]  Neurovascular checks as per SICU protocol                 [ ] 3% NaCl                     Paralysis [ ] Yes  [x ]  No      Sedated/Pain control with                 [ ] Dilaudid drip, [ ]  Ketamine drip, [ ] Fentanyl drip, [ ] Propofol, [ ] Precedex, [ ] Versed drip, [ ] Ativan drip,                           [ ] OxyContin standing,  [ ] OxyContin PRN, [ ] Dilaudid PRN pushes, [ ] Fentanyl PRN pushes, [ ] PCA,                [x ] Tylenol IV/PO, [ x] Gabapentin, [ ]  Ketorolac, [ x] Tramadol,  [x ] Lidoderm Patch       Other Medications               [ ] Seroquel, [ ] Zyprexa, [ ] Haldol,  [ ] Clonazepam [ ] Xanax, [ ] Versed/Ativan PRN, [ ] Valium [x ] None               [ ] Robaxin   [ ] Baclofen  [ ] Flexeril               [ ] Keppra  [ ] Lamictal  [ ] Depakote  [ ] Dilantin               [ ] CIWA (Ativan/Valium/ Librium)  CV: continue to monitor  On pressors [ ]  Yes  [ x]  No          [ ]  Levophed, [ ] Federico-Synephrine, [ ] Vasopressin, [ ]  Epinephrine          [ ] Dobutamine, [ ] Milrinone, [ ]  Midodrine,  [ ] Others    Other Cardiac Meds          [ ] Amiodarone IV/PO, [ ] Digoxin, [ ] Cardizem drip, [ ] Cleviprex drip, [ ] Esmolol drip  Respiratory: Acute respiratory insufficiency due poly trauma -> continue monitoring                        None Invasive Support  [x ] Incentive Spirometer                                  [ ] BiPAP   [ ] CPAP/NIV   [ ] HFNC   [ ] NR Face Mask   [ x] NC  [ ] Trach Caller                        Ventilatory support  [ ] Yes [x ] No                            [ ] SBT                                  [ ] PC    [ ] VC   [ ] AC/PRVC   [ ] BiVent/APRV   [ ]CPAP   GI  [x ]  bowel regiment  [x ] BM none [x ] Flatus +             [ ] Ostomy   [ ] NG tube        Prophylaxis [ x] PPI  [ ] H2 Blockers  [ ] Others  Nutrition: continue   [x ] Diet  DASH [ ] TPN/PPN   [ ] calories count   [ ]  Tube Feeds     Renal: Continue I&Os monitoring, Graves catheter  [ ] Yes,  [x ]   No ,  [ ] Consideration for discontinuation [ ] Taxes cath/PrimaFit  [ ] TOV  [ ] HD/CVVH       Lytes/Acid-base: replete hypokalemia, hypomagnesemia, hypocalcemia, hypophosphatemia        IV Fluids   [x ] LR@75ml/hr,  [ ] NS  [ ] D5W1/2NS [ ] Bicarbonate Drip  [ ] Albumin [ ] Lasix  ID: leukocytosis -> continue to monitor:         IV Abx [ ] Yes, [x ] No;  ID consulted [ ] Yes, [ x] No        Cultures send  [ ] Respiratory   [ ] Blood   [ ] Urine  [ ] Fluids  [ ] Tissue  [x ]  None  Lines:   [ ] Right   [ ] Left  [ ] Bilateral                     [ ] Subclavian TLC        [ ]  Internal Jugular TLC     [ ]  Femoral TLC                     [ ] Subclavian Cordis    [ ] Internal Jugular Cordis   [ ] Femoral Cordis                     [ ] HD catheter    [ ] PICC     [ ]  Midline   [x ] Peripheral IVs                                                 [ ] Right   [ ] Left   [x ] None                     [ ] Radial A-Line    [ ] Femoral A-Line   [ ] Axillary A-Line               Heme: continue to evaluate for acute blood loss anemia- trend Hg/Hct                     AC Reversal Indicated [ ] Yes  [x ] No                    Transfused  indicated  [ ] Yes, [x ] No    [ ] PRBCs   [ ] Platelets   [ ] FFPs   [ ] Cryoprecipitate                    Should be started on or continued with  following  [ ] Yes,  [x ] No                               [ ] Lovenox  [ ] Coumadin  [ ] Heparin drip  [ ] NOVACs  [ ] ASA  [ ] Antiplatelets   Endocrine: Prevent and treat hyperglycemia with insulin as needed,                                 Insuline drip [ ] Yes, [x ] No , add Lantus   PV: follow pulse exam  Skin: decub precautions  DVT Prophylaxis:  [x ] SCDs  [ ] Heparin SQ  [ x] Lovenox  SQ  Stress Gastritis Prophylaxis: PPI/H2 Blockers if indicated  Mobility: patient is evaluated at the bedside with mobility team and the goals for today are discussed with PT [ x] out of bed to chair with C-caller    PATIENT/FAMILY/SURROGATE CONFERENCE:  [x ] Yes with patient at the bedside. [ ] No  PURPOSE: To obtain necessary information, To discuss treatment options under consideration today.    I saw and evaluated the patient personally. I have reviewed and agree with note above. Treatment plan discussed with SICU team, nurses and primary team at the time of the multidisciplinary rounds. The above note is NOT written at the time of rounds and will reflect all changes throughout management of the patient for the day note is written for.    Celia Ball MD, FACS  Trauma/ACS/SCC Attending
Critical Care: [id number]   This patient has a high probability of sudden, clinically significant deterioration, which requires the highest level of physician preparedness to intervene urgently. I managed/supervised life or organ supporting interventions that required frequent physician assessment. I devoted my full attention in the ICU to the direct care of this patient for the period of time indicated below. Time I spent with the family or surrogate(s) is included only if the patient was incapable of providing the necessary information or participating in medical decision making. Time devoted to teaching and to any procedures I billed separately is not included.     STACY CARDENAS 77y Male admitted to [x ] SICU /[ ] SDU with multiple ribs fractures, C1 fracture, high risk for hemodynamic instability, airway at risk, electrolytes abnormalities   Patient is examined and evaluated at the bedside with SICU team. Treatment plan discussed with SICU team, nurses and primary team.   Chest X-ray and all relevant studies reviewed during rounds.  Will continue hemodynamic monitoring as per protocol in SICU.    Neuro:  GCS [15 ]   [x ]  Neurovascular checks as per SICU protocol                 [ ] 3% NaCl                     Paralysis [ ] Yes  [x ]  No      Sedated/Pain control with                 [ ] Dilaudid drip, [ ]  Ketamine drip, [ ] Fentanyl drip, [ ] Propofol, [ ] Precedex, [ ] Versed drip, [ ] Ativan drip,                           [ ] OxyContin standing,  [ ] OxyContin PRN, [ ] Dilaudid PRN pushes, [ ] Fentanyl PRN pushes, [ ] PCA,                [x ] Tylenol IV/PO, [ x] Gabapentin, [ ]  Ketorolac, [ x] Tramadol,  [x ] Lidoderm Patch       Other Medications               [ ] Seroquel, [ ] Zyprexa, [ ] Haldol,  [ ] Clonazepam [ ] Xanax, [ ] Versed/Ativan PRN, [ ] Valium [x ] None               [ ] Robaxin   [ ] Baclofen  [ ] Flexeril               [ ] Keppra  [ ] Lamictal  [ ] Depakote  [ ] Dilantin               [ ] CIWA (Ativan/Valium/ Librium)  CV: continue to monitor  On pressors [ ]  Yes  [ x]  No          [ ]  Levophed, [ ] Federico-Synephrine, [ ] Vasopressin, [ ]  Epinephrine          [ ] Dobutamine, [ ] Milrinone, [ ]  Midodrine,  [ ] Others    Other Cardiac Meds          [ ] Amiodarone IV/PO, [ ] Digoxin, [ ] Cardizem drip, [ ] Cleviprex drip, [ ] Esmolol drip  Respiratory: Acute respiratory insufficiency due poly trauma -> continue monitoring                        None Invasive Support  [x ] Incentive Spirometer                                  [ ] BiPAP   [ ] CPAP/NIV   [ ] HFNC   [ ] NR Face Mask   [ x] NC  [ ] Trach Caller                        Ventilatory support  [ ] Yes [x ] No                            [ ] SBT                                  [ ] PC    [ ] VC   [ ] AC/PRVC   [ ] BiVent/APRV   [ ]CPAP   GI  [x ]  bowel regiment  [x ] BM none [x ] Flatus +             [ ] Ostomy   [ ] NG tube        Prophylaxis [ x] PPI  [ ] H2 Blockers  [ ] Others  Nutrition: continue   [x ] Diet  DASH [ ] TPN/PPN   [ ] calories count   [ ]  Tube Feeds     Renal: Continue I&Os monitoring, Graves catheter  [ ] Yes,  [x ]   No ,  [ ] Consideration for discontinuation [ ] Taxes cath/PrimaFit  [ ] TOV  [ ] HD/CVVH       Lytes/Acid-base: replete hypokalemia, hypomagnesemia, hypocalcemia, hypophosphatemia        IV Fluids   [x ] LR@25ml/hr,  [ ] NS  [ ] D5W1/2NS [ ] Bicarbonate Drip  [ ] Albumin [ ] Lasix  ID: leukocytosis -> continue to monitor:         IV Abx [ ] Yes, [x ] No;  ID consulted [ ] Yes, [ x] No        Cultures send  [ ] Respiratory   [ ] Blood   [ ] Urine  [ ] Fluids  [ ] Tissue  [x ]  None  Lines:   [ ] Right   [ ] Left  [ ] Bilateral                     [ ] Subclavian TLC        [ ]  Internal Jugular TLC     [ ]  Femoral TLC                     [ ] Subclavian Cordis    [ ] Internal Jugular Cordis   [ ] Femoral Cordis                     [ ] HD catheter    [ ] PICC     [ ]  Midline   [x ] Peripheral IVs                                                 [ ] Right   [ ] Left   [x ] None                     [ ] Radial A-Line    [ ] Femoral A-Line   [ ] Axillary A-Line               Heme: continue to evaluate for acute blood loss anemia- trend Hg/Hct                     AC Reversal Indicated [ ] Yes  [x ] No                    Transfused  indicated  [ ] Yes, [x ] No    [ ] PRBCs   [ ] Platelets   [ ] FFPs   [ ] Cryoprecipitate                    Should be started on or continued with  following  [ ] Yes,  [x ] No                               [ ] Lovenox  [ ] Coumadin  [ ] Heparin drip  [ ] NOVACs  [ ] ASA  [ ] Antiplatelets   Endocrine: Prevent and treat hyperglycemia with insulin as needed,                                 Insuline drip [ ] Yes, [x ] No , add Lantus   PV: follow pulse exam  Skin: decub precautions  DVT Prophylaxis:  [x ] SCDs  [ ] Heparin SQ  [ x] Lovenox  SQ  Stress Gastritis Prophylaxis: PPI/H2 Blockers if indicated  Mobility: patient is evaluated at the bedside with mobility team and the goals for today are discussed with PT [ x] out of bed to chair with C-caller    PATIENT/FAMILY/SURROGATE CONFERENCE:  [x ] Yes with patient at the bedside. [ ] No  PURPOSE: To obtain necessary information, To discuss treatment options under consideration today.    I saw and evaluated the patient personally. I have reviewed and agree with note above. Treatment plan discussed with SICU team, nurses and primary team at the time of the multidisciplinary rounds. The above note is NOT written at the time of rounds and will reflect all changes throughout management of the patient for the day note is written for.    Celia Ball MD, FACS  Trauma/ACS/SCC Attending

## 2022-10-25 DIAGNOSIS — S12.01XA STABLE BURST FRACTURE OF FIRST CERVICAL VERTEBRA, INITIAL ENCOUNTER FOR CLOSED FRACTURE: ICD-10-CM

## 2022-10-25 DIAGNOSIS — S22.43XS MULTIPLE FRACTURES OF RIBS, BILATERAL, SEQUELA: ICD-10-CM

## 2022-10-25 DIAGNOSIS — Z79.84 LONG TERM (CURRENT) USE OF ORAL HYPOGLYCEMIC DRUGS: ICD-10-CM

## 2022-10-25 DIAGNOSIS — R25.1 TREMOR, UNSPECIFIED: ICD-10-CM

## 2022-10-25 DIAGNOSIS — S22.42XA MULTIPLE FRACTURES OF RIBS, LEFT SIDE, INITIAL ENCOUNTER FOR CLOSED FRACTURE: ICD-10-CM

## 2022-10-25 DIAGNOSIS — S80.01XA CONTUSION OF RIGHT KNEE, INITIAL ENCOUNTER: ICD-10-CM

## 2022-10-25 DIAGNOSIS — I95.9 HYPOTENSION, UNSPECIFIED: ICD-10-CM
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
## 2022-10-25 DIAGNOSIS — J98.11 ATELECTASIS: ICD-10-CM

## 2022-10-25 DIAGNOSIS — F03.90 UNSPECIFIED DEMENTIA WITHOUT BEHAVIORAL DISTURBANCE: ICD-10-CM

## 2022-10-25 DIAGNOSIS — E83.42 HYPOMAGNESEMIA: ICD-10-CM

## 2022-10-25 DIAGNOSIS — K74.60 UNSPECIFIED CIRRHOSIS OF LIVER: ICD-10-CM

## 2022-10-25 DIAGNOSIS — Z98.41 CATARACT EXTRACTION STATUS, RIGHT EYE: ICD-10-CM
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
## 2022-10-25 DIAGNOSIS — N17.9 ACUTE KIDNEY FAILURE, UNSPECIFIED: ICD-10-CM

## 2022-10-25 DIAGNOSIS — J96.01 ACUTE RESPIRATORY FAILURE WITH HYPOXIA: ICD-10-CM

## 2022-10-25 DIAGNOSIS — S00.03XA CONTUSION OF SCALP, INITIAL ENCOUNTER: ICD-10-CM

## 2022-10-25 DIAGNOSIS — D72.829 ELEVATED WHITE BLOOD CELL COUNT, UNSPECIFIED: ICD-10-CM

## 2022-10-25 DIAGNOSIS — I10 ESSENTIAL (PRIMARY) HYPERTENSION: ICD-10-CM

## 2022-10-25 DIAGNOSIS — S80.02XA CONTUSION OF LEFT KNEE, INITIAL ENCOUNTER: ICD-10-CM

## 2022-10-25 DIAGNOSIS — Z20.822 CONTACT WITH AND (SUSPECTED) EXPOSURE TO COVID-19: ICD-10-CM

## 2022-10-25 DIAGNOSIS — M10.9 GOUT, UNSPECIFIED: ICD-10-CM

## 2022-10-25 DIAGNOSIS — G89.11 ACUTE PAIN DUE TO TRAUMA: ICD-10-CM

## 2022-10-25 DIAGNOSIS — Z79.82 LONG TERM (CURRENT) USE OF ASPIRIN: ICD-10-CM

## 2022-10-25 DIAGNOSIS — Z88.1 ALLERGY STATUS TO OTHER ANTIBIOTIC AGENTS STATUS: ICD-10-CM

## 2022-10-25 DIAGNOSIS — E11.9 TYPE 2 DIABETES MELLITUS WITHOUT COMPLICATIONS: ICD-10-CM

## 2022-10-25 DIAGNOSIS — Z78.1 PHYSICAL RESTRAINT STATUS: ICD-10-CM
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
## 2022-10-25 DIAGNOSIS — Z98.42 CATARACT EXTRACTION STATUS, LEFT EYE: ICD-10-CM

## 2022-10-25 DIAGNOSIS — F32.A DEPRESSION, UNSPECIFIED: ICD-10-CM

## 2022-10-31 ENCOUNTER — APPOINTMENT (OUTPATIENT)
Dept: NEUROSURGERY | Facility: CLINIC | Age: 77
End: 2022-10-31

## 2022-10-31 VITALS — BODY MASS INDEX: 28.45 KG/M2 | WEIGHT: 177 LBS | HEIGHT: 66 IN

## 2022-10-31 PROCEDURE — 99204 OFFICE O/P NEW MOD 45 MIN: CPT

## 2022-10-31 NOTE — DIETITIAN INITIAL EVALUATION ADULT. - ENERGY INTAKE
/76, hr 130, denies any cp, lightheadedness, dizzines vitals remain stable; pt is asymptomatic hr 127, bp 106/71, denies any cp, dizziness, lightheadedness pt's pulse Ox 90% on room with trouble breathing Good (>75%)

## 2022-11-01 NOTE — REVIEW OF SYSTEMS
[As Noted in HPI] : as noted in HPI [Fever] : no fever [Chills] : no chills [Chest Pain] : no chest pain [Palpitations] : no palpitations [Shortness Of Breath] : no shortness of breath [Wheezing] : no wheezing [de-identified] : Patient has Dementia

## 2022-11-01 NOTE — HISTORY OF PRESENT ILLNESS
[FreeTextEntry1] : Mr Posada is a 76 yo gentleman here for followup from [facility]. He has a pmhx DM and dementia, who presented to the hospital after falling down the stairs, and sustaining a poster arch C1 fracture. Presently does not complain of any neck pain, back pain, numbness, tingling or weakness in his arms or legs. Patient is in a Nursing facility\par \par CT cervical [place] 10/13/2022 demonstrates stable posterior arch fracture of C1.\par \par PE:\par Constitutional: Well appearing, no distress sitting on the chair in mild discomfort\par HEENT: Normocephalic Atraumatic, sclerae anicteric, mucus membranes moist, trachea midline\par Psychiatric: Alert and oriented to all spheres, normal mood\par Pulmonary: No respiratory distress or accessory muscle use\par \par Neurologic:\par CN II-XII grossly intact \par Palpation: no pain to palpation\par \par \par Moves all four extremities without difficulty or pain. \par Reflexes:\par 2+ patellar\par 2+ biceps\par 2+ ankle jerk\par No Thomas's\par No clonus\par No babinski\par \par Cervical collar removed while performing active/passive ROM, without pain. \par \par Signs:\par SLR negative; Yves's maneuver negative\par \par Gait: Gait not assessed, patient in wheelchair. \par \par \par

## 2022-11-07 ENCOUNTER — RESULT REVIEW (OUTPATIENT)
Age: 77
End: 2022-11-07

## 2022-11-16 ENCOUNTER — RESULT REVIEW (OUTPATIENT)
Age: 77
End: 2022-11-16

## 2022-11-16 ENCOUNTER — OUTPATIENT (OUTPATIENT)
Dept: OUTPATIENT SERVICES | Facility: HOSPITAL | Age: 77
LOS: 1 days | Discharge: HOME | End: 2022-11-16

## 2022-11-16 DIAGNOSIS — Z90.49 ACQUIRED ABSENCE OF OTHER SPECIFIED PARTS OF DIGESTIVE TRACT: Chronic | ICD-10-CM

## 2022-11-16 DIAGNOSIS — M54.2 CERVICALGIA: ICD-10-CM

## 2022-11-16 DIAGNOSIS — M25.561 PAIN IN RIGHT KNEE: ICD-10-CM

## 2022-11-16 DIAGNOSIS — H26.9 UNSPECIFIED CATARACT: Chronic | ICD-10-CM

## 2022-11-16 DIAGNOSIS — Z98.890 OTHER SPECIFIED POSTPROCEDURAL STATES: Chronic | ICD-10-CM

## 2022-11-16 PROCEDURE — 72040 X-RAY EXAM NECK SPINE 2-3 VW: CPT | Mod: 26

## 2022-11-16 PROCEDURE — 73562 X-RAY EXAM OF KNEE 3: CPT | Mod: 26,RT

## 2022-11-22 ENCOUNTER — INPATIENT (INPATIENT)
Facility: HOSPITAL | Age: 77
LOS: 9 days | Discharge: NURSING, REHAB OR LONG-TERM CARE | End: 2022-12-02
Attending: INTERNAL MEDICINE | Admitting: INTERNAL MEDICINE

## 2022-11-22 VITALS
HEART RATE: 80 BPM | WEIGHT: 149.91 LBS | DIASTOLIC BLOOD PRESSURE: 56 MMHG | OXYGEN SATURATION: 98 % | SYSTOLIC BLOOD PRESSURE: 115 MMHG | RESPIRATION RATE: 18 BRPM | HEIGHT: 66 IN | TEMPERATURE: 98 F

## 2022-11-22 DIAGNOSIS — I50.32 CHRONIC DIASTOLIC (CONGESTIVE) HEART FAILURE: ICD-10-CM

## 2022-11-22 DIAGNOSIS — Z98.41 CATARACT EXTRACTION STATUS, RIGHT EYE: ICD-10-CM

## 2022-11-22 DIAGNOSIS — Z98.890 OTHER SPECIFIED POSTPROCEDURAL STATES: Chronic | ICD-10-CM

## 2022-11-22 DIAGNOSIS — K76.82 HEPATIC ENCEPHALOPATHY: ICD-10-CM

## 2022-11-22 DIAGNOSIS — G90.3 MULTI-SYSTEM DEGENERATION OF THE AUTONOMIC NERVOUS SYSTEM: ICD-10-CM

## 2022-11-22 DIAGNOSIS — I07.1 RHEUMATIC TRICUSPID INSUFFICIENCY: ICD-10-CM

## 2022-11-22 DIAGNOSIS — I45.2 BIFASCICULAR BLOCK: ICD-10-CM

## 2022-11-22 DIAGNOSIS — I86.4 GASTRIC VARICES: ICD-10-CM

## 2022-11-22 DIAGNOSIS — R19.7 DIARRHEA, UNSPECIFIED: ICD-10-CM

## 2022-11-22 DIAGNOSIS — Z88.3 ALLERGY STATUS TO OTHER ANTI-INFECTIVE AGENTS: ICD-10-CM

## 2022-11-22 DIAGNOSIS — F32.A DEPRESSION, UNSPECIFIED: ICD-10-CM

## 2022-11-22 DIAGNOSIS — Z79.4 LONG TERM (CURRENT) USE OF INSULIN: ICD-10-CM
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
## 2022-11-22 DIAGNOSIS — Z98.42 CATARACT EXTRACTION STATUS, LEFT EYE: ICD-10-CM

## 2022-11-22 DIAGNOSIS — F02.80 DEMENTIA IN OTHER DISEASES CLASSIFIED ELSEWHERE, UNSPECIFIED SEVERITY, WITHOUT BEHAVIORAL DISTURBANCE, PSYCHOTIC DISTURBANCE, MOOD DISTURBANCE, AND ANXIETY: ICD-10-CM

## 2022-11-22 DIAGNOSIS — G20 PARKINSON'S DISEASE: ICD-10-CM

## 2022-11-22 DIAGNOSIS — Z90.49 ACQUIRED ABSENCE OF OTHER SPECIFIED PARTS OF DIGESTIVE TRACT: Chronic | ICD-10-CM

## 2022-11-22 DIAGNOSIS — H26.9 UNSPECIFIED CATARACT: Chronic | ICD-10-CM

## 2022-11-22 DIAGNOSIS — R42 DIZZINESS AND GIDDINESS: ICD-10-CM

## 2022-11-22 DIAGNOSIS — M19.90 UNSPECIFIED OSTEOARTHRITIS, UNSPECIFIED SITE: ICD-10-CM

## 2022-11-22 DIAGNOSIS — T47.3X5A ADVERSE EFFECT OF SALINE AND OSMOTIC LAXATIVES, INITIAL ENCOUNTER: ICD-10-CM

## 2022-11-22 DIAGNOSIS — Z86.19 PERSONAL HISTORY OF OTHER INFECTIOUS AND PARASITIC DISEASES: ICD-10-CM

## 2022-11-22 DIAGNOSIS — K74.69 OTHER CIRRHOSIS OF LIVER: ICD-10-CM
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
## 2022-11-22 DIAGNOSIS — Z79.82 LONG TERM (CURRENT) USE OF ASPIRIN: ICD-10-CM

## 2022-11-22 DIAGNOSIS — I11.0 HYPERTENSIVE HEART DISEASE WITH HEART FAILURE: ICD-10-CM

## 2022-11-22 DIAGNOSIS — Z90.49 ACQUIRED ABSENCE OF OTHER SPECIFIED PARTS OF DIGESTIVE TRACT: ICD-10-CM

## 2022-11-22 DIAGNOSIS — E83.42 HYPOMAGNESEMIA: ICD-10-CM

## 2022-11-22 DIAGNOSIS — M10.9 GOUT, UNSPECIFIED: ICD-10-CM

## 2022-11-22 DIAGNOSIS — M84.48XA PATHOLOGICAL FRACTURE, OTHER SITE, INITIAL ENCOUNTER FOR FRACTURE: ICD-10-CM

## 2022-11-22 DIAGNOSIS — E11.9 TYPE 2 DIABETES MELLITUS WITHOUT COMPLICATIONS: ICD-10-CM
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
## 2022-11-22 LAB
ALBUMIN SERPL ELPH-MCNC: 3.2 G/DL — LOW (ref 3.5–5.2)
ALP SERPL-CCNC: 155 U/L — HIGH (ref 30–115)
ALT FLD-CCNC: 27 U/L — SIGNIFICANT CHANGE UP (ref 0–41)
AMMONIA BLD-MCNC: 121 UMOL/L — HIGH (ref 11–55)
ANION GAP SERPL CALC-SCNC: 11 MMOL/L — SIGNIFICANT CHANGE UP (ref 7–14)
ANION GAP SERPL CALC-SCNC: 12 MMOL/L — SIGNIFICANT CHANGE UP (ref 7–14)
APTT BLD: 33.2 SEC — SIGNIFICANT CHANGE UP (ref 27–39.2)
AST SERPL-CCNC: 29 U/L — SIGNIFICANT CHANGE UP (ref 0–41)
B-OH-BUTYR SERPL-SCNC: 0.6 MMOL/L — HIGH
BASOPHILS # BLD AUTO: 0.04 K/UL — SIGNIFICANT CHANGE UP (ref 0–0.2)
BASOPHILS NFR BLD AUTO: 0.6 % — SIGNIFICANT CHANGE UP (ref 0–1)
BILIRUB SERPL-MCNC: 1.7 MG/DL — HIGH (ref 0.2–1.2)
BUN SERPL-MCNC: 15 MG/DL — SIGNIFICANT CHANGE UP (ref 10–20)
BUN SERPL-MCNC: 18 MG/DL — SIGNIFICANT CHANGE UP (ref 10–20)
CALCIUM SERPL-MCNC: 8.8 MG/DL — SIGNIFICANT CHANGE UP (ref 8.4–10.5)
CALCIUM SERPL-MCNC: 9.1 MG/DL — SIGNIFICANT CHANGE UP (ref 8.4–10.5)
CHLORIDE SERPL-SCNC: 104 MMOL/L — SIGNIFICANT CHANGE UP (ref 98–110)
CHLORIDE SERPL-SCNC: 98 MMOL/L — SIGNIFICANT CHANGE UP (ref 98–110)
CK SERPL-CCNC: 21 U/L — SIGNIFICANT CHANGE UP (ref 0–225)
CO2 SERPL-SCNC: 26 MMOL/L — SIGNIFICANT CHANGE UP (ref 17–32)
CO2 SERPL-SCNC: 27 MMOL/L — SIGNIFICANT CHANGE UP (ref 17–32)
CREAT SERPL-MCNC: 0.8 MG/DL — SIGNIFICANT CHANGE UP (ref 0.7–1.5)
CREAT SERPL-MCNC: 0.9 MG/DL — SIGNIFICANT CHANGE UP (ref 0.7–1.5)
EGFR: 88 ML/MIN/1.73M2 — SIGNIFICANT CHANGE UP
EGFR: 91 ML/MIN/1.73M2 — SIGNIFICANT CHANGE UP
EOSINOPHIL # BLD AUTO: 0.15 K/UL — SIGNIFICANT CHANGE UP (ref 0–0.7)
EOSINOPHIL NFR BLD AUTO: 2.4 % — SIGNIFICANT CHANGE UP (ref 0–8)
GLUCOSE BLDC GLUCOMTR-MCNC: 238 MG/DL — HIGH (ref 70–99)
GLUCOSE BLDC GLUCOMTR-MCNC: 238 MG/DL — HIGH (ref 70–99)
GLUCOSE BLDC GLUCOMTR-MCNC: 376 MG/DL — HIGH (ref 70–99)
GLUCOSE SERPL-MCNC: 192 MG/DL — HIGH (ref 70–99)
GLUCOSE SERPL-MCNC: 346 MG/DL — HIGH (ref 70–99)
HCT VFR BLD CALC: 39.3 % — LOW (ref 42–52)
HGB BLD-MCNC: 14.4 G/DL — SIGNIFICANT CHANGE UP (ref 14–18)
IMM GRANULOCYTES NFR BLD AUTO: 0.2 % — SIGNIFICANT CHANGE UP (ref 0.1–0.3)
INR BLD: 1.25 RATIO — SIGNIFICANT CHANGE UP (ref 0.65–1.3)
LACTATE SERPL-SCNC: 3.3 MMOL/L — HIGH (ref 0.7–2)
LYMPHOCYTES # BLD AUTO: 1.42 K/UL — SIGNIFICANT CHANGE UP (ref 1.2–3.4)
LYMPHOCYTES # BLD AUTO: 22.5 % — SIGNIFICANT CHANGE UP (ref 20.5–51.1)
MAGNESIUM SERPL-MCNC: 1.2 MG/DL — LOW (ref 1.8–2.4)
MAGNESIUM SERPL-MCNC: 3.3 MG/DL — CRITICAL HIGH (ref 1.8–2.4)
MCHC RBC-ENTMCNC: 36.1 PG — HIGH (ref 27–31)
MCHC RBC-ENTMCNC: 36.6 G/DL — SIGNIFICANT CHANGE UP (ref 32–37)
MCV RBC AUTO: 98.5 FL — HIGH (ref 80–94)
MONOCYTES # BLD AUTO: 0.65 K/UL — HIGH (ref 0.1–0.6)
MONOCYTES NFR BLD AUTO: 10.3 % — HIGH (ref 1.7–9.3)
NEUTROPHILS # BLD AUTO: 4.04 K/UL — SIGNIFICANT CHANGE UP (ref 1.4–6.5)
NEUTROPHILS NFR BLD AUTO: 64 % — SIGNIFICANT CHANGE UP (ref 42.2–75.2)
NRBC # BLD: 0 /100 WBCS — SIGNIFICANT CHANGE UP (ref 0–0)
PLATELET # BLD AUTO: 149 K/UL — SIGNIFICANT CHANGE UP (ref 130–400)
POTASSIUM SERPL-MCNC: 4.2 MMOL/L — SIGNIFICANT CHANGE UP (ref 3.5–5)
POTASSIUM SERPL-MCNC: 4.5 MMOL/L — SIGNIFICANT CHANGE UP (ref 3.5–5)
POTASSIUM SERPL-SCNC: 4.2 MMOL/L — SIGNIFICANT CHANGE UP (ref 3.5–5)
POTASSIUM SERPL-SCNC: 4.5 MMOL/L — SIGNIFICANT CHANGE UP (ref 3.5–5)
PROT SERPL-MCNC: 6.3 G/DL — SIGNIFICANT CHANGE UP (ref 6–8)
PROTHROM AB SERPL-ACNC: 14.3 SEC — HIGH (ref 9.95–12.87)
RBC # BLD: 3.99 M/UL — LOW (ref 4.7–6.1)
RBC # FLD: 13.6 % — SIGNIFICANT CHANGE UP (ref 11.5–14.5)
SARS-COV-2 RNA SPEC QL NAA+PROBE: SIGNIFICANT CHANGE UP
SODIUM SERPL-SCNC: 136 MMOL/L — SIGNIFICANT CHANGE UP (ref 135–146)
SODIUM SERPL-SCNC: 142 MMOL/L — SIGNIFICANT CHANGE UP (ref 135–146)
TROPONIN T SERPL-MCNC: <0.01 NG/ML — SIGNIFICANT CHANGE UP
TROPONIN T SERPL-MCNC: <0.01 NG/ML — SIGNIFICANT CHANGE UP
WBC # BLD: 6.31 K/UL — SIGNIFICANT CHANGE UP (ref 4.8–10.8)
WBC # FLD AUTO: 6.31 K/UL — SIGNIFICANT CHANGE UP (ref 4.8–10.8)

## 2022-11-22 PROCEDURE — 99053 MED SERV 10PM-8AM 24 HR FAC: CPT

## 2022-11-22 PROCEDURE — 99285 EMERGENCY DEPT VISIT HI MDM: CPT

## 2022-11-22 PROCEDURE — 70450 CT HEAD/BRAIN W/O DYE: CPT | Mod: 26,MA

## 2022-11-22 PROCEDURE — 93010 ELECTROCARDIOGRAM REPORT: CPT

## 2022-11-22 RX ORDER — ASPIRIN/CALCIUM CARB/MAGNESIUM 324 MG
81 TABLET ORAL DAILY
Refills: 0 | Status: DISCONTINUED | OUTPATIENT
Start: 2022-11-22 | End: 2022-12-02

## 2022-11-22 RX ORDER — SODIUM CHLORIDE 9 MG/ML
1000 INJECTION, SOLUTION INTRAVENOUS
Refills: 0 | Status: DISCONTINUED | OUTPATIENT
Start: 2022-11-22 | End: 2022-12-02

## 2022-11-22 RX ORDER — MEMANTINE HYDROCHLORIDE 10 MG/1
1 TABLET ORAL
Qty: 0 | Refills: 0 | DISCHARGE

## 2022-11-22 RX ORDER — CHLORHEXIDINE GLUCONATE 213 G/1000ML
1 SOLUTION TOPICAL
Refills: 0 | Status: DISCONTINUED | OUTPATIENT
Start: 2022-11-22 | End: 2022-12-02

## 2022-11-22 RX ORDER — ENOXAPARIN SODIUM 100 MG/ML
40 INJECTION SUBCUTANEOUS EVERY 24 HOURS
Refills: 0 | Status: DISCONTINUED | OUTPATIENT
Start: 2022-11-22 | End: 2022-11-30

## 2022-11-22 RX ORDER — DEXTROSE 50 % IN WATER 50 %
25 SYRINGE (ML) INTRAVENOUS ONCE
Refills: 0 | Status: DISCONTINUED | OUTPATIENT
Start: 2022-11-22 | End: 2022-12-02

## 2022-11-22 RX ORDER — METFORMIN HYDROCHLORIDE 850 MG/1
1 TABLET ORAL
Qty: 0 | Refills: 0 | DISCHARGE

## 2022-11-22 RX ORDER — MAGNESIUM SULFATE 500 MG/ML
2 VIAL (ML) INJECTION ONCE
Refills: 0 | Status: COMPLETED | OUTPATIENT
Start: 2022-11-22 | End: 2022-11-22

## 2022-11-22 RX ORDER — PYRIDOXINE HCL (VITAMIN B6) 100 MG
100 TABLET ORAL DAILY
Refills: 0 | Status: DISCONTINUED | OUTPATIENT
Start: 2022-11-22 | End: 2022-12-02

## 2022-11-22 RX ORDER — DEXTROSE 50 % IN WATER 50 %
12.5 SYRINGE (ML) INTRAVENOUS ONCE
Refills: 0 | Status: DISCONTINUED | OUTPATIENT
Start: 2022-11-22 | End: 2022-12-02

## 2022-11-22 RX ORDER — SODIUM CHLORIDE 9 MG/ML
1000 INJECTION, SOLUTION INTRAVENOUS
Refills: 0 | Status: DISCONTINUED | OUTPATIENT
Start: 2022-11-22 | End: 2022-11-25

## 2022-11-22 RX ORDER — INSULIN LISPRO 100/ML
VIAL (ML) SUBCUTANEOUS
Refills: 0 | Status: DISCONTINUED | OUTPATIENT
Start: 2022-11-22 | End: 2022-12-02

## 2022-11-22 RX ORDER — DULOXETINE HYDROCHLORIDE 30 MG/1
60 CAPSULE, DELAYED RELEASE ORAL DAILY
Refills: 0 | Status: DISCONTINUED | OUTPATIENT
Start: 2022-11-22 | End: 2022-12-02

## 2022-11-22 RX ORDER — DONEPEZIL HYDROCHLORIDE 10 MG/1
10 TABLET, FILM COATED ORAL AT BEDTIME
Refills: 0 | Status: DISCONTINUED | OUTPATIENT
Start: 2022-11-22 | End: 2022-12-02

## 2022-11-22 RX ORDER — GLUCAGON INJECTION, SOLUTION 0.5 MG/.1ML
1 INJECTION, SOLUTION SUBCUTANEOUS ONCE
Refills: 0 | Status: DISCONTINUED | OUTPATIENT
Start: 2022-11-22 | End: 2022-12-02

## 2022-11-22 RX ORDER — SODIUM CHLORIDE 9 MG/ML
1000 INJECTION, SOLUTION INTRAVENOUS ONCE
Refills: 0 | Status: COMPLETED | OUTPATIENT
Start: 2022-11-22 | End: 2022-11-22

## 2022-11-22 RX ORDER — MAGNESIUM SULFATE 500 MG/ML
2 VIAL (ML) INJECTION
Refills: 0 | Status: COMPLETED | OUTPATIENT
Start: 2022-11-22 | End: 2022-11-22

## 2022-11-22 RX ORDER — MEMANTINE HYDROCHLORIDE 10 MG/1
10 TABLET ORAL DAILY
Refills: 0 | Status: DISCONTINUED | OUTPATIENT
Start: 2022-11-22 | End: 2022-12-02

## 2022-11-22 RX ORDER — PANTOPRAZOLE SODIUM 20 MG/1
40 TABLET, DELAYED RELEASE ORAL
Refills: 0 | Status: DISCONTINUED | OUTPATIENT
Start: 2022-11-22 | End: 2022-12-02

## 2022-11-22 RX ORDER — DEXTROSE 50 % IN WATER 50 %
15 SYRINGE (ML) INTRAVENOUS ONCE
Refills: 0 | Status: DISCONTINUED | OUTPATIENT
Start: 2022-11-22 | End: 2022-12-02

## 2022-11-22 RX ADMIN — Medication 25 GRAM(S): at 08:13

## 2022-11-22 RX ADMIN — Medication 10: at 12:39

## 2022-11-22 RX ADMIN — SODIUM CHLORIDE 666.67 MILLILITER(S): 9 INJECTION, SOLUTION INTRAVENOUS at 05:48

## 2022-11-22 RX ADMIN — Medication 81 MILLIGRAM(S): at 11:25

## 2022-11-22 RX ADMIN — Medication 25 GRAM(S): at 09:51

## 2022-11-22 RX ADMIN — SODIUM CHLORIDE 1000 MILLILITER(S): 9 INJECTION, SOLUTION INTRAVENOUS at 06:50

## 2022-11-22 RX ADMIN — DONEPEZIL HYDROCHLORIDE 10 MILLIGRAM(S): 10 TABLET, FILM COATED ORAL at 22:06

## 2022-11-22 RX ADMIN — SODIUM CHLORIDE 75 MILLILITER(S): 9 INJECTION, SOLUTION INTRAVENOUS at 22:06

## 2022-11-22 RX ADMIN — Medication 4: at 17:29

## 2022-11-22 RX ADMIN — DULOXETINE HYDROCHLORIDE 60 MILLIGRAM(S): 30 CAPSULE, DELAYED RELEASE ORAL at 11:25

## 2022-11-22 RX ADMIN — Medication 25 GRAM(S): at 11:25

## 2022-11-22 RX ADMIN — MEMANTINE HYDROCHLORIDE 10 MILLIGRAM(S): 10 TABLET ORAL at 11:26

## 2022-11-22 RX ADMIN — Medication 100 MILLIGRAM(S): at 11:25

## 2022-11-22 NOTE — H&P ADULT - HISTORY OF PRESENT ILLNESS
Location: [facility] Hold 017 A ([facility] Hold)  Patient Name: STACY POSADA  Age: 77y  Gender: Male      History of Present Illness  Mr. Posada is a 77 year old male patient known to have:  - Baseline: AO3, lives with wife, recently discharged from rehab; supposed to ambulate with help of walker  - Dementia  - Depression  - Hypertension  - History of Hepatitis (likely C) s/p Rx several decades ago per wife and history of Liver Cirrhosis by Dr Stephens at [place]. Recent CT AP IC 10/2022 revealed nodular liver with perisplenic, gastrohepatic, and omental varices. History of admission for HE currently still on lactulose BID.  - HFpEF. Last TTE 10/15/2022 normal EF, DD I, mild MR  - Osteoarthritis/ Gout  - Chronic Rib Fractures of Left posterior T11 and T12 and recent Left 1st 4-6th and 8-10th rib fractures  - Surgical History: s/p CCY      He was brought to the ED on 11/22 following an episode of presyncope.  History goes back to evening of 11/21 when the patient was laying supine and stood up abruptly to go to the bathroom.  A few steps later, patient reported feeling light headed and decided to sit on knees.  Prior to episode, patient denied any chest pain, SOB, diaphoresis, or blacking out.  During the episode, he had no HT or LOC or seizure like activity (no jerky limb movements, tongue biting, drooling, uprolling of eyes, or urinary/fecal incontinence).  After the episode, he was able to stand up after 15 minutes, and was not confused/ could recall all event details.  Of note, wife notes a recent fall while going down stairs in 10/2022 and another episode where he called her for help while on toilet few days PTP (found him with head on wall and unable to stand; no LOC then).  She also notes that POCT has been 160-220's at home, and that the metformin dose has recently been increased from 1g to 1.5g BID.      On review of systems, wife notes that the patient has been having 3-4 episodes of watery non bloody non mucoid diarrhea for the last 3-4 days (patient has been on lactulose 1 tablespoon BID for a long time, but diarrhea is new per wife).   He otherwise denies any recent fever, chills, night sweats, URTI symptoms (cough, rhinorrhea, sore throat), urinary symptoms (urinary frequency, urgency, intermittence, dysuria, foul smelling urine, cloudy urine), change in bowel movements (diarrhea or constipation), abdominal pain, headache, nausea, or vomiting. No sick contacts. No recent travel or exposure to recent travelers.      Upon presentation to the ED, the patient was hemodynamically stable:  Vital Signs in ED   - /56mmHg  - HR 80bpm  - RR 18bpm  - T 36.7 degrees  - SAO2 98% on RA      Investigations   Laboratory Workup  - CBC:                        14.4   6.31  )-----------( 149      ( 22 Nov 2022 04:55 )             39.3     - Chemistry:  11-22    136  |  98  |  15  ----------------------------<  192<H>  4.2   |  26  |  0.9    Ca    9.1      22 Nov 2022 04:55  Mg     1.2     11-22    TPro  6.3  /  Alb  3.2<L>  /  TBili  1.7<H>  /  DBili  x   /  AST  29  /  ALT  27  /  AlkPhos  155<H>  11-22    - Cardiac Markers:  CARDIAC MARKERS ( 22 Nov 2022 04:55 )  x     / <0.01 ng/mL / x     / x     / x          Microbiological Workup  * COVID received and pending      Radiological Workup  * CT Head No Cont (11.22.22 @ 05:23) No acute intracranial pathology.      - In the ED, orthostatic vitals were checked and came back positive: (126/57mmHg; 84bpm) -> (120/56mmHg; 82bpm) -> (97/52mmHg; 81bpm)  - Patient received 1L LR bolus in ED  - He also received 2g x3 doses of Mg for Mg 1.2  - He will be admitted for further investigations, management, and monitoring     Location: [facility] Hold 017 A ([facility] Hold)  Patient Name: STACY POSADA  Age: 77y  Gender: Male      History of Present Illness  Mr. Posada is a 77 year old male patient known to have:  - Baseline: AO3, lives with wife, recently discharged from rehab; supposed to ambulate with help of walker  - Dementia  - Depression  - Hypertension  - DM II. Last Hba1c 6.1 10/2022. Recently increased metformin from 1g BID to 1.5g BID  - History of Hepatitis (likely C) s/p Rx several decades ago per wife and history of Liver Cirrhosis by Dr Stephens at [place]. Recent CT AP IC 10/2022 revealed nodular liver with perisplenic, gastrohepatic, and omental varices. History of admission for HE currently still on lactulose BID.  - HFpEF. Last TTE 10/15/2022 normal EF, DD I, mild MR  - Osteoarthritis/ Gout  - Chronic Rib Fractures of Left posterior T11 and T12 and recent Left 1st 4-6th and 8-10th rib fractures  - Surgical History: s/p CCY      He was brought to the ED on 11/22 following an episode of presyncope.  History goes back to evening of 11/21 when the patient was laying supine and stood up abruptly to go to the bathroom.  A few steps later, patient reported feeling light headed and decided to sit on knees.  Prior to episode, patient denied any chest pain, SOB, diaphoresis, or blacking out.  During the episode, he had no HT or LOC or seizure like activity (no jerky limb movements, tongue biting, drooling, uprolling of eyes, or urinary/fecal incontinence).  After the episode, he was able to stand up after 15 minutes, and was not confused/ could recall all event details.  Of note, wife notes a recent fall while going down stairs in 10/2022 and another episode where he called her for help while on toilet few days PTP (found him with head on wall and unable to stand; no LOC then).  She also notes that POCT has been 160-220's at home, and that the metformin dose has recently been increased from 1g to 1.5g BID.      On review of systems, wife notes that the patient has been having 3-4 episodes of watery non bloody non mucoid diarrhea for the last 3-4 days (patient has been on lactulose 1 tablespoon BID for a long time, but diarrhea is new per wife).   He otherwise denies any recent fever, chills, night sweats, URTI symptoms (cough, rhinorrhea, sore throat), urinary symptoms (urinary frequency, urgency, intermittence, dysuria, foul smelling urine, cloudy urine), change in bowel movements (diarrhea or constipation), abdominal pain, headache, nausea, or vomiting. No sick contacts. No recent travel or exposure to recent travelers.      Upon presentation to the ED, the patient was hemodynamically stable:  Vital Signs in ED   - /56mmHg  - HR 80bpm  - RR 18bpm  - T 36.7 degrees  - SAO2 98% on RA      Investigations   Laboratory Workup  - CBC:                        14.4   6.31  )-----------( 149      ( 22 Nov 2022 04:55 )             39.3     - Chemistry:  11-22    136  |  98  |  15  ----------------------------<  192<H>  4.2   |  26  |  0.9    Ca    9.1      22 Nov 2022 04:55  Mg     1.2     11-22    TPro  6.3  /  Alb  3.2<L>  /  TBili  1.7<H>  /  DBili  x   /  AST  29  /  ALT  27  /  AlkPhos  155<H>  11-22    - Cardiac Markers:  CARDIAC MARKERS ( 22 Nov 2022 04:55 )  x     / <0.01 ng/mL / x     / x     / x          Microbiological Workup  * COVID received and pending      Radiological Workup  * CT Head No Cont (11.22.22 @ 05:23) No acute intracranial pathology.      - In the ED, orthostatic vitals were checked and came back positive: (126/57mmHg; 84bpm) -> (120/56mmHg; 82bpm) -> (97/52mmHg; 81bpm)  - Patient received 1L LR bolus in ED  - He also received 2g x3 doses of Mg for Mg 1.2  - He will be admitted for further investigations, management, and monitoring

## 2022-11-22 NOTE — ED PROVIDER NOTE - PHYSICAL EXAMINATION
CONSTITUTIONAL: in NAD.  SKIN: warm, dry. no rashes.  HEAD: Normocephalic; atraumatic.  EYES: PERRLA, EOMI, no conjunctival erythema.  ENT: No nasal discharge; airway clear. MMM.  NECK: Supple; non tender. No c-spine ttp.  CARD: S1, S2 normal; no murmurs, gallops, or rubs. Regular rate and rhythm.   RESP: No wheezes, rales, or rhonchi. lungs ctab.  ABD: soft ntnd; no masses, rebound, or guarding.  MSK/EXT: Normal ROM. No clubbing, cyanosis, or edema. No midline spinal ttp.  NEURO: Alert, oriented, grossly unremarkable. nml motor, strength, and sensation. no focal neuro. deficits.  PSYCH: Cooperative, appropriate.

## 2022-11-22 NOTE — ED ADULT NURSE NOTE - OBJECTIVE STATEMENT
pt BIBA s/p near-syncopal episode. pt reports getting up in the middle of the night, feeling dizzy and lowering himself to his knees. pt reports returning to baseline after lowering himself to the ground. metformin dose recently increased.  in triage. denies fall, LOC, head injury, anticoag use

## 2022-11-22 NOTE — H&P ADULT - ASSESSMENT
Assessment and Plan  Case of a 77 year old male patient with history of Dementia, Depression, OA, Gout, HTN, Liver Cirrhosis 2ry to Likely Hepatitis, and HFpEF who was brought to the ED on 11/22 following an episode of presyncope in setting of recent watery diarrhea on lactulose, found to have positive orthostatic vitals in ED, s/p IV fluids, to be admitted for further investigations, management, and monitoring. Currently hemodynamically stable.      Presyncope Likely Secondary to Orthostatic Hypotension  In Setting of Being on Lactulose, Recent Increase in Metformin, and Antihypertensive Medications  Unlikely Seizures or Vasovagal Episode  * Baseline: AO3, lives with wife, recently discharged from rehab; supposed to ambulate with help of walker  * ED vitals 115/56mmHg, HR 80bpm, RR 18bpm, T 36.7 degrees, SaO2 98% on RA  * Orthostatic Vitals + (126/57mmHg; 84bpm) -> (120/56mmHg; 82bpm) -> (97/52mmHg; 81bpm)  * ED Labs WBC 6.31, Hb 14.4, Plt 149K  * ED ECG noted      - In setting of positive orthostasis in setting of recent diarrhea with lactulose while on increased metformin dose and antihypertensives, will hold lactulose, metformin, and quinapril  - Start IV fluid hydration with LR at 75mL/hour   - Monitor diarrhea and consider sending studies if persists off lactulose  - Repeat orthostatic vitals on 11/23  - PT/OT evaluation  - Educated about importance of slow transitioning from supine to seated to standing position to prevent light headedness   - Would check Serum NH3 although patient does not seem encephalopathic on exam  - Will check CK since patient remained for 15 minutes on ground before he was able to stand up  - Will not order rEEG due to low suspicion of seizures and likely orthostasis. Check LA  - CT head without contrast noted  - Would check TTE. Trend troponin  - Monitor telemetry  - Check TSH, Folate, and B12 levels  - No need for speech and swallow evaluation      Chronically Elevated LFTs  History of Hepatitis (likely C) s/p Rx several decades ago per wife and   History of Liver Cirrhosis with No Current Signs of Cirrhosis Decompensation   Perisplenic, Gastrohepatic, and Omental Varices  * Follows Dr Stephens at [place]  * Recent CT AP IC 10/2022 revealed nodular liver with perisplenic, gastrohepatic, and omental varices  * History of admission for HE with elevated SNH3 currently still on lactulose BID  * Surgical History: s/p CCY    - Outpatient GI follow up for EGD for varices  - Trend LFTs   - Check hepatitis panel  - Will order Ultrasound abdomen (aware that patient is s/p CCY)  - Check serum NH3 although no signs of hepatic encephalopathy  - Will hold lactulose and monitor BMs      History of Hypertension  Current Orthostatic Hypotension  * Home meds Quinapril 20mg QD  * ED vitals 115/56mmHg, HR 80bpm, RR 18bpm, T 36.7 degrees, SaO2 98% on RA  * Orthostatic Vitals + (126/57mmHg; 84bpm) -> (120/56mmHg; 82bpm) -> (97/52mmHg; 81bpm)  - Monitor BP closely  - Repeat orthostatic vitals on 11/23  - IV fluid hydration as above  - Hold Quinapril 20mg QD for now      HFpEF- Not in Exacerbation  * Last TTE 10/15/2022 normal EF, DD I, mild MR  * ED SaO2 98% on RA  * CXR noted    - Monitor in/out  - Daily weight   - Monitor SaO2 and O2 requirements  - IV fluid hydration as above (not on diuretics at home)  - Monitor telemetry   - Trend electrolytes and keep K>4 and Mg>2  - Check TTE      Dementia  Depression  * Home meds Donepezil 10mg QD and Memantine 10mg QD  - Resume Donepezil 10mg QD and Memantine 10mg QD      Osteoarthritis/ Gout  Chronic Rib Fractures of Left posterior T11 and T12 and recent Left 1st 4-6th and 8-10th rib fractures  - PT/OT as above      Others  - DVT Prophylaxis: Lovenox 40mg Subcutaneously daily  - GI Prophylaxis: Pantoprazole 40mg PO QD  - Diet: DASH/ TLC  - Code Status: Full      Barriers to learning: NO  Discharge Planning: Patient will be discharged once stable   Plan was communicated with patient and medical team       Lata Ferrara MD  PGY - 3 Internal Medicine   [facility]   Assessment and Plan  Case of a 77 year old male patient with history of Dementia, Depression, OA, Gout, HTN, Liver Cirrhosis 2ry to Likely Hepatitis, and HFpEF who was brought to the ED on 11/22 following an episode of presyncope in setting of recent watery diarrhea on lactulose, found to have positive orthostatic vitals in ED, s/p IV fluids, to be admitted for further investigations, management, and monitoring. Currently hemodynamically stable.      Presyncope Likely Secondary to Orthostatic Hypotension  In Setting of Being on Lactulose, Recent Increase in Metformin, and Antihypertensive Medications  Unlikely Seizures or Vasovagal Episode  * Baseline: AO3, lives with wife, recently discharged from rehab; supposed to ambulate with help of walker  * ED vitals 115/56mmHg, HR 80bpm, RR 18bpm, T 36.7 degrees, SaO2 98% on RA  * Orthostatic Vitals + (126/57mmHg; 84bpm) -> (120/56mmHg; 82bpm) -> (97/52mmHg; 81bpm)  * ED Labs WBC 6.31, Hb 14.4, Plt 149K  * ED ECG noted      - In setting of positive orthostasis in setting of recent diarrhea with lactulose while on increased metformin dose and antihypertensives, will hold lactulose, metformin, and quinapril  - Start IV fluid hydration with LR at 75mL/hour   - Monitor diarrhea and consider sending studies if persists off lactulose. Will not start imodium yet pending monitoring off lactulose  - Repeat orthostatic vitals on 11/23  - PT/OT evaluation  - Educated about importance of slow transitioning from supine to seated to standing position to prevent light headedness   - Would check Serum NH3 although patient does not seem encephalopathic on exam  - Will check CK since patient remained for 15 minutes on ground before he was able to stand up  - Will not order rEEG due to low suspicion of seizures and likely orthostasis. Check LA  - CT head without contrast noted  - Would check TTE. Trend troponin  - Monitor telemetry  - Check TSH, Folate, and B12 levels  - No need for speech and swallow evaluation      Hypomagnesemia in Setting of Diarrhea on Lactulose  - Trend Mg and replete as indicated to Keep >2. ED Mg 1.2 s/p 2g x3 doses 11/22  - Hold lactulose 1 tablespoon BID for now  - Monitor diarrhea and consider sending studies if persists off lactulose  - Will not start imodium yet pending monitoring off lactulose  - Start IV fluid hydration with LR at 75mL/hour       Chronically Elevated LFTs  History of Hepatitis (likely C) s/p Rx several decades ago per wife and   History of Liver Cirrhosis with No Current Signs of Cirrhosis Decompensation   Perisplenic, Gastrohepatic, and Omental Varices  * Follows Dr Stephens at [place]  * Recent CT AP IC 10/2022 revealed nodular liver with perisplenic, gastrohepatic, and omental varices  * History of admission for HE with elevated SNH3 currently still on lactulose BID  * Surgical History: s/p CCY    - Outpatient GI follow up for EGD for varices  - Trend LFTs   - Check hepatitis panel  - Will order Ultrasound abdomen (aware that patient is s/p CCY)  - Check serum NH3 although no signs of hepatic encephalopathy  - Will hold lactulose and monitor BMs      History of Hypertension  Current Orthostatic Hypotension  * Home meds Quinapril 20mg QD  * ED vitals 115/56mmHg, HR 80bpm, RR 18bpm, T 36.7 degrees, SaO2 98% on RA  * Orthostatic Vitals + (126/57mmHg; 84bpm) -> (120/56mmHg; 82bpm) -> (97/52mmHg; 81bpm)  - Monitor BP closely  - Repeat orthostatic vitals on 11/23  - IV fluid hydration as above  - Hold Quinapril 20mg QD for now      HFpEF- Not in Exacerbation  * Last TTE 10/15/2022 normal EF, DD I, mild MR  * ED SaO2 98% on RA  * CXR noted    - Monitor in/out  - Daily weight   - Monitor SaO2 and O2 requirements  - IV fluid hydration as above (not on diuretics at home)  - Monitor telemetry   - Trend electrolytes and keep K>4 and Mg>2  - Check TTE      Dementia  Depression  * Home meds Donepezil 10mg QD and Memantine 10mg QD  - Resume Donepezil 10mg QD and Memantine 10mg QD      Osteoarthritis/ Gout  Chronic Rib Fractures of Left posterior T11 and T12 and recent Left 1st 4-6th and 8-10th rib fractures  - PT/OT as above      Others  - DVT Prophylaxis: Lovenox 40mg Subcutaneously daily  - GI Prophylaxis: Pantoprazole 40mg PO QD  - Diet: DASH/ TLC  - Code Status: Full      Barriers to learning: NO  Discharge Planning: Patient will be discharged once stable   Plan was communicated with patient and medical team       Lata Ferrara MD  PGY - 3 Internal Medicine   [facility]   Assessment and Plan  Case of a 77 year old male patient with history of Dementia, Depression, OA, Gout, HTN, Liver Cirrhosis 2ry to Likely Hepatitis, and HFpEF who was brought to the ED on 11/22 following an episode of presyncope in setting of recent watery diarrhea on lactulose, found to have positive orthostatic vitals in ED, s/p IV fluids, to be admitted for further investigations, management, and monitoring. Currently hemodynamically stable.      Presyncope Likely Secondary to Orthostatic Hypotension  In Setting of Being on Lactulose, Recent Increase in Metformin, and Antihypertensive Medications  Unlikely Seizures or Vasovagal Episode  * Baseline: AO3, lives with wife, recently discharged from rehab; supposed to ambulate with help of walker  * ED vitals 115/56mmHg, HR 80bpm, RR 18bpm, T 36.7 degrees, SaO2 98% on RA  * Orthostatic Vitals + (126/57mmHg; 84bpm) -> (120/56mmHg; 82bpm) -> (97/52mmHg; 81bpm)  * ED Labs WBC 6.31, Hb 14.4, Plt 149K  * ED ECG noted      - In setting of positive orthostasis in setting of recent diarrhea with lactulose while on increased metformin dose and antihypertensives, will hold lactulose, metformin, and quinapril  - Start IV fluid hydration with LR at 75mL/hour   - Monitor diarrhea and consider sending studies if persists off lactulose. Will not start imodium yet pending monitoring off lactulose  - Repeat orthostatic vitals on 11/23  - PT/OT evaluation  - Educated about importance of slow transitioning from supine to seated to standing position to prevent light headedness   - Would check Serum NH3 although patient does not seem encephalopathic on exam  - Will check CK since patient remained for 15 minutes on ground before he was able to stand up  - Will not order rEEG due to low suspicion of seizures and likely orthostasis. Check LA  - CT head without contrast noted  - Would check TTE. Trend troponin  - Monitor telemetry  - Check TSH, Folate, and B12 levels. Resume home Vitamin B6 QD  - No need for speech and swallow evaluation      Hypomagnesemia in Setting of Diarrhea on Lactulose  - Trend Mg and replete as indicated to Keep >2. ED Mg 1.2 s/p 2g x3 doses 11/22  - Hold lactulose 1 tablespoon BID for now  - Monitor diarrhea and consider sending studies if persists off lactulose  - Will not start imodium yet pending monitoring off lactulose  - Start IV fluid hydration with LR at 75mL/hour       Chronically Elevated LFTs  History of Hepatitis (likely C) s/p Rx several decades ago per wife and   History of Liver Cirrhosis with No Current Signs of Cirrhosis Decompensation   Perisplenic, Gastrohepatic, and Omental Varices  * Follows Dr Stephens at [place]  * Recent CT AP IC 10/2022 revealed nodular liver with perisplenic, gastrohepatic, and omental varices  * History of admission for HE with elevated SNH3 currently still on lactulose BID  * Surgical History: s/p CCY    - Outpatient GI follow up for EGD for varices  - Trend LFTs   - Check hepatitis panel  - Will order Ultrasound abdomen (aware that patient is s/p CCY)  - Check serum NH3 although no signs of hepatic encephalopathy  - Will hold lactulose and monitor BMs      History of Hypertension  Current Orthostatic Hypotension  * Home meds Quinapril 20mg QD  * ED vitals 115/56mmHg, HR 80bpm, RR 18bpm, T 36.7 degrees, SaO2 98% on RA  * Orthostatic Vitals + (126/57mmHg; 84bpm) -> (120/56mmHg; 82bpm) -> (97/52mmHg; 81bpm)  - Monitor BP closely  - Repeat orthostatic vitals on 11/23  - IV fluid hydration as above  - Hold Quinapril 20mg QD for now      HFpEF- Not in Exacerbation  * Last TTE 10/15/2022 normal EF, DD I, mild MR  * ED SaO2 98% on RA  * CXR noted    - Monitor in/out  - Daily weight   - Monitor SaO2 and O2 requirements  - IV fluid hydration as above (not on diuretics at home)  - Monitor telemetry   - Trend electrolytes and keep K>4 and Mg>2  - Check TTE      Dementia  Depression  * Home meds Donepezil 10mg QD, Memantine 10mg QD, Duloxetine 60mg QD  - Resume Donepezil 10mg QD, Memantine 10mg QD, Duloxetine 60mg QD      Osteoarthritis/ Gout  Chronic Rib Fractures of Left posterior T11 and T12 and recent Left 1st 4-6th and 8-10th rib fractures  - PT/OT as above      Others  - DVT Prophylaxis: Lovenox 40mg Subcutaneously daily  - GI Prophylaxis: Pantoprazole 40mg PO QD  - Diet: DASH/ TLC  - Code Status: Full      Barriers to learning: NO  Discharge Planning: Patient will be discharged once stable   Plan was communicated with patient and medical team       Lata Ferrara MD  PGY - 3 Internal Medicine   [facility]   Assessment and Plan  Case of a 77 year old male patient with history of Dementia, Depression, OA, Gout, HTN, Liver Cirrhosis 2ry to Likely Hepatitis, and HFpEF who was brought to the ED on 11/22 following an episode of presyncope in setting of recent watery diarrhea on lactulose, found to have positive orthostatic vitals in ED, s/p IV fluids, to be admitted for further investigations, management, and monitoring. Currently hemodynamically stable.      Presyncope Likely Secondary to Orthostatic Hypotension  In Setting of Being on Lactulose, Recent Increase in Metformin, and Antihypertensive Medications  Unlikely Seizures or Vasovagal Episode  * Baseline: AO3, lives with wife, recently discharged from rehab; supposed to ambulate with help of walker  * ED vitals 115/56mmHg, HR 80bpm, RR 18bpm, T 36.7 degrees, SaO2 98% on RA  * Orthostatic Vitals + (126/57mmHg; 84bpm) -> (120/56mmHg; 82bpm) -> (97/52mmHg; 81bpm)  * s/p 1L LR bolus in ED  * ED Labs WBC 6.31, Hb 14.4, Plt 149K  * ED ECG noted      - In setting of positive orthostasis in setting of recent diarrhea with lactulose while on increased metformin dose and antihypertensives, will hold lactulose, metformin, and quinapril  - Start IV fluid hydration with LR at 75mL/hour. s/p 1L LR bolus in ED  - Monitor diarrhea and consider sending studies if persists off lactulose. Will not start imodium yet pending monitoring off lactulose  - Repeat orthostatic vitals on 11/23  - PT/OT evaluation  - Educated about importance of slow transitioning from supine to seated to standing position to prevent light headedness   - Would check Serum NH3 although patient does not seem encephalopathic on exam  - Will check CK since patient remained for 15 minutes on ground before he was able to stand up  - Will not order rEEG due to low suspicion of seizures and likely orthostasis. Check LA  - CT head without contrast noted  - Would check TTE. Trend troponin  - Monitor telemetry  - Check TSH, Folate, and B12 levels. Resume home Vitamin B6 QD  - No need for speech and swallow evaluation      Hypomagnesemia in Setting of Diarrhea on Lactulose  - Trend Mg and replete as indicated to Keep >2. ED Mg 1.2 s/p 2g x3 doses 11/22 -> follow up repeat Mg at 11:00 AM  - Hold lactulose 1 tablespoon BID for now  - Monitor diarrhea and consider sending studies if persists off lactulose  - Will not start imodium yet pending monitoring off lactulose  - Start IV fluid hydration with LR at 75mL/hour       Chronically Elevated LFTs  History of Hepatitis (likely C) s/p Rx several decades ago per wife and   History of Liver Cirrhosis with No Current Signs of Cirrhosis Decompensation   Perisplenic, Gastrohepatic, and Omental Varices  * Follows Dr Stephens at [place]  * Recent CT AP IC 10/2022 revealed nodular liver with perisplenic, gastrohepatic, and omental varices  * History of admission for HE with elevated SNH3 currently still on lactulose BID  * Surgical History: s/p CCY    - Outpatient GI follow up for EGD for varices  - Trend LFTs   - Check hepatitis panel  - Will order Ultrasound abdomen (aware that patient is s/p CCY)  - Check serum NH3 although no signs of hepatic encephalopathy  - Will hold lactulose and monitor BMs      History of Hypertension  Current Orthostatic Hypotension  * Home meds Quinapril 20mg QD  * ED vitals 115/56mmHg, HR 80bpm, RR 18bpm, T 36.7 degrees, SaO2 98% on RA  * Orthostatic Vitals + (126/57mmHg; 84bpm) -> (120/56mmHg; 82bpm) -> (97/52mmHg; 81bpm)  - Monitor BP closely  - Repeat orthostatic vitals on 11/23  - IV fluid hydration as above  - Hold Quinapril 20mg QD for now      HFpEF- Not in Exacerbation  * Last TTE 10/15/2022 normal EF, DD I, mild MR  * ED SaO2 98% on RA  * CXR noted    - Monitor in/out  - Daily weight   - Monitor SaO2 and O2 requirements  - IV fluid hydration as above (not on diuretics at home)  - Monitor telemetry   - Trend electrolytes and keep K>4 and Mg>2  - Check TTE      Dementia  Depression  * Home meds Donepezil 10mg QD, Memantine 10mg QD, Duloxetine 60mg QD  - Resume Donepezil 10mg QD, Memantine 10mg QD, Duloxetine 60mg QD      Osteoarthritis/ Gout  Chronic Rib Fractures of Left posterior T11 and T12 and recent Left 1st 4-6th and 8-10th rib fractures  - PT/OT as above      Others  - DVT Prophylaxis: Lovenox 40mg Subcutaneously daily  - GI Prophylaxis: Pantoprazole 40mg PO QD  - Diet: DASH/ TLC  - Code Status: Full      Barriers to learning: NO  Discharge Planning: Patient will be discharged once stable   Plan was communicated with patient and medical team       Lata Ferrara MD  PGY - 3 Internal Medicine   [facility]   Assessment and Plan  Case of a 77 year old male patient with history of Dementia, Depression, OA, Gout, HTN, Liver Cirrhosis 2ry to Likely Hepatitis, and HFpEF who was brought to the ED on 11/22 following an episode of presyncope in setting of recent watery diarrhea on lactulose, found to have positive orthostatic vitals in ED, s/p IV fluids, to be admitted for further investigations, management, and monitoring. Currently hemodynamically stable.      Presyncope Likely Secondary to Orthostatic Hypotension  In Setting of Being on Lactulose, Recent Increase in Metformin, and Antihypertensive Medications  Unlikely Seizures or Vasovagal Episode  * Baseline: AO3, lives with wife, recently discharged from rehab; supposed to ambulate with help of walker  * ED vitals 115/56mmHg, HR 80bpm, RR 18bpm, T 36.7 degrees, SaO2 98% on RA  * Orthostatic Vitals + (126/57mmHg; 84bpm) -> (120/56mmHg; 82bpm) -> (97/52mmHg; 81bpm)  * s/p 1L LR bolus in ED  * ED Labs WBC 6.31, Hb 14.4, Plt 149K  * ED ECG noted      - In setting of positive orthostasis in setting of recent diarrhea with lactulose while on increased metformin dose and antihypertensives, will hold lactulose, metformin, and quinapril  - Start IV fluid hydration with LR at 75mL/hour. s/p 1L LR bolus in ED  - Monitor diarrhea and consider sending studies if persists off lactulose. Will not start imodium yet pending monitoring off lactulose  - Repeat orthostatic vitals on 11/23  - PT/OT evaluation  - Educated about importance of slow transitioning from supine to seated to standing position to prevent light headedness   - Would check Serum NH3 although patient does not seem encephalopathic on exam  - Will check CK since patient remained for 15 minutes on ground before he was able to stand up  - Will not order rEEG due to low suspicion of seizures and likely orthostasis. Check LA  - CT head without contrast noted  - Would check TTE. Trend troponin  - Monitor telemetry  - Check TSH, Folate, and B12 levels. Resume home Vitamin B6 QD  - No need for speech and swallow evaluation      Hypomagnesemia in Setting of Diarrhea on Lactulose  - Trend Mg and replete as indicated to Keep >2. ED Mg 1.2 s/p 2g x3 doses 11/22 -> follow up repeat Mg at 11:00 AM  - Hold lactulose 1 tablespoon BID for now  - Monitor diarrhea and consider sending studies if persists off lactulose  - Will not start imodium yet pending monitoring off lactulose  - Start IV fluid hydration with LR at 75mL/hour       Chronically Elevated LFTs  History of Hepatitis (likely C) s/p Rx several decades ago per wife and   History of Liver Cirrhosis with No Current Signs of Cirrhosis Decompensation (Unable to Calculate MELD Score without INR)  Perisplenic, Gastrohepatic, and Omental Varices  * Follows Dr Stephens at [place]  * Recent CT AP IC 10/2022 revealed nodular liver with perisplenic, gastrohepatic, and omental varices  * History of admission for HE with elevated SNH3 currently still on lactulose BID  * Surgical History: s/p CCY    - Outpatient GI follow up for EGD for varices  - Trend LFTs and check INR to calculate MELD Score  - Check hepatitis panel  - Will order Ultrasound abdomen (aware that patient is s/p CCY)  - Check serum NH3 although no signs of hepatic encephalopathy  - Will hold lactulose and monitor BMs      History of Hypertension  Current Orthostatic Hypotension  * Home meds Quinapril 20mg QD  * ED vitals 115/56mmHg, HR 80bpm, RR 18bpm, T 36.7 degrees, SaO2 98% on RA  * Orthostatic Vitals + (126/57mmHg; 84bpm) -> (120/56mmHg; 82bpm) -> (97/52mmHg; 81bpm)  - Monitor BP closely  - Repeat orthostatic vitals on 11/23  - IV fluid hydration as above  - Hold Quinapril 20mg QD for now      HFpEF- Not in Exacerbation  * Last TTE 10/15/2022 normal EF, DD I, mild MR  * ED SaO2 98% on RA  * CXR noted    - Monitor in/out  - Daily weight   - Monitor SaO2 and O2 requirements  - IV fluid hydration as above (not on diuretics at home)  - Monitor telemetry   - Trend electrolytes and keep K>4 and Mg>2  - Check TTE      Dementia  Depression  * Home meds Donepezil 10mg QD, Memantine 10mg QD, Duloxetine 60mg QD  - Resume Donepezil 10mg QD, Memantine 10mg QD, Duloxetine 60mg QD      Osteoarthritis/ Gout  Chronic Rib Fractures of Left posterior T11 and T12 and recent Left 1st 4-6th and 8-10th rib fractures  - PT/OT as above      Others  - DVT Prophylaxis: Lovenox 40mg Subcutaneously daily  - GI Prophylaxis: Pantoprazole 40mg PO QD  - Diet: DASH/ TLC  - Code Status: Full      Barriers to learning: NO  Discharge Planning: Patient will be discharged once stable   Plan was communicated with patient and medical team       Lata Ferrara MD  PGY - 3 Internal Medicine   [facility]   Assessment and Plan  Case of a 77 year old male patient with history of Dementia, Depression, OA, Gout, HTN, Liver Cirrhosis 2ry to Likely Hepatitis, and HFpEF who was brought to the ED on 11/22 following an episode of presyncope in setting of recent watery diarrhea on lactulose, found to have positive orthostatic vitals in ED, s/p IV fluids, to be admitted for further investigations, management, and monitoring. Currently hemodynamically stable.      Presyncope Likely Secondary to Orthostatic Hypotension  In Setting of Being on Lactulose, Recent Increase in Metformin, and Antihypertensive Medications  Unlikely Seizures or Vasovagal Episode  * Baseline: AO3, lives with wife, recently discharged from rehab; supposed to ambulate with help of walker  * ED vitals 115/56mmHg, HR 80bpm, RR 18bpm, T 36.7 degrees, SaO2 98% on RA  * Orthostatic Vitals + (126/57mmHg; 84bpm) -> (120/56mmHg; 82bpm) -> (97/52mmHg; 81bpm)  * s/p 1L LR bolus in ED  * ED Labs WBC 6.31, Hb 14.4, Plt 149K  * ED ECG noted      - In setting of positive orthostasis in setting of recent diarrhea with lactulose while on increased metformin dose and antihypertensives, will hold lactulose, metformin, and quinapril  - Start IV fluid hydration with LR at 75mL/hour. s/p 1L LR bolus in ED  - Monitor diarrhea and consider sending studies if persists off lactulose. Will not start imodium yet pending monitoring off lactulose  - Repeat orthostatic vitals on 11/23  - PT/OT evaluation  - Educated about importance of slow transitioning from supine to seated to standing position to prevent light headedness   - Would check Serum NH3 although patient does not seem encephalopathic on exam  - Will check CK since patient remained for 15 minutes on ground before he was able to stand up  - Will not order rEEG due to low suspicion of seizures and likely orthostasis. Check LA  - CT head without contrast noted  - Would check TTE. Trend troponin  - Monitor telemetry  - Check TSH, Folate, and B12 levels. Resume home Vitamin B6 QD  - No need for speech and swallow evaluation      Hypomagnesemia in Setting of Diarrhea on Lactulose  - Trend Mg and replete as indicated to Keep >2. ED Mg 1.2 s/p 2g x3 doses 11/22 -> follow up repeat Mg at 11:00 AM  - Hold lactulose 1 tablespoon BID for now  - Monitor diarrhea and consider sending studies if persists off lactulose  - Will not start imodium yet pending monitoring off lactulose  - Start IV fluid hydration with LR at 75mL/hour       Chronically Elevated LFTs  History of Hepatitis (likely C) s/p Rx several decades ago per wife and   History of Liver Cirrhosis with No Current Signs of Cirrhosis Decompensation (Unable to Calculate MELD Score without INR)  Perisplenic, Gastrohepatic, and Omental Varices  * Follows Dr Stephens at [facility]  * Recent CT AP IC 10/2022 revealed nodular liver with perisplenic, gastrohepatic, and omental varices  * History of admission for HE with elevated SNH3 currently still on lactulose BID  * Surgical History: s/p CCY    - Outpatient GI follow up for EGD for varices  - Trend LFTs and check INR to calculate MELD Score  - Check hepatitis panel  - Will order Ultrasound abdomen (aware that patient is s/p CCY)  - Check serum NH3 although no signs of hepatic encephalopathy  - Will hold lactulose and monitor BMs      History of Hypertension  Current Orthostatic Hypotension  * Home meds Quinapril 20mg QD  * ED vitals 115/56mmHg, HR 80bpm, RR 18bpm, T 36.7 degrees, SaO2 98% on RA  * Orthostatic Vitals + (126/57mmHg; 84bpm) -> (120/56mmHg; 82bpm) -> (97/52mmHg; 81bpm)  - Monitor BP closely  - Repeat orthostatic vitals on 11/23  - IV fluid hydration as above  - Hold Quinapril 20mg QD for now      HFpEF- Not in Exacerbation  * Last TTE 10/15/2022 normal EF, DD I, mild MR  * ED SaO2 98% on RA  * CXR noted    - Monitor in/out  - Daily weight   - Monitor SaO2 and O2 requirements  - IV fluid hydration as above (not on diuretics at home)  - Monitor telemetry   - Trend electrolytes and keep K>4 and Mg>2  - Check TTE      Dementia  Depression  * Home meds Donepezil 10mg QD, Memantine 10mg QD, Duloxetine 60mg QD  - Resume Donepezil 10mg QD, Memantine 10mg QD, Duloxetine 60mg QD      DM II  * Last Hba1c 6.1 10/2022  * Recently increased metformin from 1g BID to 1.5g BID  - Not due to repeat Hba1c  - Monitor POCT   - Apidra Sliding Scale B for now  - Hold home DM meds      Osteoarthritis/ Gout  Chronic Rib Fractures of Left posterior T11 and T12 and recent Left 1st 4-6th and 8-10th rib fractures  - PT/OT as above      Others  - DVT Prophylaxis: Lovenox 40mg Subcutaneously daily  - GI Prophylaxis: Pantoprazole 40mg PO QD  - Diet: DASH/ TLC  - Code Status: Full      Barriers to learning: NO  Discharge Planning: Patient will be discharged once stable   Plan was communicated with patient and medical team       Lata Ferrara MD  PGY - 3 Internal Medicine   [facility]

## 2022-11-22 NOTE — ED ADULT NURSE REASSESSMENT NOTE - NS ED NURSE REASSESS COMMENT FT1
informed provider of patients orthostatic BP , pt reports mild dizziness when standing up , provider made aware

## 2022-11-22 NOTE — ED ADULT TRIAGE NOTE - CHIEF COMPLAINT QUOTE
pt BIBA s/p near-syncopal episode. pt reports getting up in the middle of the night, feeling dizzy and lowering himself to his knees. pt reports returning to baseline after lowering himself to the ground. pt metformin dose recently increased.  in triage pt BIBA s/p near-syncopal episode. pt reports getting up in the middle of the night, feeling dizzy and lowering himself to his knees. pt reports returning to baseline after lowering himself to the ground. metformin dose recently increased.  in triage. denies fall, LOC, head injury, anticoag use

## 2022-11-22 NOTE — ED PROVIDER NOTE - OBJECTIVE STATEMENT
76 yo male with pmh of dm, depression, gout, htn, osteoarthritis presenting to the ED for near syncopal episode. Patient states that he went to the bathroom this evening and felt dizzy, did not hit his head but fell to his knees. Was able to get back up after about 15 minutes. Returned to baseline afterwards. Does report some dizziness when standing.

## 2022-11-22 NOTE — PATIENT PROFILE ADULT - FALL HARM RISK - FACTORS
PT STATES PAIN IS UNCHANGED. WHEN ASKED ABOUT HER RXN TO MORPHINE SHE STATES \"SWELLING\"  INFORMED THE MD ORDERED MORPHINE AND PT STATES \"OH YEAH I FORGOT.   I GUESS I SHOULDN'T HAVE IT\"
Dizziness/Weakness

## 2022-11-22 NOTE — ED PROVIDER NOTE - CLINICAL SUMMARY MEDICAL DECISION MAKING FREE TEXT BOX
76 yo man with near syncope likely due to orthostatic hypotension.  This is most likely due to some recent diarrhea and increase in antihypertenives.  IVF given.  Will need to admit due to safety reasons.

## 2022-11-22 NOTE — H&P ADULT - ATTENDING COMMENTS
77 year old male with MH of HTN, DM 2, liver cirrhosis, dementia, HFpEF and gout was brought to ED after syncopal episode, patient was going to the bathroom, he stood up and walked few steps he felt dizzy and sat down on his knees, no loss of consciousness, no chest pain or palpitation, patient was c/o watery diarrhea for the last few days, patient takes Lactulose for hepatic encephalopathy. In the ED BP was stable, orthostatic changes were positive. Hb stable.     PHYSICAL EXAM:  GENERAL: NAD, well-developed.  HEAD:  Atraumatic, Normocephalic.  EYES: EOMI, PERRLA, conjunctiva and sclera clear.  NECK: Supple, No JVD.  CHEST/LUNG: Clear to auscultation bilaterally; No wheeze.  HEART: Regular rate and rhythm; S1 S2.   ABDOMEN: Soft, Nontender, Nondistended; Bowel sounds present.  EXTREMITIES:  2+ Peripheral Pulses, No clubbing, cyanosis, or edema.  PSYCH: AAOx to place, date of birth and time month and year.   NEUROLOGY: non-focal.  SKIN: No rashes or lesions.    A/P:   Dizziness and Pre-Syncope:   Likely due to orthostatic changes, possibly from hypovolemia due to diarrhea.   s/p IV fluid, encourage oral hydration, lactulose on hold, will need to resume it with lower dose and monitor for diarrhea.   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   Recent echo Oct 2022 showed normal LVEF. Mild TR,     Chronic Liver cirrhosis: compensated. Stable  No ascites on exam.   INR .125, ,     Chronic HFpEF: stable, euvolemic.     Dementia:   Continue Donepezil and Memantine.

## 2022-11-22 NOTE — PATIENT PROFILE ADULT - FALL HARM RISK - HARM RISK INTERVENTIONS
Assistance with ambulation/Assistance OOB with selected safe patient handling equipment/Communicate Risk of Fall with Harm to all staff/Monitor gait and stability/Reinforce activity limits and safety measures with patient and family/Sit up slowly, dangle for a short time, stand at bedside before walking/Tailored Fall Risk Interventions/Visual Cue: Yellow wristband and red socks/Bed in lowest position, wheels locked, appropriate side rails in place/Call bell, personal items and telephone in reach/Instruct patient to call for assistance before getting out of bed or chair/Non-slip footwear when patient is out of bed/[place] to call system/Physically safe environment - no spills, clutter or unnecessary equipment/Purposeful Proactive Rounding/Room/bathroom lighting operational, light cord in reach

## 2022-11-22 NOTE — ED ADULT NURSE NOTE - INTERVENTIONS DEFINITIONS
[place] to call system/Call bell, personal items and telephone within reach/Instruct patient to call for assistance/Room bathroom lighting operational/Non-slip footwear when patient is off stretcher/Physically safe environment: no spills, clutter or unnecessary equipment/Stretcher in lowest position, wheels locked, appropriate side rails in place/Provide visual cue, wrist band, yellow gown, etc./Monitor for mental status changes and reorient to person, place, and time/Review medications for side effects contributing to fall risk/Reinforce activity limits and safety measures with patient and family/Provide visual clues: red socks

## 2022-11-22 NOTE — H&P ADULT - NSHPPHYSICALEXAM_GEN_ALL_CORE
- Physical Exam in ED  * General Appearance: Alert, cooperative, interactive, oriented to time, place, and person, in no acute distress  * Head: Normocephalic, without obvious abnormality, atraumatic  * Eyes: PERRL, conjunctiva/corneas clear, EOM's intact, fundi benign, both eyes  * Thyroid:  No enlargement/tenderness/nodules; no carotid bruit or JVD  * Lungs: Respirations unlabored, Good bilateral air entry, normal breath sounds (Clear to auscultation bilaterally, no audible wheezes, crackles, or rhonchi)  * Heart: Regular Rate and Rhythm, normal S1 and S2, no audible murmur, rub, or gallop  * Abdomen: Symmetric, minimally distended, soft, non-tender, bowel sounds active all four quadrants, no masses  * Extremities: Extremities normal, atraumatic, no cyanosis, no lower extremity pitting edema bilaterally, adequate dorsalis pedis pulses  * Pulses: 2+ and symmetric all extremities  * Skin: Skin color, texture, turgor normal, no rashes or lesions  * Lymph nodes: Cervical, supraclavicular, and axillary nodes normal  * Neurologic: CNII-XII intact, normal strength, sensation and reflexes throughout

## 2022-11-22 NOTE — ED PROVIDER NOTE - IV ALTEPLASE EXCL ABS HIDDEN
Patient called and would like the below medication refilled and sent to [facility].      telmisartan-hydroCHLOROthiazide (MICARDIS HCT) 40-12.5 mg per tablet
Refill for requested medication was sent to [facility] as requested.
show

## 2022-11-23 ENCOUNTER — TRANSCRIPTION ENCOUNTER (OUTPATIENT)
Age: 77
End: 2022-11-23

## 2022-11-23 LAB
ALBUMIN SERPL ELPH-MCNC: 2.6 G/DL — LOW (ref 3.5–5.2)
ALP SERPL-CCNC: 159 U/L — HIGH (ref 30–115)
ALT FLD-CCNC: 24 U/L — SIGNIFICANT CHANGE UP (ref 0–41)
ANION GAP SERPL CALC-SCNC: 6 MMOL/L — LOW (ref 7–14)
AST SERPL-CCNC: 24 U/L — SIGNIFICANT CHANGE UP (ref 0–41)
BASOPHILS # BLD AUTO: 0.06 K/UL — SIGNIFICANT CHANGE UP (ref 0–0.2)
BASOPHILS NFR BLD AUTO: 1 % — SIGNIFICANT CHANGE UP (ref 0–1)
BILIRUB SERPL-MCNC: 1.8 MG/DL — HIGH (ref 0.2–1.2)
BUN SERPL-MCNC: 16 MG/DL — SIGNIFICANT CHANGE UP (ref 10–20)
CALCIUM SERPL-MCNC: 8.6 MG/DL — SIGNIFICANT CHANGE UP (ref 8.4–10.5)
CHLORIDE SERPL-SCNC: 98 MMOL/L — SIGNIFICANT CHANGE UP (ref 98–110)
CHOLEST SERPL-MCNC: 111 MG/DL — SIGNIFICANT CHANGE UP
CO2 SERPL-SCNC: 30 MMOL/L — SIGNIFICANT CHANGE UP (ref 17–32)
CREAT SERPL-MCNC: 0.7 MG/DL — SIGNIFICANT CHANGE UP (ref 0.7–1.5)
EGFR: 95 ML/MIN/1.73M2 — SIGNIFICANT CHANGE UP
EOSINOPHIL # BLD AUTO: 0.31 K/UL — SIGNIFICANT CHANGE UP (ref 0–0.7)
EOSINOPHIL NFR BLD AUTO: 5 % — SIGNIFICANT CHANGE UP (ref 0–8)
FOLATE SERPL-MCNC: 12.2 NG/ML — SIGNIFICANT CHANGE UP
GLUCOSE BLDC GLUCOMTR-MCNC: 151 MG/DL — HIGH (ref 70–99)
GLUCOSE BLDC GLUCOMTR-MCNC: 188 MG/DL — HIGH (ref 70–99)
GLUCOSE BLDC GLUCOMTR-MCNC: 323 MG/DL — HIGH (ref 70–99)
GLUCOSE BLDC GLUCOMTR-MCNC: 353 MG/DL — HIGH (ref 70–99)
GLUCOSE SERPL-MCNC: 177 MG/DL — HIGH (ref 70–99)
HAV IGM SER-ACNC: SIGNIFICANT CHANGE UP
HBV CORE IGM SER-ACNC: SIGNIFICANT CHANGE UP
HBV SURFACE AG SER-ACNC: SIGNIFICANT CHANGE UP
HCT VFR BLD CALC: 35.5 % — LOW (ref 42–52)
HCV AB S/CO SERPL IA: 0.13 S/CO — SIGNIFICANT CHANGE UP (ref 0–0.99)
HCV AB SERPL-IMP: SIGNIFICANT CHANGE UP
HDLC SERPL-MCNC: 50 MG/DL — SIGNIFICANT CHANGE UP
HGB BLD-MCNC: 12.8 G/DL — LOW (ref 14–18)
IMM GRANULOCYTES NFR BLD AUTO: 0.2 % — SIGNIFICANT CHANGE UP (ref 0.1–0.3)
LIPID PNL WITH DIRECT LDL SERPL: 48 MG/DL — SIGNIFICANT CHANGE UP
LYMPHOCYTES # BLD AUTO: 1.82 K/UL — SIGNIFICANT CHANGE UP (ref 1.2–3.4)
LYMPHOCYTES # BLD AUTO: 29.6 % — SIGNIFICANT CHANGE UP (ref 20.5–51.1)
MAGNESIUM SERPL-MCNC: 1.6 MG/DL — LOW (ref 1.8–2.4)
MCHC RBC-ENTMCNC: 35.9 PG — HIGH (ref 27–31)
MCHC RBC-ENTMCNC: 36.1 G/DL — SIGNIFICANT CHANGE UP (ref 32–37)
MCV RBC AUTO: 99.4 FL — HIGH (ref 80–94)
MONOCYTES # BLD AUTO: 0.67 K/UL — HIGH (ref 0.1–0.6)
MONOCYTES NFR BLD AUTO: 10.9 % — HIGH (ref 1.7–9.3)
NEUTROPHILS # BLD AUTO: 3.27 K/UL — SIGNIFICANT CHANGE UP (ref 1.4–6.5)
NEUTROPHILS NFR BLD AUTO: 53.3 % — SIGNIFICANT CHANGE UP (ref 42.2–75.2)
NON HDL CHOLESTEROL: 61 MG/DL — SIGNIFICANT CHANGE UP
NRBC # BLD: 0 /100 WBCS — SIGNIFICANT CHANGE UP (ref 0–0)
PLATELET # BLD AUTO: 123 K/UL — LOW (ref 130–400)
POTASSIUM SERPL-MCNC: 4.1 MMOL/L — SIGNIFICANT CHANGE UP (ref 3.5–5)
POTASSIUM SERPL-SCNC: 4.1 MMOL/L — SIGNIFICANT CHANGE UP (ref 3.5–5)
PROT SERPL-MCNC: 6 G/DL — SIGNIFICANT CHANGE UP (ref 6–8)
RBC # BLD: 3.57 M/UL — LOW (ref 4.7–6.1)
RBC # FLD: 13.5 % — SIGNIFICANT CHANGE UP (ref 11.5–14.5)
SODIUM SERPL-SCNC: 134 MMOL/L — LOW (ref 135–146)
TRIGL SERPL-MCNC: 65 MG/DL — SIGNIFICANT CHANGE UP
TROPONIN T SERPL-MCNC: 0.01 NG/ML — SIGNIFICANT CHANGE UP
TSH SERPL-MCNC: 1.26 UIU/ML — SIGNIFICANT CHANGE UP (ref 0.27–4.2)
VIT B12 SERPL-MCNC: 692 PG/ML — SIGNIFICANT CHANGE UP (ref 232–1245)
WBC # BLD: 6.14 K/UL — SIGNIFICANT CHANGE UP (ref 4.8–10.8)
WBC # FLD AUTO: 6.14 K/UL — SIGNIFICANT CHANGE UP (ref 4.8–10.8)

## 2022-11-23 PROCEDURE — 76705 ECHO EXAM OF ABDOMEN: CPT | Mod: 26

## 2022-11-23 PROCEDURE — 99223 1ST HOSP IP/OBS HIGH 75: CPT

## 2022-11-23 RX ORDER — MAGNESIUM SULFATE 500 MG/ML
2 VIAL (ML) INJECTION ONCE
Refills: 0 | Status: COMPLETED | OUTPATIENT
Start: 2022-11-23 | End: 2022-11-23

## 2022-11-23 RX ADMIN — CHLORHEXIDINE GLUCONATE 1 APPLICATION(S): 213 SOLUTION TOPICAL at 05:30

## 2022-11-23 RX ADMIN — MEMANTINE HYDROCHLORIDE 10 MILLIGRAM(S): 10 TABLET ORAL at 17:59

## 2022-11-23 RX ADMIN — ENOXAPARIN SODIUM 40 MILLIGRAM(S): 100 INJECTION SUBCUTANEOUS at 05:30

## 2022-11-23 RX ADMIN — PANTOPRAZOLE SODIUM 40 MILLIGRAM(S): 20 TABLET, DELAYED RELEASE ORAL at 05:30

## 2022-11-23 RX ADMIN — Medication 8: at 17:20

## 2022-11-23 RX ADMIN — Medication 81 MILLIGRAM(S): at 12:45

## 2022-11-23 RX ADMIN — Medication 25 GRAM(S): at 12:44

## 2022-11-23 RX ADMIN — DULOXETINE HYDROCHLORIDE 60 MILLIGRAM(S): 30 CAPSULE, DELAYED RELEASE ORAL at 17:59

## 2022-11-23 RX ADMIN — DONEPEZIL HYDROCHLORIDE 10 MILLIGRAM(S): 10 TABLET, FILM COATED ORAL at 21:47

## 2022-11-23 RX ADMIN — Medication 100 MILLIGRAM(S): at 17:59

## 2022-11-23 RX ADMIN — Medication 2: at 08:29

## 2022-11-23 NOTE — DISCHARGE NOTE NURSING/CASE MANAGEMENT/SOCIAL WORK - PATIENT PORTAL LINK FT
You can access the FollowMyHealth Patient Portal offered by [facility] by registering at the following website: [email or web address]. By joining [employer]’s FollowMyHealth portal, you will also be able to view your health information using other applications (apps) compatible with our system.

## 2022-11-23 NOTE — DISCHARGE NOTE NURSING/CASE MANAGEMENT/SOCIAL WORK - NSDCVIVACCINE_GEN_ALL_CORE_FT
Tdap; 19-Oct-2018 14:41; Deepthi Hamilton); [employer]; [id number] (Exp. Date: 10-Oct-2020); IntraMuscular; Deltoid Right.; 0.5 milliLiter(s); VIS (VIS Published: 09-May-2013, VIS Presented: 19-Oct-2018);

## 2022-11-23 NOTE — DISCHARGE NOTE NURSING/CASE MANAGEMENT/SOCIAL WORK - NSDCPEFALRISK_GEN_ALL_CORE
For information on Fall & Injury Prevention, visit: [email or web address] OR  [email or web address] OR  https://www.cdc.gov/steadi/patient.html

## 2022-11-23 NOTE — PROGRESS NOTE ADULT - ASSESSMENT
77 year old male patient with history of Dementia, Depression, OA, Gout, HTN, Liver Cirrhosis 2ry to Likely Hepatitis, and HFpEF who was brought to the ED on 11/22 following an episode of presyncope in setting of recent watery diarrhea on lactulose, found to have positive orthostatic vitals in ED, s/p IV fluids, to be admitted for further investigations, management, and monitoring. Currently hemodynamically stable.    Presyncope Likely Secondary to Orthostatic Hypotension  In Setting of Being on Lactulose, Recent Increase in Metformin, and Antihypertensive Medications  Unlikely Seizures or Vasovagal Episode  * Baseline: AO3, lives with wife, recently discharged from rehab; supposed to ambulate with help of walker  * ED vitals 115/56mmHg, HR 80bpm, RR 18bpm, T 36.7 degrees, SaO2 98% on RA  * Orthostatic Vitals + (126/57mmHg; 84bpm) -> (120/56mmHg; 82bpm) -> (97/52mmHg; 81bpm)  * s/p 1L LR bolus in ED  * ED Labs WBC 6.31, Hb 14.4, Plt 149K  * ED ECG noted    - In setting of positive orthostasis in setting of recent diarrhea with lactulose while on increased metformin dose and antihypertensives, will hold lactulose, metformin, and quinapril  - Start IV fluid hydration with LR at 75mL/hour. s/p 1L LR bolus in ED  - Monitor diarrhea and consider sending studies if persists off lactulose. Will not start imodium yet pending monitoring off lactulose  - Repeat orthostatic vitals on 11/23  - PT/OT evaluation  - Educated about importance of slow transitioning from supine to seated to standing position to prevent light headedness   - Would check Serum NH3 although patient does not seem encephalopathic on exam  - Will check CK since patient remained for 15 minutes on ground before he was able to stand up  - Will not order rEEG due to low suspicion of seizures and likely orthostasis. Check LA  - CT head without contrast noted  - Would check TTE. Trend troponin  - Monitor telemetry  - Check TSH, Folate, and B12 levels. Resume home Vitamin B6 QD  - No need for speech and swallow evaluation    Hypomagnesemia in Setting of Diarrhea on Lactulose  - Trend Mg and replete as indicated to Keep >2. ED Mg 1.2 s/p 2g x3 doses 11/22 -> follow up repeat Mg at 11:00 AM  - Hold lactulose 1 tablespoon BID for now  - Monitor diarrhea and consider sending studies if persists off lactulose  - Will not start imodium yet pending monitoring off lactulose  - Start IV fluid hydration with LR at 75mL/hour     Chronically Elevated LFTs  History of Hepatitis (likely C) s/p Rx several decades ago per wife and History of Liver Cirrhosis with No Current Signs of Cirrhosis Decompensation (Unable to Calculate MELD Score without INR)  Perisplenic, Gastrohepatic, and Omental Varices  * Follows Dr Stephens at [facility]  * Recent CT AP IC 10/2022 revealed nodular liver with perisplenic, gastrohepatic, and omental varices  * History of admission for HE with elevated SNH3 currently still on lactulose BID  * Surgical History: s/p CCY  - Outpatient GI follow up for EGD for varices  - Trend LFTs and check INR to calculate MELD Score  - Check hepatitis panel  - Will order Ultrasound abdomen (aware that patient is s/p CCY)  - Check serum NH3 although no signs of hepatic encephalopathy  - Will hold lactulose and monitor BMs    History of Hypertension  Current Orthostatic Hypotension  * Home meds Quinapril 20mg QD  * ED vitals 115/56mmHg, HR 80bpm, RR 18bpm, T 36.7 degrees, SaO2 98% on RA  * Orthostatic Vitals + (126/57mmHg; 84bpm) -> (120/56mmHg; 82bpm) -> (97/52mmHg; 81bpm)  - Monitor BP closely  - Repeat orthostatic vitals on 11/23  - IV fluid hydration as above  - Hold Quinapril 20mg QD for now    HFpEF- Not in Exacerbation  * Last TTE 10/15/2022 normal EF, DD I, mild MR  * ED SaO2 98% on RA  * CXR noted  - Monitor in/out  - Daily weight   - Monitor SaO2 and O2 requirements  - IV fluid hydration as above (not on diuretics at home)  - Monitor telemetry   - Trend electrolytes and keep K>4 and Mg>2  - Check TTE    Dementia  Depression  * Home meds Donepezil 10mg QD, Memantine 10mg QD, Duloxetine 60mg QD  - Resume Donepezil 10mg QD, Memantine 10mg QD, Duloxetine 60mg QD    DM II  * Last Hba1c 6.1 10/2022  * Recently increased metformin from 1g BID to 1.5g BID  - Not due to repeat Hba1c  - Monitor POCT   - Apidra Sliding Scale B for now  - Hold home DM meds    Osteoarthritis/ Gout  Chronic Rib Fractures of Left posterior T11 and T12 and recent Left 1st 4-6th and 8-10th rib fractures  - PT/OT as above    Others  - DVT Prophylaxis: Lovenox 40mg Subcutaneously daily  - GI Prophylaxis: Pantoprazole 40mg PO QD  - Diet: DASH/ TLC  - Code Status: Full     77 year old male patient with history of Dementia, Depression, OA, Gout, HTN, Liver Cirrhosis 2ry to Likely Hepatitis, and HFpEF who was brought to the ED on 11/22 following an episode of presyncope in setting of recent watery diarrhea on lactulose, found to have positive orthostatic vitals in ED, s/p IV fluids, to be admitted for further investigations, management, and monitoring. Currently hemodynamically stable.    #Presyncope likely 2/2 orthostatic hypotension in setting of lactulose, recent increase in metformin, and antihypertensives  - Orthostatic Vitals + (126/57mmHg; 84bpm) -> (120/56mmHg; 82bpm) -> (97/52mmHg; 81bpm)  - In setting of positive orthostasis, recent diarrhea with lactulose while on increased metformin dose and antihypertensives, will hold lactulose, metformin, and quinapril  - c/w IV fluid hydration with LR at 75mL/hour  - Monitor diarrhea and consider sending studies if persists off lactulose  - Repeat orthostatic vitals on 11/24  - PT/OT evaluation  - f/u TTE  - Monitor telemetry  - f/u TSH, Folate, and B12 levels    #Hypomagnesemia in Setting of Diarrhea on Lactulose   - 11/23 Mg 1.6 - repleted    #Chronically Elevated LFTs - follows Dr. Stephens at [facility]  #History of Hepatitis (likely C) s/p Rx several decades ago per wife and History of Liver Cirrhosis with No Current Signs of Cirrhosis Decompensation  #Perisplenic, Gastrohepatic, and Omental Varices on CTAP 10/2022  - Outpatient GI follow up for EGD for varices  - Trend LFTs. MELD score 18pts, 6% 3-month mortality  - f/u hepatitis panel  - Will order Ultrasound abdomen (aware that patient is s/p CCY)  - Check serum NH3 although no signs of hepatic encephalopathy  - Will hold lactulose and monitor BMs    #History of Hypertension  #Current Orthostatic Hypotension  - Orthostatic Vitals + (126/57mmHg; 84bpm) -> (120/56mmHg; 82bpm) -> (97/52mmHg; 81bpm)  - Monitor BP  - Hold home quinapril 20mg QD for now    #HFpEF- Not in Exacerbation  - Last TTE 10/15/2022 normal EF, DD I, mild MR  - IV fluid hydration as above (not on diuretics at home)  - Monitor telemetry   - Trend electrolytes and keep K>4 and Mg>2  - f/u TTE    #Dementia  #Depression  - c/w Donepezil 10mg QD, Memantine 10mg QD, Duloxetine 60mg QD    #DM II  - Last Hba1c 6.1 10/2022  - Recently increased metformin from 1g BID to 1.5g BID  - Monitor FS  - moderate insulin ss, adjust as needed    #Osteoarthritis/ Gout  #Chronic Rib Fractures of Left posterior T11 and T12 and recent Left 1st 4-6th and 8-10th rib fractures  - PT/OT    DVT Prophylaxis: Lovenox 40mg Subcutaneously daily  GI Prophylaxis: Pantoprazole 40mg PO QD  Diet: DASH/ TLC  Code Status: Full

## 2022-11-24 LAB
ALBUMIN SERPL ELPH-MCNC: 2.8 G/DL — LOW (ref 3.5–5.2)
ALP SERPL-CCNC: 174 U/L — HIGH (ref 30–115)
ALT FLD-CCNC: 22 U/L — SIGNIFICANT CHANGE UP (ref 0–41)
ANION GAP SERPL CALC-SCNC: 9 MMOL/L — SIGNIFICANT CHANGE UP (ref 7–14)
AST SERPL-CCNC: 25 U/L — SIGNIFICANT CHANGE UP (ref 0–41)
BASOPHILS # BLD AUTO: 0.06 K/UL — SIGNIFICANT CHANGE UP (ref 0–0.2)
BASOPHILS NFR BLD AUTO: 0.9 % — SIGNIFICANT CHANGE UP (ref 0–1)
BILIRUB SERPL-MCNC: 1.4 MG/DL — HIGH (ref 0.2–1.2)
BLD GP AB SCN SERPL QL: SIGNIFICANT CHANGE UP
BUN SERPL-MCNC: 15 MG/DL — SIGNIFICANT CHANGE UP (ref 10–20)
CALCIUM SERPL-MCNC: 8.6 MG/DL — SIGNIFICANT CHANGE UP (ref 8.4–10.4)
CHLORIDE SERPL-SCNC: 104 MMOL/L — SIGNIFICANT CHANGE UP (ref 98–110)
CO2 SERPL-SCNC: 28 MMOL/L — SIGNIFICANT CHANGE UP (ref 17–32)
CREAT SERPL-MCNC: 0.7 MG/DL — SIGNIFICANT CHANGE UP (ref 0.7–1.5)
EGFR: 95 ML/MIN/1.73M2 — SIGNIFICANT CHANGE UP
EOSINOPHIL # BLD AUTO: 0.37 K/UL — SIGNIFICANT CHANGE UP (ref 0–0.7)
EOSINOPHIL NFR BLD AUTO: 5.7 % — SIGNIFICANT CHANGE UP (ref 0–8)
GLUCOSE BLDC GLUCOMTR-MCNC: 171 MG/DL — HIGH (ref 70–99)
GLUCOSE BLDC GLUCOMTR-MCNC: 180 MG/DL — HIGH (ref 70–99)
GLUCOSE BLDC GLUCOMTR-MCNC: 306 MG/DL — HIGH (ref 70–99)
GLUCOSE BLDC GLUCOMTR-MCNC: 402 MG/DL — HIGH (ref 70–99)
GLUCOSE SERPL-MCNC: 169 MG/DL — HIGH (ref 70–99)
HCT VFR BLD CALC: 34.2 % — LOW (ref 42–52)
HGB BLD-MCNC: 12.4 G/DL — LOW (ref 14–18)
IMM GRANULOCYTES NFR BLD AUTO: 0.2 % — SIGNIFICANT CHANGE UP (ref 0.1–0.3)
LYMPHOCYTES # BLD AUTO: 1.89 K/UL — SIGNIFICANT CHANGE UP (ref 1.2–3.4)
LYMPHOCYTES # BLD AUTO: 29.1 % — SIGNIFICANT CHANGE UP (ref 20.5–51.1)
MAGNESIUM SERPL-MCNC: 1.4 MG/DL — LOW (ref 1.8–2.4)
MCHC RBC-ENTMCNC: 35.9 PG — HIGH (ref 27–31)
MCHC RBC-ENTMCNC: 36.3 G/DL — SIGNIFICANT CHANGE UP (ref 32–37)
MCV RBC AUTO: 99.1 FL — HIGH (ref 80–94)
MONOCYTES # BLD AUTO: 0.64 K/UL — HIGH (ref 0.1–0.6)
MONOCYTES NFR BLD AUTO: 9.8 % — HIGH (ref 1.7–9.3)
NEUTROPHILS # BLD AUTO: 3.53 K/UL — SIGNIFICANT CHANGE UP (ref 1.4–6.5)
NEUTROPHILS NFR BLD AUTO: 54.3 % — SIGNIFICANT CHANGE UP (ref 42.2–75.2)
NRBC # BLD: 0 /100 WBCS — SIGNIFICANT CHANGE UP (ref 0–0)
PLATELET # BLD AUTO: 117 K/UL — LOW (ref 130–400)
POTASSIUM SERPL-MCNC: 4.2 MMOL/L — SIGNIFICANT CHANGE UP (ref 3.5–5)
POTASSIUM SERPL-SCNC: 4.2 MMOL/L — SIGNIFICANT CHANGE UP (ref 3.5–5)
PROT SERPL-MCNC: 6 G/DL — SIGNIFICANT CHANGE UP (ref 6–8)
RBC # BLD: 3.45 M/UL — LOW (ref 4.7–6.1)
RBC # FLD: 13.5 % — SIGNIFICANT CHANGE UP (ref 11.5–14.5)
SODIUM SERPL-SCNC: 141 MMOL/L — SIGNIFICANT CHANGE UP (ref 135–146)
WBC # BLD: 6.5 K/UL — SIGNIFICANT CHANGE UP (ref 4.8–10.8)
WBC # FLD AUTO: 6.5 K/UL — SIGNIFICANT CHANGE UP (ref 4.8–10.8)

## 2022-11-24 PROCEDURE — 99233 SBSQ HOSP IP/OBS HIGH 50: CPT

## 2022-11-24 RX ORDER — SODIUM CHLORIDE 9 MG/ML
1000 INJECTION, SOLUTION INTRAVENOUS ONCE
Refills: 0 | Status: COMPLETED | OUTPATIENT
Start: 2022-11-24 | End: 2022-11-24

## 2022-11-24 RX ORDER — MAGNESIUM SULFATE 500 MG/ML
2 VIAL (ML) INJECTION ONCE
Refills: 0 | Status: COMPLETED | OUTPATIENT
Start: 2022-11-24 | End: 2022-11-24

## 2022-11-24 RX ADMIN — SODIUM CHLORIDE 75 MILLILITER(S): 9 INJECTION, SOLUTION INTRAVENOUS at 07:51

## 2022-11-24 RX ADMIN — DONEPEZIL HYDROCHLORIDE 10 MILLIGRAM(S): 10 TABLET, FILM COATED ORAL at 21:57

## 2022-11-24 RX ADMIN — Medication 81 MILLIGRAM(S): at 11:22

## 2022-11-24 RX ADMIN — CHLORHEXIDINE GLUCONATE 1 APPLICATION(S): 213 SOLUTION TOPICAL at 05:19

## 2022-11-24 RX ADMIN — PANTOPRAZOLE SODIUM 40 MILLIGRAM(S): 20 TABLET, DELAYED RELEASE ORAL at 06:10

## 2022-11-24 RX ADMIN — Medication 100 MILLIGRAM(S): at 11:22

## 2022-11-24 RX ADMIN — Medication 8: at 11:59

## 2022-11-24 RX ADMIN — SODIUM CHLORIDE 666.67 MILLILITER(S): 9 INJECTION, SOLUTION INTRAVENOUS at 09:05

## 2022-11-24 RX ADMIN — Medication 2: at 07:51

## 2022-11-24 RX ADMIN — Medication 12: at 17:14

## 2022-11-24 RX ADMIN — ENOXAPARIN SODIUM 40 MILLIGRAM(S): 100 INJECTION SUBCUTANEOUS at 05:20

## 2022-11-24 RX ADMIN — MEMANTINE HYDROCHLORIDE 10 MILLIGRAM(S): 10 TABLET ORAL at 11:22

## 2022-11-24 RX ADMIN — Medication 25 GRAM(S): at 09:17

## 2022-11-24 RX ADMIN — DULOXETINE HYDROCHLORIDE 60 MILLIGRAM(S): 30 CAPSULE, DELAYED RELEASE ORAL at 11:22

## 2022-11-24 NOTE — PROGRESS NOTE ADULT - ASSESSMENT
77 year old male with MH of HTN, DM 2, liver cirrhosis, dementia, HFpEF and gout was brought to ED after syncopal episode, patient was going to the bathroom, he stood up and walked few steps he felt dizzy and sat down on his knees, no loss of consciousness, no chest pain or palpitation, patient was c/o watery diarrhea for the last few days, patient takes Lactulose for hepatic encephalopathy. In the ED BP was stable, orthostatic changes were positive. Hb stable.     A/P:   Dizziness and Pre-Syncope: Likely due to orthostatic hypotension.   Orthostatic Hypotension: possibly from hypovolemia due to diarrhea vs autonomic dysfunction.   Still with severe orthostatic hypotension today, will give another 1l of IV fluid   Lactulose on hold, will need to resume it with lower dose and monitor for diarrhea.   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   Recent echo Oct 2022 showed normal LVEF. Mild TR,   lower extremities compression stocking and abdominal binder.     Chronic Liver cirrhosis: compensated. Stable  No ascites on exam.   INR .125, ,   Hepatology followup outpatient.   Resume Lactulose before discharge.     Chronic HFpEF: stable, euvolemic.     Dementia:   Continue Donepezil and Memantine.     DM type 2: A1C 6.1 controlled.     DVT Prophylaxis: Lovenox SC.   #Progress Note Handoff:  Pending (specify): improving orthostatic hypotension, PT.   Family discussion:  Disposition: Home vs STR.

## 2022-11-25 LAB
ALBUMIN SERPL ELPH-MCNC: 2.7 G/DL — LOW (ref 3.5–5.2)
ALP SERPL-CCNC: 150 U/L — HIGH (ref 30–115)
ALT FLD-CCNC: 22 U/L — SIGNIFICANT CHANGE UP (ref 0–41)
ANION GAP SERPL CALC-SCNC: 11 MMOL/L — SIGNIFICANT CHANGE UP (ref 7–14)
AST SERPL-CCNC: 27 U/L — SIGNIFICANT CHANGE UP (ref 0–41)
BASOPHILS # BLD AUTO: 0.04 K/UL — SIGNIFICANT CHANGE UP (ref 0–0.2)
BASOPHILS NFR BLD AUTO: 0.7 % — SIGNIFICANT CHANGE UP (ref 0–1)
BILIRUB SERPL-MCNC: 1.5 MG/DL — HIGH (ref 0.2–1.2)
BUN SERPL-MCNC: 12 MG/DL — SIGNIFICANT CHANGE UP (ref 10–20)
CALCIUM SERPL-MCNC: 8.3 MG/DL — LOW (ref 8.4–10.5)
CHLORIDE SERPL-SCNC: 109 MMOL/L — SIGNIFICANT CHANGE UP (ref 98–110)
CO2 SERPL-SCNC: 26 MMOL/L — SIGNIFICANT CHANGE UP (ref 17–32)
CREAT SERPL-MCNC: 0.6 MG/DL — LOW (ref 0.7–1.5)
EGFR: 99 ML/MIN/1.73M2 — SIGNIFICANT CHANGE UP
EOSINOPHIL # BLD AUTO: 0.41 K/UL — SIGNIFICANT CHANGE UP (ref 0–0.7)
EOSINOPHIL NFR BLD AUTO: 6.9 % — SIGNIFICANT CHANGE UP (ref 0–8)
GLUCOSE BLDC GLUCOMTR-MCNC: 176 MG/DL — HIGH (ref 70–99)
GLUCOSE BLDC GLUCOMTR-MCNC: 184 MG/DL — HIGH (ref 70–99)
GLUCOSE BLDC GLUCOMTR-MCNC: 241 MG/DL — HIGH (ref 70–99)
GLUCOSE BLDC GLUCOMTR-MCNC: 250 MG/DL — HIGH (ref 70–99)
GLUCOSE SERPL-MCNC: 173 MG/DL — HIGH (ref 70–99)
HCT VFR BLD CALC: 35.5 % — LOW (ref 42–52)
HCV RNA SPEC NAA+PROBE-LOG IU: SIGNIFICANT CHANGE UP IU/ML
HCV RNA SPEC NAA+PROBE-LOG IU: SIGNIFICANT CHANGE UP LOGIU/ML
HGB BLD-MCNC: 12.8 G/DL — LOW (ref 14–18)
IMM GRANULOCYTES NFR BLD AUTO: 0.3 % — SIGNIFICANT CHANGE UP (ref 0.1–0.3)
LYMPHOCYTES # BLD AUTO: 1.81 K/UL — SIGNIFICANT CHANGE UP (ref 1.2–3.4)
LYMPHOCYTES # BLD AUTO: 30.4 % — SIGNIFICANT CHANGE UP (ref 20.5–51.1)
MAGNESIUM SERPL-MCNC: 1.4 MG/DL — LOW (ref 1.8–2.4)
MCHC RBC-ENTMCNC: 35.7 PG — HIGH (ref 27–31)
MCHC RBC-ENTMCNC: 36.1 G/DL — SIGNIFICANT CHANGE UP (ref 32–37)
MCV RBC AUTO: 98.9 FL — HIGH (ref 80–94)
MONOCYTES # BLD AUTO: 0.52 K/UL — SIGNIFICANT CHANGE UP (ref 0.1–0.6)
MONOCYTES NFR BLD AUTO: 8.7 % — SIGNIFICANT CHANGE UP (ref 1.7–9.3)
NEUTROPHILS # BLD AUTO: 3.16 K/UL — SIGNIFICANT CHANGE UP (ref 1.4–6.5)
NEUTROPHILS NFR BLD AUTO: 53 % — SIGNIFICANT CHANGE UP (ref 42.2–75.2)
NRBC # BLD: 0 /100 WBCS — SIGNIFICANT CHANGE UP (ref 0–0)
PLATELET # BLD AUTO: 116 K/UL — LOW (ref 130–400)
POTASSIUM SERPL-MCNC: 4.6 MMOL/L — SIGNIFICANT CHANGE UP (ref 3.5–5)
POTASSIUM SERPL-SCNC: 4.6 MMOL/L — SIGNIFICANT CHANGE UP (ref 3.5–5)
PROT SERPL-MCNC: 5.7 G/DL — LOW (ref 6–8)
RBC # BLD: 3.59 M/UL — LOW (ref 4.7–6.1)
RBC # FLD: 13.3 % — SIGNIFICANT CHANGE UP (ref 11.5–14.5)
SODIUM SERPL-SCNC: 146 MMOL/L — SIGNIFICANT CHANGE UP (ref 135–146)
TROPONIN T SERPL-MCNC: <0.01 NG/ML — SIGNIFICANT CHANGE UP
WBC # BLD: 5.96 K/UL — SIGNIFICANT CHANGE UP (ref 4.8–10.8)
WBC # FLD AUTO: 5.96 K/UL — SIGNIFICANT CHANGE UP (ref 4.8–10.8)

## 2022-11-25 PROCEDURE — 99233 SBSQ HOSP IP/OBS HIGH 50: CPT

## 2022-11-25 PROCEDURE — 93010 ELECTROCARDIOGRAM REPORT: CPT

## 2022-11-25 RX ORDER — MAGNESIUM SULFATE 500 MG/ML
2 VIAL (ML) INJECTION
Refills: 0 | Status: COMPLETED | OUTPATIENT
Start: 2022-11-25 | End: 2022-11-25

## 2022-11-25 RX ORDER — INSULIN GLARGINE 100 [IU]/ML
10 INJECTION, SOLUTION SUBCUTANEOUS AT BEDTIME
Refills: 0 | Status: DISCONTINUED | OUTPATIENT
Start: 2022-11-25 | End: 2022-12-02

## 2022-11-25 RX ADMIN — PANTOPRAZOLE SODIUM 40 MILLIGRAM(S): 20 TABLET, DELAYED RELEASE ORAL at 05:47

## 2022-11-25 RX ADMIN — Medication 4: at 16:42

## 2022-11-25 RX ADMIN — Medication 100 MILLIGRAM(S): at 11:48

## 2022-11-25 RX ADMIN — DONEPEZIL HYDROCHLORIDE 10 MILLIGRAM(S): 10 TABLET, FILM COATED ORAL at 21:26

## 2022-11-25 RX ADMIN — Medication 25 GRAM(S): at 16:23

## 2022-11-25 RX ADMIN — CHLORHEXIDINE GLUCONATE 1 APPLICATION(S): 213 SOLUTION TOPICAL at 05:47

## 2022-11-25 RX ADMIN — MEMANTINE HYDROCHLORIDE 10 MILLIGRAM(S): 10 TABLET ORAL at 11:48

## 2022-11-25 RX ADMIN — Medication 2: at 09:41

## 2022-11-25 RX ADMIN — DULOXETINE HYDROCHLORIDE 60 MILLIGRAM(S): 30 CAPSULE, DELAYED RELEASE ORAL at 11:48

## 2022-11-25 RX ADMIN — Medication 25 GRAM(S): at 11:48

## 2022-11-25 RX ADMIN — Medication 81 MILLIGRAM(S): at 11:49

## 2022-11-25 RX ADMIN — ENOXAPARIN SODIUM 40 MILLIGRAM(S): 100 INJECTION SUBCUTANEOUS at 05:47

## 2022-11-25 RX ADMIN — INSULIN GLARGINE 10 UNIT(S): 100 INJECTION, SOLUTION SUBCUTANEOUS at 21:44

## 2022-11-25 NOTE — PROGRESS NOTE ADULT - ASSESSMENT
77 year old male with MH of HTN, DM 2, liver cirrhosis, dementia, HFpEF and gout was brought to ED after syncopal episode, patient was going to the bathroom, he stood up and walked few steps he felt dizzy and sat down on his knees, no loss of consciousness, no chest pain or palpitation, patient was c/o watery diarrhea for the last few days, patient takes Lactulose for hepatic encephalopathy. In the ED BP was stable, orthostatic changes were positive. Hb stable.     A/P:   Dizziness and Pre-Syncope: Likely due to orthostatic hypotension.   Orthostatic Hypotension: possibly from hypovolemia due to diarrhea vs autonomic dysfunction.   Still with severe orthostatic hypotension, no improvement with IV fluid challenge.   Lactulose on hold, will need to resume it with lower dose and monitor for diarrhea.   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   Recent echo Oct 2022 showed normal LVEF. Mild TR,   lower extremities compression stocking and abdominal binder.     Chronic Liver cirrhosis: compensated. Stable  No ascites on exam.   INR .125, ,   Hepatology followup outpatient.   Resume Lactulose before discharge.     Chronic HFpEF: stable, euvolemic.     Dementia:   Continue Donepezil and Memantine.     DM type 2: A1C 6.1 controlled.     DVT Prophylaxis: Lovenox SC.   #Progress Note Handoff:  Pending (specify): improving orthostatic hypotension.    Family discussion:  Disposition: Home vs STR.

## 2022-11-25 NOTE — PHYSICAL THERAPY INITIAL EVALUATION ADULT - PERTINENT HX OF CURRENT PROBLEM, REHAB EVAL
pt adm for dizziness, pre-syncope, h/o dementia as per chart
77 year old male patient with history of Dementia, Depression, OA, Gout, HTN, Liver Cirrhosis 2ry to Likely Hepatitis, and HFpEF who was brought to the ED on 11/22 following an episode of presyncope in setting of recent watery diarrhea on lactulose, found to have positive orthostatic vitals in ED, s/p IV fluids, to be admitted for further investigations, management, and monitoring. Currently hemodynamically stable.

## 2022-11-25 NOTE — OCCUPATIONAL THERAPY INITIAL EVALUATION ADULT - ADDITIONAL COMMENTS
As per pt report, resides in private house with steps to enter, full flight to bedroom/full bath. Pt may be a poor historian.

## 2022-11-25 NOTE — PHYSICAL THERAPY INITIAL EVALUATION ADULT - GENERAL OBSERVATIONS, REHAB EVAL
1430 - 1440 Attempted to see pt for for b/s PT with pt's wife at b/s, however pt present very drowsy and low energy. Pt kept eyes closed t/o most of discussion, and declined OOB with PT at this time. As per OT note, pt seen earlier in day but was too fatigued to attempt sit <> stand from EOB. As per NSG documentation, pt with +orthostatic hypotension when assessed this am and yesterday. Pt currently presents too fatigued to participate in OOB activity at this time, will f/u when appropriate. PT educated pt and pt's wife on PT POC, content of IE, and general rehab goals.
Chart reviewed, attempted to see pt for PT IE, pt rec in bed in NAD, pt declined PT, not really stating a reason why, just stated he wanted to stay in the bed, pt was also on the bedpan and finished, PCA informed, PT educated pt on the importance of amb and on UE/LE therex to be done while in bed, pt verbalized understanding, ROM assessed, social hx obtained, pt left as found in NAD, PCA to take pt off bedpan, will f/u for IE
Patient encountered lying in bed. A little drowsy but willing to participate in therapy.

## 2022-11-25 NOTE — PHYSICAL THERAPY INITIAL EVALUATION ADULT - IMPAIRED TRANSFERS: SIT/STAND, REHAB EVAL
Patient c/o dizziness on prolonged standing. Unable to take BP while standing./impaired balance/cognition/decreased flexibility

## 2022-11-25 NOTE — OCCUPATIONAL THERAPY INITIAL EVALUATION ADULT - GENERAL OBSERVATIONS, REHAB EVAL
Pt received and left semifowler in bed. + IV lock, + B/L sequentials, + abd binder, + TEDs. Vitals taken. supine  in bed: BP: 164-72 HR: 80 short-sitting on edge of bed: BP: 159/71 HR: 80. Pt unable to participate in  sit<>stand due to fatigue.

## 2022-11-25 NOTE — OCCUPATIONAL THERAPY INITIAL EVALUATION ADULT - RANGE OF MOTION EXAMINATION
L UE: against gravity: shoulder flexion 0-90*, wrist/digits WFL. Gravity eliminated plane: elbow flex/extension 0-90*./deficits as listed below

## 2022-11-25 NOTE — PHYSICAL THERAPY INITIAL EVALUATION ADULT - LEVEL OF INDEPENDENCE: GAIT, REHAB EVAL
Ptaient c/o dizziness on standing (+) abdominal binder in place. Patient (+) orthostatic hypotension/unable to perform

## 2022-11-25 NOTE — OCCUPATIONAL THERAPY INITIAL EVALUATION ADULT - PERTINENT HX OF CURRENT PROBLEM, REHAB EVAL
He was brought to the ED on 11/22 following an episode of presyncope.  History goes back to evening of 11/21 when the patient was laying supine and stood up abruptly to go to the bathroom. A few steps later, patient reported feeling light headed and decided to sit on knees.  Prior to episode, patient denied any chest pain, SOB, diaphoresis, or blacking out. During the episode, he had no HT or LOC or seizure like activity (no jerky limb movements, tongue biting, drooling, uprolling of eyes, or urinary/fecal incontinence). After the episode, he was able to stand up after 15 minutes, and was not confused/ could recall all event details. Of note, wife notes a recent fall while going down stairs in 10/2022 and another episode where he called her for help while on toilet few days PTP (found him with head on wall and unable to stand; no LOC then). She also notes that POCT has been 160-220's at home, and that the metformin dose has recently been increased from 1g to 1.5g BID.    On review of systems, wife notes that the patient has been having 3-4 episodes of watery non bloody non mucoid diarrhea for the last 3-4 days (patient has been on lactulose 1 tablespoon BID for a long time, but diarrhea is new per wife).
Allergic reaction
He was brought to the ED on 11/22 following an episode of presyncope.  History goes back to evening of 11/21 when the patient was laying supine and stood up abruptly to go to the bathroom. A few steps later, patient reported feeling light headed and decided to sit on knees.  Prior to episode, patient denied any chest pain, SOB, diaphoresis, or blacking out. During the episode, he had no HT or LOC or seizure like activity (no jerky limb movements, tongue biting, drooling, uprolling of eyes, or urinary/fecal incontinence). After the episode, he was able to stand up after 15 minutes, and was not confused/ could recall all event details. Of note, wife notes a recent fall while going down stairs in 10/2022 and another episode where he called her for help while on toilet few days PTP (found him with head on wall and unable to stand; no LOC then). She also notes that POCT has been 160-220's at home, and that the metformin dose has recently been increased from 1g to 1.5g BID.    On review of systems, wife notes that the patient has been having 3-4 episodes of watery non bloody non mucoid diarrhea for the last 3-4 days (patient has been on lactulose 1 tablespoon BID for a long time, but diarrhea is new per wife).

## 2022-11-25 NOTE — OCCUPATIONAL THERAPY INITIAL EVALUATION ADULT - NSACTIVITYREC_GEN_A_OT
Patient is dependent with activities of daily living. OT recommends D/C to  subacute rehabilitation facility when medically appropriate. OT recommends commode for safe D/C to next level of care. Recommendations for tub transfer to be determined prior to D/C. Currently, recommend sponge bath in bed. Refer to evaluation for details.

## 2022-11-25 NOTE — PHYSICAL THERAPY INITIAL EVALUATION ADULT - ADDITIONAL COMMENTS
5 SOL, 1 flight inside to bedroom
Patient lives with wife in house with 7 steps to enter. Patient claims he was independent in ADL's and ambulation using RW. HPI notes indicates patient recently d/c from rehab and requires assistance in ambulation using RW.

## 2022-11-25 NOTE — PROGRESS NOTE ADULT - ASSESSMENT
77 year old male patient with history of Dementia, Depression, OA, Gout, HTN, Liver Cirrhosis 2ry to Likely Hepatitis, and HFpEF who was brought to the ED on 11/22 following an episode of presyncope in setting of recent watery diarrhea on lactulose, found to have positive orthostatic vitals in ED, s/p IV fluids, to be admitted for further investigations, management, and monitoring. Currently hemodynamically stable.    #Presyncope likely 2/2 orthostatic hypotension in setting of lactulose, recent increase in metformin, and antihypertensives  - Orthostatic Vitals on admission (126/57mmHg; 84bpm) -> (120/56mmHg; 82bpm) -> (97/52mmHg; 81bpm)  - In setting of positive orthostasis, recent diarrhea with lactulose while on increased metformin dose and antihypertensives, will hold lactulose, metformin, and quinapril  - Monitor diarrhea and consider sending studies if persists off lactulose  - PT/OT evaluation  - f/u TTE  - Repeat orthostatic vitals on 11/24 143/70 supine -> 113/60 sit -> 84/43 stand  - Repeat orthostatic vitals on 11/25, 183/80 supine -> 133/73 sit -> 111/55 stand.   - cont using joey stocking, abd binder. cannot give midodrine given htn    #Hypomagnesemia in Setting of Diarrhea on Lactulose   - Mg remains low despite repletion, continue to replete as needed    #Chronically Elevated LFTs - follows Dr. Stephens at [facility]  #History of Hepatitis (likely C) s/p Rx several decades ago per wife and History of Liver Cirrhosis with No Current Signs of Cirrhosis Decompensation  #Perisplenic, Gastrohepatic, and Omental Varices on CTAP 10/2022  - Outpatient GI follow up for EGD for varices  - Trend LFTs. MELD score 18pts, 6% 3-month mortality  - hepatitis panel negative  - Will hold lactulose and monitor BMs    #Hypertension  #Orthostatic Hypotension  - orthostatic vitals on 11/25, 183/80 supine -> 133/73 sit -> 111/55 stand  - Monitor BP  - Hold home quinapril 20mg QD for now    #HFpEF- Not in Exacerbation  - Last TTE 10/15/2022 normal EF, DD I, mild MR  - not on diuretics at home  - Monitor telemetry   - Trend electrolytes and keep K>4 and Mg>2  - f/u TTE    #Dementia  #Depression  - c/w Donepezil 10mg QD, Memantine 10mg QD, Duloxetine 60mg QD    #DM II  - Last Hba1c 6.1 10/2022  - Recently increased metformin from 1g BID to 1.5g BID  - Monitor FS  - moderate insulin ss  - start lantus 10units    #Osteoarthritis/Gout  #Chronic Rib Fractures of Left posterior T11 and T12 and recent Left 1st 4-6th and 8-10th rib fractures  - PT/OT    DVT Prophylaxis: Lovenox 40mg Subcutaneously daily  GI Prophylaxis: Pantoprazole 40mg PO QD  Diet: DASH/ TLC  Code Status: Full

## 2022-11-25 NOTE — OCCUPATIONAL THERAPY INITIAL EVALUATION ADULT - PATIENT PROFILE REVIEW, REHAB EVAL
[id number] Pt chart thoroughly reviewed prior to OT evaluation./yes
[id number] Pt chart thoroughly reviewed prior to OT evaluation./yes

## 2022-11-26 ENCOUNTER — TRANSCRIPTION ENCOUNTER (OUTPATIENT)
Age: 77
End: 2022-11-26

## 2022-11-26 LAB
ALBUMIN SERPL ELPH-MCNC: 2.6 G/DL — LOW (ref 3.5–5.2)
ALP SERPL-CCNC: 136 U/L — HIGH (ref 30–115)
ALT FLD-CCNC: 20 U/L — SIGNIFICANT CHANGE UP (ref 0–41)
ANION GAP SERPL CALC-SCNC: 9 MMOL/L — SIGNIFICANT CHANGE UP (ref 7–14)
AST SERPL-CCNC: 22 U/L — SIGNIFICANT CHANGE UP (ref 0–41)
BASOPHILS # BLD AUTO: 0.05 K/UL — SIGNIFICANT CHANGE UP (ref 0–0.2)
BASOPHILS NFR BLD AUTO: 0.9 % — SIGNIFICANT CHANGE UP (ref 0–1)
BILIRUB SERPL-MCNC: 1.9 MG/DL — HIGH (ref 0.2–1.2)
BUN SERPL-MCNC: 11 MG/DL — SIGNIFICANT CHANGE UP (ref 10–20)
CALCIUM SERPL-MCNC: 8.5 MG/DL — SIGNIFICANT CHANGE UP (ref 8.4–10.5)
CHLORIDE SERPL-SCNC: 103 MMOL/L — SIGNIFICANT CHANGE UP (ref 98–110)
CO2 SERPL-SCNC: 27 MMOL/L — SIGNIFICANT CHANGE UP (ref 17–32)
CREAT SERPL-MCNC: 0.6 MG/DL — LOW (ref 0.7–1.5)
EGFR: 99 ML/MIN/1.73M2 — SIGNIFICANT CHANGE UP
EOSINOPHIL # BLD AUTO: 0.36 K/UL — SIGNIFICANT CHANGE UP (ref 0–0.7)
EOSINOPHIL NFR BLD AUTO: 6.7 % — SIGNIFICANT CHANGE UP (ref 0–8)
GLUCOSE BLDC GLUCOMTR-MCNC: 170 MG/DL — HIGH (ref 70–99)
GLUCOSE BLDC GLUCOMTR-MCNC: 232 MG/DL — HIGH (ref 70–99)
GLUCOSE BLDC GLUCOMTR-MCNC: 274 MG/DL — HIGH (ref 70–99)
GLUCOSE BLDC GLUCOMTR-MCNC: 345 MG/DL — HIGH (ref 70–99)
GLUCOSE SERPL-MCNC: 182 MG/DL — HIGH (ref 70–99)
HCT VFR BLD CALC: 36.3 % — LOW (ref 42–52)
HGB BLD-MCNC: 13.4 G/DL — LOW (ref 14–18)
IMM GRANULOCYTES NFR BLD AUTO: 0.2 % — SIGNIFICANT CHANGE UP (ref 0.1–0.3)
LYMPHOCYTES # BLD AUTO: 1.61 K/UL — SIGNIFICANT CHANGE UP (ref 1.2–3.4)
LYMPHOCYTES # BLD AUTO: 29.8 % — SIGNIFICANT CHANGE UP (ref 20.5–51.1)
MAGNESIUM SERPL-MCNC: 1.4 MG/DL — LOW (ref 1.8–2.4)
MCHC RBC-ENTMCNC: 36.4 PG — HIGH (ref 27–31)
MCHC RBC-ENTMCNC: 36.9 G/DL — SIGNIFICANT CHANGE UP (ref 32–37)
MCV RBC AUTO: 98.6 FL — HIGH (ref 80–94)
MONOCYTES # BLD AUTO: 0.59 K/UL — SIGNIFICANT CHANGE UP (ref 0.1–0.6)
MONOCYTES NFR BLD AUTO: 10.9 % — HIGH (ref 1.7–9.3)
NEUTROPHILS # BLD AUTO: 2.79 K/UL — SIGNIFICANT CHANGE UP (ref 1.4–6.5)
NEUTROPHILS NFR BLD AUTO: 51.5 % — SIGNIFICANT CHANGE UP (ref 42.2–75.2)
NRBC # BLD: 0 /100 WBCS — SIGNIFICANT CHANGE UP (ref 0–0)
PLATELET # BLD AUTO: 115 K/UL — LOW (ref 130–400)
POTASSIUM SERPL-MCNC: 4.2 MMOL/L — SIGNIFICANT CHANGE UP (ref 3.5–5)
POTASSIUM SERPL-SCNC: 4.2 MMOL/L — SIGNIFICANT CHANGE UP (ref 3.5–5)
PROT SERPL-MCNC: 5.9 G/DL — LOW (ref 6–8)
RBC # BLD: 3.68 M/UL — LOW (ref 4.7–6.1)
RBC # FLD: 13.4 % — SIGNIFICANT CHANGE UP (ref 11.5–14.5)
SODIUM SERPL-SCNC: 139 MMOL/L — SIGNIFICANT CHANGE UP (ref 135–146)
WBC # BLD: 5.41 K/UL — SIGNIFICANT CHANGE UP (ref 4.8–10.8)
WBC # FLD AUTO: 5.41 K/UL — SIGNIFICANT CHANGE UP (ref 4.8–10.8)

## 2022-11-26 PROCEDURE — 99233 SBSQ HOSP IP/OBS HIGH 50: CPT

## 2022-11-26 RX ORDER — MAGNESIUM OXIDE 400 MG ORAL TABLET 241.3 MG
400 TABLET ORAL
Refills: 0 | Status: DISCONTINUED | OUTPATIENT
Start: 2022-11-26 | End: 2022-12-02

## 2022-11-26 RX ORDER — LACTULOSE 10 G/15ML
10 SOLUTION ORAL THREE TIMES A DAY
Refills: 0 | Status: DISCONTINUED | OUTPATIENT
Start: 2022-11-26 | End: 2022-11-29

## 2022-11-26 RX ORDER — LACTULOSE 10 G/15ML
15 SOLUTION ORAL
Qty: 1350 | Refills: 0
Start: 2022-11-26 | End: 2022-12-25

## 2022-11-26 RX ORDER — MAGNESIUM OXIDE 400 MG ORAL TABLET 241.3 MG
1 TABLET ORAL
Qty: 28 | Refills: 0
Start: 2022-11-26 | End: 2022-12-09

## 2022-11-26 RX ORDER — QUINAPRIL HYDROCHLORIDE 40 MG/1
1 TABLET, FILM COATED ORAL
Qty: 0 | Refills: 0 | DISCHARGE

## 2022-11-26 RX ADMIN — MAGNESIUM OXIDE 400 MG ORAL TABLET 400 MILLIGRAM(S): 241.3 TABLET ORAL at 16:52

## 2022-11-26 RX ADMIN — Medication 100 MILLIGRAM(S): at 12:17

## 2022-11-26 RX ADMIN — LACTULOSE 10 GRAM(S): 10 SOLUTION ORAL at 21:55

## 2022-11-26 RX ADMIN — ENOXAPARIN SODIUM 40 MILLIGRAM(S): 100 INJECTION SUBCUTANEOUS at 05:31

## 2022-11-26 RX ADMIN — LACTULOSE 10 GRAM(S): 10 SOLUTION ORAL at 12:13

## 2022-11-26 RX ADMIN — Medication 8: at 12:14

## 2022-11-26 RX ADMIN — MEMANTINE HYDROCHLORIDE 10 MILLIGRAM(S): 10 TABLET ORAL at 12:18

## 2022-11-26 RX ADMIN — Medication 81 MILLIGRAM(S): at 12:34

## 2022-11-26 RX ADMIN — CHLORHEXIDINE GLUCONATE 1 APPLICATION(S): 213 SOLUTION TOPICAL at 05:31

## 2022-11-26 RX ADMIN — DULOXETINE HYDROCHLORIDE 60 MILLIGRAM(S): 30 CAPSULE, DELAYED RELEASE ORAL at 12:17

## 2022-11-26 RX ADMIN — PANTOPRAZOLE SODIUM 40 MILLIGRAM(S): 20 TABLET, DELAYED RELEASE ORAL at 05:31

## 2022-11-26 RX ADMIN — INSULIN GLARGINE 10 UNIT(S): 100 INJECTION, SOLUTION SUBCUTANEOUS at 21:55

## 2022-11-26 RX ADMIN — Medication 6: at 16:52

## 2022-11-26 RX ADMIN — DONEPEZIL HYDROCHLORIDE 10 MILLIGRAM(S): 10 TABLET, FILM COATED ORAL at 21:54

## 2022-11-26 RX ADMIN — Medication 2: at 08:13

## 2022-11-26 NOTE — DISCHARGE NOTE PROVIDER - PROVIDER TOKENS
PROVIDER:[TOKEN:[[id number]],FOLLOWUP:[2 weeks]],PROVIDER:[TOKEN:[[id number]],FOLLOWUP:[1-3 days]] PROVIDER:[TOKEN:[[id number]],FOLLOWUP:[2 weeks]],PROVIDER:[TOKEN:[[id number]],FOLLOWUP:[1-3 days]],PROVIDER:[TOKEN:[[id number]]] PROVIDER:[TOKEN:[[id number]],FOLLOWUP:[2 weeks]],PROVIDER:[TOKEN:[[id number]],FOLLOWUP:[1-3 days]],PROVIDER:[TOKEN:[[id number]],FOLLOWUP:[2 weeks]]

## 2022-11-26 NOTE — DISCHARGE NOTE PROVIDER - CARE PROVIDER_API CALL
DARLYN CARUSO  Internal Medicine  [address]  [place] [address]  Phone: [phone]  Fax: [phone]  Follow Up Time: 2 weeks    Adam Kirkland (DO)  Infectious Disease; Internal Medicine  [address] [place] [address]  Phone: [phone]  Fax: [phone]  Follow Up Time: 1-3 days   DARLYN CARUSO  Internal Medicine  [address]  [place] [address]  Phone: [phone]  Fax: [phone]  Follow Up Time: 2 weeks    Adam Kirkland ()  Infectious Disease; Internal Medicine  [address] [place] [address]  Phone: [phone]  Fax: [phone]  Follow Up Time: 1-3 days    Carl Camp)  EEGEpilepsy; Neurology  [address] [place] [address], [address]  [place] [address]  Phone: [phone]  Fax: [phone]  Follow Up Time:    DARLYN CARUSO  Internal Medicine  [address]  [place] [address]  Phone: [phone]  Fax: [phone]  Follow Up Time: 2 weeks    Adam Kirkland ()  Infectious Disease; Internal Medicine  [address] [place] [address]  Phone: [phone]  Fax: [phone]  Follow Up Time: 1-3 days    Loraine Levin)  Neurology  [address] [place] [address]  Phone: [phone]  Fax: [phone]  Follow Up Time: 2 weeks

## 2022-11-26 NOTE — DISCHARGE NOTE PROVIDER - HOSPITAL COURSE
Mr. Posada is a 77 year old male patient known to have:  - Baseline: AO3, lives with wife, recently discharged from rehab; supposed to ambulate with help of walker  - Dementia  - Depression  - Hypertension  - DM II. Last Hba1c 6.1 10/2022. Recently increased metformin from 1g BID to 1.5g BID  - History of Hepatitis (likely C) s/p Rx several decades ago per wife and history of Liver Cirrhosis by Dr Stephens at [facility]. Recent CT AP IC 10/2022 revealed nodular liver with perisplenic, gastrohepatic, and omental varices. History of admission for HE currently still on lactulose BID.  - HFpEF. Last TTE 10/15/2022 normal EF, DD I, mild MR  - Osteoarthritis/ Gout  - Chronic Rib Fractures of Left posterior T11 and T12 and recent Left 1st 4-6th and 8-10th rib fractures  - Surgical History: s/p CCY      He was brought to the ED on 11/22 following an episode of presyncope.  History goes back to evening of 11/21 when the patient was laying supine and stood up abruptly to go to the bathroom.  A few steps later, patient reported feeling light headed and decided to sit on knees.  Prior to episode, patient denied any chest pain, SOB, diaphoresis, or blacking out.  During the episode, he had no HT or LOC or seizure like activity (no jerky limb movements, tongue biting, drooling, uprolling of eyes, or urinary/fecal incontinence).  After the episode, he was able to stand up after 15 minutes, and was not confused/ could recall all event details.  Of note, wife notes a recent fall while going down stairs in 10/2022 and another episode where he called her for help while on toilet few days PTP (found him with head on wall and unable to stand; no LOC then).  She also notes that POCT has been 160-220's at home, and that the metformin dose has recently been increased from 1g to 1.5g BID.      On review of systems, wife notes that the patient has been having 3-4 episodes of watery non bloody non mucoid diarrhea for the last 3-4 days (patient has been on lactulose 1 tablespoon BID for a long time, but diarrhea is new per wife).   He otherwise denies any recent fever, chills, night sweats, URTI symptoms (cough, rhinorrhea, sore throat), urinary symptoms (urinary frequency, urgency, intermittence, dysuria, foul smelling urine, cloudy urine), change in bowel movements (diarrhea or constipation), abdominal pain, headache, nausea, or vomiting. No sick contacts. No recent travel or exposure to recent travelers.    #Presyncope likely 2/2 orthostatic hypotension in setting of lactulose, recent increase in metformin, and antihypertensives  - Orthostatic Vitals on admission (126/57mmHg; 84bpm) -> (120/56mmHg; 82bpm) -> (97/52mmHg; 81bpm)  - In setting of positive orthostasis, recent diarrhea with lactulose while on increased metformin dose and antihypertensives, lactulose, metformin, and quinapril held  - PT/OT evaluation: difficulty ambulating secondary to fatigue, rec home PT vs SAURAV  - Repeat orthostatic vitals on 11/24 143/70 supine -> 113/60 sit -> 84/43 stand  - Repeat orthostatic vitals on 11/25, 183/80 supine -> 133/73 sit -> 111/55 stand.   - Repeat orthostatic vitals on 11/25 afternoon, supine -> 164/72, sit -> 159/71  - cont using joey stocking, abd binder. cannot give midodrine given htn    #Chronically Elevated LFTs - follows Dr. Stephens at [facility]  #History of Hepatitis (likely C) s/p Rx several decades ago per wife and History of Liver Cirrhosis with No Current Signs of Cirrhosis Decompensation  #Perisplenic, Gastrohepatic, and Omental Varices on CTAP 10/2022  - AST/ALT normalized, ALP trending down, bilirubin elevated but stable MELD score 18pts, 6% 3-month mortality  - hepatitis panel negative  - Outpatient GI follow up for EGD for varices and restarting lactulose    #Hypertension  - Bp elevated off home meds, but given orthostasis will withhold meds on d/c  - Hold home quinapril 20mg QD for now    #HFpEF- Not in Exacerbation  - Last TTE 10/15/2022 normal EF, DD I, mild MR  - not on diuretics at home  - Monitor telemetry--> no event    #Dementia  #Depression  - c/w Donepezil 10mg QD, Memantine 10mg QD, Duloxetine 60mg QD    #DM II  - Last Hba1c 6.1 10/2022  - Recently increased metformin from 1g BID to 1.5g BID  - Monitor FS  - moderate insulin ss  - start lantus 10units    #Osteoarthritis/Gout  #Chronic Rib Fractures of Left posterior T11 and T12 and recent Left 1st 4-6th and 8-10th rib fractures  - PT/OT    #Hypomagnesemia in Setting of Diarrhea on Lactulose   - Mg remains low despite repletion  -will d/c with 14 day course of PO   Mr. Posada is a 77 year old male patient known to have:  - Baseline: AO3, lives with wife, recently discharged from rehab; supposed to ambulate with help of walker  - Dementia  - Depression  - Hypertension  - DM II. Last Hba1c 6.1 10/2022. Recently increased metformin from 1g BID to 1.5g BID  - History of Hepatitis (likely C) s/p Rx several decades ago per wife and history of Liver Cirrhosis by Dr Stephens at [facility]. Recent CT AP IC 10/2022 revealed nodular liver with perisplenic, gastrohepatic, and omental varices. History of admission for HE currently still on lactulose BID.  - HFpEF. Last TTE 10/15/2022 normal EF, DD I, mild MR  - Osteoarthritis/ Gout  - Chronic Rib Fractures of Left posterior T11 and T12 and recent Left 1st 4-6th and 8-10th rib fractures  - Surgical History: s/p CCY      He was brought to the ED on 11/22 following an episode of presyncope.  History goes back to evening of 11/21 when the patient was laying supine and stood up abruptly to go to the bathroom.  A few steps later, patient reported feeling light headed and decided to sit on knees.  Prior to episode, patient denied any chest pain, SOB, diaphoresis, or blacking out.  During the episode, he had no HT or LOC or seizure like activity (no jerky limb movements, tongue biting, drooling, uprolling of eyes, or urinary/fecal incontinence).  After the episode, he was able to stand up after 15 minutes, and was not confused/ could recall all event details.  Of note, wife notes a recent fall while going down stairs in 10/2022 and another episode where he called her for help while on toilet few days PTP (found him with head on wall and unable to stand; no LOC then).  She also notes that POCT has been 160-220's at home, and that the metformin dose has recently been increased from 1g to 1.5g BID.      On review of systems, wife notes that the patient has been having 3-4 episodes of watery non bloody non mucoid diarrhea for the last 3-4 days (patient has been on lactulose 1 tablespoon BID for a long time, but diarrhea is new per wife).   He otherwise denies any recent fever, chills, night sweats, URTI symptoms (cough, rhinorrhea, sore throat), urinary symptoms (urinary frequency, urgency, intermittence, dysuria, foul smelling urine, cloudy urine), change in bowel movements (diarrhea or constipation), abdominal pain, headache, nausea, or vomiting. No sick contacts. No recent travel or exposure to recent travelers.    #Presyncope likely 2/2 orthostatic hypotension in setting of lactulose, recent increase in metformin, and antihypertensives  - Orthostatic Vitals on admission (126/57mmHg; 84bpm) -> (120/56mmHg; 82bpm) -> (97/52mmHg; 81bpm)  - In setting of positive orthostasis, recent diarrhea with lactulose while on increased metformin dose and antihypertensives, lactulose, metformin, and quinapril held  - PT/OT evaluation: difficulty ambulating secondary to fatigue, rec home PT vs [facility]  - Repeat orthostatic vitals on 11/24 143/70 supine -> 113/60 sit -> 84/43 stand  - Repeat orthostatic vitals on 11/25, 183/80 supine -> 133/73 sit -> 111/55 stand.   - Repeat orthostatic vitals on 11/25 afternoon, supine -> 164/72, sit -> 159/71  - cont using joey stocking, abd binder. cannot give midodrine given htn    #Chronically Elevated LFTs - follows Dr. Stephens at [facility]  #History of Hepatitis (likely C) s/p Rx several decades ago per wife and History of Liver Cirrhosis with No Current Signs of Cirrhosis Decompensation  #Perisplenic, Gastrohepatic, and Omental Varices on CTAP 10/2022  - AST/ALT normalized, ALP trending down, bilirubin elevated but stable MELD score 18pts, 6% 3-month mortality  - hepatitis panel negative  - Outpatient GI follow up for EGD for varices    #Hypertension  - Bp elevated off home meds, but given orthostasis will withhold meds on d/c  - Hold home quinapril 20mg QD for now    #HFpEF- Not in Exacerbation  - Last TTE 10/15/2022 normal EF, DD I, mild MR  - not on diuretics at home  - Monitor telemetry--> no event    #Dementia  #Depression  - c/w Donepezil 10mg QD, Memantine 10mg QD, Duloxetine 60mg QD    #DM II  - Last Hba1c 6.1 10/2022  - Recently increased metformin from 1g BID to 1.5g BID  -discharge with glipizide and metformin    #Osteoarthritis/Gout  #Chronic Rib Fractures of Left posterior T11 and T12 and recent Left 1st 4-6th and 8-10th rib fractures  - PT/OT    #Hypomagnesemia in Setting of Diarrhea on Lactulose   - Mg remains low despite repletion  -will d/c with 14 day course of PO   Mr. Posada is a 77 year old male patient known to have:  - Baseline: AO3, lives with wife, recently discharged from rehab; supposed to ambulate with help of walker  - Dementia  - Depression  - Hypertension  - DM II. Last Hba1c 6.1 10/2022. Recently increased metformin from 1g BID to 1.5g BID  - History of Hepatitis (likely C) s/p Rx several decades ago per wife and history of Liver Cirrhosis by Dr Stephens at [facility]. Recent CT AP IC 10/2022 revealed nodular liver with perisplenic, gastrohepatic, and omental varices. History of admission for HE currently still on lactulose BID.  - HFpEF. Last TTE 10/15/2022 normal EF, DD I, mild MR  - Osteoarthritis/ Gout  - Chronic Rib Fractures of Left posterior T11 and T12 and recent Left 1st 4-6th and 8-10th rib fractures  - Surgical History: s/p CCY      He was brought to the ED on 11/22 following an episode of presyncope.  History goes back to evening of 11/21 when the patient was laying supine and stood up abruptly to go to the bathroom.  A few steps later, patient reported feeling light headed and decided to sit on knees.  Prior to episode, patient denied any chest pain, SOB, diaphoresis, or blacking out.  During the episode, he had no HT or LOC or seizure like activity (no jerky limb movements, tongue biting, drooling, uprolling of eyes, or urinary/fecal incontinence).  After the episode, he was able to stand up after 15 minutes, and was not confused/ could recall all event details.  Of note, wife notes a recent fall while going down stairs in 10/2022 and another episode where he called her for help while on toilet few days PTP (found him with head on wall and unable to stand; no LOC then).  She also notes that POCT has been 160-220's at home, and that the metformin dose has recently been increased from 1g to 1.5g BID.      On review of systems, wife notes that the patient has been having 3-4 episodes of watery non bloody non mucoid diarrhea for the last 3-4 days (patient has been on lactulose 1 tablespoon BID for a long time, but diarrhea is new per wife).   He otherwise denies any recent fever, chills, night sweats, URTI symptoms (cough, rhinorrhea, sore throat), urinary symptoms (urinary frequency, urgency, intermittence, dysuria, foul smelling urine, cloudy urine), change in bowel movements (diarrhea or constipation), abdominal pain, headache, nausea, or vomiting. No sick contacts. No recent travel or exposure to recent travelers.    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   - Recent echo Oct 2022 showed normal LVEF. Mild TR,   - In setting of positive orthostasis, recent diarrhea with lactulose while on increased metformin dose and antihypertensives, will lactulose, metformin, quinapril held  - cont using joey stocking, abd binder. cannot give midodrine given htn  - Replete Mg as needed , Will monitor  - Orthostatic bp repeated -> still positive    #Chronically Elevated LFTs  #History of Hepatitis (likely C)   - Compensated, follows Dr. Stephens at [facility]  - Perisplenic, Gastrohepatic, and Omental Varices on CTAP 10/2022  - Outpatient GI follow up for EGD for varices  - Trend LFTs. MELD score 18pts, 6% 3-month mortality  - hepatitis panel negative  - Lactulose Q4 since 11/29, low BM  - Lactulose increased to Q3 hours, had 1 BM on 12/1  - No Ascites on US abdomen 11/30    #Hypertension  - Quinapril held in setting of Orthostatic hypotension    #HFpEF- Not in Exacerbation  - Last TTE 10/15/2022 normal EF, DD I, mild MR  - not on diuretics at home  - Monitor telemetry   - Trend electrolytes and keep K>4 and Mg>2  - TTE 60% to 65% EF, (Grade I diastolic dysfunction)    #Dementia  #Depression  - c/w Donepezil 10mg QD, Memantine 10mg QD, Duloxetine 60mg QD    #Parkinson's  - On Carbidopa/Levodopa 25/100 TID, resumed on 11/28    #DM II  - Last Hba1c 6.1 10/2022  - Recently increased metformin from 1g BID to 1.5g BID  - Monitor FS  - moderate insulin ss  - On Lantus 10 units    #Osteoarthritis/Gout  #Chronic Rib Fractures of Left posterior T11 and T12 and recent Left 1st 4-6th and 8-10th rib fractures  - PT/OT

## 2022-11-26 NOTE — DISCHARGE NOTE PROVIDER - CARE PROVIDERS DIRECT ADDRESSES
,DirectAddress_Unknown,DirectAddress_Unknown ,DirectAddress_Unknown,DirectAddress_Unknown,[email or web address] ,DirectAddress_Unknown,DirectAddress_Unknown,DirectAddress_Unknown

## 2022-11-26 NOTE — PROGRESS NOTE ADULT - ASSESSMENT
77 year old male with MH of HTN, DM 2, liver cirrhosis, dementia, HFpEF and gout was brought to ED after syncopal episode, patient was going to the bathroom, he stood up and walked few steps he felt dizzy and sat down on his knees, no loss of consciousness, no chest pain or palpitation, patient was c/o watery diarrhea for the last few days, patient takes Lactulose for hepatic encephalopathy. In the ED BP was stable, orthostatic changes were positive. Hb stable.     A/P:   Dizziness and Pre-Syncope: Likely due to orthostatic hypotension.   Orthostatic Hypotension: possibly from hypovolemia due to diarrhea vs autonomic dysfunction.   Still with severe orthostatic hypotension, no improvement with IV fluid challenge.   Lactulose on hold, will resume it.   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   Recent echo Oct 2022 showed normal LVEF. Mild TR,   lower extremities compression stocking and abdominal binder.     Chronic Liver cirrhosis: compensated. Stable  No ascites on exam.   INR .125, ,   Hepatology followup outpatient.   Resume Lactulose.     Chronic HFpEF: stable, euvolemic.     Dementia:   Continue Donepezil and Memantine.     DM type 2: A1C 6.1 controlled.     DVT Prophylaxis: Lovenox SC.   #Progress Note Handoff:  Pending (specify): improving orthostatic hypotension.    Family discussion: with his wife and daughter, discussed discharge plan to STR  Disposition: STR

## 2022-11-26 NOTE — DISCHARGE NOTE PROVIDER - NSDCCPTREATMENT_GEN_ALL_CORE_FT
PRINCIPAL PROCEDURE  Procedure: CT head wo con  Findings and Treatment: There is prominence of the sulci, sylvian fissures,and ventricles,   reflecting stable mild diffuse parenchymal volume loss.  There are scattered patchy low attenuations in the bilateral   periventricular cerebral white matter consistent with moderate chronic   microvascular ischemic changes.  There is no intraparenchymal hematoma, mass effect or midline shift. No   abnormal extra-axial fluid collections are present.  The calvarium is intact. Left maxillary sinus polyp or retention cyst.   The visualized intraorbital compartments and mastoid complexes appear   free of acute disease.  IMPRESSION:  No acute intracranial pathology.        SECONDARY PROCEDURE  Procedure: US abdomen RUQ  Findings and Treatment: Liver: Limited evaluation due to overlying bowel gas. Cirrhotic liver,   better visualized on prior CT examination.  Bile ducts: Limited evaluation of intrahepatic biliary ducts. Common bile   duct measures 6 mm, within normal limits.  Gallbladder: History of cholecystectomy.  Pancreas: Poorly visualized due to bowel gas.  Right kidney: 9.7 cm. No hydronephrosis.  Ascites: None.  IVC: Visualized portions are within normal limits.  IMPRESSION:  Limited evaluation of the right upper abdominal quadrant due to overlying   bowel gas.  Post cholecystectomy.  Cirrhotic liver, better visualized on prior CT examination.

## 2022-11-26 NOTE — DISCHARGE NOTE PROVIDER - NSDCCPCAREPLAN_GEN_ALL_CORE_FT
PRINCIPAL DISCHARGE DIAGNOSIS  Diagnosis: Dizziness  Assessment and Plan of Treatment: -Your symptoms are likely secondary to orthostatic hypotension.   -Please follow up with Dr. Kirkland for re-initiation of your antihypertensives if indicated by him. Please remain off to reduce the impact of the drop in your blood pressure when you go from laying to sitting or sitting to standing  -Please take a minute in between sitting to standing to assess for dizziness or lightheadednes prior to starting to walk      SECONDARY DISCHARGE DIAGNOSES  Diagnosis: Pre-syncope  Assessment and Plan of Treatment:     Diagnosis: Liver cirrhosis  Assessment and Plan of Treatment: -Please follow up with Dr. Stephens for re-initiation of your lactulose given it likely added to your orthostatic hypotension     PRINCIPAL DISCHARGE DIAGNOSIS  Diagnosis: Dizziness  Assessment and Plan of Treatment: -Your symptoms are likely secondary to orthostatic hypotension.   -Please follow up with Dr. Kirkland for re-initiation of your antihypertensives if indicated by him. Please remain off to reduce the impact of the drop in your blood pressure when you go from laying to sitting or sitting to standing  -Please take a minute in between sitting to standing to assess for dizziness or lightheadednes prior to starting to walk  You were found to have orthostatic hypotension, this means that your blood pressure falls dependent on the position of your body. When you stand up, your body takes a few seconds to adjust in order to keep your blood pressure at a normal level. Your blood pressure did not adjust quickly, causing you to have low blood pressure which in turn causes you to "pass out." Medications can also contribute to this problem. In order to prevent this, you should maintain good hydration. Stand up slowly and give your body time to adjust before you start walking. You also have parkinsons disease that can contrinbute to dizziness as well. Use leg and abdominal binders and stockings to support drop in BP. PLease follow up with neurologist and PCP in  1 week.      SECONDARY DISCHARGE DIAGNOSES  Diagnosis: Pre-syncope  Assessment and Plan of Treatment:     Diagnosis: Liver cirrhosis  Assessment and Plan of Treatment: -Please follow up with Dr. Stephens for re-initiation of your lactulose given it likely added to your orthostatic hypotension     PRINCIPAL DISCHARGE DIAGNOSIS  Diagnosis: Dizziness  Assessment and Plan of Treatment: -Your symptoms are likely secondary to orthostatic hypotension.   -Please follow up with Dr. Kirkland for re-initiation of your antihypertensives if indicated by him. Please get up slowly to reduce the impact of the drop in your blood pressure when you go from laying to sitting or sitting to standing  -Please take a minute in between sitting to standing to assess for dizziness or lightheadednes prior to starting to walk  - Please wear tight stockings when out of bed  You were found to have orthostatic hypotension, this means that your blood pressure falls dependent on the position of your body. When you stand up, your body takes a few seconds to adjust in order to keep your blood pressure at a normal level. Your blood pressure did not adjust quickly, causing you to have low blood pressure which in turn causes you to "pass out." Medications can also contribute to this problem. In order to prevent this, you should maintain good hydration. Stand up slowly and give your body time to adjust before you start walking. You also have parkinsons disease that can contrinbute to dizziness as well. Use leg and abdominal binders and stockings to support drop in BP. PLease follow up with neurologist and PCP in  1 week.      SECONDARY DISCHARGE DIAGNOSES  Diagnosis: Liver cirrhosis  Assessment and Plan of Treatment: -Please follow up with Dr. Stephens for re-initiation of your lactulose given it likely added to your orthostatic hypotension

## 2022-11-26 NOTE — DISCHARGE NOTE PROVIDER - NPI NUMBER (FOR [employer] USE ONLY) :
[[id number] [[id number]] [[id number]]

## 2022-11-26 NOTE — DISCHARGE NOTE PROVIDER - NSDCMRMEDTOKEN_GEN_ALL_CORE_FT
aspirin 81 mg oral tablet: 1 tab(s) orally once a day  donepezil 10 mg oral tablet: 1 tab(s) orally once a day (at bedtime)  DULoxetine 60 mg oral delayed release capsule: 1 cap(s) orally once a day  glipiZIDE 2.5 mg oral tablet, extended release: 1 tab(s) orally once a day  latanoprost 0.005% ophthalmic solution: 1 drop(s) to each affected eye once a day (in the evening)  magnesium oxide 400 mg oral tablet: 1 tab(s) orally 2 times a day (with meals) (END 12/10/22)  memantine 10 mg oral tablet: 1 tab(s) orally once a day  metFORMIN 1000 mg oral tablet: 1.5 tab(s) orally 2 times a day  Vitamin B6 100 mg oral tablet: 1 tab(s) orally once a day  Vitamin D3 2000 intl units oral tablet: 1 tab(s) orally once a day   aspirin 81 mg oral tablet: 1 tab(s) orally once a day  donepezil 10 mg oral tablet: 1 tab(s) orally once a day (at bedtime)  DULoxetine 60 mg oral delayed release capsule: 1 cap(s) orally once a day  glipiZIDE 2.5 mg oral tablet, extended release: 1 tab(s) orally once a day  lactulose 10 g/15 mL oral syrup: 15 milliliter(s) orally 3 times a day  latanoprost 0.005% ophthalmic solution: 1 drop(s) to each affected eye once a day (in the evening)  magnesium oxide 400 mg oral tablet: 1 tab(s) orally 2 times a day (with meals) (END 12/10/22)  memantine 10 mg oral tablet: 1 tab(s) orally once a day  metFORMIN 1000 mg oral tablet: 1.5 tab(s) orally 2 times a day  Vitamin B6 100 mg oral tablet: 1 tab(s) orally once a day  Vitamin D3 2000 intl units oral tablet: 1 tab(s) orally once a day   aspirin 81 mg oral tablet: 1 tab(s) orally once a day  donepezil 10 mg oral tablet: 1 tab(s) orally once a day (at bedtime)  DULoxetine 60 mg oral delayed release capsule: 1 cap(s) orally once a day  glipiZIDE 2.5 mg oral tablet, extended release: 1 tab(s) orally once a day  lactulose 10 g/15 mL oral syrup: 15 milliliter(s) orally every 3 hours  latanoprost 0.005% ophthalmic solution: 1 drop(s) to each affected eye once a day (in the evening)  magnesium oxide 400 mg oral tablet: 1 tab(s) orally 2 times a day (with meals) (END 12/10/22)  memantine 10 mg oral tablet: 1 tab(s) orally once a day  metFORMIN 1000 mg oral tablet: 1.5 tab(s) orally 2 times a day  Vitamin B6 100 mg oral tablet: 1 tab(s) orally once a day  Vitamin D3 2000 intl units oral tablet: 1 tab(s) orally once a day   aspirin 81 mg oral tablet: 1 tab(s) orally once a day  carbidopa-levodopa 25 mg-100 mg oral tablet: 1 tab(s) orally 3 times a day  donepezil 10 mg oral tablet: 1 tab(s) orally once a day (at bedtime)  DULoxetine 60 mg oral delayed release capsule: 1 cap(s) orally once a day  enoxaparin: 40 milligram(s) subcutaneous once a day  glipiZIDE 2.5 mg oral tablet, extended release: 1 tab(s) orally once a day  lactulose 10 g/15 mL oral syrup: 30 milliliter(s) orally every 4 to 8 hours -4 BMs per day to prevent hepatic encephalopathy)  latanoprost 0.005% ophthalmic solution: 1 drop(s) to each affected eye once a day (in the evening)  magnesium oxide 400 mg oral tablet: 1 tab(s) orally 2 times a day (with meals) (END 12/10/22)  memantine 10 mg oral tablet: 1 tab(s) orally once a day  metFORMIN 1000 mg oral tablet: 1.5 tab(s) orally 2 times a day  Vitamin B6 100 mg oral tablet: 1 tab(s) orally once a day  Vitamin D3 2000 intl units oral tablet: 1 tab(s) orally once a day

## 2022-11-27 LAB
ALBUMIN SERPL ELPH-MCNC: 2.9 G/DL — LOW (ref 3.5–5.2)
ALP SERPL-CCNC: 125 U/L — HIGH (ref 30–115)
ALT FLD-CCNC: 20 U/L — SIGNIFICANT CHANGE UP (ref 0–41)
ANION GAP SERPL CALC-SCNC: 8 MMOL/L — SIGNIFICANT CHANGE UP (ref 7–14)
AST SERPL-CCNC: 22 U/L — SIGNIFICANT CHANGE UP (ref 0–41)
BASOPHILS # BLD AUTO: 0.05 K/UL — SIGNIFICANT CHANGE UP (ref 0–0.2)
BASOPHILS NFR BLD AUTO: 1 % — SIGNIFICANT CHANGE UP (ref 0–1)
BILIRUB SERPL-MCNC: 1.7 MG/DL — HIGH (ref 0.2–1.2)
BUN SERPL-MCNC: 12 MG/DL — SIGNIFICANT CHANGE UP (ref 10–20)
CALCIUM SERPL-MCNC: 8.6 MG/DL — SIGNIFICANT CHANGE UP (ref 8.4–10.5)
CHLORIDE SERPL-SCNC: 106 MMOL/L — SIGNIFICANT CHANGE UP (ref 98–110)
CO2 SERPL-SCNC: 26 MMOL/L — SIGNIFICANT CHANGE UP (ref 17–32)
CREAT SERPL-MCNC: 0.7 MG/DL — SIGNIFICANT CHANGE UP (ref 0.7–1.5)
EGFR: 95 ML/MIN/1.73M2 — SIGNIFICANT CHANGE UP
EOSINOPHIL # BLD AUTO: 0.26 K/UL — SIGNIFICANT CHANGE UP (ref 0–0.7)
EOSINOPHIL NFR BLD AUTO: 5 % — SIGNIFICANT CHANGE UP (ref 0–8)
GLUCOSE BLDC GLUCOMTR-MCNC: 158 MG/DL — HIGH (ref 70–99)
GLUCOSE BLDC GLUCOMTR-MCNC: 189 MG/DL — HIGH (ref 70–99)
GLUCOSE BLDC GLUCOMTR-MCNC: 191 MG/DL — HIGH (ref 70–99)
GLUCOSE BLDC GLUCOMTR-MCNC: 308 MG/DL — HIGH (ref 70–99)
GLUCOSE SERPL-MCNC: 176 MG/DL — HIGH (ref 70–99)
HCT VFR BLD CALC: 35.4 % — LOW (ref 42–52)
HGB BLD-MCNC: 12.9 G/DL — LOW (ref 14–18)
IMM GRANULOCYTES NFR BLD AUTO: 0.2 % — SIGNIFICANT CHANGE UP (ref 0.1–0.3)
LYMPHOCYTES # BLD AUTO: 1.66 K/UL — SIGNIFICANT CHANGE UP (ref 1.2–3.4)
LYMPHOCYTES # BLD AUTO: 31.6 % — SIGNIFICANT CHANGE UP (ref 20.5–51.1)
MAGNESIUM SERPL-MCNC: 1.2 MG/DL — LOW (ref 1.8–2.4)
MCHC RBC-ENTMCNC: 35.9 PG — HIGH (ref 27–31)
MCHC RBC-ENTMCNC: 36.4 G/DL — SIGNIFICANT CHANGE UP (ref 32–37)
MCV RBC AUTO: 98.6 FL — HIGH (ref 80–94)
MONOCYTES # BLD AUTO: 0.6 K/UL — SIGNIFICANT CHANGE UP (ref 0.1–0.6)
MONOCYTES NFR BLD AUTO: 11.4 % — HIGH (ref 1.7–9.3)
NEUTROPHILS # BLD AUTO: 2.67 K/UL — SIGNIFICANT CHANGE UP (ref 1.4–6.5)
NEUTROPHILS NFR BLD AUTO: 50.8 % — SIGNIFICANT CHANGE UP (ref 42.2–75.2)
NRBC # BLD: 0 /100 WBCS — SIGNIFICANT CHANGE UP (ref 0–0)
PLATELET # BLD AUTO: 118 K/UL — LOW (ref 130–400)
POTASSIUM SERPL-MCNC: 3.9 MMOL/L — SIGNIFICANT CHANGE UP (ref 3.5–5)
POTASSIUM SERPL-SCNC: 3.9 MMOL/L — SIGNIFICANT CHANGE UP (ref 3.5–5)
PROT SERPL-MCNC: 6 G/DL — SIGNIFICANT CHANGE UP (ref 6–8)
RBC # BLD: 3.59 M/UL — LOW (ref 4.7–6.1)
RBC # FLD: 13.4 % — SIGNIFICANT CHANGE UP (ref 11.5–14.5)
SODIUM SERPL-SCNC: 140 MMOL/L — SIGNIFICANT CHANGE UP (ref 135–146)
WBC # BLD: 5.25 K/UL — SIGNIFICANT CHANGE UP (ref 4.8–10.8)
WBC # FLD AUTO: 5.25 K/UL — SIGNIFICANT CHANGE UP (ref 4.8–10.8)

## 2022-11-27 PROCEDURE — 99232 SBSQ HOSP IP/OBS MODERATE 35: CPT

## 2022-11-27 RX ADMIN — PANTOPRAZOLE SODIUM 40 MILLIGRAM(S): 20 TABLET, DELAYED RELEASE ORAL at 06:27

## 2022-11-27 RX ADMIN — CHLORHEXIDINE GLUCONATE 1 APPLICATION(S): 213 SOLUTION TOPICAL at 06:28

## 2022-11-27 RX ADMIN — MAGNESIUM OXIDE 400 MG ORAL TABLET 400 MILLIGRAM(S): 241.3 TABLET ORAL at 17:22

## 2022-11-27 RX ADMIN — Medication 2: at 11:57

## 2022-11-27 RX ADMIN — ENOXAPARIN SODIUM 40 MILLIGRAM(S): 100 INJECTION SUBCUTANEOUS at 06:28

## 2022-11-27 RX ADMIN — LACTULOSE 10 GRAM(S): 10 SOLUTION ORAL at 13:35

## 2022-11-27 RX ADMIN — Medication 100 MILLIGRAM(S): at 11:57

## 2022-11-27 RX ADMIN — Medication 8: at 17:22

## 2022-11-27 RX ADMIN — Medication 81 MILLIGRAM(S): at 11:57

## 2022-11-27 RX ADMIN — MAGNESIUM OXIDE 400 MG ORAL TABLET 400 MILLIGRAM(S): 241.3 TABLET ORAL at 07:57

## 2022-11-27 RX ADMIN — MEMANTINE HYDROCHLORIDE 10 MILLIGRAM(S): 10 TABLET ORAL at 11:57

## 2022-11-27 RX ADMIN — LACTULOSE 10 GRAM(S): 10 SOLUTION ORAL at 06:27

## 2022-11-27 RX ADMIN — INSULIN GLARGINE 10 UNIT(S): 100 INJECTION, SOLUTION SUBCUTANEOUS at 21:54

## 2022-11-27 RX ADMIN — Medication 2: at 08:13

## 2022-11-27 RX ADMIN — LACTULOSE 10 GRAM(S): 10 SOLUTION ORAL at 21:53

## 2022-11-27 RX ADMIN — DULOXETINE HYDROCHLORIDE 60 MILLIGRAM(S): 30 CAPSULE, DELAYED RELEASE ORAL at 11:58

## 2022-11-27 RX ADMIN — DONEPEZIL HYDROCHLORIDE 10 MILLIGRAM(S): 10 TABLET, FILM COATED ORAL at 21:53

## 2022-11-27 NOTE — PROGRESS NOTE ADULT - ASSESSMENT
77 year old male with MH of HTN, DM 2, liver cirrhosis, dementia, HFpEF and gout was brought to ED after syncopal episode, patient was going to the bathroom, he stood up and walked few steps he felt dizzy and sat down on his knees, no loss of consciousness, no chest pain or palpitation, patient was c/o watery diarrhea for the last few days, patient takes Lactulose for hepatic encephalopathy. In the ED BP was stable, orthostatic changes were positive. Hb stable.     A/P:   Dizziness and Pre-Syncope: Likely due to orthostatic hypotension.   Orthostatic Hypotension: possibly from hypovolemia due to diarrhea vs autonomic dysfunction.   Still with severe orthostatic hypotension, no improvement with IV fluid challenge.   Lactulose on hold, will resume it.   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   Recent echo Oct 2022 showed normal LVEF. Mild TR,   lower extremities compression stocking and abdominal binder.     Chronic Liver cirrhosis: compensated. Stable  No ascites on exam.   INR .125, ,   Hepatology followup outpatient.   Lactulose resumed.     Chronic HFpEF: stable, euvolemic.     Dementia:   Continue Donepezil and Memantine.     DM type 2: A1C 6.1 controlled.     DVT Prophylaxis: Lovenox SC.   #Progress Note Handoff:  Pending (specify): placement.     Family discussion: with his wife and daughter, discussed discharge plan to STR  Disposition: STR

## 2022-11-28 LAB
ALBUMIN SERPL ELPH-MCNC: 2.7 G/DL — LOW (ref 3.5–5.2)
ALP SERPL-CCNC: 123 U/L — HIGH (ref 30–115)
ALT FLD-CCNC: 22 U/L — SIGNIFICANT CHANGE UP (ref 0–41)
ANION GAP SERPL CALC-SCNC: 8 MMOL/L — SIGNIFICANT CHANGE UP (ref 7–14)
AST SERPL-CCNC: 27 U/L — SIGNIFICANT CHANGE UP (ref 0–41)
BASOPHILS # BLD AUTO: 0.07 K/UL — SIGNIFICANT CHANGE UP (ref 0–0.2)
BASOPHILS NFR BLD AUTO: 1.2 % — HIGH (ref 0–1)
BILIRUB SERPL-MCNC: 1.6 MG/DL — HIGH (ref 0.2–1.2)
BUN SERPL-MCNC: 12 MG/DL — SIGNIFICANT CHANGE UP (ref 10–20)
CALCIUM SERPL-MCNC: 8.6 MG/DL — SIGNIFICANT CHANGE UP (ref 8.4–10.5)
CHLORIDE SERPL-SCNC: 105 MMOL/L — SIGNIFICANT CHANGE UP (ref 98–110)
CO2 SERPL-SCNC: 26 MMOL/L — SIGNIFICANT CHANGE UP (ref 17–32)
CREAT SERPL-MCNC: 0.7 MG/DL — SIGNIFICANT CHANGE UP (ref 0.7–1.5)
EGFR: 95 ML/MIN/1.73M2 — SIGNIFICANT CHANGE UP
EOSINOPHIL # BLD AUTO: 0.28 K/UL — SIGNIFICANT CHANGE UP (ref 0–0.7)
EOSINOPHIL NFR BLD AUTO: 4.6 % — SIGNIFICANT CHANGE UP (ref 0–8)
GLUCOSE BLDC GLUCOMTR-MCNC: 151 MG/DL — HIGH (ref 70–99)
GLUCOSE BLDC GLUCOMTR-MCNC: 231 MG/DL — HIGH (ref 70–99)
GLUCOSE BLDC GLUCOMTR-MCNC: 238 MG/DL — HIGH (ref 70–99)
GLUCOSE BLDC GLUCOMTR-MCNC: 265 MG/DL — HIGH (ref 70–99)
GLUCOSE SERPL-MCNC: 143 MG/DL — HIGH (ref 70–99)
HCT VFR BLD CALC: 36.5 % — LOW (ref 42–52)
HGB BLD-MCNC: 13.5 G/DL — LOW (ref 14–18)
IMM GRANULOCYTES NFR BLD AUTO: 1.8 % — HIGH (ref 0.1–0.3)
LYMPHOCYTES # BLD AUTO: 2.06 K/UL — SIGNIFICANT CHANGE UP (ref 1.2–3.4)
LYMPHOCYTES # BLD AUTO: 34.2 % — SIGNIFICANT CHANGE UP (ref 20.5–51.1)
MAGNESIUM SERPL-MCNC: 1.3 MG/DL — LOW (ref 1.8–2.4)
MCHC RBC-ENTMCNC: 36.2 PG — HIGH (ref 27–31)
MCHC RBC-ENTMCNC: 37 G/DL — SIGNIFICANT CHANGE UP (ref 32–37)
MCV RBC AUTO: 97.9 FL — HIGH (ref 80–94)
MONOCYTES # BLD AUTO: 0.68 K/UL — HIGH (ref 0.1–0.6)
MONOCYTES NFR BLD AUTO: 11.3 % — HIGH (ref 1.7–9.3)
NEUTROPHILS # BLD AUTO: 2.83 K/UL — SIGNIFICANT CHANGE UP (ref 1.4–6.5)
NEUTROPHILS NFR BLD AUTO: 46.9 % — SIGNIFICANT CHANGE UP (ref 42.2–75.2)
NRBC # BLD: 0 /100 WBCS — SIGNIFICANT CHANGE UP (ref 0–0)
PLATELET # BLD AUTO: 128 K/UL — LOW (ref 130–400)
POTASSIUM SERPL-MCNC: 3.9 MMOL/L — SIGNIFICANT CHANGE UP (ref 3.5–5)
POTASSIUM SERPL-SCNC: 3.9 MMOL/L — SIGNIFICANT CHANGE UP (ref 3.5–5)
PROT SERPL-MCNC: 5.8 G/DL — LOW (ref 6–8)
RBC # BLD: 3.73 M/UL — LOW (ref 4.7–6.1)
RBC # FLD: 13.2 % — SIGNIFICANT CHANGE UP (ref 11.5–14.5)
SARS-COV-2 RNA SPEC QL NAA+PROBE: SIGNIFICANT CHANGE UP
SODIUM SERPL-SCNC: 139 MMOL/L — SIGNIFICANT CHANGE UP (ref 135–146)
WBC # BLD: 6.03 K/UL — SIGNIFICANT CHANGE UP (ref 4.8–10.8)
WBC # FLD AUTO: 6.03 K/UL — SIGNIFICANT CHANGE UP (ref 4.8–10.8)

## 2022-11-28 PROCEDURE — 93306 TTE W/DOPPLER COMPLETE: CPT | Mod: 26

## 2022-11-28 PROCEDURE — 99232 SBSQ HOSP IP/OBS MODERATE 35: CPT

## 2022-11-28 RX ORDER — MAGNESIUM OXIDE 400 MG ORAL TABLET 241.3 MG
400 TABLET ORAL ONCE
Refills: 0 | Status: COMPLETED | OUTPATIENT
Start: 2022-11-28 | End: 2022-11-28

## 2022-11-28 RX ORDER — CARBIDOPA AND LEVODOPA 25; 100 MG/1; MG/1
1 TABLET ORAL THREE TIMES A DAY
Refills: 0 | Status: DISCONTINUED | OUTPATIENT
Start: 2022-11-28 | End: 2022-12-02

## 2022-11-28 RX ORDER — MAGNESIUM SULFATE 500 MG/ML
2 VIAL (ML) INJECTION
Refills: 0 | Status: COMPLETED | OUTPATIENT
Start: 2022-11-28 | End: 2022-11-28

## 2022-11-28 RX ADMIN — DONEPEZIL HYDROCHLORIDE 10 MILLIGRAM(S): 10 TABLET, FILM COATED ORAL at 22:29

## 2022-11-28 RX ADMIN — CHLORHEXIDINE GLUCONATE 1 APPLICATION(S): 213 SOLUTION TOPICAL at 05:09

## 2022-11-28 RX ADMIN — CARBIDOPA AND LEVODOPA 1 TABLET(S): 25; 100 TABLET ORAL at 22:29

## 2022-11-28 RX ADMIN — ENOXAPARIN SODIUM 40 MILLIGRAM(S): 100 INJECTION SUBCUTANEOUS at 05:09

## 2022-11-28 RX ADMIN — Medication 6: at 17:33

## 2022-11-28 RX ADMIN — Medication 4: at 13:39

## 2022-11-28 RX ADMIN — LACTULOSE 10 GRAM(S): 10 SOLUTION ORAL at 17:33

## 2022-11-28 RX ADMIN — MAGNESIUM OXIDE 400 MG ORAL TABLET 400 MILLIGRAM(S): 241.3 TABLET ORAL at 17:36

## 2022-11-28 RX ADMIN — Medication 25 GRAM(S): at 17:38

## 2022-11-28 RX ADMIN — Medication 100 MILLIGRAM(S): at 13:41

## 2022-11-28 RX ADMIN — INSULIN GLARGINE 10 UNIT(S): 100 INJECTION, SOLUTION SUBCUTANEOUS at 22:29

## 2022-11-28 RX ADMIN — MAGNESIUM OXIDE 400 MG ORAL TABLET 400 MILLIGRAM(S): 241.3 TABLET ORAL at 22:29

## 2022-11-28 RX ADMIN — DULOXETINE HYDROCHLORIDE 60 MILLIGRAM(S): 30 CAPSULE, DELAYED RELEASE ORAL at 13:42

## 2022-11-28 RX ADMIN — Medication 2: at 08:27

## 2022-11-28 RX ADMIN — MAGNESIUM OXIDE 400 MG ORAL TABLET 400 MILLIGRAM(S): 241.3 TABLET ORAL at 08:28

## 2022-11-28 RX ADMIN — Medication 81 MILLIGRAM(S): at 13:42

## 2022-11-28 RX ADMIN — LACTULOSE 10 GRAM(S): 10 SOLUTION ORAL at 22:29

## 2022-11-28 RX ADMIN — LACTULOSE 10 GRAM(S): 10 SOLUTION ORAL at 05:09

## 2022-11-28 RX ADMIN — MEMANTINE HYDROCHLORIDE 10 MILLIGRAM(S): 10 TABLET ORAL at 13:41

## 2022-11-28 RX ADMIN — Medication 25 GRAM(S): at 18:56

## 2022-11-28 NOTE — PROGRESS NOTE ADULT - ASSESSMENT
77 year old male patient with history of Dementia, Depression, OA, Gout, HTN, Liver Cirrhosis 2ry to Likely Hepatitis, and HFpEF who was brought to the ED on 11/22 following an episode of presyncope in setting of recent watery diarrhea on lactulose, found to have positive orthostatic vitals in ED, s/p IV fluids, to be admitted for further investigations, management, and monitoring. Currently hemodynamically stable.    #Presyncope likely 2/2 orthostatic hypotension in setting of lactulose,   - recent increase in metformin and antihypertensives  - Orthostatic Vitals on admission -> (120/56mmHg; 82bpm) -> (97/52mmHg; 81bpm)  - In setting of positive orthostasis, recent diarrhea with lactulose while on increased metformin dose and antihypertensives, will lactulose, metformin, quinapril held  - PT/OT evaluation  - f/u TTE  - cont using joey stocking, abd binder. cannot give midodrine given htn    #Hypomagnesemia in Setting of Diarrhea on Lactulose   - Mg remains low despite repletion, continue to replete as needed    #Chronically Elevated LFTs - follows Dr. Stephens at [facility]  #History of Hepatitis (likely C) s/p Rx several decades ago per wife and History of Liver Cirrhosis with No Current Signs of Cirrhosis Decompensation  #Perisplenic, Gastrohepatic, and Omental Varices on CTAP 10/2022  - Outpatient GI follow up for EGD for varices  - Trend LFTs. MELD score 18pts, 6% 3-month mortality  - hepatitis panel negative  - Will hold lactulose and monitor BMs    #Hypertension  #Orthostatic Hypotension  - orthostatic vitals on 11/25, 183/80 supine -> 133/73 sit -> 111/55 stand  - Monitor BP  - Hold home quinapril 20mg QD for now    #HFpEF- Not in Exacerbation  - Last TTE 10/15/2022 normal EF, DD I, mild MR  - not on diuretics at home  - Monitor telemetry   - Trend electrolytes and keep K>4 and Mg>2  - f/u TTE    #Dementia  #Depression  - c/w Donepezil 10mg QD, Memantine 10mg QD, Duloxetine 60mg QD    #DM II  - Last Hba1c 6.1 10/2022  - Recently increased metformin from 1g BID to 1.5g BID  - Monitor FS  - moderate insulin ss  - start lantus 10units    #Osteoarthritis/Gout  #Chronic Rib Fractures of Left posterior T11 and T12 and recent Left 1st 4-6th and 8-10th rib fractures  - PT/OT    DVT Prophylaxis: Lovenox 40mg Subcutaneously daily  GI Prophylaxis: Pantoprazole 40mg PO QD  Diet: DASH/ TLC  Code Status: Full    A/P:   Dizziness and Pre-Syncope: Likely due to orthostatic hypotension.   Orthostatic Hypotension: possibly from hypovolemia due to diarrhea vs autonomic dysfunction.   Still with severe orthostatic hypotension, no improvement with IV fluid challenge.   Lactulose on hold, will resume it.   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   Recent echo Oct 2022 showed normal LVEF. Mild TR,   lower extremities compression stocking and abdominal binder.     Chronic Liver cirrhosis: compensated. Stable  No ascites on exam.   INR .125, ,   Hepatology followup outpatient.   Lactulose resumed.     Chronic HFpEF: stable, euvolemic.     Dementia:   Continue Donepezil and Memantine.     DM type 2: A1C 6.1 controlled.        77 year old male patient with history of Dementia, Depression, OA, Gout, HTN, Liver Cirrhosis 2ry to Likely Hepatitis, and HFpEF who was brought to the ED on 11/22 following an episode of presyncope in setting of recent watery diarrhea on lactulose, found to have positive orthostatic vitals in ED, s/p IV fluids, to be admitted for further investigations, management, and monitoring. Currently hemodynamically stable.    #Presyncope likely 2/2 orthostatic hypotension in setting of lactulose,   - recent increase in metformin and antihypertensives  - Orthostatic Vitals on admission -> (120/56mmHg; 82bpm) -> (97/52mmHg; 81bpm)  - In setting of positive orthostasis, recent diarrhea with lactulose while on increased metformin dose and antihypertensives, will lactulose, metformin, quinapril held  - PT/OT evaluation  - f/u TTE  - cont using joey stocking, abd binder. cannot give midodrine given htn    #Hypomagnesemia  - Setting of Diarrhea on Lactulose   - Replete as needed , Will monitor    #Chronically Elevated LFTs  #History of Hepatitis (likely C)   - follows Dr. Stephens at [facility]  - Not decompensated  - Perisplenic, Gastrohepatic, and Omental Varices on CTAP 10/2022  - Outpatient GI follow up for EGD for varices  - Trend LFTs. MELD score 18pts, 6% 3-month mortality  - hepatitis panel negative  - Lactulose resumed, will Monitor BM    #Hypertension  #Orthostatic Hypotension  - orthostatic vitals on 11/25, 183/80 supine -> 133/73 sit -> 111/55 stand  - Monitor BP  - Hold home quinapril 20mg QD for now    #HFpEF- Not in Exacerbation  - Last TTE 10/15/2022 normal EF, DD I, mild MR  - not on diuretics at home  - Monitor telemetry   - Trend electrolytes and keep K>4 and Mg>2  - f/u TTE    #Dementia  #Depression  - c/w Donepezil 10mg QD, Memantine 10mg QD, Duloxetine 60mg QD    #DM II  - Last Hba1c 6.1 10/2022  - Recently increased metformin from 1g BID to 1.5g BID  - Monitor FS  - moderate insulin ss  - start lantus 10units    #Osteoarthritis/Gout  #Chronic Rib Fractures of Left posterior T11 and T12 and recent Left 1st 4-6th and 8-10th rib fractures  - PT/OT    DVT Prophylaxis: Lovenox 40mg Subcutaneously daily  GI Prophylaxis: Pantoprazole 40mg PO QD  Diet: DASH/ TLC  Code Status: Full    A/P:   Dizziness and Pre-Syncope: Likely due to orthostatic hypotension.   Orthostatic Hypotension: possibly from hypovolemia due to diarrhea vs autonomic dysfunction.   Still with severe orthostatic hypotension, no improvement with IV fluid challenge.   Lactulose on hold, will resume it.   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   Recent echo Oct 2022 showed normal LVEF. Mild TR,   lower extremities compression stocking and abdominal binder.     Chronic Liver cirrhosis: compensated. Stable  No ascites on exam.   INR .125, ,   Hepatology followup outpatient.   Lactulose resumed.     Chronic HFpEF: stable, euvolemic.     Dementia:   Continue Donepezil and Memantine.     DM type 2: A1C 6.1 controlled.        77 year old male patient with history of Dementia, Depression, OA, Gout, HTN, Liver Cirrhosis 2ry to Likely Hepatitis, and HFpEF who was brought to the ED on 11/22 following an episode of presyncope in setting of recent watery diarrhea on lactulose, found to have positive orthostatic vitals in ED, s/p IV fluids, to be admitted for further investigations, management, and monitoring. Currently hemodynamically stable.    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   - Recent echo Oct 2022 showed normal LVEF. Mild TR,   - In setting of positive orthostasis, recent diarrhea with lactulose while on increased metformin dose and antihypertensives, will lactulose, metformin, quinapril held  - cont using joey stocking, abd binder. cannot give midodrine given htn    #Hypomagnesemia  - Setting of Diarrhea on Lactulose   - Replete as needed , Will monitor  - Ordered IV 2grams for 2 doses    #Chronically Elevated LFTs  #History of Hepatitis (likely C)   - follows Dr. Stephens at [facility]  - Compensated  - Perisplenic, Gastrohepatic, and Omental Varices on CTAP 10/2022  - Outpatient GI follow up for EGD for varices  - Trend LFTs. MELD score 18pts, 6% 3-month mortality  - hepatitis panel negative  - Lactulose resumed, will Monitor BM    #Hypertension  - Quinapril held in setting of Orthostatic hypotension    #HFpEF- Not in Exacerbation  - Last TTE 10/15/2022 normal EF, DD I, mild MR  - not on diuretics at home  - Monitor telemetry   - Trend electrolytes and keep K>4 and Mg>2  - f/u TTE    #Dementia  #Depression  - c/w Donepezil 10mg QD, Memantine 10mg QD, Duloxetine 60mg QD    #DM II  - Last Hba1c 6.1 10/2022  - Recently increased metformin from 1g BID to 1.5g BID  - Monitor FS  - moderate insulin ss  - On lantus 10units    #Osteoarthritis/Gout  #Chronic Rib Fractures of Left posterior T11 and T12 and recent Left 1st 4-6th and 8-10th rib fractures  - PT/OT    DVT Prophylaxis: Lovenox 40mg Subcutaneously daily  GI Prophylaxis: Pantoprazole 40mg PO QD  Diet: DASH/ TLC  Code Status: Full

## 2022-11-29 LAB
ALBUMIN SERPL ELPH-MCNC: 2.6 G/DL — LOW (ref 3.5–5.2)
ALP SERPL-CCNC: 145 U/L — HIGH (ref 30–115)
ALT FLD-CCNC: 11 U/L — SIGNIFICANT CHANGE UP (ref 0–41)
ANION GAP SERPL CALC-SCNC: 9 MMOL/L — SIGNIFICANT CHANGE UP (ref 7–14)
AST SERPL-CCNC: 33 U/L — SIGNIFICANT CHANGE UP (ref 0–41)
BASOPHILS # BLD AUTO: 0.05 K/UL — SIGNIFICANT CHANGE UP (ref 0–0.2)
BASOPHILS NFR BLD AUTO: 0.8 % — SIGNIFICANT CHANGE UP (ref 0–1)
BILIRUB SERPL-MCNC: 1.8 MG/DL — HIGH (ref 0.2–1.2)
BUN SERPL-MCNC: 10 MG/DL — SIGNIFICANT CHANGE UP (ref 10–20)
CALCIUM SERPL-MCNC: 8.3 MG/DL — LOW (ref 8.4–10.5)
CHLORIDE SERPL-SCNC: 105 MMOL/L — SIGNIFICANT CHANGE UP (ref 98–110)
CO2 SERPL-SCNC: 24 MMOL/L — SIGNIFICANT CHANGE UP (ref 17–32)
CREAT SERPL-MCNC: 0.6 MG/DL — LOW (ref 0.7–1.5)
EGFR: 99 ML/MIN/1.73M2 — SIGNIFICANT CHANGE UP
EOSINOPHIL # BLD AUTO: 0.33 K/UL — SIGNIFICANT CHANGE UP (ref 0–0.7)
EOSINOPHIL NFR BLD AUTO: 5.4 % — SIGNIFICANT CHANGE UP (ref 0–8)
GLUCOSE BLDC GLUCOMTR-MCNC: 153 MG/DL — HIGH (ref 70–99)
GLUCOSE BLDC GLUCOMTR-MCNC: 169 MG/DL — HIGH (ref 70–99)
GLUCOSE BLDC GLUCOMTR-MCNC: 290 MG/DL — HIGH (ref 70–99)
GLUCOSE SERPL-MCNC: 158 MG/DL — HIGH (ref 70–99)
HCT VFR BLD CALC: 37.1 % — LOW (ref 42–52)
HGB BLD-MCNC: 13.7 G/DL — LOW (ref 14–18)
IMM GRANULOCYTES NFR BLD AUTO: 0.2 % — SIGNIFICANT CHANGE UP (ref 0.1–0.3)
LYMPHOCYTES # BLD AUTO: 2.02 K/UL — SIGNIFICANT CHANGE UP (ref 1.2–3.4)
LYMPHOCYTES # BLD AUTO: 33.2 % — SIGNIFICANT CHANGE UP (ref 20.5–51.1)
MAGNESIUM SERPL-MCNC: 1.8 MG/DL — SIGNIFICANT CHANGE UP (ref 1.8–2.4)
MCHC RBC-ENTMCNC: 35.9 PG — HIGH (ref 27–31)
MCHC RBC-ENTMCNC: 36.9 G/DL — SIGNIFICANT CHANGE UP (ref 32–37)
MCV RBC AUTO: 97.1 FL — HIGH (ref 80–94)
MONOCYTES # BLD AUTO: 0.59 K/UL — SIGNIFICANT CHANGE UP (ref 0.1–0.6)
MONOCYTES NFR BLD AUTO: 9.7 % — HIGH (ref 1.7–9.3)
NEUTROPHILS # BLD AUTO: 3.08 K/UL — SIGNIFICANT CHANGE UP (ref 1.4–6.5)
NEUTROPHILS NFR BLD AUTO: 50.7 % — SIGNIFICANT CHANGE UP (ref 42.2–75.2)
NRBC # BLD: 0 /100 WBCS — SIGNIFICANT CHANGE UP (ref 0–0)
PLATELET # BLD AUTO: 176 K/UL — SIGNIFICANT CHANGE UP (ref 130–400)
POTASSIUM SERPL-MCNC: 4.1 MMOL/L — SIGNIFICANT CHANGE UP (ref 3.5–5)
POTASSIUM SERPL-SCNC: 4.1 MMOL/L — SIGNIFICANT CHANGE UP (ref 3.5–5)
PROT SERPL-MCNC: 5.9 G/DL — LOW (ref 6–8)
RBC # BLD: 3.82 M/UL — LOW (ref 4.7–6.1)
RBC # FLD: 13 % — SIGNIFICANT CHANGE UP (ref 11.5–14.5)
SODIUM SERPL-SCNC: 138 MMOL/L — SIGNIFICANT CHANGE UP (ref 135–146)
WBC # BLD: 6.08 K/UL — SIGNIFICANT CHANGE UP (ref 4.8–10.8)
WBC # FLD AUTO: 6.08 K/UL — SIGNIFICANT CHANGE UP (ref 4.8–10.8)

## 2022-11-29 PROCEDURE — 99233 SBSQ HOSP IP/OBS HIGH 50: CPT

## 2022-11-29 RX ORDER — LACTULOSE 10 G/15ML
10 SOLUTION ORAL EVERY 4 HOURS
Refills: 0 | Status: DISCONTINUED | OUTPATIENT
Start: 2022-11-29 | End: 2022-12-01

## 2022-11-29 RX ADMIN — CHLORHEXIDINE GLUCONATE 1 APPLICATION(S): 213 SOLUTION TOPICAL at 06:55

## 2022-11-29 RX ADMIN — INSULIN GLARGINE 10 UNIT(S): 100 INJECTION, SOLUTION SUBCUTANEOUS at 21:52

## 2022-11-29 RX ADMIN — MEMANTINE HYDROCHLORIDE 10 MILLIGRAM(S): 10 TABLET ORAL at 12:47

## 2022-11-29 RX ADMIN — LACTULOSE 10 GRAM(S): 10 SOLUTION ORAL at 13:59

## 2022-11-29 RX ADMIN — MAGNESIUM OXIDE 400 MG ORAL TABLET 400 MILLIGRAM(S): 241.3 TABLET ORAL at 08:25

## 2022-11-29 RX ADMIN — LACTULOSE 10 GRAM(S): 10 SOLUTION ORAL at 06:14

## 2022-11-29 RX ADMIN — MAGNESIUM OXIDE 400 MG ORAL TABLET 400 MILLIGRAM(S): 241.3 TABLET ORAL at 18:05

## 2022-11-29 RX ADMIN — ENOXAPARIN SODIUM 40 MILLIGRAM(S): 100 INJECTION SUBCUTANEOUS at 06:15

## 2022-11-29 RX ADMIN — CARBIDOPA AND LEVODOPA 1 TABLET(S): 25; 100 TABLET ORAL at 21:52

## 2022-11-29 RX ADMIN — LACTULOSE 10 GRAM(S): 10 SOLUTION ORAL at 21:52

## 2022-11-29 RX ADMIN — CARBIDOPA AND LEVODOPA 1 TABLET(S): 25; 100 TABLET ORAL at 13:59

## 2022-11-29 RX ADMIN — Medication 6: at 12:46

## 2022-11-29 RX ADMIN — DONEPEZIL HYDROCHLORIDE 10 MILLIGRAM(S): 10 TABLET, FILM COATED ORAL at 21:52

## 2022-11-29 RX ADMIN — Medication 6: at 18:03

## 2022-11-29 RX ADMIN — LACTULOSE 10 GRAM(S): 10 SOLUTION ORAL at 12:46

## 2022-11-29 RX ADMIN — Medication 81 MILLIGRAM(S): at 12:47

## 2022-11-29 RX ADMIN — CARBIDOPA AND LEVODOPA 1 TABLET(S): 25; 100 TABLET ORAL at 06:15

## 2022-11-29 RX ADMIN — LACTULOSE 10 GRAM(S): 10 SOLUTION ORAL at 18:04

## 2022-11-29 RX ADMIN — Medication 2: at 08:24

## 2022-11-29 RX ADMIN — Medication 100 MILLIGRAM(S): at 12:47

## 2022-11-29 RX ADMIN — DULOXETINE HYDROCHLORIDE 60 MILLIGRAM(S): 30 CAPSULE, DELAYED RELEASE ORAL at 12:47

## 2022-11-29 RX ADMIN — PANTOPRAZOLE SODIUM 40 MILLIGRAM(S): 20 TABLET, DELAYED RELEASE ORAL at 06:14

## 2022-11-29 NOTE — PROGRESS NOTE ADULT - ASSESSMENT
77 year old male with MH of HTN, DM 2, liver cirrhosis, dementia, HFpEF and gout was brought to ED after syncopal episode, patient was going to the bathroom, he stood up and walked few steps he felt dizzy and sat down on his knees, no loss of consciousness, no chest pain or palpitation, patient was c/o watery diarrhea for the last few days, patient takes Lactulose for hepatic encephalopathy. In the ED BP was stable, orthostatic changes were positive. Hb stable.     A/P:   Dizziness and Pre-Syncope: Likely due to orthostatic hypotension.   Orthostatic Hypotension: likely from autonomic dysfunction due to Parkinson's diease.   Still with severe orthostatic hypotension, no improvement with IV fluid challenge.   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   Echo 11/28/2022 showed normal LVEF. Mild TR,   lower extremities compression stocking and abdominal binder.     Hepatic Encephalopathy: improved after resuming Lactulose.   Chronic Liver cirrhosis: compensated. Stable  No ascites on exam.   INR .125, ,   Hepatology followup outpatient.   Lactulose resumed.     Possible Parkinson's disease:   Patient was started recently on Sinemet by his neurologist, resume it. Outpatient follow up.     Chronic HFpEF: stable, euvolemic.     Dementia:   Continue Donepezil and Memantine.     DM type 2: A1C 6.1 controlled.     DVT Prophylaxis: Lovenox SC.   #Progress Note Handoff:  Pending (specify): placement.     Family discussion: with his wife and daughter, discussed discharge plan to STR  Disposition: STR

## 2022-11-29 NOTE — PROGRESS NOTE ADULT - ASSESSMENT
77 year old male patient with history of Dementia, Depression, OA, Gout, HTN, Liver Cirrhosis 2ry to Likely Hepatitis, and HFpEF who was brought to the ED on 11/22 following an episode of presyncope in setting of recent watery diarrhea on lactulose, found to have positive orthostatic vitals in ED, s/p IV fluids, to be admitted for further investigations, management, and monitoring. Currently hemodynamically stable.    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   - Recent echo Oct 2022 showed normal LVEF. Mild TR,   - In setting of positive orthostasis, recent diarrhea with lactulose while on increased metformin dose and antihypertensives, will lactulose, metformin, quinapril held  - cont using joey stocking, abd binder. cannot give midodrine given htn    #Hypomagnesemia  - Setting of Diarrhea on Lactulose   - Replete as needed , Will monitor  - Mg 1.8 today    #Chronically Elevated LFTs  #History of Hepatitis (likely C)   - Compensated, follows Dr. Stephens at [facility]  - Perisplenic, Gastrohepatic, and Omental Varices on CTAP 10/2022  - Outpatient GI follow up for EGD for varices  - Trend LFTs. MELD score 18pts, 6% 3-month mortality  - hepatitis panel negative  - Lactulose resumed, will Monitor BM    #Hypertension  - Quinapril held in setting of Orthostatic hypotension    #HFpEF- Not in Exacerbation  - Last TTE 10/15/2022 normal EF, DD I, mild MR  - not on diuretics at home  - Monitor telemetry   - Trend electrolytes and keep K>4 and Mg>2  - f/u TTE    #Dementia  #Depression  - c/w Donepezil 10mg QD, Memantine 10mg QD, Duloxetine 60mg QD    #Parkinson's  - On Carbidopa/Levodopa 25/100 TID, resumed on 11/28    #DM II  - Last Hba1c 6.1 10/2022  - Recently increased metformin from 1g BID to 1.5g BID  - Monitor FS  - moderate insulin ss  - On Lantus 10 units    #Osteoarthritis/Gout  #Chronic Rib Fractures of Left posterior T11 and T12 and recent Left 1st 4-6th and 8-10th rib fractures  - PT/OT    DVT Prophylaxis: Lovenox 40mg Subcutaneously daily  GI Prophylaxis: Pantoprazole 40mg PO QD  Diet: DASH/ TLC  Code Status: Full

## 2022-11-30 LAB
ALBUMIN SERPL ELPH-MCNC: 2.7 G/DL — LOW (ref 3.5–5.2)
ALP SERPL-CCNC: 125 U/L — HIGH (ref 30–115)
ALT FLD-CCNC: 7 U/L — SIGNIFICANT CHANGE UP (ref 0–41)
ANION GAP SERPL CALC-SCNC: 7 MMOL/L — SIGNIFICANT CHANGE UP (ref 7–14)
AST SERPL-CCNC: 24 U/L — SIGNIFICANT CHANGE UP (ref 0–41)
BASOPHILS # BLD AUTO: 0.06 K/UL — SIGNIFICANT CHANGE UP (ref 0–0.2)
BASOPHILS NFR BLD AUTO: 1 % — SIGNIFICANT CHANGE UP (ref 0–1)
BILIRUB SERPL-MCNC: 1.7 MG/DL — HIGH (ref 0.2–1.2)
BUN SERPL-MCNC: 14 MG/DL — SIGNIFICANT CHANGE UP (ref 10–20)
CALCIUM SERPL-MCNC: 8.7 MG/DL — SIGNIFICANT CHANGE UP (ref 8.4–10.4)
CHLORIDE SERPL-SCNC: 103 MMOL/L — SIGNIFICANT CHANGE UP (ref 98–110)
CO2 SERPL-SCNC: 27 MMOL/L — SIGNIFICANT CHANGE UP (ref 17–32)
CREAT SERPL-MCNC: 0.6 MG/DL — LOW (ref 0.7–1.5)
EGFR: 99 ML/MIN/1.73M2 — SIGNIFICANT CHANGE UP
EOSINOPHIL # BLD AUTO: 0.3 K/UL — SIGNIFICANT CHANGE UP (ref 0–0.7)
EOSINOPHIL NFR BLD AUTO: 5.1 % — SIGNIFICANT CHANGE UP (ref 0–8)
GLUCOSE BLDC GLUCOMTR-MCNC: 205 MG/DL — HIGH (ref 70–99)
GLUCOSE BLDC GLUCOMTR-MCNC: 220 MG/DL — HIGH (ref 70–99)
GLUCOSE BLDC GLUCOMTR-MCNC: 248 MG/DL — HIGH (ref 70–99)
GLUCOSE BLDC GLUCOMTR-MCNC: 291 MG/DL — HIGH (ref 70–99)
GLUCOSE SERPL-MCNC: 177 MG/DL — HIGH (ref 70–99)
HCT VFR BLD CALC: 37.3 % — LOW (ref 42–52)
HGB BLD-MCNC: 13.3 G/DL — LOW (ref 14–18)
IMM GRANULOCYTES NFR BLD AUTO: 0.3 % — SIGNIFICANT CHANGE UP (ref 0.1–0.3)
LYMPHOCYTES # BLD AUTO: 1.93 K/UL — SIGNIFICANT CHANGE UP (ref 1.2–3.4)
LYMPHOCYTES # BLD AUTO: 33.1 % — SIGNIFICANT CHANGE UP (ref 20.5–51.1)
MAGNESIUM SERPL-MCNC: 1.3 MG/DL — LOW (ref 1.8–2.4)
MCHC RBC-ENTMCNC: 35.2 PG — HIGH (ref 27–31)
MCHC RBC-ENTMCNC: 35.7 G/DL — SIGNIFICANT CHANGE UP (ref 32–37)
MCV RBC AUTO: 98.7 FL — HIGH (ref 80–94)
MONOCYTES # BLD AUTO: 0.54 K/UL — SIGNIFICANT CHANGE UP (ref 0.1–0.6)
MONOCYTES NFR BLD AUTO: 9.3 % — SIGNIFICANT CHANGE UP (ref 1.7–9.3)
NEUTROPHILS # BLD AUTO: 2.98 K/UL — SIGNIFICANT CHANGE UP (ref 1.4–6.5)
NEUTROPHILS NFR BLD AUTO: 51.2 % — SIGNIFICANT CHANGE UP (ref 42.2–75.2)
NRBC # BLD: 0 /100 WBCS — SIGNIFICANT CHANGE UP (ref 0–0)
PLATELET # BLD AUTO: 139 K/UL — SIGNIFICANT CHANGE UP (ref 130–400)
POTASSIUM SERPL-MCNC: 4.2 MMOL/L — SIGNIFICANT CHANGE UP (ref 3.5–5)
POTASSIUM SERPL-SCNC: 4.2 MMOL/L — SIGNIFICANT CHANGE UP (ref 3.5–5)
PROT SERPL-MCNC: 5.8 G/DL — LOW (ref 6–8)
RBC # BLD: 3.78 M/UL — LOW (ref 4.7–6.1)
RBC # FLD: 12.8 % — SIGNIFICANT CHANGE UP (ref 11.5–14.5)
SODIUM SERPL-SCNC: 137 MMOL/L — SIGNIFICANT CHANGE UP (ref 135–146)
WBC # BLD: 5.83 K/UL — SIGNIFICANT CHANGE UP (ref 4.8–10.8)
WBC # FLD AUTO: 5.83 K/UL — SIGNIFICANT CHANGE UP (ref 4.8–10.8)

## 2022-11-30 PROCEDURE — 76700 US EXAM ABDOM COMPLETE: CPT | Mod: 26

## 2022-11-30 PROCEDURE — 99233 SBSQ HOSP IP/OBS HIGH 50: CPT

## 2022-11-30 RX ORDER — MAGNESIUM SULFATE 500 MG/ML
2 VIAL (ML) INJECTION
Refills: 0 | Status: COMPLETED | OUTPATIENT
Start: 2022-11-30 | End: 2022-11-30

## 2022-11-30 RX ORDER — ENOXAPARIN SODIUM 100 MG/ML
40 INJECTION SUBCUTANEOUS EVERY 24 HOURS
Refills: 0 | Status: DISCONTINUED | OUTPATIENT
Start: 2022-12-01 | End: 2022-12-02

## 2022-11-30 RX ADMIN — INSULIN GLARGINE 10 UNIT(S): 100 INJECTION, SOLUTION SUBCUTANEOUS at 21:42

## 2022-11-30 RX ADMIN — Medication 25 GRAM(S): at 12:52

## 2022-11-30 RX ADMIN — CHLORHEXIDINE GLUCONATE 1 APPLICATION(S): 213 SOLUTION TOPICAL at 06:47

## 2022-11-30 RX ADMIN — MAGNESIUM OXIDE 400 MG ORAL TABLET 400 MILLIGRAM(S): 241.3 TABLET ORAL at 08:49

## 2022-11-30 RX ADMIN — Medication 100 MILLIGRAM(S): at 12:52

## 2022-11-30 RX ADMIN — CARBIDOPA AND LEVODOPA 1 TABLET(S): 25; 100 TABLET ORAL at 21:42

## 2022-11-30 RX ADMIN — Medication 25 GRAM(S): at 08:49

## 2022-11-30 RX ADMIN — CARBIDOPA AND LEVODOPA 1 TABLET(S): 25; 100 TABLET ORAL at 15:00

## 2022-11-30 RX ADMIN — CARBIDOPA AND LEVODOPA 1 TABLET(S): 25; 100 TABLET ORAL at 06:12

## 2022-11-30 RX ADMIN — MAGNESIUM OXIDE 400 MG ORAL TABLET 400 MILLIGRAM(S): 241.3 TABLET ORAL at 17:18

## 2022-11-30 RX ADMIN — Medication 4: at 08:48

## 2022-11-30 RX ADMIN — Medication 4: at 17:19

## 2022-11-30 RX ADMIN — Medication 4: at 12:54

## 2022-11-30 RX ADMIN — LACTULOSE 10 GRAM(S): 10 SOLUTION ORAL at 06:12

## 2022-11-30 RX ADMIN — LACTULOSE 10 GRAM(S): 10 SOLUTION ORAL at 17:18

## 2022-11-30 RX ADMIN — DULOXETINE HYDROCHLORIDE 60 MILLIGRAM(S): 30 CAPSULE, DELAYED RELEASE ORAL at 12:52

## 2022-11-30 RX ADMIN — MEMANTINE HYDROCHLORIDE 10 MILLIGRAM(S): 10 TABLET ORAL at 12:52

## 2022-11-30 RX ADMIN — LACTULOSE 10 GRAM(S): 10 SOLUTION ORAL at 12:52

## 2022-11-30 RX ADMIN — Medication 81 MILLIGRAM(S): at 12:52

## 2022-11-30 RX ADMIN — LACTULOSE 10 GRAM(S): 10 SOLUTION ORAL at 21:42

## 2022-11-30 RX ADMIN — DONEPEZIL HYDROCHLORIDE 10 MILLIGRAM(S): 10 TABLET, FILM COATED ORAL at 21:42

## 2022-11-30 RX ADMIN — ENOXAPARIN SODIUM 40 MILLIGRAM(S): 100 INJECTION SUBCUTANEOUS at 06:14

## 2022-11-30 NOTE — PROGRESS NOTE ADULT - ASSESSMENT
77 year old male patient with history of Dementia, Depression, OA, Gout, HTN, Liver Cirrhosis 2ry to Likely Hepatitis, and HFpEF who was brought to the ED on 11/22 following an episode of presyncope in setting of recent watery diarrhea on lactulose, found to have positive orthostatic vitals in ED, s/p IV fluids, to be admitted for further investigations, management, and monitoring. Currently hemodynamically stable.    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   - Recent echo Oct 2022 showed normal LVEF. Mild TR,   - In setting of positive orthostasis, recent diarrhea with lactulose while on increased metformin dose and antihypertensives, will lactulose, metformin, quinapril held  - cont using joey stocking, abd binder. cannot give midodrine given htn    #Hypomagnesemia  - Setting of Diarrhea on Lactulose   - Replete as needed , Will monitor  - Mg 1.3 today    #Chronically Elevated LFTs  #History of Hepatitis (likely C)   - Compensated, follows Dr. Stephens at [facility]  - Perisplenic, Gastrohepatic, and Omental Varices on CTAP 10/2022  - Outpatient GI follow up for EGD for varices  - Trend LFTs. MELD score 18pts, 6% 3-month mortality  - hepatitis panel negative  - Lactulose Q4 since 11/29, low BM, will monitor    #Hypertension  - Quinapril held in setting of Orthostatic hypotension    #HFpEF- Not in Exacerbation  - Last TTE 10/15/2022 normal EF, DD I, mild MR  - not on diuretics at home  - Monitor telemetry   - Trend electrolytes and keep K>4 and Mg>2  - TTE 60% to 65% EF, (Grade I diastolic dysfunction)    #Dementia  #Depression  - c/w Donepezil 10mg QD, Memantine 10mg QD, Duloxetine 60mg QD    #Parkinson's  - On Carbidopa/Levodopa 25/100 TID, resumed on 11/28    #DM II  - Last Hba1c 6.1 10/2022  - Recently increased metformin from 1g BID to 1.5g BID  - Monitor FS  - moderate insulin ss  - On Lantus 10 units    #Osteoarthritis/Gout  #Chronic Rib Fractures of Left posterior T11 and T12 and recent Left 1st 4-6th and 8-10th rib fractures  - PT/OT    DVT Prophylaxis: Lovenox 40mg Subcutaneously daily  GI Prophylaxis: Pantoprazole 40mg PO QD  Diet: DASH/ TLC  Code Status: Full

## 2022-11-30 NOTE — PROGRESS NOTE ADULT - ASSESSMENT
78 yo M PMHx HTN, DM II, liver cirrhosis, dementia, chronic HFpEF and gout was brought to ED after syncopal episode, patient was going to the bathroom, he stood up and walked few steps he felt dizzy and sat down on his knees, no loss of consciousness, no chest pain or palpitation, patient was c/o watery diarrhea for the last few days, patient takes Lactulose for hepatic encephalopathy. In the ED BP was stable, orthostatic changes were positive. Hb stable.       Dizziness and Pre-Syncope: Likely due to orthostatic hypotension.   Orthostatic Hypotension: likely from autonomic dysfunction due to Parkinson's diease plus cirrhosis  -Still with severe orthostatic hypotension, no improvement with IV fluid challenge.   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   -Echo 11/28/2022 showed normal LVEF. Mild TR,   -lower extremities compression stocking and abdominal binder.     Hepatic Encephalopathy  - as per team improved after resuming Lactulose.   Chronic Liver cirrhosis: compensated. Stable  - unclear if +fluid shift on exam or if from subcut fat. Check abdominal US  -Hepatology followup outpatient.   -Lactulose resumed, has not had BM, no evidence of obstruction    Parkinson's disease  - sinemet restarted 2 days ago      Chronic HFpEF: stable, euvolemic.     Dementia:   Continue Donepezil and Memantine.     DM type 2: A1C 6.1 controlled.     DVT Prophylaxis: Lovenox SC.   #Progress Note Handoff:  Pending (specify): placement, ab US  Family discussion: team previously spoke with his wife and daughter, discussed discharge plan to STR  Disposition: STR

## 2022-12-01 LAB
ALBUMIN SERPL ELPH-MCNC: 2.9 G/DL — LOW (ref 3.5–5.2)
ALP SERPL-CCNC: 151 U/L — HIGH (ref 30–115)
ALT FLD-CCNC: 7 U/L — SIGNIFICANT CHANGE UP (ref 0–41)
ANION GAP SERPL CALC-SCNC: 9 MMOL/L — SIGNIFICANT CHANGE UP (ref 7–14)
AST SERPL-CCNC: 28 U/L — SIGNIFICANT CHANGE UP (ref 0–41)
BASOPHILS # BLD AUTO: 0.06 K/UL — SIGNIFICANT CHANGE UP (ref 0–0.2)
BASOPHILS NFR BLD AUTO: 1.2 % — HIGH (ref 0–1)
BILIRUB SERPL-MCNC: 1.4 MG/DL — HIGH (ref 0.2–1.2)
BUN SERPL-MCNC: 14 MG/DL — SIGNIFICANT CHANGE UP (ref 10–20)
CALCIUM SERPL-MCNC: 8.8 MG/DL — SIGNIFICANT CHANGE UP (ref 8.4–10.5)
CHLORIDE SERPL-SCNC: 103 MMOL/L — SIGNIFICANT CHANGE UP (ref 98–110)
CO2 SERPL-SCNC: 27 MMOL/L — SIGNIFICANT CHANGE UP (ref 17–32)
CREAT SERPL-MCNC: 0.7 MG/DL — SIGNIFICANT CHANGE UP (ref 0.7–1.5)
EGFR: 95 ML/MIN/1.73M2 — SIGNIFICANT CHANGE UP
EOSINOPHIL # BLD AUTO: 0.27 K/UL — SIGNIFICANT CHANGE UP (ref 0–0.7)
EOSINOPHIL NFR BLD AUTO: 5.2 % — SIGNIFICANT CHANGE UP (ref 0–8)
GLUCOSE BLDC GLUCOMTR-MCNC: 206 MG/DL — HIGH (ref 70–99)
GLUCOSE BLDC GLUCOMTR-MCNC: 225 MG/DL — HIGH (ref 70–99)
GLUCOSE BLDC GLUCOMTR-MCNC: 298 MG/DL — HIGH (ref 70–99)
GLUCOSE BLDC GLUCOMTR-MCNC: 324 MG/DL — HIGH (ref 70–99)
GLUCOSE SERPL-MCNC: 216 MG/DL — HIGH (ref 70–99)
HCT VFR BLD CALC: 37.8 % — LOW (ref 42–52)
HGB BLD-MCNC: 13.4 G/DL — LOW (ref 14–18)
IMM GRANULOCYTES NFR BLD AUTO: 0.2 % — SIGNIFICANT CHANGE UP (ref 0.1–0.3)
LYMPHOCYTES # BLD AUTO: 1.89 K/UL — SIGNIFICANT CHANGE UP (ref 1.2–3.4)
LYMPHOCYTES # BLD AUTO: 36.3 % — SIGNIFICANT CHANGE UP (ref 20.5–51.1)
MAGNESIUM SERPL-MCNC: 1.5 MG/DL — LOW (ref 1.8–2.4)
MCHC RBC-ENTMCNC: 35.4 G/DL — SIGNIFICANT CHANGE UP (ref 32–37)
MCHC RBC-ENTMCNC: 35.4 PG — HIGH (ref 27–31)
MCV RBC AUTO: 99.7 FL — HIGH (ref 80–94)
MONOCYTES # BLD AUTO: 0.58 K/UL — SIGNIFICANT CHANGE UP (ref 0.1–0.6)
MONOCYTES NFR BLD AUTO: 11.2 % — HIGH (ref 1.7–9.3)
NEUTROPHILS # BLD AUTO: 2.39 K/UL — SIGNIFICANT CHANGE UP (ref 1.4–6.5)
NEUTROPHILS NFR BLD AUTO: 45.9 % — SIGNIFICANT CHANGE UP (ref 42.2–75.2)
NRBC # BLD: 0 /100 WBCS — SIGNIFICANT CHANGE UP (ref 0–0)
PLATELET # BLD AUTO: 139 K/UL — SIGNIFICANT CHANGE UP (ref 130–400)
POTASSIUM SERPL-MCNC: 3.9 MMOL/L — SIGNIFICANT CHANGE UP (ref 3.5–5)
POTASSIUM SERPL-SCNC: 3.9 MMOL/L — SIGNIFICANT CHANGE UP (ref 3.5–5)
PROT SERPL-MCNC: 6.1 G/DL — SIGNIFICANT CHANGE UP (ref 6–8)
RBC # BLD: 3.79 M/UL — LOW (ref 4.7–6.1)
RBC # FLD: 13.1 % — SIGNIFICANT CHANGE UP (ref 11.5–14.5)
SARS-COV-2 RNA SPEC QL NAA+PROBE: SIGNIFICANT CHANGE UP
SODIUM SERPL-SCNC: 139 MMOL/L — SIGNIFICANT CHANGE UP (ref 135–146)
WBC # BLD: 5.2 K/UL — SIGNIFICANT CHANGE UP (ref 4.8–10.8)
WBC # FLD AUTO: 5.2 K/UL — SIGNIFICANT CHANGE UP (ref 4.8–10.8)

## 2022-12-01 PROCEDURE — 99233 SBSQ HOSP IP/OBS HIGH 50: CPT

## 2022-12-01 RX ORDER — LACTULOSE 10 G/15ML
10 SOLUTION ORAL
Refills: 0 | Status: DISCONTINUED | OUTPATIENT
Start: 2022-12-01 | End: 2022-12-01

## 2022-12-01 RX ORDER — MAGNESIUM SULFATE 500 MG/ML
2 VIAL (ML) INJECTION
Refills: 0 | Status: DISCONTINUED | OUTPATIENT
Start: 2022-12-01 | End: 2022-12-01

## 2022-12-01 RX ORDER — LACTULOSE 10 G/15ML
10 SOLUTION ORAL
Refills: 0 | Status: DISCONTINUED | OUTPATIENT
Start: 2022-12-01 | End: 2022-12-02

## 2022-12-01 RX ADMIN — LACTULOSE 10 GRAM(S): 10 SOLUTION ORAL at 02:34

## 2022-12-01 RX ADMIN — Medication 81 MILLIGRAM(S): at 12:11

## 2022-12-01 RX ADMIN — LACTULOSE 10 GRAM(S): 10 SOLUTION ORAL at 05:35

## 2022-12-01 RX ADMIN — PANTOPRAZOLE SODIUM 40 MILLIGRAM(S): 20 TABLET, DELAYED RELEASE ORAL at 05:41

## 2022-12-01 RX ADMIN — DULOXETINE HYDROCHLORIDE 60 MILLIGRAM(S): 30 CAPSULE, DELAYED RELEASE ORAL at 12:11

## 2022-12-01 RX ADMIN — LACTULOSE 10 GRAM(S): 10 SOLUTION ORAL at 09:25

## 2022-12-01 RX ADMIN — CARBIDOPA AND LEVODOPA 1 TABLET(S): 25; 100 TABLET ORAL at 22:48

## 2022-12-01 RX ADMIN — CHLORHEXIDINE GLUCONATE 1 APPLICATION(S): 213 SOLUTION TOPICAL at 05:36

## 2022-12-01 RX ADMIN — LACTULOSE 10 GRAM(S): 10 SOLUTION ORAL at 14:36

## 2022-12-01 RX ADMIN — Medication 25 GRAM(S): at 10:36

## 2022-12-01 RX ADMIN — Medication 100 MILLIGRAM(S): at 12:11

## 2022-12-01 RX ADMIN — DONEPEZIL HYDROCHLORIDE 10 MILLIGRAM(S): 10 TABLET, FILM COATED ORAL at 21:48

## 2022-12-01 RX ADMIN — Medication 4: at 17:35

## 2022-12-01 RX ADMIN — MAGNESIUM OXIDE 400 MG ORAL TABLET 400 MILLIGRAM(S): 241.3 TABLET ORAL at 17:35

## 2022-12-01 RX ADMIN — LACTULOSE 10 GRAM(S): 10 SOLUTION ORAL at 17:35

## 2022-12-01 RX ADMIN — MAGNESIUM OXIDE 400 MG ORAL TABLET 400 MILLIGRAM(S): 241.3 TABLET ORAL at 08:31

## 2022-12-01 RX ADMIN — CARBIDOPA AND LEVODOPA 1 TABLET(S): 25; 100 TABLET ORAL at 05:36

## 2022-12-01 RX ADMIN — LACTULOSE 10 GRAM(S): 10 SOLUTION ORAL at 21:47

## 2022-12-01 RX ADMIN — CARBIDOPA AND LEVODOPA 1 TABLET(S): 25; 100 TABLET ORAL at 14:36

## 2022-12-01 RX ADMIN — Medication 8: at 12:10

## 2022-12-01 RX ADMIN — Medication 4: at 08:30

## 2022-12-01 RX ADMIN — Medication 25 GRAM(S): at 12:12

## 2022-12-01 RX ADMIN — ENOXAPARIN SODIUM 40 MILLIGRAM(S): 100 INJECTION SUBCUTANEOUS at 05:35

## 2022-12-01 RX ADMIN — INSULIN GLARGINE 10 UNIT(S): 100 INJECTION, SOLUTION SUBCUTANEOUS at 21:47

## 2022-12-01 RX ADMIN — MEMANTINE HYDROCHLORIDE 10 MILLIGRAM(S): 10 TABLET ORAL at 12:11

## 2022-12-01 NOTE — PROGRESS NOTE ADULT - ASSESSMENT
76 yo M PMHx HTN, DM II, liver cirrhosis, dementia, chronic HFpEF and gout was brought to ED after syncopal episode, patient was going to the bathroom, he stood up and walked few steps he felt dizzy and sat down on his knees, no loss of consciousness, no chest pain or palpitation, patient was c/o watery diarrhea for the last few days, patient takes Lactulose for hepatic encephalopathy. In the ED BP was stable, orthostatic changes were positive. Hb stable.       Dizziness and Pre-Syncope: Likely due to orthostatic hypotension.   Orthostatic Hypotension: likely from autonomic dysfunction due to Parkinson's diease plus cirrhosis  -Still with severe orthostatic hypotension, no improvement with IV fluid challenge.   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   -Echo 11/28/2022 showed normal LVEF. Mild TR,   -lower extremities compression stocking and abdominal binder.   - pt and family aware that OH will be a chronic condition due to PD and cirrhosis    Hepatic Encephalopathy  - as per team improved after resuming Lactulose.   Chronic Liver cirrhosis: compensated. Stable  - unclear if +fluid shift on exam or if from subcut fat. US showed no ascites  -Hepatology followup outpatient.   -Lc/w lactulose, had BM today    Parkinson's disease  - sinemet restarted 2 days ago      Chronic HFpEF: stable, euvolemic.     Dementia:   Continue Donepezil and Memantine.     DM type 2: A1C 6.1 controlled.     DVT Prophylaxis: Lovenox SC.   #Progress Note Handoff:  Pending (specify): placement, dc in AM  Family discussion: I spoke with wife and pt at bedside today. Aware of pending placement tomorrow  Disposition: STR

## 2022-12-01 NOTE — PROGRESS NOTE ADULT - ASSESSMENT
77 year old male patient with history of Dementia, Depression, OA, Gout, HTN, Liver Cirrhosis 2ry to Likely Hepatitis, and HFpEF who was brought to the ED on 11/22 following an episode of presyncope in setting of recent watery diarrhea on lactulose, found to have positive orthostatic vitals in ED, s/p IV fluids, to be admitted for further investigations, management, and monitoring. Currently hemodynamically stable.    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   - Recent echo Oct 2022 showed normal LVEF. Mild TR,   - In setting of positive orthostasis, recent diarrhea with lactulose while on increased metformin dose and antihypertensives, will lactulose, metformin, quinapril held  - cont using joey stocking, abd binder. cannot give midodrine given htn  - cont using joey stocking, abd binder. cannot give midodrine given hypertension- Setting of Diarrhea on Lactulose   - Replete Mg as needed , Will monitor  - Orthostatic bp repeated -> still positive    #Chronically Elevated LFTs  #History of Hepatitis (likely C)   - Compensated, follows Dr. Stephens at [facility]  - Perisplenic, Gastrohepatic, and Omental Varices on CTAP 10/2022  - Outpatient GI follow up for EGD for varices  - Trend LFTs. MELD score 18pts, 6% 3-month mortality  - hepatitis panel negative  - Lactulose Q4 since 11/29, low BM  - Lactulose increased to Q3 hours, had 1 BM on 12/1  - No Ascites on US abdomen 11/30    #Hypertension  - Quinapril held in setting of Orthostatic hypotension    #HFpEF- Not in Exacerbation  - Last TTE 10/15/2022 normal EF, DD I, mild MR  - not on diuretics at home  - Monitor telemetry   - Trend electrolytes and keep K>4 and Mg>2  - TTE 60% to 65% EF, (Grade I diastolic dysfunction)    #Dementia  #Depression  - c/w Donepezil 10mg QD, Memantine 10mg QD, Duloxetine 60mg QD    #Parkinson's  - On Carbidopa/Levodopa 25/100 TID, resumed on 11/28    #DM II  - Last Hba1c 6.1 10/2022  - Recently increased metformin from 1g BID to 1.5g BID  - Monitor FS  - moderate insulin ss  - On Lantus 10 units    #Osteoarthritis/Gout  #Chronic Rib Fractures of Left posterior T11 and T12 and recent Left 1st 4-6th and 8-10th rib fractures  - PT/OT    DVT Prophylaxis: Lovenox 40mg Subcutaneously daily  GI Prophylaxis: Pantoprazole 40mg PO QD  Diet: DASH/ TLC  Handoff: Still positive orthostasis, Low bowel movements, replete Mg daily  77 year old male patient with history of Dementia, Depression, OA, Gout, HTN, Liver Cirrhosis 2ry to Likely Hepatitis, and HFpEF who was brought to the ED on 11/22 following an episode of presyncope in setting of recent watery diarrhea on lactulose, found to have positive orthostatic vitals in ED, s/p IV fluids, to be admitted for further investigations, management, and monitoring. Currently hemodynamically stable.    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   - Recent echo Oct 2022 showed normal LVEF. Mild TR,   - In setting of positive orthostasis, recent diarrhea with lactulose while on increased metformin dose and antihypertensives, will lactulose, metformin, quinapril held  - cont using joey stocking, abd binder. cannot give midodrine given htn  - Replete Mg as needed , Will monitor  - Orthostatic bp repeated -> still positive    #Chronically Elevated LFTs  #History of Hepatitis (likely C)   - Compensated, follows Dr. Stephens at [facility]  - Perisplenic, Gastrohepatic, and Omental Varices on CTAP 10/2022  - Outpatient GI follow up for EGD for varices  - Trend LFTs. MELD score 18pts, 6% 3-month mortality  - hepatitis panel negative  - Lactulose Q4 since 11/29, low BM  - Lactulose increased to Q3 hours, had 1 BM on 12/1  - No Ascites on US abdomen 11/30    #Hypertension  - Quinapril held in setting of Orthostatic hypotension    #HFpEF- Not in Exacerbation  - Last TTE 10/15/2022 normal EF, DD I, mild MR  - not on diuretics at home  - Monitor telemetry   - Trend electrolytes and keep K>4 and Mg>2  - TTE 60% to 65% EF, (Grade I diastolic dysfunction)    #Dementia  #Depression  - c/w Donepezil 10mg QD, Memantine 10mg QD, Duloxetine 60mg QD    #Parkinson's  - On Carbidopa/Levodopa 25/100 TID, resumed on 11/28    #DM II  - Last Hba1c 6.1 10/2022  - Recently increased metformin from 1g BID to 1.5g BID  - Monitor FS  - moderate insulin ss  - On Lantus 10 units    #Osteoarthritis/Gout  #Chronic Rib Fractures of Left posterior T11 and T12 and recent Left 1st 4-6th and 8-10th rib fractures  - PT/OT    DVT Prophylaxis: Lovenox 40mg Subcutaneously daily  GI Prophylaxis: Pantoprazole 40mg PO QD  Diet: DASH/ TLC  Handoff: Still positive orthostasis, Low bowel movements, replete Mg daily

## 2022-12-02 VITALS
RESPIRATION RATE: 18 BRPM | SYSTOLIC BLOOD PRESSURE: 129 MMHG | TEMPERATURE: 98 F | DIASTOLIC BLOOD PRESSURE: 71 MMHG | HEART RATE: 91 BPM

## 2022-12-02 LAB
BASOPHILS # BLD AUTO: 0.06 K/UL — SIGNIFICANT CHANGE UP (ref 0–0.2)
BASOPHILS NFR BLD AUTO: 1 % — SIGNIFICANT CHANGE UP (ref 0–1)
EOSINOPHIL # BLD AUTO: 0.37 K/UL — SIGNIFICANT CHANGE UP (ref 0–0.7)
EOSINOPHIL NFR BLD AUTO: 5.9 % — SIGNIFICANT CHANGE UP (ref 0–8)
GLUCOSE BLDC GLUCOMTR-MCNC: 233 MG/DL — HIGH (ref 70–99)
GLUCOSE BLDC GLUCOMTR-MCNC: 252 MG/DL — HIGH (ref 70–99)
GLUCOSE BLDC GLUCOMTR-MCNC: 367 MG/DL — HIGH (ref 70–99)
HCT VFR BLD CALC: 37.4 % — LOW (ref 42–52)
HGB BLD-MCNC: 13.4 G/DL — LOW (ref 14–18)
IMM GRANULOCYTES NFR BLD AUTO: 0.2 % — SIGNIFICANT CHANGE UP (ref 0.1–0.3)
LYMPHOCYTES # BLD AUTO: 2.14 K/UL — SIGNIFICANT CHANGE UP (ref 1.2–3.4)
LYMPHOCYTES # BLD AUTO: 34.4 % — SIGNIFICANT CHANGE UP (ref 20.5–51.1)
MAGNESIUM SERPL-MCNC: 1.5 MG/DL — LOW (ref 1.8–2.4)
MCHC RBC-ENTMCNC: 35.6 PG — HIGH (ref 27–31)
MCHC RBC-ENTMCNC: 35.8 G/DL — SIGNIFICANT CHANGE UP (ref 32–37)
MCV RBC AUTO: 99.5 FL — HIGH (ref 80–94)
MONOCYTES # BLD AUTO: 0.64 K/UL — HIGH (ref 0.1–0.6)
MONOCYTES NFR BLD AUTO: 10.3 % — HIGH (ref 1.7–9.3)
NEUTROPHILS # BLD AUTO: 3 K/UL — SIGNIFICANT CHANGE UP (ref 1.4–6.5)
NEUTROPHILS NFR BLD AUTO: 48.2 % — SIGNIFICANT CHANGE UP (ref 42.2–75.2)
NRBC # BLD: 0 /100 WBCS — SIGNIFICANT CHANGE UP (ref 0–0)
PLATELET # BLD AUTO: 157 K/UL — SIGNIFICANT CHANGE UP (ref 130–400)
RBC # BLD: 3.76 M/UL — LOW (ref 4.7–6.1)
RBC # FLD: 13 % — SIGNIFICANT CHANGE UP (ref 11.5–14.5)
WBC # BLD: 6.22 K/UL — SIGNIFICANT CHANGE UP (ref 4.8–10.8)
WBC # FLD AUTO: 6.22 K/UL — SIGNIFICANT CHANGE UP (ref 4.8–10.8)

## 2022-12-02 PROCEDURE — 99239 HOSP IP/OBS DSCHRG MGMT >30: CPT

## 2022-12-02 RX ORDER — ENOXAPARIN SODIUM 100 MG/ML
40 INJECTION SUBCUTANEOUS
Qty: 0 | Refills: 0 | DISCHARGE
Start: 2022-12-02

## 2022-12-02 RX ORDER — LACTULOSE 10 G/15ML
15 SOLUTION ORAL
Qty: 0 | Refills: 0 | DISCHARGE
Start: 2022-12-02

## 2022-12-02 RX ORDER — LACTULOSE 10 G/15ML
30 SOLUTION ORAL
Qty: 0 | Refills: 0 | DISCHARGE
Start: 2022-12-02

## 2022-12-02 RX ORDER — CARBIDOPA AND LEVODOPA 25; 100 MG/1; MG/1
1 TABLET ORAL
Qty: 0 | Refills: 0 | DISCHARGE
Start: 2022-12-02 | End: 2022-12-31

## 2022-12-02 RX ORDER — CARBIDOPA AND LEVODOPA 25; 100 MG/1; MG/1
1 TABLET ORAL
Qty: 90 | Refills: 0
Start: 2022-12-02 | End: 2022-12-31

## 2022-12-02 RX ORDER — MAGNESIUM SULFATE 500 MG/ML
2 VIAL (ML) INJECTION EVERY 4 HOURS
Refills: 0 | Status: DISCONTINUED | OUTPATIENT
Start: 2022-12-02 | End: 2022-12-02

## 2022-12-02 RX ADMIN — Medication 81 MILLIGRAM(S): at 11:26

## 2022-12-02 RX ADMIN — Medication 10: at 11:34

## 2022-12-02 RX ADMIN — PANTOPRAZOLE SODIUM 40 MILLIGRAM(S): 20 TABLET, DELAYED RELEASE ORAL at 06:49

## 2022-12-02 RX ADMIN — MEMANTINE HYDROCHLORIDE 10 MILLIGRAM(S): 10 TABLET ORAL at 11:26

## 2022-12-02 RX ADMIN — CARBIDOPA AND LEVODOPA 1 TABLET(S): 25; 100 TABLET ORAL at 05:05

## 2022-12-02 RX ADMIN — DULOXETINE HYDROCHLORIDE 60 MILLIGRAM(S): 30 CAPSULE, DELAYED RELEASE ORAL at 11:26

## 2022-12-02 RX ADMIN — Medication 100 MILLIGRAM(S): at 11:26

## 2022-12-02 RX ADMIN — LACTULOSE 10 GRAM(S): 10 SOLUTION ORAL at 00:44

## 2022-12-02 RX ADMIN — Medication 4: at 07:53

## 2022-12-02 RX ADMIN — Medication 25 GRAM(S): at 10:46

## 2022-12-02 RX ADMIN — LACTULOSE 10 GRAM(S): 10 SOLUTION ORAL at 05:03

## 2022-12-02 RX ADMIN — LACTULOSE 10 GRAM(S): 10 SOLUTION ORAL at 08:03

## 2022-12-02 RX ADMIN — CARBIDOPA AND LEVODOPA 1 TABLET(S): 25; 100 TABLET ORAL at 15:16

## 2022-12-02 RX ADMIN — LACTULOSE 10 GRAM(S): 10 SOLUTION ORAL at 06:49

## 2022-12-02 RX ADMIN — LACTULOSE 10 GRAM(S): 10 SOLUTION ORAL at 11:17

## 2022-12-02 RX ADMIN — MAGNESIUM OXIDE 400 MG ORAL TABLET 400 MILLIGRAM(S): 241.3 TABLET ORAL at 07:54

## 2022-12-02 RX ADMIN — ENOXAPARIN SODIUM 40 MILLIGRAM(S): 100 INJECTION SUBCUTANEOUS at 05:05

## 2022-12-02 NOTE — PROGRESS NOTE ADULT - ASSESSMENT
76 yo M PMHx HTN, DM II, liver cirrhosis, dementia, chronic HFpEF and gout was brought to ED after syncopal episode, patient was going to the bathroom, he stood up and walked few steps he felt dizzy and sat down on his knees, no loss of consciousness, no chest pain or palpitation, patient was c/o watery diarrhea for the last few days, patient takes Lactulose for hepatic encephalopathy. In the ED BP was stable, orthostatic changes were positive. Hb stable.       Dizziness and Pre-Syncope: Likely due to orthostatic hypotension.   Orthostatic Hypotension: likely from autonomic dysfunction due to Parkinson's diease plus cirrhosis  -Still with severe orthostatic hypotension, no improvement with IV fluid challenge.   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   -Echo 11/28/2022 showed normal LVEF. Mild TR,   -lower extremities compression stocking and abdominal binder.   - pt and family aware that OH will be a chronic condition due to PD and cirrhosis    Chronic Liver cirrhosis: compensated. Stable  . US showed no ascites  -Hepatology followup outpatient.   -c/w lactulose, ensure 2-3 BMs daily    Parkinson's disease  - sinemet restarted 2 days ago    Chronic HFpEF: stable, euvolemic.     Dementia:   Continue Donepezil and Memantine.     DM type 2: A1C 6.1 controlled.   avoid hypoglycemia    Discharge instructions discussed and patient/spouse know when to seek immediate medical attention.  Patient has proper follow up.  All results discussed and patient/spouse aware they require further follow up/work up.  Stressed importance of proper follow up.  Medications prescribed and changes discussed.  All questions and concerns from patient and family addressed. Understanding of instructions verbalized.    Time spent in completing discharge process and coordinating care 35 minutes.    Discussed with housestaff, nursing, case mgmt, spouse

## 2022-12-02 NOTE — PROGRESS NOTE ADULT - PROVIDER SPECIALTY LIST ADULT
Hospitalist
Internal Medicine
Hospitalist
Internal Medicine
Hospitalist
Internal Medicine
Hospitalist
Internal Medicine

## 2022-12-02 NOTE — PROGRESS NOTE ADULT - REASON FOR ADMISSION
Presyncope

## 2022-12-02 NOTE — CHART NOTE - NSCHARTNOTEFT_GEN_A_CORE
Brief Nutrition Note    Per H&P, pt is a 77 year old male with PMHx of Dementia, Depression, OA, Gout, HTN, Liver Cirrhosis 2ry to Likely Hepatitis, and HFpEF who was brought to the ED on 11/22 following an episode of presyncope in setting of recent watery diarrhea on lactulose, found to have positive orthostatic vitals in ED, s/p IV fluids, to be admitted for further investigations, management, and monitoring. Currently hemodynamically stable.    Weight is 79.5 KG; BMI is 28.3 (overweight range). Current diet order is consistent carbohydrate (no snacks) -- recommend to add DASH/TLC modular to diet order (dx HTN). Pt states he has good appetite; consuming >75% of meals provided in-house -- RN confirmed. No chewing or swallowing difficulties noted. No nausea or vomiting reported. Skin is intact; no pressure injuries noted per flowsheet. No edema noted per flowsheet. Last BM on 12/01 - 1x; no BM today per RN.     Pt assessed to be at low nutrition risk; will f/u in 7-10 days.     RD to remain available: Erin Rm [id number] or TEAMS

## 2022-12-02 NOTE — PROGRESS NOTE ADULT - SUBJECTIVE AND OBJECTIVE BOX
STACY CARDENAS  77y  Male      Patient is a 77y old  Male who presents with a chief complaint of Presyncope (26 Nov 2022 16:35)      INTERVAL HPI/OVERNIGHT EVENTS:  He feels ok, no chest pain.   Vital Signs Last 24 Hrs  T(C): 36.7 (27 Nov 2022 04:43), Max: 36.9 (26 Nov 2022 20:54)  T(F): 98 (27 Nov 2022 04:43), Max: 98.4 (26 Nov 2022 20:54)  HR: 81 (27 Nov 2022 04:43) (81 - 86)  BP: 146/77 (27 Nov 2022 04:43) (146/77 - 147/76)  BP(mean): --  RR: 18 (27 Nov 2022 04:43) (18 - 18)  SpO2: --          11-26-22 @ 07:01  -  11-27-22 @ 07:00  --------------------------------------------------------  IN: 890 mL / OUT: 0 mL / NET: 890 mL    11-27-22 @ 07:01  -  11-27-22 @ 13:07  --------------------------------------------------------  IN: 0 mL / OUT: 0 mL / NET: 0 mL            Consultant(s) Notes Reviewed:  [x ] YES  [ ] NO          MEDICATIONS  (STANDING):  aspirin  chewable 81 milliGRAM(s) Oral daily  chlorhexidine 2% Cloths 1 Application(s) Topical <User Schedule>  dextrose 5%. 1000 milliLiter(s) (100 mL/Hr) IV Continuous <Continuous>  dextrose 5%. 1000 milliLiter(s) (50 mL/Hr) IV Continuous <Continuous>  dextrose 50% Injectable 25 Gram(s) IV Push once  dextrose 50% Injectable 12.5 Gram(s) IV Push once  dextrose 50% Injectable 25 Gram(s) IV Push once  donepezil 10 milliGRAM(s) Oral at bedtime  DULoxetine 60 milliGRAM(s) Oral daily  enoxaparin Injectable 40 milliGRAM(s) SubCutaneous every 24 hours  glucagon  Injectable 1 milliGRAM(s) IntraMuscular once  insulin glargine Injectable (LANTUS) 10 Unit(s) SubCutaneous at bedtime  insulin lispro (ADMELOG) corrective regimen sliding scale   SubCutaneous three times a day before meals  lactulose Syrup 10 Gram(s) Oral three times a day  magnesium oxide 400 milliGRAM(s) Oral two times a day with meals  memantine 10 milliGRAM(s) Oral daily  pantoprazole    Tablet 40 milliGRAM(s) Oral before breakfast  pyridoxine 100 milliGRAM(s) Oral daily    MEDICATIONS  (PRN):  dextrose Oral Gel 15 Gram(s) Oral once PRN Blood Glucose LESS THAN 70 milliGRAM(s)/deciliter      LABS                          12.9   5.25  )-----------( 118      ( 27 Nov 2022 06:00 )             35.4     11-27    140  |  106  |  12  ----------------------------<  176<H>  3.9   |  26  |  0.7    Ca    8.6      27 Nov 2022 06:00  Mg     1.2     11-27    TPro  6.0  /  Alb  2.9<L>  /  TBili  1.7<H>  /  DBili  x   /  AST  22  /  ALT  20  /  AlkPhos  125<H>  11-27          Lactate Trend        CAPILLARY BLOOD GLUCOSE      POCT Blood Glucose.: 158 mg/dL (27 Nov 2022 11:40)        RADIOLOGY & ADDITIONAL TESTS:    Imaging Personally Reviewed:  [ ] YES  [ ] NO    HEALTH ISSUES - PROBLEM Dx:          PHYSICAL EXAM:  GENERAL: NAD, well-developed.  HEAD:  Atraumatic, Normocephalic.  EYES: EOMI, PERRLA, conjunctiva and sclera clear.  NECK: Supple, No JVD.  CHEST/LUNG: Clear to auscultation bilaterally; No wheeze.  HEART: Regular rate and rhythm; S1 S2.   ABDOMEN: Soft, Nontender, Nondistended; Bowel sounds present.  EXTREMITIES:  2+ Peripheral Pulses, No clubbing, cyanosis, or edema.  PSYCH: AAOx to place, date of birth and time month and year.   NEUROLOGY: non-focal.  SKIN: No rashes or lesions.
24H events:    Patient is a 77y old Male who presents with a chief complaint of Presyncope (27 Nov 2022 13:06)    Primary diagnosis of Dizziness       Today is hospital day 6d. This morning patient was seen and examined at bedside, resting comfortably in bed.      PAST MEDICAL & SURGICAL HISTORY  Diabetes    Depression    Gout    Benign essential HTN    Osteoarthritis    Cataracts, both eyes    S/P cholecystectomy    S/P knee surgery      SOCIAL HISTORY:  Social History:  Please refer to above for more details (22 Nov 2022 08:08)      ALLERGIES:  Keflex (Hives)  Keflex (Unknown)    MEDICATIONS:  STANDING MEDICATIONS  aspirin  chewable 81 milliGRAM(s) Oral daily  chlorhexidine 2% Cloths 1 Application(s) Topical <User Schedule>  dextrose 5%. 1000 milliLiter(s) IV Continuous <Continuous>  dextrose 5%. 1000 milliLiter(s) IV Continuous <Continuous>  dextrose 50% Injectable 25 Gram(s) IV Push once  dextrose 50% Injectable 12.5 Gram(s) IV Push once  dextrose 50% Injectable 25 Gram(s) IV Push once  donepezil 10 milliGRAM(s) Oral at bedtime  DULoxetine 60 milliGRAM(s) Oral daily  enoxaparin Injectable 40 milliGRAM(s) SubCutaneous every 24 hours  glucagon  Injectable 1 milliGRAM(s) IntraMuscular once  insulin glargine Injectable (LANTUS) 10 Unit(s) SubCutaneous at bedtime  insulin lispro (ADMELOG) corrective regimen sliding scale   SubCutaneous three times a day before meals  lactulose Syrup 10 Gram(s) Oral three times a day  magnesium oxide 400 milliGRAM(s) Oral two times a day with meals  magnesium sulfate  IVPB 2 Gram(s) IV Intermittent every 2 hours  memantine 10 milliGRAM(s) Oral daily  pantoprazole    Tablet 40 milliGRAM(s) Oral before breakfast  pyridoxine 100 milliGRAM(s) Oral daily    PRN MEDICATIONS  dextrose Oral Gel 15 Gram(s) Oral once PRN        LABS:                        13.5   6.03  )-----------( 128      ( 28 Nov 2022 05:26 )             36.5     11-28    139  |  105  |  12  ----------------------------<  143<H>  3.9   |  26  |  0.7    Ca    8.6      28 Nov 2022 05:26  Mg     1.3     11-28    TPro  5.8<L>  /  Alb  2.7<L>  /  TBili  1.6<H>  /  DBili  x   /  AST  27  /  ALT  22  /  AlkPhos  123<H>  11-28      RADIOLOGY:    VITALS:   T(F): 98.1  HR: 83  BP: 160/68  RR: 18  SpO2: --    PHYSICAL EXAM:    GENERAL: Lethargic  HEAD:  Atraumatic, Normocephalic  EYES: EOMI  NECK: Supple  NERVOUS SYSTEM:  Alert & Oriented X2, motor 5/5 b/l upper and lower ext, sensation inatct  CHEST/LUNG: Clear to auscultation bilaterally; No rales, rhonchi, wheezing, or rubs  HEART: Regular rate and rhythm; No murmurs, rubs, or gallops  ABDOMEN: Soft, Nontender, Nondistended; Bowel sounds present  EXTREMITIES: No clubbing, cyanosis, mild edema, on stockings  LYMPH: No lymphadenopathy noted  SKIN: No rashes or lesions      
24H events:    Patient is a 77y old Male who presents with a chief complaint of Presyncope (28 Nov 2022 13:11)    Primary diagnosis of Dizziness       Today is hospital day 7d. This morning patient was seen and examined at bedside, resting comfortably in bed.      PAST MEDICAL & SURGICAL HISTORY  Diabetes    Depression    Gout    Benign essential HTN    Osteoarthritis    Cataracts, both eyes    S/P cholecystectomy    S/P knee surgery      SOCIAL HISTORY:  Social History:  Please refer to above for more details (22 Nov 2022 08:08)      ALLERGIES:  Keflex (Hives)  Keflex (Unknown)    MEDICATIONS:  STANDING MEDICATIONS  aspirin  chewable 81 milliGRAM(s) Oral daily  carbidopa/levodopa  25/100 1 Tablet(s) Oral three times a day  chlorhexidine 2% Cloths 1 Application(s) Topical <User Schedule>  dextrose 5%. 1000 milliLiter(s) IV Continuous <Continuous>  dextrose 5%. 1000 milliLiter(s) IV Continuous <Continuous>  dextrose 50% Injectable 25 Gram(s) IV Push once  dextrose 50% Injectable 12.5 Gram(s) IV Push once  dextrose 50% Injectable 25 Gram(s) IV Push once  donepezil 10 milliGRAM(s) Oral at bedtime  DULoxetine 60 milliGRAM(s) Oral daily  enoxaparin Injectable 40 milliGRAM(s) SubCutaneous every 24 hours  glucagon  Injectable 1 milliGRAM(s) IntraMuscular once  insulin glargine Injectable (LANTUS) 10 Unit(s) SubCutaneous at bedtime  insulin lispro (ADMELOG) corrective regimen sliding scale   SubCutaneous three times a day before meals  lactulose Syrup 10 Gram(s) Oral three times a day  magnesium oxide 400 milliGRAM(s) Oral two times a day with meals  memantine 10 milliGRAM(s) Oral daily  pantoprazole    Tablet 40 milliGRAM(s) Oral before breakfast  pyridoxine 100 milliGRAM(s) Oral daily    PRN MEDICATIONS  dextrose Oral Gel 15 Gram(s) Oral once PRN        LABS:                        13.7   6.08  )-----------( 176      ( 29 Nov 2022 06:47 )             37.1     11-29    138  |  105  |  10  ----------------------------<  158<H>  4.1   |  24  |  0.6<L>    Ca    8.3<L>      29 Nov 2022 06:47  Mg     1.8     11-29    TPro  5.9<L>  /  Alb  2.6<L>  /  TBili  1.8<H>  /  DBili  x   /  AST  33  /  ALT  11  /  AlkPhos  145<H>  11-29    RADIOLOGY:    VITALS:   T(F): 96.4  HR: 79  BP: 158/83  RR: 18  SpO2: --    PHYSICAL EXAM:    GENERAL: Lethargic  HEAD:  Atraumatic, Normocephalic  EYES: EOMI  NECK: Supple  NERVOUS SYSTEM:  Alert & Oriented X2, motor 5/5 b/l upper and lower ext, sensation intact  CHEST/LUNG: Clear to auscultation bilaterally; No rales, rhonchi, wheezing, or rubs  HEART: Regular rate and rhythm; No murmurs, rubs, or gallops  ABDOMEN: Soft, Nontender, Nondistended; Bowel sounds present  EXTREMITIES: No clubbing, cyanosis, mild edema, on stockings  LYMPH: No lymphadenopathy noted  SKIN: No rashes or lesions      
  STACY CARDENAS  77y  Male      Patient is a 77y old  Male who presents with a chief complaint of Presyncope (28 Nov 2022 10:41)      INTERVAL HPI/OVERNIGHT EVENTS:  He is confused, no new events.   Vital Signs Last 24 Hrs  T(C): 36.7 (28 Nov 2022 04:38), Max: 36.7 (28 Nov 2022 04:38)  T(F): 98.1 (28 Nov 2022 04:38), Max: 98.1 (28 Nov 2022 04:38)  HR: 83 (28 Nov 2022 04:38) (83 - 92)  BP: 160/68 (28 Nov 2022 04:38) (155/69 - 172/84)  BP(mean): --  RR: 18 (28 Nov 2022 04:38) (18 - 18)  SpO2: --          11-27-22 @ 07:01  -  11-28-22 @ 07:00  --------------------------------------------------------  IN: 746 mL / OUT: 0 mL / NET: 746 mL    11-28-22 @ 07:01  -  11-28-22 @ 13:14  --------------------------------------------------------  IN: 236 mL / OUT: 0 mL / NET: 236 mL            Consultant(s) Notes Reviewed:  [x ] YES  [ ] NO          MEDICATIONS  (STANDING):  aspirin  chewable 81 milliGRAM(s) Oral daily  chlorhexidine 2% Cloths 1 Application(s) Topical <User Schedule>  dextrose 5%. 1000 milliLiter(s) (50 mL/Hr) IV Continuous <Continuous>  dextrose 5%. 1000 milliLiter(s) (100 mL/Hr) IV Continuous <Continuous>  dextrose 50% Injectable 25 Gram(s) IV Push once  dextrose 50% Injectable 12.5 Gram(s) IV Push once  dextrose 50% Injectable 25 Gram(s) IV Push once  donepezil 10 milliGRAM(s) Oral at bedtime  DULoxetine 60 milliGRAM(s) Oral daily  enoxaparin Injectable 40 milliGRAM(s) SubCutaneous every 24 hours  glucagon  Injectable 1 milliGRAM(s) IntraMuscular once  insulin glargine Injectable (LANTUS) 10 Unit(s) SubCutaneous at bedtime  insulin lispro (ADMELOG) corrective regimen sliding scale   SubCutaneous three times a day before meals  lactulose Syrup 10 Gram(s) Oral three times a day  magnesium oxide 400 milliGRAM(s) Oral two times a day with meals  magnesium sulfate  IVPB 2 Gram(s) IV Intermittent every 2 hours  memantine 10 milliGRAM(s) Oral daily  pantoprazole    Tablet 40 milliGRAM(s) Oral before breakfast  pyridoxine 100 milliGRAM(s) Oral daily    MEDICATIONS  (PRN):  dextrose Oral Gel 15 Gram(s) Oral once PRN Blood Glucose LESS THAN 70 milliGRAM(s)/deciliter      LABS                          13.5   6.03  )-----------( 128      ( 28 Nov 2022 05:26 )             36.5     11-28    139  |  105  |  12  ----------------------------<  143<H>  3.9   |  26  |  0.7    Ca    8.6      28 Nov 2022 05:26  Mg     1.3     11-28    TPro  5.8<L>  /  Alb  2.7<L>  /  TBili  1.6<H>  /  DBili  x   /  AST  27  /  ALT  22  /  AlkPhos  123<H>  11-28          Lactate Trend        CAPILLARY BLOOD GLUCOSE      POCT Blood Glucose.: 238 mg/dL (28 Nov 2022 12:56)        RADIOLOGY & ADDITIONAL TESTS:    Imaging Personally Reviewed:  [ ] YES  [ ] NO    HEALTH ISSUES - PROBLEM Dx:          PHYSICAL EXAM:  GENERAL: NAD, well-developed.  HEAD:  Atraumatic, Normocephalic.  EYES: EOMI, PERRLA, conjunctiva and sclera clear.  NECK: Supple, No JVD.  CHEST/LUNG: Clear to auscultation bilaterally; No wheeze.  HEART: Regular rate and rhythm; S1 S2.   ABDOMEN: Soft, Nontender, Nondistended; Bowel sounds present.  EXTREMITIES:  2+ Peripheral Pulses, No clubbing, cyanosis, or edema.  PSYCH: AAOx to place, date of birth and time month and year.   NEUROLOGY: non-focal.  SKIN: No rashes or lesions.
Patient is a 77y old  Male who presents with a chief complaint of Presyncope (01 Dec 2022 17:11)    INTERVAL HPI/OVERNIGHT EVENTS: Patient was examined and seen at bedside. This morning pt is resting comfortably in bed and reports no new issues or overnight events. No complaints, feels better. As per wife, today is the best pt looked in awhile.  ROS: Denies CP, SOB, AP, new weakness, dizziness  All other systems reviewed and are within normal limits.  InitialHPI:  Location: [facility] ER Hold 017 A ([facility] ER Hold)  Patient Name: STACY POSADA  Age: 77y  Gender: Male      History of Present Illness  Mr. Posada is a 77 year old male patient known to have:  - Baseline: AO3, lives with wife, recently discharged from rehab; supposed to ambulate with help of walker  - Dementia  - Depression  - Hypertension  - DM II. Last Hba1c 6.1 10/2022. Recently increased metformin from 1g BID to 1.5g BID  - History of Hepatitis (likely C) s/p Rx several decades ago per wife and history of Liver Cirrhosis by Dr Stephens at [place]. Recent CT AP IC 10/2022 revealed nodular liver with perisplenic, gastrohepatic, and omental varices. History of admission for HE currently still on lactulose BID.  - HFpEF. Last TTE 10/15/2022 normal EF, DD I, mild MR  - Osteoarthritis/ Gout  - Chronic Rib Fractures of Left posterior T11 and T12 and recent Left 1st 4-6th and 8-10th rib fractures  - Surgical History: s/p CCY      He was brought to the ED on 11/22 following an episode of presyncope.  History goes back to evening of 11/21 when the patient was laying supine and stood up abruptly to go to the bathroom.  A few steps later, patient reported feeling light headed and decided to sit on knees.  Prior to episode, patient denied any chest pain, SOB, diaphoresis, or blacking out.  During the episode, he had no HT or LOC or seizure like activity (no jerky limb movements, tongue biting, drooling, uprolling of eyes, or urinary/fecal incontinence).  After the episode, he was able to stand up after 15 minutes, and was not confused/ could recall all event details.  Of note, wife notes a recent fall while going down stairs in 10/2022 and another episode where he called her for help while on toilet few days PTP (found him with head on wall and unable to stand; no LOC then).  She also notes that POCT has been 160-220's at home, and that the metformin dose has recently been increased from 1g to 1.5g BID.      On review of systems, wife notes that the patient has been having 3-4 episodes of watery non bloody non mucoid diarrhea for the last 3-4 days (patient has been on lactulose 1 tablespoon BID for a long time, but diarrhea is new per wife).   He otherwise denies any recent fever, chills, night sweats, URTI symptoms (cough, rhinorrhea, sore throat), urinary symptoms (urinary frequency, urgency, intermittence, dysuria, foul smelling urine, cloudy urine), change in bowel movements (diarrhea or constipation), abdominal pain, headache, nausea, or vomiting. No sick contacts. No recent travel or exposure to recent travelers.    - In the ED, orthostatic vitals were checked and came back positive: (126/57mmHg; 84bpm) -> (120/56mmHg; 82bpm) -> (97/52mmHg; 81bpm)  - Patient received 1L LR bolus in ED  - He also received 2g x3 doses of Mg for Mg 1.2  - He will be admitted for further investigations, management, and monitoring     (22 Nov 2022 08:08)    PAST MEDICAL & SURGICAL HISTORY:  Diabetes      Depression      Gout      Benign essential HTN      Osteoarthritis      Cataracts, both eyes      S/P cholecystectomy      S/P knee surgery          General: NAD, AAOx2+  HEENT:  EOMI, no LAD  CV: S1 S2  Resp: decreased breath sounds at bases  GI: NT/ND/S +BS  MS: no clubbing/cyanosis/edema, + pulses b/l  Neuro: LUE chronic weakness, rest 5/5    MEDICATIONS  (STANDING):  aspirin  chewable 81 milliGRAM(s) Oral daily  carbidopa/levodopa  25/100 1 Tablet(s) Oral three times a day  chlorhexidine 2% Cloths 1 Application(s) Topical <User Schedule>  dextrose 5%. 1000 milliLiter(s) (100 mL/Hr) IV Continuous <Continuous>  dextrose 5%. 1000 milliLiter(s) (50 mL/Hr) IV Continuous <Continuous>  dextrose 50% Injectable 25 Gram(s) IV Push once  dextrose 50% Injectable 12.5 Gram(s) IV Push once  dextrose 50% Injectable 25 Gram(s) IV Push once  donepezil 10 milliGRAM(s) Oral at bedtime  DULoxetine 60 milliGRAM(s) Oral daily  enoxaparin Injectable 40 milliGRAM(s) SubCutaneous every 24 hours  glucagon  Injectable 1 milliGRAM(s) IntraMuscular once  insulin glargine Injectable (LANTUS) 10 Unit(s) SubCutaneous at bedtime  insulin lispro (ADMELOG) corrective regimen sliding scale   SubCutaneous three times a day before meals  lactulose Syrup 10 Gram(s) Oral every 3 hours  magnesium oxide 400 milliGRAM(s) Oral two times a day with meals  magnesium sulfate  IVPB 2 Gram(s) IV Intermittent every 4 hours  memantine 10 milliGRAM(s) Oral daily  pantoprazole    Tablet 40 milliGRAM(s) Oral before breakfast  pyridoxine 100 milliGRAM(s) Oral daily    MEDICATIONS  (PRN):  dextrose Oral Gel 15 Gram(s) Oral once PRN Blood Glucose LESS THAN 70 milliGRAM(s)/deciliter    Vital Signs Last 24 Hrs  T(C): 36.4 (02 Dec 2022 12:30), Max: 36.4 (02 Dec 2022 12:30)  T(F): 97.5 (02 Dec 2022 12:30), Max: 97.5 (02 Dec 2022 12:30)  HR: 91 (02 Dec 2022 12:30) (74 - 91)  BP: 129/71 (02 Dec 2022 12:30) (129/71 - 162/77)  BP(mean): --  RR: 18 (02 Dec 2022 12:30) (18 - 18)  SpO2: --      CAPILLARY BLOOD GLUCOSE      POCT Blood Glucose.: 252 mg/dL (02 Dec 2022 16:40)  POCT Blood Glucose.: 367 mg/dL (02 Dec 2022 11:09)  POCT Blood Glucose.: 233 mg/dL (02 Dec 2022 07:42)  POCT Blood Glucose.: 298 mg/dL (01 Dec 2022 21:16)                          13.4   6.22  )-----------( 157      ( 02 Dec 2022 06:39 )             37.4     12-01    139  |  103  |  14  ----------------------------<  216<H>  3.9   |  27  |  0.7    Ca    8.8      01 Dec 2022 07:05  Mg     1.5     12-02    TPro  6.1  /  Alb  2.9<L>  /  TBili  1.4<H>  /  DBili  x   /  AST  28  /  ALT  7   /  AlkPhos  151<H>  12-01    LIVER FUNCTIONS - ( 01 Dec 2022 07:05 )  Alb: 2.9 g/dL / Pro: 6.1 g/dL / ALK PHOS: 151 U/L / ALT: 7 U/L / AST: 28 U/L / GGT: x                           Chart, Consultant(s) Notes Reviewed:  [x ] YES  [ ] NO  Care Discussed with Consultants/Other Providers/ Housestaff [ x] YES  [ ] NO  Radiology, labs, old available records personally reviewed.                            
  MICHAELZAIN STACY  77y  Male      Patient is a 77y old  Male who presents with a chief complaint of Presyncope (29 Nov 2022 08:56)      INTERVAL HPI/OVERNIGHT EVENTS:  Patient is more wake today.   Vital Signs Last 24 Hrs  T(C): 36.2 (29 Nov 2022 12:43), Max: 36.9 (28 Nov 2022 20:26)  T(F): 97.1 (29 Nov 2022 12:43), Max: 98.5 (28 Nov 2022 20:26)  HR: 75 (29 Nov 2022 12:43) (75 - 80)  BP: 176/78 (29 Nov 2022 12:43) (157/72 - 176/78)  BP(mean): --  RR: 18 (29 Nov 2022 12:43) (18 - 18)  SpO2: --          11-28-22 @ 07:01  -  11-29-22 @ 07:00  --------------------------------------------------------  IN: 472 mL / OUT: 0 mL / NET: 472 mL            Consultant(s) Notes Reviewed:  [x ] YES  [ ] NO          MEDICATIONS  (STANDING):  aspirin  chewable 81 milliGRAM(s) Oral daily  carbidopa/levodopa  25/100 1 Tablet(s) Oral three times a day  chlorhexidine 2% Cloths 1 Application(s) Topical <User Schedule>  dextrose 5%. 1000 milliLiter(s) (50 mL/Hr) IV Continuous <Continuous>  dextrose 5%. 1000 milliLiter(s) (100 mL/Hr) IV Continuous <Continuous>  dextrose 50% Injectable 25 Gram(s) IV Push once  dextrose 50% Injectable 12.5 Gram(s) IV Push once  dextrose 50% Injectable 25 Gram(s) IV Push once  donepezil 10 milliGRAM(s) Oral at bedtime  DULoxetine 60 milliGRAM(s) Oral daily  enoxaparin Injectable 40 milliGRAM(s) SubCutaneous every 24 hours  glucagon  Injectable 1 milliGRAM(s) IntraMuscular once  insulin glargine Injectable (LANTUS) 10 Unit(s) SubCutaneous at bedtime  insulin lispro (ADMELOG) corrective regimen sliding scale   SubCutaneous three times a day before meals  lactulose Syrup 10 Gram(s) Oral every 4 hours  magnesium oxide 400 milliGRAM(s) Oral two times a day with meals  memantine 10 milliGRAM(s) Oral daily  pantoprazole    Tablet 40 milliGRAM(s) Oral before breakfast  pyridoxine 100 milliGRAM(s) Oral daily    MEDICATIONS  (PRN):  dextrose Oral Gel 15 Gram(s) Oral once PRN Blood Glucose LESS THAN 70 milliGRAM(s)/deciliter      LABS                          13.7   6.08  )-----------( 176      ( 29 Nov 2022 06:47 )             37.1     11-29    138  |  105  |  10  ----------------------------<  158<H>  4.1   |  24  |  0.6<L>    Ca    8.3<L>      29 Nov 2022 06:47  Mg     1.8     11-29    TPro  5.9<L>  /  Alb  2.6<L>  /  TBili  1.8<H>  /  DBili  x   /  AST  33  /  ALT  11  /  AlkPhos  145<H>  11-29          Lactate Trend        CAPILLARY BLOOD GLUCOSE      POCT Blood Glucose.: 290 mg/dL (29 Nov 2022 11:53)        RADIOLOGY & ADDITIONAL TESTS:    Imaging Personally Reviewed:  [ ] YES  [ ] NO    HEALTH ISSUES - PROBLEM Dx:          PHYSICAL EXAM:  GENERAL: NAD, well-developed.  HEAD:  Atraumatic, Normocephalic.  EYES: EOMI, PERRLA, conjunctiva and sclera clear.  NECK: Supple, No JVD.  CHEST/LUNG: Clear to auscultation bilaterally; No wheeze.  HEART: Regular rate and rhythm; S1 S2.   ABDOMEN: Soft, Nontender, Nondistended; Bowel sounds present.  EXTREMITIES:  2+ Peripheral Pulses, No clubbing, cyanosis, or edema.  PSYCH: AAOx to place, date of birth and time month and year.   NEUROLOGY: non-focal.  SKIN: No rashes or lesions.
  STACY CARDENAS  77y  Male      Patient is a 77y old  Male who presents with a chief complaint of Presyncope (24 Nov 2022 10:01)      INTERVAL HPI/OVERNIGHT EVENTS:  He feels weak, no chest pain.   Vital Signs Last 24 Hrs  T(C): 36.6 (24 Nov 2022 13:45), Max: 36.8 (23 Nov 2022 21:25)  T(F): 97.9 (24 Nov 2022 13:45), Max: 98.2 (23 Nov 2022 21:25)  HR: 85 (24 Nov 2022 13:45) (74 - 85)  BP: 161/72 (24 Nov 2022 13:45) (143/70 - 181/83)  BP(mean): --  RR: 18 (24 Nov 2022 13:45) (18 - 18)  SpO2: --          11-23-22 @ 07:01  -  11-24-22 @ 07:00  --------------------------------------------------------  IN: 825 mL / OUT: 300 mL / NET: 525 mL            Consultant(s) Notes Reviewed:  [x ] YES  [ ] NO          MEDICATIONS  (STANDING):  aspirin  chewable 81 milliGRAM(s) Oral daily  chlorhexidine 2% Cloths 1 Application(s) Topical <User Schedule>  dextrose 5%. 1000 milliLiter(s) (50 mL/Hr) IV Continuous <Continuous>  dextrose 5%. 1000 milliLiter(s) (100 mL/Hr) IV Continuous <Continuous>  dextrose 50% Injectable 25 Gram(s) IV Push once  dextrose 50% Injectable 12.5 Gram(s) IV Push once  dextrose 50% Injectable 25 Gram(s) IV Push once  donepezil 10 milliGRAM(s) Oral at bedtime  DULoxetine 60 milliGRAM(s) Oral daily  enoxaparin Injectable 40 milliGRAM(s) SubCutaneous every 24 hours  glucagon  Injectable 1 milliGRAM(s) IntraMuscular once  insulin lispro (ADMELOG) corrective regimen sliding scale   SubCutaneous three times a day before meals  lactated ringers. 1000 milliLiter(s) (75 mL/Hr) IV Continuous <Continuous>  memantine 10 milliGRAM(s) Oral daily  pantoprazole    Tablet 40 milliGRAM(s) Oral before breakfast  pyridoxine 100 milliGRAM(s) Oral daily    MEDICATIONS  (PRN):  dextrose Oral Gel 15 Gram(s) Oral once PRN Blood Glucose LESS THAN 70 milliGRAM(s)/deciliter      LABS                          12.4   6.50  )-----------( 117      ( 24 Nov 2022 05:42 )             34.2     11-24    141  |  104  |  15  ----------------------------<  169<H>  4.2   |  28  |  0.7    Ca    8.6      24 Nov 2022 05:42  Mg     1.4     11-24    TPro  6.0  /  Alb  2.8<L>  /  TBili  1.4<H>  /  DBili  x   /  AST  25  /  ALT  22  /  AlkPhos  174<H>  11-24          Lactate Trend  11-22 @ 11:43 Lactate:3.3     CARDIAC MARKERS ( 23 Nov 2022 06:55 )  x     / 0.01 ng/mL / x     / x     / x          CAPILLARY BLOOD GLUCOSE      POCT Blood Glucose.: 306 mg/dL (24 Nov 2022 11:50)        RADIOLOGY & ADDITIONAL TESTS:    Imaging Personally Reviewed:  [ ] YES  [ ] NO    HEALTH ISSUES - PROBLEM Dx:          PHYSICAL EXAM:  GENERAL: NAD, well-developed.  HEAD:  Atraumatic, Normocephalic.  EYES: EOMI, PERRLA, conjunctiva and sclera clear.  NECK: Supple, No JVD.  CHEST/LUNG: Clear to auscultation bilaterally; No wheeze.  HEART: Regular rate and rhythm; S1 S2.   ABDOMEN: Soft, Nontender, Nondistended; Bowel sounds present.  EXTREMITIES:  2+ Peripheral Pulses, No clubbing, cyanosis, or edema.  PSYCH: AAOx to place, date of birth and time month and year.   NEUROLOGY: non-focal.  SKIN: No rashes or lesions.
  STACY CARDENAS  77y  Male      Patient is a 77y old  Male who presents with a chief complaint of Presyncope (25 Nov 2022 11:03)      INTERVAL HPI/OVERNIGHT EVENTS:  He feels ok,   Vital Signs Last 24 Hrs  T(C): 36.5 (25 Nov 2022 06:00), Max: 36.9 (24 Nov 2022 19:58)  T(F): 97.7 (25 Nov 2022 06:00), Max: 98.4 (24 Nov 2022 19:58)  HR: 74 (25 Nov 2022 06:00) (74 - 88)  BP: 166/73 (25 Nov 2022 06:00) (145/63 - 166/73)  BP(mean): --  RR: 16 (25 Nov 2022 06:00) (16 - 18)  SpO2: 97% (25 Nov 2022 06:00) (97% - 97%)    Parameters below as of 25 Nov 2022 06:00  Patient On (Oxygen Delivery Method): room air          11-24-22 @ 07:01  -  11-25-22 @ 07:00  --------------------------------------------------------  IN: 0 mL / OUT: 300 mL / NET: -300 mL            Consultant(s) Notes Reviewed:  [x ] YES  [ ] NO          MEDICATIONS  (STANDING):  aspirin  chewable 81 milliGRAM(s) Oral daily  chlorhexidine 2% Cloths 1 Application(s) Topical <User Schedule>  dextrose 5%. 1000 milliLiter(s) (50 mL/Hr) IV Continuous <Continuous>  dextrose 5%. 1000 milliLiter(s) (100 mL/Hr) IV Continuous <Continuous>  dextrose 50% Injectable 25 Gram(s) IV Push once  dextrose 50% Injectable 12.5 Gram(s) IV Push once  dextrose 50% Injectable 25 Gram(s) IV Push once  donepezil 10 milliGRAM(s) Oral at bedtime  DULoxetine 60 milliGRAM(s) Oral daily  enoxaparin Injectable 40 milliGRAM(s) SubCutaneous every 24 hours  glucagon  Injectable 1 milliGRAM(s) IntraMuscular once  insulin glargine Injectable (LANTUS) 10 Unit(s) SubCutaneous at bedtime  insulin lispro (ADMELOG) corrective regimen sliding scale   SubCutaneous three times a day before meals  magnesium sulfate  IVPB 2 Gram(s) IV Intermittent every 2 hours  memantine 10 milliGRAM(s) Oral daily  pantoprazole    Tablet 40 milliGRAM(s) Oral before breakfast  pyridoxine 100 milliGRAM(s) Oral daily    MEDICATIONS  (PRN):  dextrose Oral Gel 15 Gram(s) Oral once PRN Blood Glucose LESS THAN 70 milliGRAM(s)/deciliter      LABS                          12.8   5.96  )-----------( 116      ( 25 Nov 2022 06:31 )             35.5     11-25    146  |  109  |  12  ----------------------------<  173<H>  4.6   |  26  |  0.6<L>    Ca    8.3<L>      25 Nov 2022 06:31  Mg     1.4     11-25    TPro  5.7<L>  /  Alb  2.7<L>  /  TBili  1.5<H>  /  DBili  x   /  AST  27  /  ALT  22  /  AlkPhos  150<H>  11-25          Lactate Trend  11-22 @ 11:43 Lactate:3.3     CARDIAC MARKERS ( 25 Nov 2022 06:31 )  x     / <0.01 ng/mL / x     / x     / x          CAPILLARY BLOOD GLUCOSE      POCT Blood Glucose.: 176 mg/dL (25 Nov 2022 11:26)        RADIOLOGY & ADDITIONAL TESTS:    Imaging Personally Reviewed:  [ ] YES  [ ] NO    HEALTH ISSUES - PROBLEM Dx:          PHYSICAL EXAM:  GENERAL: NAD, well-developed.  HEAD:  Atraumatic, Normocephalic.  EYES: EOMI, PERRLA, conjunctiva and sclera clear.  NECK: Supple, No JVD.  CHEST/LUNG: Clear to auscultation bilaterally; No wheeze.  HEART: Regular rate and rhythm; S1 S2.   ABDOMEN: Soft, Nontender, Nondistended; Bowel sounds present.  EXTREMITIES:  2+ Peripheral Pulses, No clubbing, cyanosis, or edema.  PSYCH: AAOx to place, date of birth and time month and year.   NEUROLOGY: non-focal.  SKIN: No rashes or lesions.
  STACY CARDENAS  77y  Male      Patient is a 77y old  Male who presents with a chief complaint of Presyncope (26 Nov 2022 10:39)      INTERVAL HPI/OVERNIGHT EVENTS:  Patient was confused in AM  Vital Signs Last 24 Hrs  T(C): 36.8 (26 Nov 2022 12:54), Max: 37 (25 Nov 2022 20:16)  T(F): 98.3 (26 Nov 2022 12:54), Max: 98.6 (25 Nov 2022 20:16)  HR: 94 (26 Nov 2022 12:54) (76 - 94)  BP: 138/73 (26 Nov 2022 12:54) (138/73 - 167/119)  BP(mean): --  RR: 18 (26 Nov 2022 12:54) (18 - 18)  SpO2: 94% (25 Nov 2022 20:16) (94% - 94%)    Parameters below as of 25 Nov 2022 20:16  Patient On (Oxygen Delivery Method): room air          11-26-22 @ 07:01  -  11-26-22 @ 16:36  --------------------------------------------------------  IN: 650 mL / OUT: 0 mL / NET: 650 mL            Consultant(s) Notes Reviewed:  [x ] YES  [ ] NO          MEDICATIONS  (STANDING):  aspirin  chewable 81 milliGRAM(s) Oral daily  chlorhexidine 2% Cloths 1 Application(s) Topical <User Schedule>  dextrose 5%. 1000 milliLiter(s) (50 mL/Hr) IV Continuous <Continuous>  dextrose 5%. 1000 milliLiter(s) (100 mL/Hr) IV Continuous <Continuous>  dextrose 50% Injectable 25 Gram(s) IV Push once  dextrose 50% Injectable 12.5 Gram(s) IV Push once  dextrose 50% Injectable 25 Gram(s) IV Push once  donepezil 10 milliGRAM(s) Oral at bedtime  DULoxetine 60 milliGRAM(s) Oral daily  enoxaparin Injectable 40 milliGRAM(s) SubCutaneous every 24 hours  glucagon  Injectable 1 milliGRAM(s) IntraMuscular once  insulin glargine Injectable (LANTUS) 10 Unit(s) SubCutaneous at bedtime  insulin lispro (ADMELOG) corrective regimen sliding scale   SubCutaneous three times a day before meals  lactulose Syrup 10 Gram(s) Oral three times a day  magnesium oxide 400 milliGRAM(s) Oral two times a day with meals  memantine 10 milliGRAM(s) Oral daily  pantoprazole    Tablet 40 milliGRAM(s) Oral before breakfast  pyridoxine 100 milliGRAM(s) Oral daily    MEDICATIONS  (PRN):  dextrose Oral Gel 15 Gram(s) Oral once PRN Blood Glucose LESS THAN 70 milliGRAM(s)/deciliter      LABS                          13.4   5.41  )-----------( 115      ( 26 Nov 2022 06:42 )             36.3     11-26    139  |  103  |  11  ----------------------------<  182<H>  4.2   |  27  |  0.6<L>    Ca    8.5      26 Nov 2022 06:42  Mg     1.4     11-26    TPro  5.9<L>  /  Alb  2.6<L>  /  TBili  1.9<H>  /  DBili  x   /  AST  22  /  ALT  20  /  AlkPhos  136<H>  11-26          Lactate Trend  11-22 @ 11:43 Lactate:3.3     CARDIAC MARKERS ( 25 Nov 2022 06:31 )  x     / <0.01 ng/mL / x     / x     / x          CAPILLARY BLOOD GLUCOSE      POCT Blood Glucose.: 345 mg/dL (26 Nov 2022 12:00)        RADIOLOGY & ADDITIONAL TESTS:    Imaging Personally Reviewed:  [ ] YES  [ ] NO    HEALTH ISSUES - PROBLEM Dx:          PHYSICAL EXAM:  GENERAL: NAD, well-developed.  HEAD:  Atraumatic, Normocephalic.  EYES: EOMI, PERRLA, conjunctiva and sclera clear.  NECK: Supple, No JVD.  CHEST/LUNG: Clear to auscultation bilaterally; No wheeze.  HEART: Regular rate and rhythm; S1 S2.   ABDOMEN: Soft, Nontender, Nondistended; Bowel sounds present.  EXTREMITIES:  2+ Peripheral Pulses, No clubbing, cyanosis, or edema.  PSYCH: AAOx to place, date of birth and time month and year.   NEUROLOGY: non-focal.  SKIN: No rashes or lesions.  
24H events:    Patient is a 77y old Male who presents with a chief complaint of Presyncope (01 Dec 2022 16:34)    Primary diagnosis of Dizziness       Today is hospital day 9d. This morning patient was seen and examined at bedside, resting comfortably in bed.      PAST MEDICAL & SURGICAL HISTORY  Diabetes    Depression    Gout    Benign essential HTN    Osteoarthritis    Cataracts, both eyes    S/P cholecystectomy    S/P knee surgery      SOCIAL HISTORY:  Social History:  Please refer to above for more details (22 Nov 2022 08:08)      ALLERGIES:  Keflex (Hives)  Keflex (Unknown)    MEDICATIONS:  STANDING MEDICATIONS  aspirin  chewable 81 milliGRAM(s) Oral daily  carbidopa/levodopa  25/100 1 Tablet(s) Oral three times a day  chlorhexidine 2% Cloths 1 Application(s) Topical <User Schedule>  dextrose 5%. 1000 milliLiter(s) IV Continuous <Continuous>  dextrose 5%. 1000 milliLiter(s) IV Continuous <Continuous>  dextrose 50% Injectable 25 Gram(s) IV Push once  dextrose 50% Injectable 12.5 Gram(s) IV Push once  dextrose 50% Injectable 25 Gram(s) IV Push once  donepezil 10 milliGRAM(s) Oral at bedtime  DULoxetine 60 milliGRAM(s) Oral daily  enoxaparin Injectable 40 milliGRAM(s) SubCutaneous every 24 hours  glucagon  Injectable 1 milliGRAM(s) IntraMuscular once  insulin glargine Injectable (LANTUS) 10 Unit(s) SubCutaneous at bedtime  insulin lispro (ADMELOG) corrective regimen sliding scale   SubCutaneous three times a day before meals  lactulose Syrup 10 Gram(s) Oral every 3 hours  magnesium oxide 400 milliGRAM(s) Oral two times a day with meals  memantine 10 milliGRAM(s) Oral daily  pantoprazole    Tablet 40 milliGRAM(s) Oral before breakfast  pyridoxine 100 milliGRAM(s) Oral daily    PRN MEDICATIONS  dextrose Oral Gel 15 Gram(s) Oral once PRN        LABS:                        13.4   5.20  )-----------( 139      ( 01 Dec 2022 07:05 )             37.8     12-01    139  |  103  |  14  ----------------------------<  216<H>  3.9   |  27  |  0.7    Ca    8.8      01 Dec 2022 07:05  Mg     1.5     12-01    TPro  6.1  /  Alb  2.9<L>  /  TBili  1.4<H>  /  DBili  x   /  AST  28  /  ALT  7   /  AlkPhos  151<H>  12-01                  RADIOLOGY:    VITALS:   T(F): 96.9  HR: 80  BP: 145/71  RR: 18  SpO2: --    PHYSICAL EXAM:    GENERAL: Lethargic  HEAD:  Atraumatic, Normocephalic  EYES: EOMI  NECK: Supple  NERVOUS SYSTEM:  Alert & Oriented X2, motor 5/5 b/l upper and lower ext, sensation intact  CHEST/LUNG: Clear to auscultation bilaterally; No rales, rhonchi, wheezing, or rubs  HEART: Regular rate and rhythm; No murmurs, rubs, or gallops  ABDOMEN: Soft, Nontender, Distended; Bowel sounds present  EXTREMITIES: No clubbing, cyanosis, mild edema, on stockings  LYMPH: No lymphadenopathy noted  SKIN: No rashes or lesions      
24H events:    Patient is a 77y old Male who presents with a chief complaint of Presyncope (29 Nov 2022 16:48)    Primary diagnosis of Dizziness       Today is hospital day 8d. This morning patient was seen and examined at bedside, resting comfortably in bed.      PAST MEDICAL & SURGICAL HISTORY  Diabetes    Depression    Gout    Benign essential HTN    Osteoarthritis    Cataracts, both eyes    S/P cholecystectomy    S/P knee surgery      SOCIAL HISTORY:  Social History:  Please refer to above for more details (22 Nov 2022 08:08)      ALLERGIES:  Keflex (Hives)  Keflex (Unknown)    MEDICATIONS:  STANDING MEDICATIONS  aspirin  chewable 81 milliGRAM(s) Oral daily  carbidopa/levodopa  25/100 1 Tablet(s) Oral three times a day  chlorhexidine 2% Cloths 1 Application(s) Topical <User Schedule>  dextrose 5%. 1000 milliLiter(s) IV Continuous <Continuous>  dextrose 5%. 1000 milliLiter(s) IV Continuous <Continuous>  dextrose 50% Injectable 25 Gram(s) IV Push once  dextrose 50% Injectable 12.5 Gram(s) IV Push once  dextrose 50% Injectable 25 Gram(s) IV Push once  donepezil 10 milliGRAM(s) Oral at bedtime  DULoxetine 60 milliGRAM(s) Oral daily  glucagon  Injectable 1 milliGRAM(s) IntraMuscular once  insulin glargine Injectable (LANTUS) 10 Unit(s) SubCutaneous at bedtime  insulin lispro (ADMELOG) corrective regimen sliding scale   SubCutaneous three times a day before meals  lactulose Syrup 10 Gram(s) Oral every 4 hours  magnesium oxide 400 milliGRAM(s) Oral two times a day with meals  magnesium sulfate  IVPB 2 Gram(s) IV Intermittent every 2 hours  memantine 10 milliGRAM(s) Oral daily  pantoprazole    Tablet 40 milliGRAM(s) Oral before breakfast  pyridoxine 100 milliGRAM(s) Oral daily    PRN MEDICATIONS  dextrose Oral Gel 15 Gram(s) Oral once PRN        LABS:                        13.3   5.83  )-----------( 139      ( 30 Nov 2022 05:15 )             37.3     11-30    137  |  103  |  14  ----------------------------<  177<H>  4.2   |  27  |  0.6<L>    Ca    8.7      30 Nov 2022 05:15  Mg     1.3     11-30    TPro  5.8<L>  /  Alb  2.7<L>  /  TBili  1.7<H>  /  DBili  x   /  AST  24  /  ALT  7   /  AlkPhos  125<H>  11-30    RADIOLOGY:    VITALS:   T(F): 96.5  HR: 78  BP: 172/76  RR: 18  SpO2: --    PHYSICAL EXAM:    GENERAL: Lethargic  HEAD:  Atraumatic, Normocephalic  EYES: EOMI  NECK: Supple  NERVOUS SYSTEM:  Alert & Oriented X2, motor 5/5 b/l upper and lower ext, sensation intact  CHEST/LUNG: Clear to auscultation bilaterally; No rales, rhonchi, wheezing, or rubs  HEART: Regular rate and rhythm; No murmurs, rubs, or gallops  ABDOMEN: Soft, Nontender, Distended; Bowel sounds present  EXTREMITIES: No clubbing, cyanosis, mild edema, on stockings  LYMPH: No lymphadenopathy noted  SKIN: No rashes or lesions      
CHIEF COMPLAINT:    Patient is a 77y old  Male who presents with a chief complaint of Presyncope     INTERVAL HPI/OVERNIGHT EVENTS:    Patient seen and examined at bedside. No acute overnight events occurred.    ROS: Denies SOB, chest pain. All other systems are negative.    Medications:  Standing  aspirin  chewable 81 milliGRAM(s) Oral daily  carbidopa/levodopa  25/100 1 Tablet(s) Oral three times a day  chlorhexidine 2% Cloths 1 Application(s) Topical <User Schedule>  dextrose 5%. 1000 milliLiter(s) IV Continuous <Continuous>  dextrose 5%. 1000 milliLiter(s) IV Continuous <Continuous>  dextrose 50% Injectable 25 Gram(s) IV Push once  dextrose 50% Injectable 12.5 Gram(s) IV Push once  dextrose 50% Injectable 25 Gram(s) IV Push once  donepezil 10 milliGRAM(s) Oral at bedtime  DULoxetine 60 milliGRAM(s) Oral daily  enoxaparin Injectable 40 milliGRAM(s) SubCutaneous every 24 hours  glucagon  Injectable 1 milliGRAM(s) IntraMuscular once  insulin glargine Injectable (LANTUS) 10 Unit(s) SubCutaneous at bedtime  insulin lispro (ADMELOG) corrective regimen sliding scale   SubCutaneous three times a day before meals  lactulose Syrup 10 Gram(s) Oral every 3 hours  magnesium oxide 400 milliGRAM(s) Oral two times a day with meals  memantine 10 milliGRAM(s) Oral daily  pantoprazole    Tablet 40 milliGRAM(s) Oral before breakfast  pyridoxine 100 milliGRAM(s) Oral daily    PRN Meds  dextrose Oral Gel 15 Gram(s) Oral once PRN        Vital Signs:    T(F): 96.9 (22 @ 14:20), Max: 98.9 (22 @ 20:28)  HR: 80 (22 @ 14:20) (76 - 84)  BP: 145/71 (22 @ 14:20) (142/67 - 154/67)  RR: 18 (22 @ 14:20) (18 - 18)  SpO2: --  I&O's Summary    2022 07:01  -  01 Dec 2022 07:00  --------------------------------------------------------  IN: 871 mL / OUT: 0 mL / NET: 871 mL    01 Dec 2022 07:01  -  01 Dec 2022 16:35  --------------------------------------------------------  IN: 0 mL / OUT: 100 mL / NET: -100 mL      Daily     Daily Weight in k.8 (01 Dec 2022 05:34)  CAPILLARY BLOOD GLUCOSE      POCT Blood Glucose.: 324 mg/dL (01 Dec 2022 11:48)  POCT Blood Glucose.: 206 mg/dL (01 Dec 2022 08:13)  POCT Blood Glucose.: 291 mg/dL (2022 21:19)  POCT Blood Glucose.: 248 mg/dL (2022 16:42)      PHYSICAL EXAM:  GENERAL:  NAD  SKIN: No rashes or lesions  HEENT: Atraumatic. Normocephalic. Anicteric  NECK:  No JVD.   PULMONARY: Clear to ausculation bilaterally. No wheezing. No rales  CVS: Normal S1, S2. Regular rate and rhythm. No murmurs.  ABDOMEN/GI: Soft, Nontender, Nondistended; Bowel sounds are present  EXTREMITIES:  No edema B/L LE.  NEUROLOGIC:  No motor deficit.  PSYCH: Alert & oriented x 3, normal affect      LABS:                        13.4   5.20  )-----------( 139      ( 01 Dec 2022 07:05 )             37.8         139  |  103  |  14  ----------------------------<  216<H>  3.9   |  27  |  0.7    Ca    8.8      01 Dec 2022 07:05  Mg     1.5         TPro  6.1  /  Alb  2.9<L>  /  TBili  1.4<H>  /  DBili  x   /  AST  28  /  ALT  7   /  AlkPhos  151<H>                RADIOLOGY & ADDITIONAL TESTS:  Imaging or report Personally Reviewed:  [ ] YES  [ ] NO -->no new images    Telemetry reviewed independently - NSR, no acute events  EKG reviewed independently -->no new EKGs    Consultant(s) Notes Reviewed:  [ ] YES  [ ] NO  Care Discussed with Consultants/Other Providers [ ] YES  [ ] NO    Case discussed with resident  Care discussed with pt        
CHIEF COMPLAINT:    Patient is a 77y old  Male who presents with a chief complaint of Presyncope    INTERVAL HPI/OVERNIGHT EVENTS:    Patient seen and examined at bedside. No acute overnight events occurred.    ROS: Denies pain. ROS difficult to obtain, pt mostly saying yes to every question.     Medications:  Standing  aspirin  chewable 81 milliGRAM(s) Oral daily  carbidopa/levodopa  25/100 1 Tablet(s) Oral three times a day  chlorhexidine 2% Cloths 1 Application(s) Topical <User Schedule>  dextrose 5%. 1000 milliLiter(s) IV Continuous <Continuous>  dextrose 5%. 1000 milliLiter(s) IV Continuous <Continuous>  dextrose 50% Injectable 25 Gram(s) IV Push once  dextrose 50% Injectable 12.5 Gram(s) IV Push once  dextrose 50% Injectable 25 Gram(s) IV Push once  donepezil 10 milliGRAM(s) Oral at bedtime  DULoxetine 60 milliGRAM(s) Oral daily  glucagon  Injectable 1 milliGRAM(s) IntraMuscular once  insulin glargine Injectable (LANTUS) 10 Unit(s) SubCutaneous at bedtime  insulin lispro (ADMELOG) corrective regimen sliding scale   SubCutaneous three times a day before meals  lactulose Syrup 10 Gram(s) Oral every 4 hours  magnesium oxide 400 milliGRAM(s) Oral two times a day with meals  memantine 10 milliGRAM(s) Oral daily  pantoprazole    Tablet 40 milliGRAM(s) Oral before breakfast  pyridoxine 100 milliGRAM(s) Oral daily    PRN Meds  dextrose Oral Gel 15 Gram(s) Oral once PRN    Vital Signs:    T(F): 96.3 (22 @ 12:45), Max: 97.9 (22 @ 19:49)  HR: 83 (22 @ 12:45) (78 - 83)  BP: 162/77 (22 @ 12:45) (134/69 - 172/76)  RR: 18 (22 @ 12:45) (18 - 18)  SpO2: --  I&O's Summary    2022 07:01  -  2022 07:00  --------------------------------------------------------  IN: 440 mL / OUT: 0 mL / NET: 440 mL    2022 07:01  -  2022 15:40  --------------------------------------------------------  IN: 650 mL / OUT: 0 mL / NET: 650 mL      Daily     Daily Weight in k.5 (2022 05:37)  CAPILLARY BLOOD GLUCOSE      POCT Blood Glucose.: 220 mg/dL (2022 12:01)  POCT Blood Glucose.: 205 mg/dL (2022 08:06)  POCT Blood Glucose.: 169 mg/dL (2022 21:30)      PHYSICAL EXAM:  GENERAL:  NAD  SKIN: No rashes or lesions  HEENT: Atraumatic. Normocephalic. Anicteric  NECK:  No JVD.   PULMONARY: Clear to ausculation bilaterally. No wheezing. No rales  CVS: Normal S1, S2. Regular rate and rhythm. No murmurs.  ABDOMEN/GI: Soft, Nontender, Nondistended; Bowel sounds are present, ?fluid wave vs subcutaneous  EXTREMITIES:  No edema B/L LE.  NEUROLOGIC:  Non participatory  PSYCH: calm      LABS:                        13.3   5.83  )-----------( 139      ( 2022 05:15 )             37.3         137  |  103  |  14  ----------------------------<  177<H>  4.2   |  27  |  0.6<L>    Ca    8.7      2022 05:15  Mg     1.3         TPro  5.8<L>  /  Alb  2.7<L>  /  TBili  1.7<H>  /  DBili  x   /  AST  24  /  ALT  7   /  AlkPhos  125<H>        RADIOLOGY & ADDITIONAL TESTS:  Imaging or report Personally Reviewed:  [ ] YES  [ ] NO -->no new images    Telemetry reviewed independently - NSR, no acute events  EKG reviewed independently -->no new EKGs    Consultant(s) Notes Reviewed:  [ ] YES  [ ] NO  Care Discussed with Consultants/Other Providers [ ] YES  [ ] NO    Case discussed with resident  Care discussed with pt        
No acute events overnight. He denies any headaches, dizziness, nausea, vomiting, chest pain, shortness of breath, palpitations, abdominal pain.    VITAL SIGNS:  T(C): 36.2 (11-24-22 @ 05:30), Max: 36.8 (11-23-22 @ 21:25)  HR: 76 (11-24-22 @ 08:59) (74 - 85)  BP: 143/70 (11-24-22 @ 08:59) (127/67 - 181/83)  RR: 18 (11-24-22 @ 05:30) (18 - 18)  SpO2: --      PHYSICAL EXAM:  GENERAL: NAD  HEAD:  Atraumatic, Normocephalic  NECK: cervical kyphosis  NERVOUS SYSTEM:  Good concentration, answers questions appropriately   CHEST/LUNG: Clear to auscultation bilaterally; No rales, rhonchi, wheezing, or rubs  HEART: Regular rate and rhythm. Normal S1/S1. No murmurs, rubs, or gallops  ABDOMEN: Soft, Nontender, Nondistended; Bowel sounds present  EXTREMITIES:  no LE edema      MEDICATIONS:  MEDICATIONS  (STANDING):  aspirin  chewable 81 milliGRAM(s) Oral daily  chlorhexidine 2% Cloths 1 Application(s) Topical <User Schedule>  dextrose 5%. 1000 milliLiter(s) (50 mL/Hr) IV Continuous <Continuous>  dextrose 5%. 1000 milliLiter(s) (100 mL/Hr) IV Continuous <Continuous>  dextrose 50% Injectable 25 Gram(s) IV Push once  dextrose 50% Injectable 12.5 Gram(s) IV Push once  dextrose 50% Injectable 25 Gram(s) IV Push once  donepezil 10 milliGRAM(s) Oral at bedtime  DULoxetine 60 milliGRAM(s) Oral daily  enoxaparin Injectable 40 milliGRAM(s) SubCutaneous every 24 hours  glucagon  Injectable 1 milliGRAM(s) IntraMuscular once  insulin lispro (ADMELOG) corrective regimen sliding scale   SubCutaneous three times a day before meals  lactated ringers. 1000 milliLiter(s) (75 mL/Hr) IV Continuous <Continuous>  memantine 10 milliGRAM(s) Oral daily  pantoprazole    Tablet 40 milliGRAM(s) Oral before breakfast  pyridoxine 100 milliGRAM(s) Oral daily    MEDICATIONS  (PRN):  dextrose Oral Gel 15 Gram(s) Oral once PRN Blood Glucose LESS THAN 70 milliGRAM(s)/deciliter      LABS:                        12.4   6.50  )-----------( 117      ( 24 Nov 2022 05:42 )             34.2     11-24    141  |  104  |  15  ----------------------------<  169<H>  4.2   |  28  |  0.7    Ca    8.6      24 Nov 2022 05:42  Mg     1.4     11-24    TPro  6.0  /  Alb  2.8<L>  /  TBili  1.4<H>  /  DBili  x   /  AST  25  /  ALT  22  /  AlkPhos  174<H>  11-24    PT/INR - ( 22 Nov 2022 11:43 )   PT: 14.30 sec;   INR: 1.25 ratio         PTT - ( 22 Nov 2022 11:43 )  PTT:33.2 sec      
No acute events overnight. He denies any headaches, dizziness, nausea, vomiting, chest pain, shortness of breath, palpitations, abdominal pain.    VITAL SIGNS:  T(C): 36.5 (11-25-22 @ 06:00), Max: 36.9 (11-24-22 @ 19:58)  HR: 74 (11-25-22 @ 06:00) (74 - 88)  BP: 166/73 (11-25-22 @ 06:00) (145/63 - 166/73)  RR: 16 (11-25-22 @ 06:00) (16 - 18)  SpO2: 97% (11-25-22 @ 06:00) (97% - 97%)      PHYSICAL EXAM:  GENERAL: NAD, well-groomed, well-developed  HEAD:  Atraumatic, Normocephalic  NERVOUS SYSTEM:  Alert & Oriented to place, month, year  CHEST/LUNG: Clear to auscultation bilaterally; No rales, rhonchi, wheezing, or rubs  HEART: Regular rate and rhythm. Normal S1/S1. No murmurs, rubs, or gallops  ABDOMEN: Soft, Nontender, Nondistended; Bowel sounds present  EXTREMITIES:  no LE edema      MEDICATIONS:  MEDICATIONS  (STANDING):  aspirin  chewable 81 milliGRAM(s) Oral daily  chlorhexidine 2% Cloths 1 Application(s) Topical <User Schedule>  dextrose 5%. 1000 milliLiter(s) (50 mL/Hr) IV Continuous <Continuous>  dextrose 5%. 1000 milliLiter(s) (100 mL/Hr) IV Continuous <Continuous>  dextrose 50% Injectable 25 Gram(s) IV Push once  dextrose 50% Injectable 12.5 Gram(s) IV Push once  dextrose 50% Injectable 25 Gram(s) IV Push once  donepezil 10 milliGRAM(s) Oral at bedtime  DULoxetine 60 milliGRAM(s) Oral daily  enoxaparin Injectable 40 milliGRAM(s) SubCutaneous every 24 hours  glucagon  Injectable 1 milliGRAM(s) IntraMuscular once  insulin glargine Injectable (LANTUS) 10 Unit(s) SubCutaneous at bedtime  insulin lispro (ADMELOG) corrective regimen sliding scale   SubCutaneous three times a day before meals  magnesium sulfate  IVPB 2 Gram(s) IV Intermittent every 2 hours  memantine 10 milliGRAM(s) Oral daily  pantoprazole    Tablet 40 milliGRAM(s) Oral before breakfast  pyridoxine 100 milliGRAM(s) Oral daily    MEDICATIONS  (PRN):  dextrose Oral Gel 15 Gram(s) Oral once PRN Blood Glucose LESS THAN 70 milliGRAM(s)/deciliter      LABS:                        12.8   5.96  )-----------( 116      ( 25 Nov 2022 06:31 )             35.5     11-25    146  |  109  |  12  ----------------------------<  173<H>  4.6   |  26  |  0.6<L>    Ca    8.3<L>      25 Nov 2022 06:31  Mg     1.4     11-25    TPro  5.7<L>  /  Alb  2.7<L>  /  TBili  1.5<H>  /  DBili  x   /  AST  27  /  ALT  22  /  AlkPhos  150<H>  11-25          
No acute events overnight. denies headache, dizziness, n/v, cp, sob, abd pain.     VITAL SIGNS:  T(C): 36.3 (11-23-22 @ 05:23), Max: 36.6 (11-22-22 @ 15:42)  HR: 73 (11-23-22 @ 05:23) (73 - 86)  BP: 147/69 (11-23-22 @ 05:23) (129/61 - 147/69)  RR: 18 (11-23-22 @ 05:23) (18 - 20)  SpO2: 97% (11-23-22 @ 05:23) (96% - 98%)      PHYSICAL EXAM:  GENERAL: NAD  HEAD:  Atraumatic, Normocephalic  NERVOUS SYSTEM: oriented to self, month, and year. Not able to provide his age, day of the week or date   CHEST/LUNG: Clear to auscultation bilaterally; No rales, rhonchi, wheezing, or rubs  HEART: Regular rate and rhythm. Normal S1/S1. No murmurs, rubs, or gallops  ABDOMEN: Soft, Nontender, Nondistended; Bowel sounds present  EXTREMITIES:  no LE edema, no hypertonia or rigidity      MEDICATIONS:  MEDICATIONS  (STANDING):  aspirin  chewable 81 milliGRAM(s) Oral daily  chlorhexidine 2% Cloths 1 Application(s) Topical <User Schedule>  dextrose 5%. 1000 milliLiter(s) (100 mL/Hr) IV Continuous <Continuous>  dextrose 5%. 1000 milliLiter(s) (50 mL/Hr) IV Continuous <Continuous>  dextrose 50% Injectable 25 Gram(s) IV Push once  dextrose 50% Injectable 12.5 Gram(s) IV Push once  dextrose 50% Injectable 25 Gram(s) IV Push once  donepezil 10 milliGRAM(s) Oral at bedtime  DULoxetine 60 milliGRAM(s) Oral daily  enoxaparin Injectable 40 milliGRAM(s) SubCutaneous every 24 hours  glucagon  Injectable 1 milliGRAM(s) IntraMuscular once  insulin lispro (ADMELOG) corrective regimen sliding scale   SubCutaneous three times a day before meals  lactated ringers. 1000 milliLiter(s) (75 mL/Hr) IV Continuous <Continuous>  magnesium sulfate  IVPB 2 Gram(s) IV Intermittent once  memantine 10 milliGRAM(s) Oral daily  pantoprazole    Tablet 40 milliGRAM(s) Oral before breakfast  pyridoxine 100 milliGRAM(s) Oral daily    MEDICATIONS  (PRN):  dextrose Oral Gel 15 Gram(s) Oral once PRN Blood Glucose LESS THAN 70 milliGRAM(s)/deciliter      LABS:                        12.8   6.14  )-----------( 123      ( 23 Nov 2022 06:55 )             35.5     11-23    134<L>  |  98  |  16  ----------------------------<  177<H>  4.1   |  30  |  0.7    Ca    8.6      23 Nov 2022 06:55  Mg     1.6     11-23    TPro  6.0  /  Alb  2.6<L>  /  TBili  1.8<H>  /  DBili  x   /  AST  24  /  ALT  24  /  AlkPhos  159<H>  11-23    PT/INR - ( 22 Nov 2022 11:43 )   PT: 14.30 sec;   INR: 1.25 ratio         PTT - ( 22 Nov 2022 11:43 )  PTT:33.2 sec

## 2022-12-22 NOTE — CDI QUERY NOTE - NSCDIOTHERTXTBX_GEN_ALL_CORE_HH
Dear Dr. Torres,    CLINICAL INDICATORS:  H&P: 77 year old male patient with history of liver cirrhosis, dementia, HFpEF and gout was brought to ED after syncopal episode, patient was going to the bathroom, he stood up and walked few steps he felt dizzy and sat down on his knees …  In the ED BP was stable, orthostatic changes were positive.  Dizziness and Pre-Syncope: Likely due to orthostatic changes, possibly from hypovolemia due to diarrhea … s/p IV fluid, encourage oral hydration, lactulose on hold, will need to resume it with lower dose and monitor for diarrhea.    11/24 Progress Note Adult-Hospitalist Attending: Orthostatic Hypotension: possibly from hypovolemia due to diarrhea vs autonomic dysfunction … Still with severe orthostatic hypotension today, will give another 1l of IV fluid    11/25- 11/29 Progress Note Adult-Hospitalist Attending: Still with severe orthostatic hypotension, no improvement with IV fluid challenge.    11/29- 12/2 Progress Note Adult-Internal Medicine Attending: Orthostatic Hypotension: likely from autonomic dysfunction due to Parkinson's disease.    Based on your professional judgement and the above clinical indicators, can the “autonomic dysfunction” be further specified as:  -Autonomic neurogenic hypotension  -Neurogenic orthostatic hypotension due to Parkinson’s disease  -Other (please specify):  -Clinically unable to further specify autonomic dysfunction    Thank you,  Cheyenne Espinoza RN, BSN, CCDS  [phone]

## 2023-02-16 ENCOUNTER — INPATIENT (INPATIENT)
Facility: HOSPITAL | Age: 78
LOS: 6 days | Discharge: NURSING, REHAB OR LONG-TERM CARE | DRG: 57 | End: 2023-02-23
Attending: INTERNAL MEDICINE | Admitting: INTERNAL MEDICINE
Payer: MEDICARE

## 2023-02-16 ENCOUNTER — TRANSCRIPTION ENCOUNTER (OUTPATIENT)
Age: 78
End: 2023-02-16

## 2023-02-16 VITALS
OXYGEN SATURATION: 98 % | SYSTOLIC BLOOD PRESSURE: 153 MMHG | WEIGHT: 169.98 LBS | RESPIRATION RATE: 20 BRPM | DIASTOLIC BLOOD PRESSURE: 76 MMHG | HEART RATE: 96 BPM | TEMPERATURE: 99 F

## 2023-02-16 DIAGNOSIS — R29.6 REPEATED FALLS: ICD-10-CM

## 2023-02-16 DIAGNOSIS — I50.32 CHRONIC DIASTOLIC (CONGESTIVE) HEART FAILURE: ICD-10-CM

## 2023-02-16 DIAGNOSIS — Z87.81 PERSONAL HISTORY OF (HEALED) TRAUMATIC FRACTURE: ICD-10-CM

## 2023-02-16 DIAGNOSIS — Z88.1 ALLERGY STATUS TO OTHER ANTIBIOTIC AGENTS STATUS: ICD-10-CM

## 2023-02-16 DIAGNOSIS — K76.82 HEPATIC ENCEPHALOPATHY: ICD-10-CM

## 2023-02-16 DIAGNOSIS — Z90.49 ACQUIRED ABSENCE OF OTHER SPECIFIED PARTS OF DIGESTIVE TRACT: ICD-10-CM

## 2023-02-16 DIAGNOSIS — I95.1 ORTHOSTATIC HYPOTENSION: ICD-10-CM

## 2023-02-16 DIAGNOSIS — M10.9 GOUT, UNSPECIFIED: ICD-10-CM

## 2023-02-16 DIAGNOSIS — K74.60 UNSPECIFIED CIRRHOSIS OF LIVER: ICD-10-CM

## 2023-02-16 DIAGNOSIS — J98.11 ATELECTASIS: ICD-10-CM

## 2023-02-16 DIAGNOSIS — F02.83 DEMENTIA IN OTHER DISEASES CLASSIFIED ELSEWHERE, UNSPECIFIED SEVERITY, WITH MOOD DISTURBANCE: ICD-10-CM

## 2023-02-16 DIAGNOSIS — H26.9 UNSPECIFIED CATARACT: ICD-10-CM

## 2023-02-16 DIAGNOSIS — I11.0 HYPERTENSIVE HEART DISEASE WITH HEART FAILURE: ICD-10-CM

## 2023-02-16 DIAGNOSIS — Z79.82 LONG TERM (CURRENT) USE OF ASPIRIN: ICD-10-CM

## 2023-02-16 DIAGNOSIS — H26.9 UNSPECIFIED CATARACT: Chronic | ICD-10-CM

## 2023-02-16 DIAGNOSIS — Z90.49 ACQUIRED ABSENCE OF OTHER SPECIFIED PARTS OF DIGESTIVE TRACT: Chronic | ICD-10-CM

## 2023-02-16 DIAGNOSIS — E11.9 TYPE 2 DIABETES MELLITUS WITHOUT COMPLICATIONS: ICD-10-CM

## 2023-02-16 DIAGNOSIS — G31.09 OTHER FRONTOTEMPORAL NEUROCOGNITIVE DISORDER: ICD-10-CM

## 2023-02-16 DIAGNOSIS — G20 PARKINSON'S DISEASE: ICD-10-CM

## 2023-02-16 DIAGNOSIS — Z20.822 CONTACT WITH AND (SUSPECTED) EXPOSURE TO COVID-19: ICD-10-CM

## 2023-02-16 DIAGNOSIS — Z79.84 LONG TERM (CURRENT) USE OF ORAL HYPOGLYCEMIC DRUGS: ICD-10-CM

## 2023-02-16 DIAGNOSIS — K76.6 PORTAL HYPERTENSION: ICD-10-CM

## 2023-02-16 DIAGNOSIS — R29.810 FACIAL WEAKNESS: ICD-10-CM

## 2023-02-16 DIAGNOSIS — Z98.890 OTHER SPECIFIED POSTPROCEDURAL STATES: Chronic | ICD-10-CM

## 2023-02-16 DIAGNOSIS — B19.20 UNSPECIFIED VIRAL HEPATITIS C WITHOUT HEPATIC COMA: ICD-10-CM

## 2023-02-16 LAB
ALBUMIN SERPL ELPH-MCNC: 3.2 G/DL — LOW (ref 3.5–5.2)
ALP SERPL-CCNC: 148 U/L — HIGH (ref 30–115)
ALT FLD-CCNC: 28 U/L — SIGNIFICANT CHANGE UP (ref 0–41)
AMMONIA BLD-MCNC: 188 UMOL/L — CRITICAL HIGH (ref 11–55)
ANION GAP SERPL CALC-SCNC: 9 MMOL/L — SIGNIFICANT CHANGE UP (ref 7–14)
AST SERPL-CCNC: 36 U/L — SIGNIFICANT CHANGE UP (ref 0–41)
BASOPHILS # BLD AUTO: 0.03 K/UL — SIGNIFICANT CHANGE UP (ref 0–0.2)
BASOPHILS NFR BLD AUTO: 0.7 % — SIGNIFICANT CHANGE UP (ref 0–1)
BILIRUB SERPL-MCNC: 1.3 MG/DL — HIGH (ref 0.2–1.2)
BUN SERPL-MCNC: 17 MG/DL — SIGNIFICANT CHANGE UP (ref 10–20)
CALCIUM SERPL-MCNC: 9.4 MG/DL — SIGNIFICANT CHANGE UP (ref 8.4–10.5)
CHLORIDE SERPL-SCNC: 101 MMOL/L — SIGNIFICANT CHANGE UP (ref 98–110)
CK SERPL-CCNC: 55 U/L — SIGNIFICANT CHANGE UP (ref 0–225)
CO2 SERPL-SCNC: 28 MMOL/L — SIGNIFICANT CHANGE UP (ref 17–32)
CREAT SERPL-MCNC: 0.7 MG/DL — SIGNIFICANT CHANGE UP (ref 0.7–1.5)
EGFR: 95 ML/MIN/1.73M2 — SIGNIFICANT CHANGE UP
EOSINOPHIL # BLD AUTO: 0.17 K/UL — SIGNIFICANT CHANGE UP (ref 0–0.7)
EOSINOPHIL NFR BLD AUTO: 3.9 % — SIGNIFICANT CHANGE UP (ref 0–8)
GLUCOSE SERPL-MCNC: 193 MG/DL — HIGH (ref 70–99)
HCT VFR BLD CALC: 39.7 % — LOW (ref 42–52)
HGB BLD-MCNC: 14.2 G/DL — SIGNIFICANT CHANGE UP (ref 14–18)
IMM GRANULOCYTES NFR BLD AUTO: 0.2 % — SIGNIFICANT CHANGE UP (ref 0.1–0.3)
LYMPHOCYTES # BLD AUTO: 1.08 K/UL — LOW (ref 1.2–3.4)
LYMPHOCYTES # BLD AUTO: 24.7 % — SIGNIFICANT CHANGE UP (ref 20.5–51.1)
MAGNESIUM SERPL-MCNC: 1.4 MG/DL — LOW (ref 1.8–2.4)
MCHC RBC-ENTMCNC: 35.7 PG — HIGH (ref 27–31)
MCHC RBC-ENTMCNC: 35.8 G/DL — SIGNIFICANT CHANGE UP (ref 32–37)
MCV RBC AUTO: 99.7 FL — HIGH (ref 80–94)
MONOCYTES # BLD AUTO: 0.59 K/UL — SIGNIFICANT CHANGE UP (ref 0.1–0.6)
MONOCYTES NFR BLD AUTO: 13.5 % — HIGH (ref 1.7–9.3)
NEUTROPHILS # BLD AUTO: 2.5 K/UL — SIGNIFICANT CHANGE UP (ref 1.4–6.5)
NEUTROPHILS NFR BLD AUTO: 57 % — SIGNIFICANT CHANGE UP (ref 42.2–75.2)
NRBC # BLD: 0 /100 WBCS — SIGNIFICANT CHANGE UP (ref 0–0)
PLATELET # BLD AUTO: 154 K/UL — SIGNIFICANT CHANGE UP (ref 130–400)
POTASSIUM SERPL-MCNC: 4.5 MMOL/L — SIGNIFICANT CHANGE UP (ref 3.5–5)
POTASSIUM SERPL-SCNC: 4.5 MMOL/L — SIGNIFICANT CHANGE UP (ref 3.5–5)
PROT SERPL-MCNC: 6.8 G/DL — SIGNIFICANT CHANGE UP (ref 6–8)
RBC # BLD: 3.98 M/UL — LOW (ref 4.7–6.1)
RBC # FLD: 14.1 % — SIGNIFICANT CHANGE UP (ref 11.5–14.5)
SARS-COV-2 RNA SPEC QL NAA+PROBE: SIGNIFICANT CHANGE UP
SODIUM SERPL-SCNC: 138 MMOL/L — SIGNIFICANT CHANGE UP (ref 135–146)
TROPONIN T SERPL-MCNC: <0.01 NG/ML — SIGNIFICANT CHANGE UP
WBC # BLD: 4.38 K/UL — LOW (ref 4.8–10.8)
WBC # FLD AUTO: 4.38 K/UL — LOW (ref 4.8–10.8)

## 2023-02-16 PROCEDURE — 80053 COMPREHEN METABOLIC PANEL: CPT

## 2023-02-16 PROCEDURE — 82746 ASSAY OF FOLIC ACID SERUM: CPT

## 2023-02-16 PROCEDURE — 82962 GLUCOSE BLOOD TEST: CPT

## 2023-02-16 PROCEDURE — 97110 THERAPEUTIC EXERCISES: CPT | Mod: GP

## 2023-02-16 PROCEDURE — 83735 ASSAY OF MAGNESIUM: CPT

## 2023-02-16 PROCEDURE — U0005: CPT

## 2023-02-16 PROCEDURE — 82140 ASSAY OF AMMONIA: CPT

## 2023-02-16 PROCEDURE — 70450 CT HEAD/BRAIN W/O DYE: CPT

## 2023-02-16 PROCEDURE — 86900 BLOOD TYPING SEROLOGIC ABO: CPT

## 2023-02-16 PROCEDURE — 70551 MRI BRAIN STEM W/O DYE: CPT

## 2023-02-16 PROCEDURE — 70498 CT ANGIOGRAPHY NECK: CPT

## 2023-02-16 PROCEDURE — 36415 COLL VENOUS BLD VENIPUNCTURE: CPT

## 2023-02-16 PROCEDURE — 93005 ELECTROCARDIOGRAM TRACING: CPT

## 2023-02-16 PROCEDURE — 80061 LIPID PANEL: CPT

## 2023-02-16 PROCEDURE — U0003: CPT

## 2023-02-16 PROCEDURE — 95819 EEG AWAKE AND ASLEEP: CPT

## 2023-02-16 PROCEDURE — 85027 COMPLETE CBC AUTOMATED: CPT

## 2023-02-16 PROCEDURE — 86901 BLOOD TYPING SEROLOGIC RH(D): CPT

## 2023-02-16 PROCEDURE — 92610 EVALUATE SWALLOWING FUNCTION: CPT | Mod: GN

## 2023-02-16 PROCEDURE — 71250 CT THORAX DX C-: CPT

## 2023-02-16 PROCEDURE — 87635 SARS-COV-2 COVID-19 AMP PRB: CPT

## 2023-02-16 PROCEDURE — 71045 X-RAY EXAM CHEST 1 VIEW: CPT | Mod: 26

## 2023-02-16 PROCEDURE — 86850 RBC ANTIBODY SCREEN: CPT

## 2023-02-16 PROCEDURE — 83036 HEMOGLOBIN GLYCOSYLATED A1C: CPT

## 2023-02-16 PROCEDURE — 70450 CT HEAD/BRAIN W/O DYE: CPT | Mod: 26,MA

## 2023-02-16 PROCEDURE — 85025 COMPLETE CBC W/AUTO DIFF WBC: CPT

## 2023-02-16 PROCEDURE — 99223 1ST HOSP IP/OBS HIGH 75: CPT

## 2023-02-16 PROCEDURE — 82607 VITAMIN B-12: CPT

## 2023-02-16 PROCEDURE — 97162 PT EVAL MOD COMPLEX 30 MIN: CPT | Mod: GP

## 2023-02-16 PROCEDURE — 93010 ELECTROCARDIOGRAM REPORT: CPT

## 2023-02-16 PROCEDURE — 93306 TTE W/DOPPLER COMPLETE: CPT

## 2023-02-16 PROCEDURE — 72170 X-RAY EXAM OF PELVIS: CPT | Mod: 26

## 2023-02-16 PROCEDURE — 92526 ORAL FUNCTION THERAPY: CPT | Mod: GN

## 2023-02-16 PROCEDURE — 0042T: CPT

## 2023-02-16 PROCEDURE — 70496 CT ANGIOGRAPHY HEAD: CPT

## 2023-02-16 PROCEDURE — 71250 CT THORAX DX C-: CPT | Mod: 26

## 2023-02-16 RX ORDER — DEXTROSE 50 % IN WATER 50 %
25 SYRINGE (ML) INTRAVENOUS ONCE
Refills: 0 | Status: DISCONTINUED | OUTPATIENT
Start: 2023-02-16 | End: 2023-02-21

## 2023-02-16 RX ORDER — CHOLECALCIFEROL (VITAMIN D3) 125 MCG
2000 CAPSULE ORAL DAILY
Refills: 0 | Status: DISCONTINUED | OUTPATIENT
Start: 2023-02-16 | End: 2023-02-23

## 2023-02-16 RX ORDER — DEXTROSE 50 % IN WATER 50 %
15 SYRINGE (ML) INTRAVENOUS ONCE
Refills: 0 | Status: DISCONTINUED | OUTPATIENT
Start: 2023-02-16 | End: 2023-02-21

## 2023-02-16 RX ORDER — DONEPEZIL HYDROCHLORIDE 10 MG/1
10 TABLET, FILM COATED ORAL AT BEDTIME
Refills: 0 | Status: DISCONTINUED | OUTPATIENT
Start: 2023-02-16 | End: 2023-02-23

## 2023-02-16 RX ORDER — MEMANTINE HYDROCHLORIDE 10 MG/1
1 TABLET ORAL
Qty: 0 | Refills: 0 | DISCHARGE

## 2023-02-16 RX ORDER — CARBIDOPA AND LEVODOPA 25; 100 MG/1; MG/1
1 TABLET ORAL
Refills: 0 | Status: DISCONTINUED | OUTPATIENT
Start: 2023-02-16 | End: 2023-02-23

## 2023-02-16 RX ORDER — INSULIN LISPRO 100/ML
VIAL (ML) SUBCUTANEOUS
Refills: 0 | Status: DISCONTINUED | OUTPATIENT
Start: 2023-02-16 | End: 2023-02-23

## 2023-02-16 RX ORDER — METFORMIN HYDROCHLORIDE 850 MG/1
1.5 TABLET ORAL
Qty: 0 | Refills: 0 | DISCHARGE

## 2023-02-16 RX ORDER — ASPIRIN/CALCIUM CARB/MAGNESIUM 324 MG
81 TABLET ORAL DAILY
Refills: 0 | Status: DISCONTINUED | OUTPATIENT
Start: 2023-02-16 | End: 2023-02-23

## 2023-02-16 RX ORDER — LATANOPROST 0.05 MG/ML
1 SOLUTION/ DROPS OPHTHALMIC; TOPICAL
Qty: 0 | Refills: 0 | DISCHARGE

## 2023-02-16 RX ORDER — HALOPERIDOL DECANOATE 100 MG/ML
1 INJECTION INTRAMUSCULAR EVERY 8 HOURS
Refills: 0 | Status: DISCONTINUED | OUTPATIENT
Start: 2023-02-16 | End: 2023-02-23

## 2023-02-16 RX ORDER — DONEPEZIL HYDROCHLORIDE 10 MG/1
1 TABLET, FILM COATED ORAL
Qty: 0 | Refills: 0 | DISCHARGE

## 2023-02-16 RX ORDER — GLUCAGON INJECTION, SOLUTION 0.5 MG/.1ML
1 INJECTION, SOLUTION SUBCUTANEOUS ONCE
Refills: 0 | Status: DISCONTINUED | OUTPATIENT
Start: 2023-02-16 | End: 2023-02-21

## 2023-02-16 RX ORDER — PYRIDOXINE HCL (VITAMIN B6) 100 MG
100 TABLET ORAL DAILY
Refills: 0 | Status: DISCONTINUED | OUTPATIENT
Start: 2023-02-16 | End: 2023-02-23

## 2023-02-16 RX ORDER — LACTULOSE 10 G/15ML
20 SOLUTION ORAL EVERY 8 HOURS
Refills: 0 | Status: DISCONTINUED | OUTPATIENT
Start: 2023-02-16 | End: 2023-02-17

## 2023-02-16 RX ORDER — MAGNESIUM SULFATE 500 MG/ML
2 VIAL (ML) INJECTION ONCE
Refills: 0 | Status: COMPLETED | OUTPATIENT
Start: 2023-02-16 | End: 2023-02-16

## 2023-02-16 RX ORDER — CHLORHEXIDINE GLUCONATE 213 G/1000ML
1 SOLUTION TOPICAL DAILY
Refills: 0 | Status: DISCONTINUED | OUTPATIENT
Start: 2023-02-16 | End: 2023-02-23

## 2023-02-16 RX ORDER — LATANOPROST 0.05 MG/ML
1 SOLUTION/ DROPS OPHTHALMIC; TOPICAL AT BEDTIME
Refills: 0 | Status: DISCONTINUED | OUTPATIENT
Start: 2023-02-16 | End: 2023-02-23

## 2023-02-16 RX ORDER — MEMANTINE HYDROCHLORIDE 10 MG/1
10 TABLET ORAL DAILY
Refills: 0 | Status: DISCONTINUED | OUTPATIENT
Start: 2023-02-16 | End: 2023-02-23

## 2023-02-16 RX ORDER — PYRIDOXINE HCL (VITAMIN B6) 100 MG
1 TABLET ORAL
Qty: 0 | Refills: 0 | DISCHARGE

## 2023-02-16 RX ORDER — ENOXAPARIN SODIUM 100 MG/ML
40 INJECTION SUBCUTANEOUS EVERY 24 HOURS
Refills: 0 | Status: DISCONTINUED | OUTPATIENT
Start: 2023-02-16 | End: 2023-02-23

## 2023-02-16 RX ORDER — SODIUM CHLORIDE 9 MG/ML
1000 INJECTION, SOLUTION INTRAVENOUS
Refills: 0 | Status: DISCONTINUED | OUTPATIENT
Start: 2023-02-16 | End: 2023-02-21

## 2023-02-16 RX ORDER — LISINOPRIL 2.5 MG/1
20 TABLET ORAL DAILY
Refills: 0 | Status: DISCONTINUED | OUTPATIENT
Start: 2023-02-16 | End: 2023-02-19

## 2023-02-16 RX ORDER — DULOXETINE HYDROCHLORIDE 30 MG/1
60 CAPSULE, DELAYED RELEASE ORAL DAILY
Refills: 0 | Status: DISCONTINUED | OUTPATIENT
Start: 2023-02-16 | End: 2023-02-23

## 2023-02-16 RX ORDER — DEXTROSE 50 % IN WATER 50 %
12.5 SYRINGE (ML) INTRAVENOUS ONCE
Refills: 0 | Status: DISCONTINUED | OUTPATIENT
Start: 2023-02-16 | End: 2023-02-21

## 2023-02-16 RX ORDER — CHOLECALCIFEROL (VITAMIN D3) 125 MCG
1 CAPSULE ORAL
Qty: 0 | Refills: 0 | DISCHARGE

## 2023-02-16 RX ADMIN — Medication 25 GRAM(S): at 16:30

## 2023-02-16 RX ADMIN — Medication 25 GRAM(S): at 19:02

## 2023-02-16 NOTE — H&P ADULT - ASSESSMENT
78 yo M patient with a PMH of:  - dementia, depression,   - HTN,   - cirrhosis with portal hypertension and a hospitalization for hepatic encephalopathy  - diabetes,   - HFpEF,   - rib fractures due to recurrent falls, presents to the ED for increase in his aggressive behavior.    # Worsening aggressive behavior  # H/o dementia being investigated  # H/o depression  # Recurrent falls  # H/o cirrhosis with hepatic encephalopathy    Patient's symptoms have been worsening over the last few days, becoming constant, albeit behavior disturbances having been progressing over the last few months  Baseline dementia is being investigated at 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 +++  Was advised to get OP PET scan to characterize any atrophy of the frontal/ temporal lobes  Several reports have described worsening symptoms with donepezil, which is being taken by the patient  Will continue donepezil though, duloxetine, memantine  Will continue carbidopa/ levodopa as the tremors are initially incriminated to Parkinson's disease  Added haldol PRN for aggressive behavior, might benefit also from atypical antipsychotics (olanzapine and quetiapine )  Might need 1:1 observation  Consider neurology consult    2- Metabolic/ toxic encephalopathy:  No focal symptoms that might cause delirium  Patient has a history of hepatic encephalopathy, will order serum ammonia level, although less likely as it should have progressed by now to grade II/III by now, and no obvious precipitants  Ordered UA to rule out UTI  Will continue with lactulose with a target BM of 2-3 per day, might benefit form adding rifaximin if ammonia levels are high and/or hepatic encephalopathy is more likely  Trend daily CBC, BMP, and vitals    As for the falls, they were investigated on the last admission, deconditioning due to his neurodegenerative disease was the culprit  No arrhythmia or cardiac structural disease found on the last admission  Trauma workup is negative  Ordered orthostatics, if positive, might benefit from IV fluids, although more likely secondary to his baseline neurodegenerative disease    # HTN    On quinapril, interchanged with lisinopril    # DM    Held metformin  On SS + FS    DVT ppx: Lovenox  GI ppx: not indicated  Diet: DASH, carbs consistent  Activity: as tolerated  Dispo: floor 76 yo M patient with a PMH of:  - dementia, depression,   - HTN,   - cirrhosis with portal hypertension and a hospitalization for hepatic encephalopathy  - diabetes,   - HFpEF,   - rib fractures due to recurrent falls, presents to the ED for increase in his aggressive behavior.    # Worsening aggressive behavior  # H/o dementia being investigated  # H/o depression  # Recurrent falls  # H/o cirrhosis with hepatic encephalopathy    Patient's symptoms have been worsening over the last few days, becoming constant, albeit behavior disturbances having been progressing over the last few months  Baseline dementia is being investigated at [facility], and a PET of the brain was recommended as outpatient for further delineation  Asymmetric upper extremities tremor present   No other focal neurologic symptoms or clinical sign    Differential diagnoses include:  1- Progression of his dementia:  In the setting of disturbances that are relatively rapidly progressive for Alzheimer disease, tremors in upper extremities, symptoms that are predominated by aggressiveness 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 +++  Was advised to get OP PET scan to characterize any atrophy of the frontal/ temporal lobes  Several reports have described worsening symptoms with donepezil, which is being taken by the patient  Will continue donepezil though, duloxetine, memantine  Will continue carbidopa/ levodopa as the tremors are initially incriminated to Parkinson's disease  Added haldol PRN for aggressive behavior, might benefit also from atypical antipsychotics (olanzapine and quetiapine )  Might need 1:1 observation  Neurology consulted    2- Metabolic/ toxic encephalopathy:  No focal symptoms that might cause delirium  Patient has a history of hepatic encephalopathy, will order serum ammonia level, although less likely as it should have progressed by now to grade II/III by now, and no obvious precipitants  Ordered UA to rule out UTI  Will continue with lactulose with a target BM of 2-3 per day, might benefit form adding rifaximin if ammonia levels are high and/or hepatic encephalopathy is more likely  Trend daily CBC, BMP, and vitals    As for the falls, they were investigated on the last admission, deconditioning due to his neurodegenerative disease was the culprit  No arrhythmia or cardiac structural disease found on the last admission  Trauma workup is negative  Ordered orthostatics, if positive, might benefit from IV fluids, although more likely secondary to his baseline neurodegenerative disease    # Pulmonary opacities     Not hypoxic, no respiratory symptoms, no fever  Doubt pneumonia, as they should have resolved by now  Ordered CT scan of the chest w/o contrast, to further characterize these opacities    # HTN    On quinapril, interchanged with lisinopril    # DM    Held metformin  On SS + FS    DVT ppx: Lovenox  GI ppx: not indicated  Diet: DASH, carbs consistent  Activity: as tolerated  Dispo: floor

## 2023-02-16 NOTE — H&P ADULT - ATTENDING COMMENTS
Patient seen and examined at bedside independently of the residents. I read the resident's note and agree with the plan with the additions and corrections as noted below. My note supersedes the resident's note.     REVIEW OF SYSTEMS:  CONSTITUTIONAL: No weakness, fevers or chills  EYES/ENT: No visual changes;  No vertigo or throat pain   NECK: No pain or stiffness  RESPIRATORY: No cough, wheezing, hemoptysis; No shortness of breath  CARDIOVASCULAR: No chest pain or palpitations  GASTROINTESTINAL: No abdominal or epigastric pain. No nausea, vomiting, or hematemesis; No diarrhea or constipation. No melena or hematochezia.  GENITOURINARY: No dysuria, frequency or hematuria  NEUROLOGICAL: No numbness or weakness  MSK: No pain. No weakness.   SKIN: No itching, rashes.     PMH: Dementia (A & O x 2 at baseline), Parkinson disease?, Depression, HTN, DM II, HFpEF, Hepatitis C, Cirrhosis and Gout    FHx: Reviewed. No fhx of asthma/copd, No fhx of liver and pulmonary disease. No fhx of hematological disorder.     Physical Exam:  GEN: No acute distress. Awake, Alert and oriented x 2.   Head: Atraumatic, Normocephalic.   Eye: PEERLA. No sclera icterus. EOMI.   ENT: Normal oropharynx, no thyromegaly, no mass, no lymphadenopathy.   LUNGS: Clear to auscultation bilaterally. No wheeze/rales/crackles.   HEART: Normal. S1/S2 present. RRR. No murmur/gallops.   ABD: Soft, non-tender, non-distended. Bowel sounds present.   EXT: No pitting edema. No erythema. No tenderness.  Integumentary: No rash, No sore, No petechia.   NEURO: CN III-XII intact. Moving all extremities. + tremor.    Vital Signs Last 24 Hrs  T(C): 35.8 (2023 16:35), Max: 37.1 (2023 11:56)  T(F): 96.5 (2023 16:35), Max: 98.7 (2023 11:56)  HR: 87 (2023 16:35) (87 - 96)  BP: 153/80 (2023 16:35) (153/76 - 153/80)  BP(mean): --  RR: 18 (2023 16:35) (18 - 20)  SpO2: 97% (2023 16:35) (97% - 98%)    Parameters below as of 2023 16:35  Patient On (Oxygen Delivery Method): room air      Please see the above notes for Labs and radiology.     Assessment and Plan:     78 yo M with hx of Dementia (A & O x 2 at baseline), Parkinson disease?, Depression, HTN, DM II, HFpEF, Hepatitis C, Cirrhosis and Gout presents to ED for aggressive behavior and frequent falls.     AMS/Worsening aggression   - CTH neg for acute intracranial pathology.   - check UA and UCx.   - CXR shows unchanged opacities with right lung smaller than left.   - check serum ammonia level   - Neurology consult.     Frequent mechanical fall likely 2/2 neurodegenerative disease   - EKG: NSR @ 80. RBBB. No significant ST/ T wave changes from prior.   - check orthostatic VS  - c/w sinemet   - check UA and UCx   - fall precaution.   - Neurology consult.     Opacities of right lung   - CXR unchanged from prior.   - will get CT chest  - No fever/ leukocytosis.   - monitor off abx for now.   - speech and swallow evaluation.     HTN - c/w home med  DM II - monitor FS AC HS. Insulin regimen.   HFpEF - c/w home med  Hepatitis-C - follow up outpatient.   Cirrhosis - c/w lactulose. Follow up outpatient.     DVT ppx: Lovenox SC  GI ppx: PPI   Diet: DASH diet  Activity: as tolerated. Fall precaution.     Date seen by the attendin2023  Total time spent: 75 minutes.

## 2023-02-16 NOTE — ED PROVIDER NOTE - PHYSICAL EXAMINATION
CONST: NAD  HEAD:  normocephalic, atraumatic  EYES:  conjunctivae without injection, drainage or discharge  ENMT:  nasal mucosa moist; mouth moist without ulcerations or lesions; throat moist without erythema, exudate, ulcerations or lesions  NECK:  supple, no midline tenderness, full ROM  CHEST:  regular rate and rhythm, normal S1 and S2, no murmurs, rubs or gallops, no chest wall tenderness  RESP:  respiratory rate and effort appear normal for age; lungs are clear to auscultation bilaterally; no rales or wheezes  ABDOMEN:  soft, nontender, nondistended  MUSCULOSKELETAL/NEURO:  AAOx2, sensation intact throughout, PENA, normal movement, normal tone  SKIN:  normal skin color for age and race, well-perfused; warm and dry

## 2023-02-16 NOTE — H&P ADULT - NSHPLABSRESULTS_GEN_ALL_CORE
LABS:                          14.2   4.38  )-----------( 154      ( 16 Feb 2023 13:07 )             39.7     02-16    138  |  101  |  17  ----------------------------<  193<H>  4.5   |  28  |  0.7    Ca    9.4      16 Feb 2023 13:07  Mg     1.4     02-16    TPro  6.8  /  Alb  3.2<L>  /  TBili  1.3<H>  /  DBili  x   /  AST  36  /  ALT  28  /  AlkPhos  148<H>  02-16    LIVER FUNCTIONS - ( 16 Feb 2023 13:07 )  Alb: 3.2 g/dL / Pro: 6.8 g/dL / ALK PHOS: 148 U/L / ALT: 28 U/L / AST: 36 U/L / GGT: x

## 2023-02-16 NOTE — H&P ADULT - NSHPPHYSICALEXAM_GEN_ALL_CORE
General: NAD, aggressive behavior toward staff and family  Chest: CTAB  CV: RRR  Abdomen: Soft, mildly distended  Extremities: no edema, or cyanosis  Neuro: A&O x2, sensation intact, motor strength present throughout, bilateral upper extremities asymmetric resting tremor present

## 2023-02-16 NOTE — ED PROVIDER NOTE - ATTENDING CONTRIBUTION TO CARE
77-year-old man, history of hypertension, diabetes, hep C with cirrhosis, CHF, depression, dementia, brought in by family for frequent falls, most recently overnight.  Patient is awake and alert, ANO x2, does not recall the event.  Per wife, patient is supposed to be using a walker for balance, but he forgets to use it.  Patient denies complaints, but wife says he often does not complain.  Small bruise over left eyebrow, otherwise exam is unremarkable.  Will check labs and head CT for acute injuries.  I spoke at length with patient's wife.  She has been doing all the caregiving at home, has installed cameras around the house, but feels it is increasingly unmanageable to care for patient at home.  He falls often, and gets confused and wanders around the house at night, she is unable to sleep or care for her own needs.  We agreed that regardless of acute findings, patient will require admission and possible placement versus more intensive assistance at home.

## 2023-02-16 NOTE — ED ADULT TRIAGE NOTE - CHIEF COMPLAINT QUOTE
patient biba for fall- patient was found on the floor this morning. patient awake and alert and at base line does not remember falling. patient is not on anticoagulation hx of dementia

## 2023-02-16 NOTE — H&P ADULT - HISTORY OF PRESENT ILLNESS
78 yo M patient with a PMH of:  - dementia, depression,   - HTN,   - cirrhosis with portal hypertension and a hospitalization for hepatic encephalopathy  - diabetes,   - HFpEF,   - rib fractures due to recurrent falls, presents to the ED for increase in his aggressive behavior.    His dementia was diagnosed 2 years ago, however, symptoms of amnesia and behavioral changes have been progressing rapidly in this span of time.  His aggressive behavior which was previously intermittent, has been worsening lately and becoming constant, associated with increase on his extremities tremor and other extrapyramidal symptoms, that whenever he ambulates, he sustains falls.    During the falls, patient is conscious, recalls the event, and family that witnesses the event denies any features of tonic- clonic or atonic seizures.  Patient and surrounding deny any chest pain, shortness of breath, cough, abdominal pain, changes in bowel/ urinary symptoms, constitutional symptoms, or other focal neurologic symptoms.    In the ED, the patient was HD stable, afebrile.  Labs were only significant for a magnesium level of 1.4 (being repleted).  CXR was negative for any acute cardiopulmonary pathology, pelvis xray was negative for any fracture or misalignment, and CTH was negative for any acute intracranial pathology.    Patient is being admitted to medicine for further workup and management.   76 yo M patient with a PMH of:  - dementia, depression,   - HTN,   - cirrhosis with portal hypertension and a hospitalization for hepatic encephalopathy  - diabetes,   - HFpEF,   - rib fractures due to recurrent falls, presents to the ED for increase in his aggressive behavior.    His dementia was diagnosed 2 years ago, however, symptoms of amnesia and behavioral changes have been progressing rapidly in this span of time.  His aggressive behavior which was previously intermittent, has been worsening lately and becoming constant, associated with increase on his extremities tremor and other extrapyramidal symptoms, that whenever he ambulates, he sustains falls.    During the falls, patient is conscious, recalls the event, and family that witnesses the event denies any features of tonic- clonic or atonic seizures.  Patient and surrounding deny any chest pain, shortness of breath, cough, abdominal pain, changes in bowel/ urinary symptoms, constitutional symptoms, or other focal neurologic symptoms.    In the ED, the patient was HD stable, afebrile.  Labs were only significant for a magnesium level of 1.4 (being repleted).  CXR was negative for any acute cardiopulmonary pathology, pelvis xray was negative for any fracture or misalignment, and CTH was negative for any acute intracranial pathology.    Patient is being admitted to medicine for further workup and management.   78 yo M patient with a PMH of:  - dementia, depression,   - HTN,   - cirrhosis with portal hypertension and a hospitalization for hepatic encephalopathy  - diabetes,   - HFpEF,   - rib fractures due to recurrent falls, presents to the ED for increase in his aggressive behavior.    His dementia was diagnosed 2 years ago, however, symptoms of amnesia and behavioral changes have been progressing rapidly in this span of time.  His aggressive behavior which was previously intermittent, has been worsening lately and becoming constant, associated with increase on his extremities tremor and other extrapyramidal symptoms, that whenever he ambulates, he sustains falls.    During the falls, patient is conscious, recalls the event, and family that witnesses the event denies any features of tonic- clonic or atonic seizures.  Patient and surrounding deny any chest pain, shortness of breath, cough, abdominal pain, changes in bowel/ urinary symptoms, constitutional symptoms, or other focal neurologic symptoms.    In the ED, the patient was HD stable, afebrile.  Labs were only significant for a magnesium level of 1.4 (being repleted).  CXR was negative for any acute cardiopulmonary pathology, but showed bilateral opacities that are comparable to previous chest xrays.  Pelvis xray was negative for any fracture or misalignment, and CTH was negative for any acute intracranial pathology.    Patient is being admitted to medicine for further workup and management.

## 2023-02-16 NOTE — ED ADULT NURSE NOTE - OBJECTIVE STATEMENT
received patient A&O x3 with hx of dementia. patient wife and son at bedside. Wife states she found patient face down. Patient states he does not remember the fall. Neuro WDL. Noted redness on patient top of head. patient denies having any head pain or headache. Patient denies having any chest pain or SOB.

## 2023-02-16 NOTE — ED PROVIDER NOTE - CLINICAL SUMMARY MEDICAL DECISION MAKING FREE TEXT BOX
ED work-up unremarkable.  Doubt hepatic encephalopathy.  Doubt syncope, but fall was unwitnessed.  Will admit for neuro and case management.

## 2023-02-16 NOTE — ED ADULT NURSE REASSESSMENT NOTE - NS ED NURSE REASSESS COMMENT FT1
provided PM care to patient. Patient resting comfortably in stretcher, NAD. Will continue to monitor.

## 2023-02-16 NOTE — ED ADULT NURSE REASSESSMENT NOTE - NS ED NURSE REASSESS COMMENT FT1
Received report from Alli FIGUEROA. Pt resting comfortably in stretcher, NAD. Will continue to monitor.

## 2023-02-17 LAB
A1C WITH ESTIMATED AVERAGE GLUCOSE RESULT: 4.8 % — SIGNIFICANT CHANGE UP (ref 4–5.6)
ALBUMIN SERPL ELPH-MCNC: 2.9 G/DL — LOW (ref 3.5–5.2)
ALP SERPL-CCNC: 128 U/L — HIGH (ref 30–115)
ALT FLD-CCNC: 11 U/L — SIGNIFICANT CHANGE UP (ref 0–41)
AMMONIA BLD-MCNC: 144 UMOL/L — HIGH (ref 11–55)
ANION GAP SERPL CALC-SCNC: 10 MMOL/L — SIGNIFICANT CHANGE UP (ref 7–14)
AST SERPL-CCNC: 24 U/L — SIGNIFICANT CHANGE UP (ref 0–41)
BASOPHILS # BLD AUTO: 0.06 K/UL — SIGNIFICANT CHANGE UP (ref 0–0.2)
BASOPHILS NFR BLD AUTO: 1.1 % — HIGH (ref 0–1)
BILIRUB SERPL-MCNC: 1.5 MG/DL — HIGH (ref 0.2–1.2)
BUN SERPL-MCNC: 18 MG/DL — SIGNIFICANT CHANGE UP (ref 10–20)
CALCIUM SERPL-MCNC: 8.8 MG/DL — SIGNIFICANT CHANGE UP (ref 8.4–10.5)
CHLORIDE SERPL-SCNC: 107 MMOL/L — SIGNIFICANT CHANGE UP (ref 98–110)
CO2 SERPL-SCNC: 25 MMOL/L — SIGNIFICANT CHANGE UP (ref 17–32)
CREAT SERPL-MCNC: 0.7 MG/DL — SIGNIFICANT CHANGE UP (ref 0.7–1.5)
EGFR: 95 ML/MIN/1.73M2 — SIGNIFICANT CHANGE UP
EOSINOPHIL # BLD AUTO: 0.34 K/UL — SIGNIFICANT CHANGE UP (ref 0–0.7)
EOSINOPHIL NFR BLD AUTO: 6.1 % — SIGNIFICANT CHANGE UP (ref 0–8)
ESTIMATED AVERAGE GLUCOSE: 91 MG/DL — SIGNIFICANT CHANGE UP (ref 68–114)
GLUCOSE BLDC GLUCOMTR-MCNC: 155 MG/DL — HIGH (ref 70–99)
GLUCOSE BLDC GLUCOMTR-MCNC: 205 MG/DL — HIGH (ref 70–99)
GLUCOSE BLDC GLUCOMTR-MCNC: 226 MG/DL — HIGH (ref 70–99)
GLUCOSE SERPL-MCNC: 157 MG/DL — HIGH (ref 70–99)
HCT VFR BLD CALC: 37.8 % — LOW (ref 42–52)
HGB BLD-MCNC: 13.6 G/DL — LOW (ref 14–18)
IMM GRANULOCYTES NFR BLD AUTO: 0.4 % — HIGH (ref 0.1–0.3)
LYMPHOCYTES # BLD AUTO: 1.93 K/UL — SIGNIFICANT CHANGE UP (ref 1.2–3.4)
LYMPHOCYTES # BLD AUTO: 34.8 % — SIGNIFICANT CHANGE UP (ref 20.5–51.1)
MAGNESIUM SERPL-MCNC: 1.7 MG/DL — LOW (ref 1.8–2.4)
MCHC RBC-ENTMCNC: 36 G/DL — SIGNIFICANT CHANGE UP (ref 32–37)
MCHC RBC-ENTMCNC: 36.1 PG — HIGH (ref 27–31)
MCV RBC AUTO: 100.3 FL — HIGH (ref 80–94)
MONOCYTES # BLD AUTO: 0.63 K/UL — HIGH (ref 0.1–0.6)
MONOCYTES NFR BLD AUTO: 11.4 % — HIGH (ref 1.7–9.3)
NEUTROPHILS # BLD AUTO: 2.57 K/UL — SIGNIFICANT CHANGE UP (ref 1.4–6.5)
NEUTROPHILS NFR BLD AUTO: 46.2 % — SIGNIFICANT CHANGE UP (ref 42.2–75.2)
NRBC # BLD: 0 /100 WBCS — SIGNIFICANT CHANGE UP (ref 0–0)
PLATELET # BLD AUTO: 145 K/UL — SIGNIFICANT CHANGE UP (ref 130–400)
POTASSIUM SERPL-MCNC: 4 MMOL/L — SIGNIFICANT CHANGE UP (ref 3.5–5)
POTASSIUM SERPL-SCNC: 4 MMOL/L — SIGNIFICANT CHANGE UP (ref 3.5–5)
PROT SERPL-MCNC: 6.2 G/DL — SIGNIFICANT CHANGE UP (ref 6–8)
RBC # BLD: 3.77 M/UL — LOW (ref 4.7–6.1)
RBC # FLD: 14.2 % — SIGNIFICANT CHANGE UP (ref 11.5–14.5)
SODIUM SERPL-SCNC: 142 MMOL/L — SIGNIFICANT CHANGE UP (ref 135–146)
WBC # BLD: 5.55 K/UL — SIGNIFICANT CHANGE UP (ref 4.8–10.8)
WBC # FLD AUTO: 5.55 K/UL — SIGNIFICANT CHANGE UP (ref 4.8–10.8)

## 2023-02-17 PROCEDURE — 99232 SBSQ HOSP IP/OBS MODERATE 35: CPT

## 2023-02-17 PROCEDURE — 99221 1ST HOSP IP/OBS SF/LOW 40: CPT

## 2023-02-17 RX ORDER — HALOPERIDOL DECANOATE 100 MG/ML
1 INJECTION INTRAMUSCULAR ONCE
Refills: 0 | Status: COMPLETED | OUTPATIENT
Start: 2023-02-17 | End: 2023-02-17

## 2023-02-17 RX ORDER — LACTULOSE 10 G/15ML
15 SOLUTION ORAL
Refills: 0 | Status: DISCONTINUED | OUTPATIENT
Start: 2023-02-17 | End: 2023-02-17

## 2023-02-17 RX ORDER — MAGNESIUM SULFATE 500 MG/ML
2 VIAL (ML) INJECTION ONCE
Refills: 0 | Status: COMPLETED | OUTPATIENT
Start: 2023-02-17 | End: 2023-02-17

## 2023-02-17 RX ORDER — LACTULOSE 10 G/15ML
15 SOLUTION ORAL
Refills: 0 | Status: DISCONTINUED | OUTPATIENT
Start: 2023-02-17 | End: 2023-02-23

## 2023-02-17 RX ADMIN — LATANOPROST 1 DROP(S): 0.05 SOLUTION/ DROPS OPHTHALMIC; TOPICAL at 01:03

## 2023-02-17 RX ADMIN — ENOXAPARIN SODIUM 40 MILLIGRAM(S): 100 INJECTION SUBCUTANEOUS at 11:32

## 2023-02-17 RX ADMIN — LACTULOSE 20 GRAM(S): 10 SOLUTION ORAL at 01:02

## 2023-02-17 RX ADMIN — HALOPERIDOL DECANOATE 1 MILLIGRAM(S): 100 INJECTION INTRAMUSCULAR at 23:31

## 2023-02-17 RX ADMIN — Medication 81 MILLIGRAM(S): at 11:33

## 2023-02-17 RX ADMIN — LACTULOSE 15 GRAM(S): 10 SOLUTION ORAL at 17:00

## 2023-02-17 RX ADMIN — LACTULOSE 15 GRAM(S): 10 SOLUTION ORAL at 20:06

## 2023-02-17 RX ADMIN — DONEPEZIL HYDROCHLORIDE 10 MILLIGRAM(S): 10 TABLET, FILM COATED ORAL at 01:02

## 2023-02-17 RX ADMIN — Medication 2: at 11:55

## 2023-02-17 RX ADMIN — CARBIDOPA AND LEVODOPA 1 TABLET(S): 25; 100 TABLET ORAL at 05:49

## 2023-02-17 RX ADMIN — LISINOPRIL 20 MILLIGRAM(S): 2.5 TABLET ORAL at 05:50

## 2023-02-17 RX ADMIN — DONEPEZIL HYDROCHLORIDE 10 MILLIGRAM(S): 10 TABLET, FILM COATED ORAL at 21:27

## 2023-02-17 RX ADMIN — Medication 2: at 17:00

## 2023-02-17 RX ADMIN — LACTULOSE 20 GRAM(S): 10 SOLUTION ORAL at 05:48

## 2023-02-17 RX ADMIN — LACTULOSE 15 GRAM(S): 10 SOLUTION ORAL at 14:48

## 2023-02-17 RX ADMIN — LACTULOSE 15 GRAM(S): 10 SOLUTION ORAL at 21:27

## 2023-02-17 RX ADMIN — LACTULOSE 15 GRAM(S): 10 SOLUTION ORAL at 11:33

## 2023-02-17 RX ADMIN — Medication 25 GRAM(S): at 11:40

## 2023-02-17 RX ADMIN — LACTULOSE 15 GRAM(S): 10 SOLUTION ORAL at 18:28

## 2023-02-17 RX ADMIN — Medication 1: at 08:16

## 2023-02-17 RX ADMIN — Medication 100 MILLIGRAM(S): at 11:33

## 2023-02-17 RX ADMIN — MEMANTINE HYDROCHLORIDE 10 MILLIGRAM(S): 10 TABLET ORAL at 11:33

## 2023-02-17 RX ADMIN — DULOXETINE HYDROCHLORIDE 60 MILLIGRAM(S): 30 CAPSULE, DELAYED RELEASE ORAL at 11:33

## 2023-02-17 RX ADMIN — Medication 2000 UNIT(S): at 11:32

## 2023-02-17 RX ADMIN — CARBIDOPA AND LEVODOPA 1 TABLET(S): 25; 100 TABLET ORAL at 17:15

## 2023-02-17 RX ADMIN — CHLORHEXIDINE GLUCONATE 1 APPLICATION(S): 213 SOLUTION TOPICAL at 11:40

## 2023-02-17 RX ADMIN — LATANOPROST 1 DROP(S): 0.05 SOLUTION/ DROPS OPHTHALMIC; TOPICAL at 21:27

## 2023-02-17 NOTE — PHYSICAL THERAPY INITIAL EVALUATION ADULT - PERTINENT HX OF CURRENT PROBLEM, REHAB EVAL
76 yo M patient with a PMH of:  - dementia, depression,   - HTN,   - cirrhosis with portal hypertension and a hospitalization for hepatic encephalopathy  - diabetes,   - HFpEF,   - rib fractures due to recurrent falls, presents to the ED for increase in his aggressive behavior.    His dementia was diagnosed 2 years ago, however, symptoms of amnesia and behavioral changes have been progressing rapidly in this span of time.  His aggressive behavior which was previously intermittent, has been worsening lately and becoming constant, associated with increase on his extremities tremor and other extrapyramidal symptoms, that whenever he ambulates, he sustains falls.

## 2023-02-17 NOTE — PROGRESS NOTE ADULT - SUBJECTIVE AND OBJECTIVE BOX
INTERVAL HPI/OVERNIGHT EVENTS:    SUBJECTIVE: Patient seen and examined at bedside.     cc: agitation  no cp, sob, abd pain, fever    OBJECTIVE:    VITAL SIGNS:  Vital Signs Last 24 Hrs  T(C): 36.4 (17 Feb 2023 10:25), Max: 36.7 (17 Feb 2023 05:44)  T(F): 97.6 (17 Feb 2023 10:25), Max: 98.1 (17 Feb 2023 05:44)  HR: 86 (17 Feb 2023 10:25) (80 - 87)  BP: 173/77 (17 Feb 2023 10:25) (140/82 - 173/77)  BP(mean): --  RR: 18 (17 Feb 2023 10:25) (18 - 18)  SpO2: 97% (17 Feb 2023 10:25) (96% - 97%)    Parameters below as of 17 Feb 2023 10:25  Patient On (Oxygen Delivery Method): room air          PHYSICAL EXAM:    General: NAD  HEENT: NC/AT; PERRL, clear conjunctiva  Neck: supple  Respiratory: CTA b/l  Cardiovascular: +S1/S2; RRR  Abdomen: soft, NT/ND; +BS x4  Extremities: WWP, 2+ peripheral pulses b/l; no LE edema  Skin: normal color and turgor; no rash  Neurological:    MEDICATIONS:  MEDICATIONS  (STANDING):  aspirin  chewable 81 milliGRAM(s) Oral daily  carbidopa/levodopa  25/100 1 Tablet(s) Oral <User Schedule>  chlorhexidine 2% Cloths 1 Application(s) Topical daily  cholecalciferol 2000 Unit(s) Oral daily  dextrose 5%. 1000 milliLiter(s) (50 mL/Hr) IV Continuous <Continuous>  dextrose 5%. 1000 milliLiter(s) (100 mL/Hr) IV Continuous <Continuous>  dextrose 50% Injectable 25 Gram(s) IV Push once  dextrose 50% Injectable 12.5 Gram(s) IV Push once  dextrose 50% Injectable 25 Gram(s) IV Push once  donepezil 10 milliGRAM(s) Oral at bedtime  DULoxetine 60 milliGRAM(s) Oral daily  enoxaparin Injectable 40 milliGRAM(s) SubCutaneous every 24 hours  glucagon  Injectable 1 milliGRAM(s) IntraMuscular once  insulin lispro (ADMELOG) corrective regimen sliding scale   SubCutaneous three times a day before meals  lactulose Syrup 15 Gram(s) Oral every 2 hours  latanoprost 0.005% Ophthalmic Solution 1 Drop(s) Both EYES at bedtime  lisinopril 20 milliGRAM(s) Oral daily  memantine 10 milliGRAM(s) Oral daily  pyridoxine 100 milliGRAM(s) Oral daily  rifAXIMin 550 milliGRAM(s) Oral two times a day    MEDICATIONS  (PRN):  dextrose Oral Gel 15 Gram(s) Oral once PRN Blood Glucose LESS THAN 70 milliGRAM(s)/deciliter  haloperidol    Injectable 1 milliGRAM(s) IV Push every 8 hours PRN Agitation      ALLERGIES:  Allergies    Keflex (Hives)  Keflex (Unknown)    Intolerances        LABS:                        13.6   5.55  )-----------( 145      ( 17 Feb 2023 06:57 )             37.8     Hemoglobin: 13.6 g/dL (02-17 @ 06:57)  Hemoglobin: 14.2 g/dL (02-16 @ 13:07)    CBC Full  -  ( 17 Feb 2023 06:57 )  WBC Count : 5.55 K/uL  RBC Count : 3.77 M/uL  Hemoglobin : 13.6 g/dL  Hematocrit : 37.8 %  Platelet Count - Automated : 145 K/uL  Mean Cell Volume : 100.3 fL  Mean Cell Hemoglobin : 36.1 pg  Mean Cell Hemoglobin Concentration : 36.0 g/dL  Auto Neutrophil # : 2.57 K/uL  Auto Lymphocyte # : 1.93 K/uL  Auto Monocyte # : 0.63 K/uL  Auto Eosinophil # : 0.34 K/uL  Auto Basophil # : 0.06 K/uL  Auto Neutrophil % : 46.2 %  Auto Lymphocyte % : 34.8 %  Auto Monocyte % : 11.4 %  Auto Eosinophil % : 6.1 %  Auto Basophil % : 1.1 %    02-17    142  |  107  |  18  ----------------------------<  157<H>  4.0   |  25  |  0.7    Ca    8.8      17 Feb 2023 06:57  Mg     1.7     02-17    TPro  6.2  /  Alb  2.9<L>  /  TBili  1.5<H>  /  DBili  x   /  AST  24  /  ALT  11  /  AlkPhos  128<H>  02-17    Creatinine Trend: 0.7<--, 0.7<--  LIVER FUNCTIONS - ( 17 Feb 2023 06:57 )  Alb: 2.9 g/dL / Pro: 6.2 g/dL / ALK PHOS: 128 U/L / ALT: 11 U/L / AST: 24 U/L / GGT: x               hs Troponin:              CSF:                      EKG:   MICROBIOLOGY:    IMAGING:      Labs, imaging, EKG personally reviewed    RADIOLOGY & ADDITIONAL TESTS: Reviewed.

## 2023-02-17 NOTE — CONSULT NOTE ADULT - ATTENDING COMMENTS
Patient is currently calm and quiet. Agree with hepatic encephalopathy with hyper ammonia. No further work up for FTD as it is a progressive disease by nature. Treat underlying metabolic pathology and hyperammonemia. No further work up from neurology standpoint

## 2023-02-17 NOTE — PROGRESS NOTE ADULT - ASSESSMENT
77M PMHx dementia, HTN, cirrhosis c/b portal HTN, h/o hepatic encephalopathy; DM2, HFpEF here with behavioral disturbance, frequent falls.    #Behavioral disturbance, with frequent falls  in setting of dementia  cth neg  ct chest/ abd with bl atelectasis  ua  reeg  rifaximin, lactulose to 3 loose bm daily  f/u neuro  #Dementia  sinemet 25/ 100 bid  donepezil 10  duloxetine 60  memantine 10  #HTN  lisinopril 20  #Cirrhosis  ct abd c/w cirrhosis, +portal htn  rifaximin, lactulose as above  outpt f/u  #DM2  outpt f/u  #HFpEF, chronic  outpt f/u  #DVT ppx  lovenox    #Progress Note Handoff:  Pending (specify):  Consults_________, Tests________, Test Results_______, Other____reeg_____  Family discussion:   Disposition: Home___/SNF___/Other________/Unknown at this time____x____

## 2023-02-17 NOTE — CONSULT NOTE ADULT - ASSESSMENT
78 yo M patient with a PMH of dementia, depression, HTN, cirrhosis with portal hypertension and a hospitalization for hepatic encephalopathy, diabetes, HFpEF, rib fractures due to recurrent falls, presents to the ED for increase in his aggressive behavior. His dementia was diagnosed 2 years ago, however, symptoms of amnesia and behavioral changes have been progressing rapidly in this span of time. His aggressive behavior which was previously intermittent, has been worsening lately and becoming constant, associated with increase on his extremities tremor and other extrapyramidal symptoms, that whenever he ambulates, he sustains falls. During the falls, patient is conscious, recalls the event, and family that witnesses the event denies any features of tonic- clonic or atonic seizures. Patient and surrounding deny any chest pain, shortness of breath, cough, abdominal pain, changes in bowel/ urinary symptoms, constitutional symptoms, or other focal neurologic symptoms.    In the ED, the patient was HD stable, afebrile.  - Labs were only significant for a magnesium level of 1.4 (being repleted).  - CXR was negative for any acute cardiopulmonary pathology, but showed bilateral opacities that are comparable to previous chest xrays.  - Pelvis xray was negative for any fracture or misalignment, and CTH was negative for any acute intracranial pathology.    Patient is being admitted to medicine for further workup and management.    Neurology Team consulted for evaluation. 78 yo M patient with a PMH of dementia, depression, HTN, cirrhosis with portal hypertension and a hospitalization for hepatic encephalopathy, diabetes, HFpEF, rib fractures due to recurrent falls, presents to the ED for increase in his aggressive behavior. His dementia was diagnosed 2 years ago, however, symptoms of amnesia and behavioral changes have been progressing rapidly in this span of time. His aggressive behavior which was previously intermittent, has been worsening lately and becoming constant, associated with increase on his extremities tremor and other extrapyramidal symptoms, that whenever he ambulates, he sustains falls. During the falls, patient is conscious, recalls the event, and family that witnesses the event denies any features of tonic- clonic or atonic seizures. Patient and surrounding deny any chest pain, shortness of breath, cough, abdominal pain, changes in bowel/ urinary symptoms, constitutional symptoms, or other focal neurologic symptoms. Patient is being admitted to medicine for further workup and management. Neurology Team consulted for evaluation. Labs show hyperammoniemia. CTH shows no evidence of acute intracranial pathology.    Differential diagnosis: Altered mental status and behavioral changes likely related with progressive hepatic encephalopathy    PLAN:  - avoid opioids, benzodiazepines and anticholinergic meds  - identify and correct precipitating factors (such as infection, gastrointestinal bleeding, renal failure, electrolyte disturbances, sedative use) and alternative causes of altered mental status  - maintain nutrition with protein intake 1.2-1.5 g/kg/day and energy intake 35-40 kcal/kg ideal body weight  - start with lactulose syrup 25 mL (16.67 g) every 1-2 hours until = 2 soft or loose bowel movements, then titrate dosing to maintain 2-3 bowel movements/day  - rifaximin (Xifaxan) 550 mg orally twice daily, with or without food addition of rifaximin to lactulose  - oral branched-chain amino acids may improve hepatic encephalopathy manifestations   - can order EEG if mental status does not improve after optimizing hepatic encephalopathy management and normalized ammonia     Note not complete until final attending attestation

## 2023-02-17 NOTE — CONSULT NOTE ADULT - SUBJECTIVE AND OBJECTIVE BOX
NEUROLOGY CONSULT    HPI: 76 yo M patient with a PMH of dementia, depression, HTN, cirrhosis with portal hypertension and a hospitalization for hepatic encephalopathy, diabetes, HFpEF, rib fractures due to recurrent falls, presents to the ED for increase in his aggressive behavior. His dementia was diagnosed 2 years ago, however, symptoms of amnesia and behavioral changes have been progressing rapidly in this span of time. His aggressive behavior which was previously intermittent, has been worsening lately and becoming constant, associated with increase on his extremities tremor and other extrapyramidal symptoms, that whenever he ambulates, he sustains falls. During the falls, patient is conscious, recalls the event, and family that witnesses the event denies any features of tonic- clonic or atonic seizures. Patient and surrounding deny any chest pain, shortness of breath, cough, abdominal pain, changes in bowel/ urinary symptoms, constitutional symptoms, or other focal neurologic symptoms.    In the ED, the patient was HD stable, afebrile.  - Labs were only significant for a magnesium level of 1.4 (being repleted).  - CXR was negative for any acute cardiopulmonary pathology, but showed bilateral opacities that are comparable to previous chest xrays.  - Pelvis xray was negative for any fracture or misalignment, and CTH was negative for any acute intracranial pathology.    Patient is being admitted to medicine for further workup and management.    Neurology Team consulted for evaluation.    mRankin score 0 at baseline     MEDICATIONS  Home Medications:  aspirin 81 mg oral tablet: 1 tab(s) orally once a day (16 Feb 2023 16:23)  carbidopa-levodopa 25 mg-100 mg oral tablet: 1 tab(s) orally 2 times a day (16 Feb 2023 16:23)  donepezil 10 mg oral tablet: 1 tab(s) orally once a day (at bedtime) (16 Feb 2023 16:23)  DULoxetine 60 mg oral delayed release capsule: 1 cap(s) orally once a day (16 Feb 2023 16:23)  lactulose 10 g/15 mL oral syrup: 30 milliliter(s) orally every 4 to 8 hours -4 BMs per day to prevent hepatic encephalopathy) (16 Feb 2023 16:23)  latanoprost 0.005% ophthalmic solution: 1 drop(s) to each affected eye once a day (in the evening) (16 Feb 2023 16:23)  memantine 10 mg oral tablet: 1 tab(s) orally once a day (16 Feb 2023 16:23)  metFORMIN 1000 mg oral tablet: 1.5 tab(s) orally 2 times a day (16 Feb 2023 16:23)  quinapril 20 mg oral tablet: 1 tab(s) orally once a day (16 Feb 2023 16:23)  Vitamin B6 100 mg oral tablet: 1 tab(s) orally once a day (16 Feb 2023 16:23)  Vitamin D3 2000 intl units oral tablet: 1 tab(s) orally once a day (16 Feb 2023 16:23)    MEDICATIONS  (STANDING):  aspirin  chewable 81 milliGRAM(s) Oral daily  carbidopa/levodopa  25/100 1 Tablet(s) Oral <User Schedule>  chlorhexidine 2% Cloths 1 Application(s) Topical daily  cholecalciferol 2000 Unit(s) Oral daily  dextrose 5%. 1000 milliLiter(s) (100 mL/Hr) IV Continuous <Continuous>  dextrose 5%. 1000 milliLiter(s) (50 mL/Hr) IV Continuous <Continuous>  dextrose 50% Injectable 25 Gram(s) IV Push once  dextrose 50% Injectable 12.5 Gram(s) IV Push once  dextrose 50% Injectable 25 Gram(s) IV Push once  donepezil 10 milliGRAM(s) Oral at bedtime  DULoxetine 60 milliGRAM(s) Oral daily  enoxaparin Injectable 40 milliGRAM(s) SubCutaneous every 24 hours  glucagon  Injectable 1 milliGRAM(s) IntraMuscular once  insulin lispro (ADMELOG) corrective regimen sliding scale   SubCutaneous three times a day before meals  lactulose Syrup 20 Gram(s) Oral every 8 hours  latanoprost 0.005% Ophthalmic Solution 1 Drop(s) Both EYES at bedtime  lisinopril 20 milliGRAM(s) Oral daily  memantine 10 milliGRAM(s) Oral daily  pyridoxine 100 milliGRAM(s) Oral daily    MEDICATIONS  (PRN):  dextrose Oral Gel 15 Gram(s) Oral once PRN Blood Glucose LESS THAN 70 milliGRAM(s)/deciliter  haloperidol    Injectable 1 milliGRAM(s) IV Push every 8 hours PRN Agitation    FAMILY HISTORY:    SOCIAL HISTORY: negative for tobacco, alcohol, or ilicit drug use.    Allergies  Keflex (Hives)  Keflex (Unknown)    GEN: NAD, pleasant, cooperative    NEURO:   MENTAL STATUS: AAOx3  LANG/SPEECH: Fluent, intact naming, repetition & comprehension  CRANIAL NERVES:  II: Pupils equal and reactive, no RAPD, normal visual field and fundus  III, IV, VI: EOM intact, no gaze preference or deviation  V: normal  VII: no facial asymmetry  VIII: normal hearing to speech  MOTOR: 5/5 in both upper and lower extremities  REFLEXES: 2/4 throughout, bilateral flexor plantars  SENSORY: Normal to touch, temperature & pin prick in all extremiteis  COORD: Normal finger to nose and heel to shin, no tremor, no dysmetria  Gait:   NIHSS: **** ASPECT Score: ***** ICH Score: ****** (GCS)    LABS:                        14.2   4.38  )-----------( 154      ( 16 Feb 2023 13:07 )             39.7     02-16    138  |  101  |  17  ----------------------------<  193<H>  4.5   |  28  |  0.7    Ca    9.4      16 Feb 2023 13:07  Mg     1.4     02-16    TPro  6.8  /  Alb  3.2<L>  /  TBili  1.3<H>  /  DBili  x   /  AST  36  /  ALT  28  /  AlkPhos  148<H>  02-16    Hemoglobin A1C:   Vitamin B12     CAPILLARY BLOOD GLUCOSE                  Microbiology:      RADIOLOGY, EKG AND ADDITIONAL TESTS: Reviewed.           NEUROLOGY CONSULT    HPI: 76 yo M patient with a PMH of dementia, depression, HTN, cirrhosis with portal hypertension and a hospitalization for hepatic encephalopathy, diabetes, HFpEF, rib fractures due to recurrent falls, presents to the ED for increase in his aggressive behavior. His dementia was diagnosed 2 years ago, however, symptoms of amnesia and behavioral changes have been progressing rapidly in this span of time. His aggressive behavior which was previously intermittent, has been worsening lately and becoming constant, associated with increase on his extremities tremor and other extrapyramidal symptoms, that whenever he ambulates, he sustains falls. During the falls, patient is conscious, recalls the event, and family that witnesses the event denies any features of tonic- clonic or atonic seizures. Patient and surrounding deny any chest pain, shortness of breath, cough, abdominal pain, changes in bowel/ urinary symptoms, constitutional symptoms, or other focal neurologic symptoms.    In the ED, the patient was HD stable, afebrile.  - Labs were only significant for a magnesium level of 1.4 (being repleted).  - CXR was negative for any acute cardiopulmonary pathology, but showed bilateral opacities that are comparable to previous chest xrays.  - Pelvis xray was negative for any fracture or misalignment, and CTH was negative for any acute intracranial pathology.    Patient is being admitted to medicine for further workup and management.    Neurology Team consulted for evaluation.    mRankin score 0 at baseline     MEDICATIONS  Home Medications:  aspirin 81 mg oral tablet: 1 tab(s) orally once a day (16 Feb 2023 16:23)  carbidopa-levodopa 25 mg-100 mg oral tablet: 1 tab(s) orally 2 times a day (16 Feb 2023 16:23)  donepezil 10 mg oral tablet: 1 tab(s) orally once a day (at bedtime) (16 Feb 2023 16:23)  DULoxetine 60 mg oral delayed release capsule: 1 cap(s) orally once a day (16 Feb 2023 16:23)  lactulose 10 g/15 mL oral syrup: 30 milliliter(s) orally every 4 to 8 hours -4 BMs per day to prevent hepatic encephalopathy) (16 Feb 2023 16:23)  latanoprost 0.005% ophthalmic solution: 1 drop(s) to each affected eye once a day (in the evening) (16 Feb 2023 16:23)  memantine 10 mg oral tablet: 1 tab(s) orally once a day (16 Feb 2023 16:23)  metFORMIN 1000 mg oral tablet: 1.5 tab(s) orally 2 times a day (16 Feb 2023 16:23)  quinapril 20 mg oral tablet: 1 tab(s) orally once a day (16 Feb 2023 16:23)  Vitamin B6 100 mg oral tablet: 1 tab(s) orally once a day (16 Feb 2023 16:23)  Vitamin D3 2000 intl units oral tablet: 1 tab(s) orally once a day (16 Feb 2023 16:23)    MEDICATIONS  (STANDING):  aspirin  chewable 81 milliGRAM(s) Oral daily  carbidopa/levodopa  25/100 1 Tablet(s) Oral <User Schedule>  chlorhexidine 2% Cloths 1 Application(s) Topical daily  cholecalciferol 2000 Unit(s) Oral daily  dextrose 5%. 1000 milliLiter(s) (100 mL/Hr) IV Continuous <Continuous>  dextrose 5%. 1000 milliLiter(s) (50 mL/Hr) IV Continuous <Continuous>  dextrose 50% Injectable 25 Gram(s) IV Push once  dextrose 50% Injectable 12.5 Gram(s) IV Push once  dextrose 50% Injectable 25 Gram(s) IV Push once  donepezil 10 milliGRAM(s) Oral at bedtime  DULoxetine 60 milliGRAM(s) Oral daily  enoxaparin Injectable 40 milliGRAM(s) SubCutaneous every 24 hours  glucagon  Injectable 1 milliGRAM(s) IntraMuscular once  insulin lispro (ADMELOG) corrective regimen sliding scale   SubCutaneous three times a day before meals  lactulose Syrup 20 Gram(s) Oral every 8 hours  latanoprost 0.005% Ophthalmic Solution 1 Drop(s) Both EYES at bedtime  lisinopril 20 milliGRAM(s) Oral daily  memantine 10 milliGRAM(s) Oral daily  pyridoxine 100 milliGRAM(s) Oral daily    MEDICATIONS  (PRN):  dextrose Oral Gel 15 Gram(s) Oral once PRN Blood Glucose LESS THAN 70 milliGRAM(s)/deciliter  haloperidol    Injectable 1 milliGRAM(s) IV Push every 8 hours PRN Agitation    FAMILY HISTORY: Unremarkable    SOCIAL HISTORY: negative for tobacco, alcohol, or ilicit drug use.    Allergies  Keflex (Hives)  Keflex (Unknown)    GEN: NAD, pleasant, cooperative    NEURO:   MENTAL STATUS: AAOx3 (recall month and year and he is in a hospital). Awake  LANG/SPEECH: Fluent, intact naming, repetition & comprehension  CRANIAL NERVES:  II: Pupils equal and reactive, no RAPD, normal visual field and fundus  III, IV, VI: EOM intact, no gaze preference or deviation  V: normal  VII: no facial asymmetry  VIII: normal hearing to speech  MOTOR: 5/5 in both upper and lower extremities  REFLEXES: 3/4 throughout, bilateral flexor plantars, no clonus  SENSORY: Normal to touch, temperature & pin prick in all extremiteis  COORD: Normal finger to nose normal and heel to shin mildly altered, no tremor, no dysmetria  BALANCE AND GAIT: Non-assessed  EXTRAPIRAMIDAL: No rigidity, no bradykinesia no tremor. Regular blinking, no poker face, normal pitch tone.  MOV DISORDER: Bilateral flapping tremor in both hands.    LABS:                        14.2   4.38  )-----------( 154      ( 16 Feb 2023 13:07 )             39.7     02-16    138  |  101  |  17  ----------------------------<  193<H>  4.5   |  28  |  0.7    Ca    9.4      16 Feb 2023 13:07  Mg     1.4     02-16    TPro  6.8  /  Alb  3.2<L>  /  TBili  1.3<H>  /  DBili  x   /  AST  36  /  ALT  28  /  AlkPhos  148<H>  02-16    Hemoglobin A1C:   Vitamin B12     CAPILLARY BLOOD GLUCOSE                  Microbiology:      RADIOLOGY, EKG AND ADDITIONAL TESTS: Reviewed.

## 2023-02-17 NOTE — PHYSICAL THERAPY INITIAL EVALUATION ADULT - ADDITIONAL COMMENTS
Per chart and pt, pt lives with wife in house with 5 SOL, 1 FOS inside. Pt ambulates with RW, hx of several falls, unquantifiable, in last several months.

## 2023-02-18 LAB
ALBUMIN SERPL ELPH-MCNC: 3 G/DL — LOW (ref 3.5–5.2)
ALP SERPL-CCNC: 103 U/L — SIGNIFICANT CHANGE UP (ref 30–115)
ALT FLD-CCNC: 8 U/L — SIGNIFICANT CHANGE UP (ref 0–41)
ANION GAP SERPL CALC-SCNC: 9 MMOL/L — SIGNIFICANT CHANGE UP (ref 7–14)
AST SERPL-CCNC: 28 U/L — SIGNIFICANT CHANGE UP (ref 0–41)
BASOPHILS # BLD AUTO: 0.05 K/UL — SIGNIFICANT CHANGE UP (ref 0–0.2)
BASOPHILS NFR BLD AUTO: 0.6 % — SIGNIFICANT CHANGE UP (ref 0–1)
BILIRUB SERPL-MCNC: 1.7 MG/DL — HIGH (ref 0.2–1.2)
BLD GP AB SCN SERPL QL: SIGNIFICANT CHANGE UP
BUN SERPL-MCNC: 21 MG/DL — HIGH (ref 10–20)
CALCIUM SERPL-MCNC: 9.1 MG/DL — SIGNIFICANT CHANGE UP (ref 8.4–10.4)
CHLORIDE SERPL-SCNC: 108 MMOL/L — SIGNIFICANT CHANGE UP (ref 98–110)
CO2 SERPL-SCNC: 26 MMOL/L — SIGNIFICANT CHANGE UP (ref 17–32)
CREAT SERPL-MCNC: 0.8 MG/DL — SIGNIFICANT CHANGE UP (ref 0.7–1.5)
EGFR: 91 ML/MIN/1.73M2 — SIGNIFICANT CHANGE UP
EOSINOPHIL # BLD AUTO: 0.13 K/UL — SIGNIFICANT CHANGE UP (ref 0–0.7)
EOSINOPHIL NFR BLD AUTO: 1.6 % — SIGNIFICANT CHANGE UP (ref 0–8)
GLUCOSE BLDC GLUCOMTR-MCNC: 159 MG/DL — HIGH (ref 70–99)
GLUCOSE BLDC GLUCOMTR-MCNC: 180 MG/DL — HIGH (ref 70–99)
GLUCOSE BLDC GLUCOMTR-MCNC: 201 MG/DL — HIGH (ref 70–99)
GLUCOSE BLDC GLUCOMTR-MCNC: 264 MG/DL — HIGH (ref 70–99)
GLUCOSE SERPL-MCNC: 207 MG/DL — HIGH (ref 70–99)
HCT VFR BLD CALC: 37.7 % — LOW (ref 42–52)
HGB BLD-MCNC: 13.4 G/DL — LOW (ref 14–18)
IMM GRANULOCYTES NFR BLD AUTO: 0.3 % — SIGNIFICANT CHANGE UP (ref 0.1–0.3)
LYMPHOCYTES # BLD AUTO: 1.8 K/UL — SIGNIFICANT CHANGE UP (ref 1.2–3.4)
LYMPHOCYTES # BLD AUTO: 22.6 % — SIGNIFICANT CHANGE UP (ref 20.5–51.1)
MAGNESIUM SERPL-MCNC: 1.5 MG/DL — LOW (ref 1.8–2.4)
MCHC RBC-ENTMCNC: 35.5 G/DL — SIGNIFICANT CHANGE UP (ref 32–37)
MCHC RBC-ENTMCNC: 35.6 PG — HIGH (ref 27–31)
MCV RBC AUTO: 100.3 FL — HIGH (ref 80–94)
MONOCYTES # BLD AUTO: 0.88 K/UL — HIGH (ref 0.1–0.6)
MONOCYTES NFR BLD AUTO: 11.1 % — HIGH (ref 1.7–9.3)
NEUTROPHILS # BLD AUTO: 5.08 K/UL — SIGNIFICANT CHANGE UP (ref 1.4–6.5)
NEUTROPHILS NFR BLD AUTO: 63.8 % — SIGNIFICANT CHANGE UP (ref 42.2–75.2)
NRBC # BLD: 0 /100 WBCS — SIGNIFICANT CHANGE UP (ref 0–0)
PLATELET # BLD AUTO: 152 K/UL — SIGNIFICANT CHANGE UP (ref 130–400)
POTASSIUM SERPL-MCNC: 4.2 MMOL/L — SIGNIFICANT CHANGE UP (ref 3.5–5)
POTASSIUM SERPL-SCNC: 4.2 MMOL/L — SIGNIFICANT CHANGE UP (ref 3.5–5)
PROT SERPL-MCNC: 6.4 G/DL — SIGNIFICANT CHANGE UP (ref 6–8)
RBC # BLD: 3.76 M/UL — LOW (ref 4.7–6.1)
RBC # FLD: 14.5 % — SIGNIFICANT CHANGE UP (ref 11.5–14.5)
SODIUM SERPL-SCNC: 143 MMOL/L — SIGNIFICANT CHANGE UP (ref 135–146)
WBC # BLD: 7.96 K/UL — SIGNIFICANT CHANGE UP (ref 4.8–10.8)
WBC # FLD AUTO: 7.96 K/UL — SIGNIFICANT CHANGE UP (ref 4.8–10.8)

## 2023-02-18 PROCEDURE — 99232 SBSQ HOSP IP/OBS MODERATE 35: CPT

## 2023-02-18 PROCEDURE — 95816 EEG AWAKE AND DROWSY: CPT | Mod: 26

## 2023-02-18 RX ORDER — INSULIN GLARGINE 100 [IU]/ML
10 INJECTION, SOLUTION SUBCUTANEOUS AT BEDTIME
Refills: 0 | Status: DISCONTINUED | OUTPATIENT
Start: 2023-02-18 | End: 2023-02-21

## 2023-02-18 RX ORDER — MAGNESIUM SULFATE 500 MG/ML
2 VIAL (ML) INJECTION ONCE
Refills: 0 | Status: DISCONTINUED | OUTPATIENT
Start: 2023-02-18 | End: 2023-02-20

## 2023-02-18 RX ADMIN — LACTULOSE 15 GRAM(S): 10 SOLUTION ORAL at 02:48

## 2023-02-18 RX ADMIN — LACTULOSE 15 GRAM(S): 10 SOLUTION ORAL at 17:47

## 2023-02-18 RX ADMIN — LACTULOSE 15 GRAM(S): 10 SOLUTION ORAL at 11:15

## 2023-02-18 RX ADMIN — LACTULOSE 15 GRAM(S): 10 SOLUTION ORAL at 15:25

## 2023-02-18 RX ADMIN — LACTULOSE 15 GRAM(S): 10 SOLUTION ORAL at 20:26

## 2023-02-18 RX ADMIN — CARBIDOPA AND LEVODOPA 1 TABLET(S): 25; 100 TABLET ORAL at 05:18

## 2023-02-18 RX ADMIN — Medication 1: at 16:53

## 2023-02-18 RX ADMIN — Medication 100 MILLIGRAM(S): at 11:14

## 2023-02-18 RX ADMIN — CHLORHEXIDINE GLUCONATE 1 APPLICATION(S): 213 SOLUTION TOPICAL at 11:43

## 2023-02-18 RX ADMIN — ENOXAPARIN SODIUM 40 MILLIGRAM(S): 100 INJECTION SUBCUTANEOUS at 11:15

## 2023-02-18 RX ADMIN — DULOXETINE HYDROCHLORIDE 60 MILLIGRAM(S): 30 CAPSULE, DELAYED RELEASE ORAL at 11:14

## 2023-02-18 RX ADMIN — Medication 1: at 07:58

## 2023-02-18 RX ADMIN — CARBIDOPA AND LEVODOPA 1 TABLET(S): 25; 100 TABLET ORAL at 17:48

## 2023-02-18 RX ADMIN — LISINOPRIL 20 MILLIGRAM(S): 2.5 TABLET ORAL at 05:18

## 2023-02-18 RX ADMIN — Medication 3: at 12:01

## 2023-02-18 RX ADMIN — Medication 81 MILLIGRAM(S): at 11:15

## 2023-02-18 RX ADMIN — INSULIN GLARGINE 10 UNIT(S): 100 INJECTION, SOLUTION SUBCUTANEOUS at 21:33

## 2023-02-18 RX ADMIN — LACTULOSE 15 GRAM(S): 10 SOLUTION ORAL at 10:16

## 2023-02-18 RX ADMIN — LATANOPROST 1 DROP(S): 0.05 SOLUTION/ DROPS OPHTHALMIC; TOPICAL at 22:07

## 2023-02-18 RX ADMIN — LACTULOSE 15 GRAM(S): 10 SOLUTION ORAL at 22:16

## 2023-02-18 RX ADMIN — MEMANTINE HYDROCHLORIDE 10 MILLIGRAM(S): 10 TABLET ORAL at 11:14

## 2023-02-18 RX ADMIN — LACTULOSE 15 GRAM(S): 10 SOLUTION ORAL at 05:18

## 2023-02-18 RX ADMIN — Medication 2000 UNIT(S): at 11:14

## 2023-02-18 RX ADMIN — DONEPEZIL HYDROCHLORIDE 10 MILLIGRAM(S): 10 TABLET, FILM COATED ORAL at 22:16

## 2023-02-18 NOTE — PROGRESS NOTE ADULT - SUBJECTIVE AND OBJECTIVE BOX
INTERVAL HPI/OVERNIGHT EVENTS:    SUBJECTIVE: Patient seen and examined at bedside.     cc: agitation  no cp, sob, abd pain, fever  no ha, dizziness, lightheadedness, syncope    OBJECTIVE:    VITAL SIGNS:  Vital Signs Last 24 Hrs  T(C): 36.7 (18 Feb 2023 07:38), Max: 36.7 (17 Feb 2023 16:04)  T(F): 98.1 (18 Feb 2023 07:38), Max: 98.1 (18 Feb 2023 07:38)  HR: 79 (18 Feb 2023 07:38) (74 - 94)  BP: 109/68 (18 Feb 2023 07:38) (109/68 - 163/67)  BP(mean): 83 (18 Feb 2023 07:38) (83 - 95)  RR: 18 (18 Feb 2023 07:38) (16 - 18)  SpO2: 98% (18 Feb 2023 07:38) (98% - 98%)    Parameters below as of 18 Feb 2023 07:38  Patient On (Oxygen Delivery Method): room air          PHYSICAL EXAM:    General: NAD  HEENT: NC/AT; PERRL, clear conjunctiva  Neck: supple  Respiratory: CTA b/l  Cardiovascular: +S1/S2; RRR  Abdomen: soft, NT/ND; +BS x4  Extremities: WWP, 2+ peripheral pulses b/l; no LE edema  Skin: normal color and turgor; no rash  Neurological:    MEDICATIONS:  MEDICATIONS  (STANDING):  aspirin  chewable 81 milliGRAM(s) Oral daily  carbidopa/levodopa  25/100 1 Tablet(s) Oral <User Schedule>  chlorhexidine 2% Cloths 1 Application(s) Topical daily  cholecalciferol 2000 Unit(s) Oral daily  dextrose 5%. 1000 milliLiter(s) (100 mL/Hr) IV Continuous <Continuous>  dextrose 5%. 1000 milliLiter(s) (50 mL/Hr) IV Continuous <Continuous>  dextrose 50% Injectable 25 Gram(s) IV Push once  dextrose 50% Injectable 12.5 Gram(s) IV Push once  dextrose 50% Injectable 25 Gram(s) IV Push once  donepezil 10 milliGRAM(s) Oral at bedtime  DULoxetine 60 milliGRAM(s) Oral daily  enoxaparin Injectable 40 milliGRAM(s) SubCutaneous every 24 hours  glucagon  Injectable 1 milliGRAM(s) IntraMuscular once  insulin glargine Injectable (LANTUS) 10 Unit(s) SubCutaneous at bedtime  insulin lispro (ADMELOG) corrective regimen sliding scale   SubCutaneous three times a day before meals  lactulose Syrup 15 Gram(s) Oral every 2 hours  latanoprost 0.005% Ophthalmic Solution 1 Drop(s) Both EYES at bedtime  lisinopril 20 milliGRAM(s) Oral daily  magnesium sulfate  IVPB 2 Gram(s) IV Intermittent once  memantine 10 milliGRAM(s) Oral daily  pyridoxine 100 milliGRAM(s) Oral daily  rifAXIMin 550 milliGRAM(s) Oral two times a day    MEDICATIONS  (PRN):  dextrose Oral Gel 15 Gram(s) Oral once PRN Blood Glucose LESS THAN 70 milliGRAM(s)/deciliter  haloperidol    Injectable 1 milliGRAM(s) IV Push every 8 hours PRN Agitation      ALLERGIES:  Allergies    Keflex (Hives)  Keflex (Unknown)    Intolerances        LABS:                        13.4   7.96  )-----------( 152      ( 18 Feb 2023 06:29 )             37.7     Hemoglobin: 13.4 g/dL (02-18 @ 06:29)  Hemoglobin: 13.6 g/dL (02-17 @ 06:57)  Hemoglobin: 14.2 g/dL (02-16 @ 13:07)    CBC Full  -  ( 18 Feb 2023 06:29 )  WBC Count : 7.96 K/uL  RBC Count : 3.76 M/uL  Hemoglobin : 13.4 g/dL  Hematocrit : 37.7 %  Platelet Count - Automated : 152 K/uL  Mean Cell Volume : 100.3 fL  Mean Cell Hemoglobin : 35.6 pg  Mean Cell Hemoglobin Concentration : 35.5 g/dL  Auto Neutrophil # : 5.08 K/uL  Auto Lymphocyte # : 1.80 K/uL  Auto Monocyte # : 0.88 K/uL  Auto Eosinophil # : 0.13 K/uL  Auto Basophil # : 0.05 K/uL  Auto Neutrophil % : 63.8 %  Auto Lymphocyte % : 22.6 %  Auto Monocyte % : 11.1 %  Auto Eosinophil % : 1.6 %  Auto Basophil % : 0.6 %    02-18    143  |  108  |  21<H>  ----------------------------<  207<H>  4.2   |  26  |  0.8    Ca    9.1      18 Feb 2023 06:29  Mg     1.5     02-18    TPro  6.4  /  Alb  3.0<L>  /  TBili  1.7<H>  /  DBili  x   /  AST  28  /  ALT  8   /  AlkPhos  103  02-18    Creatinine Trend: 0.8<--, 0.7<--, 0.7<--  LIVER FUNCTIONS - ( 18 Feb 2023 06:29 )  Alb: 3.0 g/dL / Pro: 6.4 g/dL / ALK PHOS: 103 U/L / ALT: 8 U/L / AST: 28 U/L / GGT: x               hs Troponin:              CSF:                      EKG:   MICROBIOLOGY:    IMAGING:      Labs, imaging, EKG personally reviewed    RADIOLOGY & ADDITIONAL TESTS: Reviewed.

## 2023-02-18 NOTE — PATIENT PROFILE ADULT - FUNCTIONAL ASSESSMENT - BASIC MOBILITY 6.
1-calculated by average/Not able to assess (calculate score using [facility] averaging method)

## 2023-02-18 NOTE — PATIENT PROFILE ADULT - FALL HARM RISK - HARM RISK INTERVENTIONS
Assistance with ambulation/Assistance OOB with selected safe patient handling equipment/Communicate Risk of Fall with Harm to all staff/Discuss with provider need for PT consult/Monitor for mental status changes/Monitor gait and stability/Move patient closer to nurses' station/Provide patient with walking aids - walker, cane, crutches/Reinforce activity limits and safety measures with patient and family/Reorient to person, place and time as needed/Tailored Fall Risk Interventions/Toileting schedule using arm’s reach rule for commode and bathroom/Use of alarms - bed, chair and/or voice tab/Visual Cue: Yellow wristband and red socks/Bed in lowest position, wheels locked, appropriate side rails in place/Call bell, personal items and telephone in reach/Instruct patient to call for assistance before getting out of bed or chair/Non-slip footwear when patient is out of bed/[place] to call system/Physically safe environment - no spills, clutter or unnecessary equipment/Purposeful Proactive Rounding/Room/bathroom lighting operational, light cord in reach

## 2023-02-19 LAB
ALBUMIN SERPL ELPH-MCNC: 2.7 G/DL — LOW (ref 3.5–5.2)
ALP SERPL-CCNC: 93 U/L — SIGNIFICANT CHANGE UP (ref 30–115)
ALT FLD-CCNC: <5 U/L — SIGNIFICANT CHANGE UP (ref 0–41)
ANION GAP SERPL CALC-SCNC: 10 MMOL/L — SIGNIFICANT CHANGE UP (ref 7–14)
AST SERPL-CCNC: 31 U/L — SIGNIFICANT CHANGE UP (ref 0–41)
BASOPHILS # BLD AUTO: 0.05 K/UL — SIGNIFICANT CHANGE UP (ref 0–0.2)
BASOPHILS NFR BLD AUTO: 0.9 % — SIGNIFICANT CHANGE UP (ref 0–1)
BILIRUB SERPL-MCNC: 1.8 MG/DL — HIGH (ref 0.2–1.2)
BUN SERPL-MCNC: 15 MG/DL — SIGNIFICANT CHANGE UP (ref 10–20)
CALCIUM SERPL-MCNC: 8.6 MG/DL — SIGNIFICANT CHANGE UP (ref 8.4–10.5)
CHLORIDE SERPL-SCNC: 107 MMOL/L — SIGNIFICANT CHANGE UP (ref 98–110)
CO2 SERPL-SCNC: 24 MMOL/L — SIGNIFICANT CHANGE UP (ref 17–32)
CREAT SERPL-MCNC: 0.6 MG/DL — LOW (ref 0.7–1.5)
EGFR: 99 ML/MIN/1.73M2 — SIGNIFICANT CHANGE UP
EOSINOPHIL # BLD AUTO: 0.32 K/UL — SIGNIFICANT CHANGE UP (ref 0–0.7)
EOSINOPHIL NFR BLD AUTO: 5.8 % — SIGNIFICANT CHANGE UP (ref 0–8)
GLUCOSE BLDC GLUCOMTR-MCNC: 170 MG/DL — HIGH (ref 70–99)
GLUCOSE BLDC GLUCOMTR-MCNC: 253 MG/DL — HIGH (ref 70–99)
GLUCOSE BLDC GLUCOMTR-MCNC: 294 MG/DL — HIGH (ref 70–99)
GLUCOSE SERPL-MCNC: 165 MG/DL — HIGH (ref 70–99)
HCT VFR BLD CALC: 34.4 % — LOW (ref 42–52)
HGB BLD-MCNC: 12.5 G/DL — LOW (ref 14–18)
IMM GRANULOCYTES NFR BLD AUTO: 0.2 % — SIGNIFICANT CHANGE UP (ref 0.1–0.3)
LYMPHOCYTES # BLD AUTO: 1.56 K/UL — SIGNIFICANT CHANGE UP (ref 1.2–3.4)
LYMPHOCYTES # BLD AUTO: 28.3 % — SIGNIFICANT CHANGE UP (ref 20.5–51.1)
MAGNESIUM SERPL-MCNC: 1.3 MG/DL — LOW (ref 1.8–2.4)
MCHC RBC-ENTMCNC: 36 PG — HIGH (ref 27–31)
MCHC RBC-ENTMCNC: 36.3 G/DL — SIGNIFICANT CHANGE UP (ref 32–37)
MCV RBC AUTO: 99.1 FL — HIGH (ref 80–94)
MONOCYTES # BLD AUTO: 0.61 K/UL — HIGH (ref 0.1–0.6)
MONOCYTES NFR BLD AUTO: 11.1 % — HIGH (ref 1.7–9.3)
NEUTROPHILS # BLD AUTO: 2.97 K/UL — SIGNIFICANT CHANGE UP (ref 1.4–6.5)
NEUTROPHILS NFR BLD AUTO: 53.7 % — SIGNIFICANT CHANGE UP (ref 42.2–75.2)
NRBC # BLD: 0 /100 WBCS — SIGNIFICANT CHANGE UP (ref 0–0)
PLATELET # BLD AUTO: 122 K/UL — LOW (ref 130–400)
POTASSIUM SERPL-MCNC: 4.3 MMOL/L — SIGNIFICANT CHANGE UP (ref 3.5–5)
POTASSIUM SERPL-SCNC: 4.3 MMOL/L — SIGNIFICANT CHANGE UP (ref 3.5–5)
PROT SERPL-MCNC: 6 G/DL — SIGNIFICANT CHANGE UP (ref 6–8)
RBC # BLD: 3.47 M/UL — LOW (ref 4.7–6.1)
RBC # FLD: 14.3 % — SIGNIFICANT CHANGE UP (ref 11.5–14.5)
SODIUM SERPL-SCNC: 141 MMOL/L — SIGNIFICANT CHANGE UP (ref 135–146)
WBC # BLD: 5.52 K/UL — SIGNIFICANT CHANGE UP (ref 4.8–10.8)
WBC # FLD AUTO: 5.52 K/UL — SIGNIFICANT CHANGE UP (ref 4.8–10.8)

## 2023-02-19 PROCEDURE — 99232 SBSQ HOSP IP/OBS MODERATE 35: CPT

## 2023-02-19 RX ORDER — MAGNESIUM SULFATE 500 MG/ML
2 VIAL (ML) INJECTION ONCE
Refills: 0 | Status: COMPLETED | OUTPATIENT
Start: 2023-02-19 | End: 2023-02-19

## 2023-02-19 RX ORDER — MAGNESIUM OXIDE 400 MG ORAL TABLET 241.3 MG
400 TABLET ORAL
Refills: 0 | Status: DISCONTINUED | OUTPATIENT
Start: 2023-02-19 | End: 2023-02-23

## 2023-02-19 RX ORDER — MIDODRINE HYDROCHLORIDE 2.5 MG/1
5 TABLET ORAL THREE TIMES A DAY
Refills: 0 | Status: DISCONTINUED | OUTPATIENT
Start: 2023-02-19 | End: 2023-02-22

## 2023-02-19 RX ADMIN — LACTULOSE 15 GRAM(S): 10 SOLUTION ORAL at 05:20

## 2023-02-19 RX ADMIN — DONEPEZIL HYDROCHLORIDE 10 MILLIGRAM(S): 10 TABLET, FILM COATED ORAL at 21:25

## 2023-02-19 RX ADMIN — Medication 3: at 11:54

## 2023-02-19 RX ADMIN — Medication 25 GRAM(S): at 11:24

## 2023-02-19 RX ADMIN — LACTULOSE 15 GRAM(S): 10 SOLUTION ORAL at 17:35

## 2023-02-19 RX ADMIN — Medication 2000 UNIT(S): at 11:25

## 2023-02-19 RX ADMIN — MAGNESIUM OXIDE 400 MG ORAL TABLET 400 MILLIGRAM(S): 241.3 TABLET ORAL at 17:38

## 2023-02-19 RX ADMIN — MEMANTINE HYDROCHLORIDE 10 MILLIGRAM(S): 10 TABLET ORAL at 11:27

## 2023-02-19 RX ADMIN — LACTULOSE 15 GRAM(S): 10 SOLUTION ORAL at 05:11

## 2023-02-19 RX ADMIN — LACTULOSE 15 GRAM(S): 10 SOLUTION ORAL at 23:45

## 2023-02-19 RX ADMIN — Medication 1: at 08:14

## 2023-02-19 RX ADMIN — LACTULOSE 15 GRAM(S): 10 SOLUTION ORAL at 09:25

## 2023-02-19 RX ADMIN — ENOXAPARIN SODIUM 40 MILLIGRAM(S): 100 INJECTION SUBCUTANEOUS at 11:25

## 2023-02-19 RX ADMIN — Medication 81 MILLIGRAM(S): at 11:27

## 2023-02-19 RX ADMIN — Medication 3: at 17:35

## 2023-02-19 RX ADMIN — Medication 100 MILLIGRAM(S): at 11:25

## 2023-02-19 RX ADMIN — DULOXETINE HYDROCHLORIDE 60 MILLIGRAM(S): 30 CAPSULE, DELAYED RELEASE ORAL at 11:25

## 2023-02-19 RX ADMIN — CARBIDOPA AND LEVODOPA 1 TABLET(S): 25; 100 TABLET ORAL at 05:20

## 2023-02-19 RX ADMIN — LACTULOSE 15 GRAM(S): 10 SOLUTION ORAL at 00:34

## 2023-02-19 RX ADMIN — CHLORHEXIDINE GLUCONATE 1 APPLICATION(S): 213 SOLUTION TOPICAL at 11:25

## 2023-02-19 RX ADMIN — CARBIDOPA AND LEVODOPA 1 TABLET(S): 25; 100 TABLET ORAL at 17:36

## 2023-02-19 RX ADMIN — LACTULOSE 15 GRAM(S): 10 SOLUTION ORAL at 08:32

## 2023-02-19 RX ADMIN — LISINOPRIL 20 MILLIGRAM(S): 2.5 TABLET ORAL at 05:14

## 2023-02-19 RX ADMIN — INSULIN GLARGINE 10 UNIT(S): 100 INJECTION, SOLUTION SUBCUTANEOUS at 21:26

## 2023-02-19 RX ADMIN — LATANOPROST 1 DROP(S): 0.05 SOLUTION/ DROPS OPHTHALMIC; TOPICAL at 21:26

## 2023-02-19 NOTE — DIETITIAN INITIAL EVALUATION ADULT - PERTINENT LABORATORY DATA
02-19    141  |  107  |  15  ----------------------------<  165<H>  4.3   |  24  |  0.6<L>    Ca    8.6      19 Feb 2023 07:58  Mg     1.3     02-19    TPro  6.0  /  Alb  2.7<L>  /  TBili  1.8<H>  /  DBili  x   /  AST  31  /  ALT  <5  /  AlkPhos  93  02-19  POCT Blood Glucose.: 294 mg/dL (02-19-23 @ 11:28)  A1C with Estimated Average Glucose Result: 4.8 % (02-17-23 @ 06:57)  A1C with Estimated Average Glucose Result: 6.1 % (10-14-22 @ 04:00)

## 2023-02-19 NOTE — DIETITIAN INITIAL EVALUATION ADULT - OTHER INFO
--77M here with behavioral disturbance, frequent falls.  #Behavioral disturbance, with frequent falls  #Dementia

## 2023-02-19 NOTE — DIETITIAN INITIAL EVALUATION ADULT - OTHER CALCULATIONS
Energy: 1925kcal/day (25kcal/kg due to missing ht + age)   Protein: 77-92g/day (1-1.2g/kg )   Fluid: 1925mL/day (25mL/kg -- per DRI for age)

## 2023-02-19 NOTE — DIETITIAN INITIAL EVALUATION ADULT - NS FNS DIET ORDER
Diet, Easy to Chew:   Consistent Carbohydrate {Evening Snack}  Low Sodium (02-17-23 @ 13:07) [Active]

## 2023-02-19 NOTE — PROGRESS NOTE ADULT - SUBJECTIVE AND OBJECTIVE BOX
Pt seen and examined at bedside. Pleasant, calm. No complaints. Denies SOB, CP.     VITAL SIGNS (Last 24 hrs):  T(C): 36.4 (02-19-23 @ 04:48), Max: 36.7 (02-18-23 @ 16:02)  HR: 78 (02-19-23 @ 04:48) (78 - 89)  BP: 105/58 (02-19-23 @ 04:48) (105/58 - 137/63)  RR: 18 (02-19-23 @ 04:48) (18 - 18)  SpO2: 98% (02-18-23 @ 19:53) (98% - 98%)  Wt(kg): --  Daily     Daily     I&O's Summary    18 Feb 2023 07:01  -  19 Feb 2023 07:00  --------------------------------------------------------  IN: 0 mL / OUT: 350 mL / NET: -350 mL    19 Feb 2023 07:01  -  19 Feb 2023 11:22  --------------------------------------------------------  IN: 708 mL / OUT: 300 mL / NET: 408 mL        PHYSICAL EXAM:  GENERAL: NAD   HEAD:  Atraumatic, Normocephalic  EYES: EOMI, PERRLA, conjunctiva and sclera clear  NECK: Supple, No JVD  CHEST/LUNG: Clear to auscultation bilaterally; No wheeze  HEART: Regular rate and rhythm; No murmurs, rubs, or gallops  ABDOMEN: Soft, Nontender, Nondistended; Bowel sounds present  EXTREMITIES:  2+ Peripheral Pulses, No clubbing, cyanosis, or edema  PSYCH: AAOx2   SKIN: No rashes or lesions    Labs Reviewed       CBC Full  -  ( 19 Feb 2023 07:58 )  WBC Count : 5.52 K/uL  Hemoglobin : 12.5 g/dL  Hematocrit : 34.4 %  Platelet Count - Automated : 122 K/uL  Mean Cell Volume : 99.1 fL  Mean Cell Hemoglobin : 36.0 pg  Mean Cell Hemoglobin Concentration : 36.3 g/dL  Auto Neutrophil # : 2.97 K/uL  Auto Lymphocyte # : 1.56 K/uL  Auto Monocyte # : 0.61 K/uL  Auto Eosinophil # : 0.32 K/uL  Auto Basophil # : 0.05 K/uL  Auto Neutrophil % : 53.7 %  Auto Lymphocyte % : 28.3 %  Auto Monocyte % : 11.1 %  Auto Eosinophil % : 5.8 %  Auto Basophil % : 0.9 %    BMP:    02-19 @ 07:58    Blood Urea Nitrogen - 15  Calcium - 8.6  Carbond Dioxide - 24  Chloride - 107  Creatinine - 0.6  Glucose - 165  Potassium - 4.3  Sodium - 141         MEDICATIONS  (STANDING):  aspirin  chewable 81 milliGRAM(s) Oral daily  carbidopa/levodopa  25/100 1 Tablet(s) Oral <User Schedule>  chlorhexidine 2% Cloths 1 Application(s) Topical daily  cholecalciferol 2000 Unit(s) Oral daily  dextrose 5%. 1000 milliLiter(s) (100 mL/Hr) IV Continuous <Continuous>  dextrose 5%. 1000 milliLiter(s) (50 mL/Hr) IV Continuous <Continuous>  dextrose 50% Injectable 25 Gram(s) IV Push once  dextrose 50% Injectable 12.5 Gram(s) IV Push once  dextrose 50% Injectable 25 Gram(s) IV Push once  donepezil 10 milliGRAM(s) Oral at bedtime  DULoxetine 60 milliGRAM(s) Oral daily  enoxaparin Injectable 40 milliGRAM(s) SubCutaneous every 24 hours  glucagon  Injectable 1 milliGRAM(s) IntraMuscular once  insulin glargine Injectable (LANTUS) 10 Unit(s) SubCutaneous at bedtime  insulin lispro (ADMELOG) corrective regimen sliding scale   SubCutaneous three times a day before meals  lactulose Syrup 15 Gram(s) Oral every 2 hours  latanoprost 0.005% Ophthalmic Solution 1 Drop(s) Both EYES at bedtime  magnesium oxide 400 milliGRAM(s) Oral three times a day with meals  magnesium sulfate  IVPB 2 Gram(s) IV Intermittent once  magnesium sulfate  IVPB 2 Gram(s) IV Intermittent once  memantine 10 milliGRAM(s) Oral daily  pyridoxine 100 milliGRAM(s) Oral daily  rifAXIMin 550 milliGRAM(s) Oral two times a day    MEDICATIONS  (PRN):  dextrose Oral Gel 15 Gram(s) Oral once PRN Blood Glucose LESS THAN 70 milliGRAM(s)/deciliter  haloperidol    Injectable 1 milliGRAM(s) IV Push every 8 hours PRN Agitation

## 2023-02-19 NOTE — DIETITIAN INITIAL EVALUATION ADULT - NAME AND PHONE
Nutrition Intervention:meals and snacks, coordination of care   Nutrition Monitoring:diet order,energy intake,body composition,NFPF, glucose/electrolyte profile

## 2023-02-19 NOTE — DIETITIAN INITIAL EVALUATION ADULT - ORAL INTAKE PTA/DIET HISTORY
pt himself confused, unable to reach emergency contacts at this time. will attempt at f/u. no recent admits

## 2023-02-19 NOTE — PROGRESS NOTE ADULT - ASSESSMENT
77M PMHx dementia, HTN, cirrhosis c/b portal HTN, h/o hepatic encephalopathy; DM2, HFpEF here with behavioral disturbance, frequent falls.    #Behavioral disturbance, with frequent falls  #Dementia   in setting of dementia  cth neg  ct chest/ abd with bl atelectasis  ua not done, however no urinary symptoms   REEG negative   rifaximin, lactulose to 3 loose BM daily  neuro consult appreciated   PT eval noted - per PT note, unsafe to assess, pt with significant drop in SBP and c/o dizziness on 2/17, will d/c lisinopril and check orthostatics tomorrow     #Parkinson's dementia   sinemet 25/ 100 bid  donepezil 10  duloxetine 60  memantine 10    #HTN  Hold lisinopril 20 due to orthostasis during PT eval on 2/17    #Liver Cirrhosis  ct abd c/w cirrhosis, +portal htn  rifaximin, lactulose as above  outpt f/u    #DM2  outpt f/u    #HFpEF, chronic  outpt f/u    #DVT ppx  Lovenox    #Progress Note Handoff:  Pending (specify):  PT f/u, STR placement    Plan of care d/w son Dion   Disposition: STR        77M PMHx dementia, HTN, cirrhosis c/b portal HTN, h/o hepatic encephalopathy; DM2, HFpEF here with behavioral disturbance, frequent falls.    #Behavioral disturbance, with frequent falls  #Dementia   in setting of dementia  cth neg  ct chest/ abd with bl atelectasis  ua not done, however no urinary symptoms   REEG negative   rifaximin, lactulose to 3 loose BM daily  neuro consult appreciated   PT eval noted - per PT note, unsafe to assess, pt with significant drop in SBP and c/o dizziness on 2/17, will d/c lisinopril and check orthostatics tomorrow     #Parkinson's dementia   sinemet 25/ 100 bid  donepezil 10  duloxetine 60  memantine 10    #HTN  Hold lisinopril 20 due to orthostasis during PT eval on 2/17  Patient has hx of orthostatic hypotension per family, will need midodrine one hour prior to PT eval     #Liver Cirrhosis  ct abd c/w cirrhosis, +portal htn  rifaximin, lactulose as above  outpt f/u    #DM2  outpt f/u    #HFpEF, chronic  outpt f/u    #DVT ppx  Lovenox    #Progress Note Handoff:  Pending (specify):  PT f/u, STR placement    Plan of care d/w son Dion   Disposition: STR

## 2023-02-20 LAB
ALBUMIN SERPL ELPH-MCNC: 2.7 G/DL — LOW (ref 3.5–5.2)
ALP SERPL-CCNC: 143 U/L — HIGH (ref 30–115)
ALT FLD-CCNC: 32 U/L — SIGNIFICANT CHANGE UP (ref 0–41)
ANION GAP SERPL CALC-SCNC: 6 MMOL/L — LOW (ref 7–14)
AST SERPL-CCNC: 46 U/L — HIGH (ref 0–41)
BASOPHILS # BLD AUTO: 0.04 K/UL — SIGNIFICANT CHANGE UP (ref 0–0.2)
BASOPHILS NFR BLD AUTO: 0.7 % — SIGNIFICANT CHANGE UP (ref 0–1)
BILIRUB SERPL-MCNC: 1.3 MG/DL — HIGH (ref 0.2–1.2)
BUN SERPL-MCNC: 11 MG/DL — SIGNIFICANT CHANGE UP (ref 10–20)
CALCIUM SERPL-MCNC: 8.6 MG/DL — SIGNIFICANT CHANGE UP (ref 8.4–10.4)
CHLORIDE SERPL-SCNC: 101 MMOL/L — SIGNIFICANT CHANGE UP (ref 98–110)
CO2 SERPL-SCNC: 28 MMOL/L — SIGNIFICANT CHANGE UP (ref 17–32)
CREAT SERPL-MCNC: 0.6 MG/DL — LOW (ref 0.7–1.5)
EGFR: 99 ML/MIN/1.73M2 — SIGNIFICANT CHANGE UP
EOSINOPHIL # BLD AUTO: 0.18 K/UL — SIGNIFICANT CHANGE UP (ref 0–0.7)
EOSINOPHIL NFR BLD AUTO: 3.3 % — SIGNIFICANT CHANGE UP (ref 0–8)
GLUCOSE BLDC GLUCOMTR-MCNC: 172 MG/DL — HIGH (ref 70–99)
GLUCOSE BLDC GLUCOMTR-MCNC: 175 MG/DL — HIGH (ref 70–99)
GLUCOSE BLDC GLUCOMTR-MCNC: 271 MG/DL — HIGH (ref 70–99)
GLUCOSE BLDC GLUCOMTR-MCNC: 301 MG/DL — HIGH (ref 70–99)
GLUCOSE BLDC GLUCOMTR-MCNC: 330 MG/DL — HIGH (ref 70–99)
GLUCOSE SERPL-MCNC: 264 MG/DL — HIGH (ref 70–99)
HCT VFR BLD CALC: 34.3 % — LOW (ref 42–52)
HGB BLD-MCNC: 12.7 G/DL — LOW (ref 14–18)
IMM GRANULOCYTES NFR BLD AUTO: 0.4 % — HIGH (ref 0.1–0.3)
LYMPHOCYTES # BLD AUTO: 1.17 K/UL — LOW (ref 1.2–3.4)
LYMPHOCYTES # BLD AUTO: 21.4 % — SIGNIFICANT CHANGE UP (ref 20.5–51.1)
MAGNESIUM SERPL-MCNC: 1.7 MG/DL — LOW (ref 1.8–2.4)
MCHC RBC-ENTMCNC: 36 PG — HIGH (ref 27–31)
MCHC RBC-ENTMCNC: 37 G/DL — SIGNIFICANT CHANGE UP (ref 32–37)
MCV RBC AUTO: 97.2 FL — HIGH (ref 80–94)
MONOCYTES # BLD AUTO: 0.64 K/UL — HIGH (ref 0.1–0.6)
MONOCYTES NFR BLD AUTO: 11.7 % — HIGH (ref 1.7–9.3)
NEUTROPHILS # BLD AUTO: 3.43 K/UL — SIGNIFICANT CHANGE UP (ref 1.4–6.5)
NEUTROPHILS NFR BLD AUTO: 62.5 % — SIGNIFICANT CHANGE UP (ref 42.2–75.2)
NRBC # BLD: 0 /100 WBCS — SIGNIFICANT CHANGE UP (ref 0–0)
PLATELET # BLD AUTO: 142 K/UL — SIGNIFICANT CHANGE UP (ref 130–400)
POTASSIUM SERPL-MCNC: 4 MMOL/L — SIGNIFICANT CHANGE UP (ref 3.5–5)
POTASSIUM SERPL-SCNC: 4 MMOL/L — SIGNIFICANT CHANGE UP (ref 3.5–5)
PROT SERPL-MCNC: 6.2 G/DL — SIGNIFICANT CHANGE UP (ref 6–8)
RBC # BLD: 3.53 M/UL — LOW (ref 4.7–6.1)
RBC # FLD: 13.9 % — SIGNIFICANT CHANGE UP (ref 11.5–14.5)
SODIUM SERPL-SCNC: 135 MMOL/L — SIGNIFICANT CHANGE UP (ref 135–146)
WBC # BLD: 5.48 K/UL — SIGNIFICANT CHANGE UP (ref 4.8–10.8)
WBC # FLD AUTO: 5.48 K/UL — SIGNIFICANT CHANGE UP (ref 4.8–10.8)

## 2023-02-20 PROCEDURE — 0042T: CPT

## 2023-02-20 PROCEDURE — 99291 CRITICAL CARE FIRST HOUR: CPT

## 2023-02-20 PROCEDURE — 70496 CT ANGIOGRAPHY HEAD: CPT | Mod: 26

## 2023-02-20 PROCEDURE — 70498 CT ANGIOGRAPHY NECK: CPT | Mod: 26

## 2023-02-20 PROCEDURE — 99233 SBSQ HOSP IP/OBS HIGH 50: CPT

## 2023-02-20 PROCEDURE — 70450 CT HEAD/BRAIN W/O DYE: CPT | Mod: 26,59

## 2023-02-20 PROCEDURE — 93010 ELECTROCARDIOGRAM REPORT: CPT

## 2023-02-20 PROCEDURE — 93306 TTE W/DOPPLER COMPLETE: CPT | Mod: 26

## 2023-02-20 RX ORDER — CLOPIDOGREL BISULFATE 75 MG/1
75 TABLET, FILM COATED ORAL DAILY
Refills: 0 | Status: DISCONTINUED | OUTPATIENT
Start: 2023-02-20 | End: 2023-02-23

## 2023-02-20 RX ORDER — MAGNESIUM SULFATE 500 MG/ML
2 VIAL (ML) INJECTION ONCE
Refills: 0 | Status: COMPLETED | OUTPATIENT
Start: 2023-02-20 | End: 2023-02-20

## 2023-02-20 RX ORDER — MAGNESIUM SULFATE 500 MG/ML
2 VIAL (ML) INJECTION
Refills: 0 | Status: COMPLETED | OUTPATIENT
Start: 2023-02-20 | End: 2023-02-20

## 2023-02-20 RX ORDER — ATORVASTATIN CALCIUM 80 MG/1
40 TABLET, FILM COATED ORAL AT BEDTIME
Refills: 0 | Status: DISCONTINUED | OUTPATIENT
Start: 2023-02-20 | End: 2023-02-23

## 2023-02-20 RX ADMIN — Medication 1: at 08:13

## 2023-02-20 RX ADMIN — LACTULOSE 15 GRAM(S): 10 SOLUTION ORAL at 01:10

## 2023-02-20 RX ADMIN — CARBIDOPA AND LEVODOPA 1 TABLET(S): 25; 100 TABLET ORAL at 16:59

## 2023-02-20 RX ADMIN — CARBIDOPA AND LEVODOPA 1 TABLET(S): 25; 100 TABLET ORAL at 05:15

## 2023-02-20 RX ADMIN — ENOXAPARIN SODIUM 40 MILLIGRAM(S): 100 INJECTION SUBCUTANEOUS at 12:31

## 2023-02-20 RX ADMIN — Medication 2000 UNIT(S): at 09:25

## 2023-02-20 RX ADMIN — ATORVASTATIN CALCIUM 40 MILLIGRAM(S): 80 TABLET, FILM COATED ORAL at 21:08

## 2023-02-20 RX ADMIN — Medication 100 MILLIGRAM(S): at 09:24

## 2023-02-20 RX ADMIN — Medication 25 GRAM(S): at 18:43

## 2023-02-20 RX ADMIN — Medication 25 GRAM(S): at 15:23

## 2023-02-20 RX ADMIN — CHLORHEXIDINE GLUCONATE 1 APPLICATION(S): 213 SOLUTION TOPICAL at 09:25

## 2023-02-20 RX ADMIN — DONEPEZIL HYDROCHLORIDE 10 MILLIGRAM(S): 10 TABLET, FILM COATED ORAL at 21:08

## 2023-02-20 RX ADMIN — LACTULOSE 15 GRAM(S): 10 SOLUTION ORAL at 05:15

## 2023-02-20 RX ADMIN — Medication 3: at 16:59

## 2023-02-20 RX ADMIN — MAGNESIUM OXIDE 400 MG ORAL TABLET 400 MILLIGRAM(S): 241.3 TABLET ORAL at 16:59

## 2023-02-20 RX ADMIN — MAGNESIUM OXIDE 400 MG ORAL TABLET 400 MILLIGRAM(S): 241.3 TABLET ORAL at 08:13

## 2023-02-20 RX ADMIN — MIDODRINE HYDROCHLORIDE 5 MILLIGRAM(S): 2.5 TABLET ORAL at 09:25

## 2023-02-20 RX ADMIN — MAGNESIUM OXIDE 400 MG ORAL TABLET 400 MILLIGRAM(S): 241.3 TABLET ORAL at 12:31

## 2023-02-20 RX ADMIN — Medication 25 GRAM(S): at 12:31

## 2023-02-20 RX ADMIN — Medication 4: at 12:26

## 2023-02-20 RX ADMIN — Medication 25 GRAM(S): at 21:08

## 2023-02-20 RX ADMIN — Medication 81 MILLIGRAM(S): at 09:25

## 2023-02-20 RX ADMIN — MEMANTINE HYDROCHLORIDE 10 MILLIGRAM(S): 10 TABLET ORAL at 09:25

## 2023-02-20 RX ADMIN — CLOPIDOGREL BISULFATE 75 MILLIGRAM(S): 75 TABLET, FILM COATED ORAL at 16:59

## 2023-02-20 RX ADMIN — HALOPERIDOL DECANOATE 1 MILLIGRAM(S): 100 INJECTION INTRAMUSCULAR at 17:27

## 2023-02-20 RX ADMIN — LACTULOSE 15 GRAM(S): 10 SOLUTION ORAL at 08:14

## 2023-02-20 RX ADMIN — DULOXETINE HYDROCHLORIDE 60 MILLIGRAM(S): 30 CAPSULE, DELAYED RELEASE ORAL at 09:26

## 2023-02-20 RX ADMIN — LATANOPROST 1 DROP(S): 0.05 SOLUTION/ DROPS OPHTHALMIC; TOPICAL at 21:07

## 2023-02-20 RX ADMIN — LACTULOSE 15 GRAM(S): 10 SOLUTION ORAL at 09:20

## 2023-02-20 RX ADMIN — INSULIN GLARGINE 10 UNIT(S): 100 INJECTION, SOLUTION SUBCUTANEOUS at 21:08

## 2023-02-20 NOTE — PROGRESS NOTE ADULT - SUBJECTIVE AND OBJECTIVE BOX
STACY CARDENAS 77y Male  MRN#: [id number]   Hospital Day: 4d    HPI:  78 yo M patient with a PMH of:  - dementia, depression,   - HTN,   - cirrhosis with portal hypertension and a hospitalization for hepatic encephalopathy  - diabetes,   - HFpEF,   - rib fractures due to recurrent falls, presents to the ED for increase in his aggressive behavior.    His dementia was diagnosed 2 years ago, however, symptoms of amnesia and behavioral changes have been progressing rapidly in this span of time.  His aggressive behavior which was previously intermittent, has been worsening lately and becoming constant, associated with increase on his extremities tremor and other extrapyramidal symptoms, that whenever he ambulates, he sustains falls.    During the falls, patient is conscious, recalls the event, and family that witnesses the event denies any features of tonic- clonic or atonic seizures.  Patient and surrounding deny any chest pain, shortness of breath, cough, abdominal pain, changes in bowel/ urinary symptoms, constitutional symptoms, or other focal neurologic symptoms.    In the ED, the patient was HD stable, afebrile.  Labs were only significant for a magnesium level of 1.4 (being repleted).  CXR was negative for any acute cardiopulmonary pathology, but showed bilateral opacities that are comparable to previous chest xrays.  Pelvis xray was negative for any fracture or misalignment, and CTH was negative for any acute intracranial pathology.    Patient is being admitted to medicine for further workup and management.   (16 Feb 2023 16:23)      SUBJECTIVE  Patient is a 77y old Male who presents with a chief complaint of Repeated falls     (19 Feb 2023 14:23)  Currently admitted to medicine with the primary diagnosis of Fall      INTERVAL HPI AND OVERNIGHT EVENTS:  Patient was examined and seen at bedside. This morning he is resting comfortably in bed and reports no issues or overnight events.    OBJECTIVE  PAST MEDICAL & SURGICAL HISTORY  Diabetes    Depression    Gout    Benign essential HTN    Osteoarthritis    Cataracts, both eyes    S/P cholecystectomy    S/P knee surgery      ALLERGIES:  Keflex (Hives)  Keflex (Unknown)    MEDICATIONS:  STANDING MEDICATIONS  aspirin  chewable 81 milliGRAM(s) Oral daily  carbidopa/levodopa  25/100 1 Tablet(s) Oral <User Schedule>  chlorhexidine 2% Cloths 1 Application(s) Topical daily  cholecalciferol 2000 Unit(s) Oral daily  dextrose 5%. 1000 milliLiter(s) IV Continuous <Continuous>  dextrose 5%. 1000 milliLiter(s) IV Continuous <Continuous>  dextrose 50% Injectable 25 Gram(s) IV Push once  dextrose 50% Injectable 12.5 Gram(s) IV Push once  dextrose 50% Injectable 25 Gram(s) IV Push once  donepezil 10 milliGRAM(s) Oral at bedtime  DULoxetine 60 milliGRAM(s) Oral daily  enoxaparin Injectable 40 milliGRAM(s) SubCutaneous every 24 hours  glucagon  Injectable 1 milliGRAM(s) IntraMuscular once  insulin glargine Injectable (LANTUS) 10 Unit(s) SubCutaneous at bedtime  insulin lispro (ADMELOG) corrective regimen sliding scale   SubCutaneous three times a day before meals  lactulose Syrup 15 Gram(s) Oral every 2 hours  latanoprost 0.005% Ophthalmic Solution 1 Drop(s) Both EYES at bedtime  magnesium oxide 400 milliGRAM(s) Oral three times a day with meals  magnesium sulfate  IVPB 2 Gram(s) IV Intermittent once  magnesium sulfate  IVPB 2 Gram(s) IV Intermittent once  memantine 10 milliGRAM(s) Oral daily  pyridoxine 100 milliGRAM(s) Oral daily  rifAXIMin 550 milliGRAM(s) Oral two times a day    PRN MEDICATIONS  dextrose Oral Gel 15 Gram(s) Oral once PRN  haloperidol    Injectable 1 milliGRAM(s) IV Push every 8 hours PRN  midodrine. 5 milliGRAM(s) Oral three times a day PRN      VITAL SIGNS: Last 24 Hours  T(C): 35.9 (20 Feb 2023 05:37), Max: 36.7 (19 Feb 2023 14:12)  T(F): 96.7 (20 Feb 2023 05:37), Max: 98 (19 Feb 2023 14:12)  HR: 77 (20 Feb 2023 05:37) (77 - 96)  BP: 156/95 (20 Feb 2023 05:37) (111/64 - 171/75)  BP(mean): --  RR: 18 (20 Feb 2023 05:37) (18 - 20)  SpO2: 97% (19 Feb 2023 14:12) (97% - 97%)    LABS:                        12.5   5.52  )-----------( 122      ( 19 Feb 2023 07:58 )             34.4     02-19    141  |  107  |  15  ----------------------------<  165<H>  4.3   |  24  |  0.6<L>    Ca    8.6      19 Feb 2023 07:58  Mg     1.3     02-19    TPro  6.0  /  Alb  2.7<L>  /  TBili  1.8<H>  /  DBili  x   /  AST  31  /  ALT  <5  /  AlkPhos  93  02-19      PHYSICAL EXAM:  CONSTITUTIONAL: No acute distress, well-developed, well-groomed, AAOx2  HEAD: Atraumatic, normocephalic  EYES: EOM intact, PERRLA, conjunctiva and sclera clear  PULMONARY: Clear to auscultation bilaterally; no wheezes, rales, or rhonchi  CARDIOVASCULAR: Regular rate and rhythm; no murmurs, rubs, or gallops  GASTROINTESTINAL: Soft, non-tender, non-distended; bowel sounds present  MUSCULOSKELETAL: 2+ peripheral pulses; no clubbing, no cyanosis, no edema  NEUROLOGY: right facial drop       ASSESSMENT & PLAN  77M PMHx dementia, HTN, cirrhosis c/b portal HTN, h/o hepatic encephalopathy; DM2, HFpEF here with behavioral disturbance, frequent falls.    #Facial Drop:  - on 2/20: right facial drop noticed, pt AAO x2, rest of neurologic exam WNL  - Stroke code was called  - Pt was sent to urgent CT.     #Behavioral disturbance, with frequent falls  #Dementia   On admission CTH neg  - CT chest/ abd with bl atelectasis  - UA not done, however no urinary symptoms   - REEG negative   - c/w Rifaximin, lactulose to 3 loose BM daily  - Neuro cs appreciated   - PT note, unsafe to assess, pt with significant drop in SBP and c/o dizziness on 2/17, d/c lisinopril  - Orthostatics done on 2/17: positive -> Give midodrine 5 mg 1 hr before PT eval     #Parkinson's dementia   - Sinemet 25/100 BID  - Donepezil 10  - Duloxetine 60  - Memantine 10    #HTN  - Hold lisinopril 20 due to orthostasis during PT eval on 2/17  Patient has hx of orthostatic hypotension per family, will need midodrine one hour prior to PT eval     #Liver Cirrhosis  - CT abd c/w cirrhosis, +portal htn  - Rifaximin, lactulose as above  outpt f/u    #DM2  outpt f/u    #HFpEF, chronic  outpt f/u    #DVT ppx  Lovenox    Pending (specify):  PT f/u, STR placement    Disposition: STR

## 2023-02-20 NOTE — SWALLOW BEDSIDE ASSESSMENT ADULT - SWALLOW EVAL: DIAGNOSIS
+toleration of easy to chew, thins w/no overt s/s of aspiration/penetration
+toleration of easy to chew, thins w/no overt s/s of aspiration/penetration

## 2023-02-20 NOTE — PROGRESS NOTE ADULT - ASSESSMENT
This is a 76 yo M patient with a PMHx of dementia, depression, HTN, cirrhosis with portal hypertension and a hospitalization for hepatic encephalopathy, diabetes, HFpEF, rib fractures due to recurrent falls, presents to the ED for increase in his aggressive behavior. His dementia was diagnosed 2 years ago, however, symptoms of amnesia and behavioral changes have been progressing rapidly in this span of time. His aggressive behavior which was previously intermittent, has been worsening lately and becoming constant, associated with increase on his extremities tremor and other extrapyramidal symptoms, that whenever he ambulates, he sustains falls. During the falls, patient is conscious, recalls the event, and family that witnesses the event denies any features of tonic- clonic or atonic seizures. Patient and surrounding deny any chest pain, shortness of breath, cough, abdominal pain, changes in bowel/ urinary symptoms, constitutional symptoms, or other focal neurologic symptoms. Patient is being admitted to medicine for further workup and management. Neurology Team consulted for evaluation. Labs show hyperammoniemia. CTH shows no evidence of acute intracranial pathology.    Differential diagnosis: Altered mental status and behavioral changes likely related with progressive hepatic encephalopathy    PLAN:  - avoid opioids, benzodiazepines and anticholinergic meds  - identify and correct precipitating factors (such as infection, gastrointestinal bleeding, renal failure, electrolyte disturbances, sedative use) and alternative causes of altered mental status  - maintain nutrition with protein intake 1.2-1.5 g/kg/day and energy intake 35-40 kcal/kg ideal body weight  - start with lactulose syrup 25 mL (16.67 g) every 1-2 hours until = 2 soft or loose bowel movements, then titrate dosing to maintain 2-3 bowel movements/day  - rifaximin (Xifaxan) 550 mg orally twice daily, with or without food addition of rifaximin to lactulose  - oral branched-chain amino acids may improve hepatic encephalopathy manifestations   - can order EEG if mental status does not improve after optimizing hepatic encephalopathy management and normalized ammonia      This is a 78 yo M patient with a PMHx of dementia, depression, HTN, cirrhosis with portal hypertension and a hospitalization for hepatic encephalopathy, diabetes, HFpEF, rib fractures due to recurrent falls, presents to the ED for increase in his aggressive behavior. His dementia was diagnosed 2 years ago, however, symptoms of amnesia and behavioral changes have been progressing rapidly in this span of time. His aggressive behavior which was previously intermittent, has been worsening lately and becoming constant, associated with increase on his extremities tremor and other extrapyramidal symptoms, that whenever he ambulates, he sustains falls. During the falls, patient is conscious, recalls the event, and family that witnesses the event denies any features of tonic- clonic or atonic seizures. Patient and surrounding deny any chest pain, shortness of breath, cough, abdominal pain, changes in bowel/ urinary symptoms, constitutional symptoms, or other focal neurologic symptoms. Patient is being admitted to medicine for further workup and management. Neurology Team consulted for evaluation. Labs show hyperammoniemia. CTH shows no evidence of acute intracranial pathology. Differential diagnosis: Altered mental status and behavioral changes likely related with progressive hepatic encephalopathy. Stroke Code called for facial droop on right, NIHSS 2. No thrombolysis given due to non-debilitating symptoms. No LVO or major stenosis was seen on CTA H/N.     Plan:  - Avoid opioids, benzodiazepines and anticholinergic meds  - Identify and correct precipitating factors (such as infection, gastrointestinal bleeding, renal failure, electrolyte disturbances, sedative use) and alternative causes of altered mental status  - Maintain nutrition with protein intake 1.2-1.5 g/kg/day and energy intake 35-40 kcal/kg ideal body weight  - Continue with lactulose syrup 25 mL (16.67 g) every 1-2 hours until = 2 soft or loose bowel movements, then titrate dosing to maintain 2-3 bowel movements/day  - Continue rifaximin (Xifaxan) 550 mg orally twice daily, with or without food addition of rifaximin to lactulose  - Oral branched-chain amino acids may improve hepatic encephalopathy manifestations   - Can order EEG if mental status does not improve after optimizing hepatic encephalopathy management and normalized ammonia

## 2023-02-20 NOTE — SWALLOW BEDSIDE ASSESSMENT ADULT - SLP PERTINENT HISTORY OF CURRENT PROBLEM
His dementia was diagnosed 2 years ago, however, symptoms of amnesia and behavioral changes have been progressing rapidly in this span of time.
His dementia was diagnosed 2 years ago, however, symptoms of amnesia and behavioral changes have been progressing rapidly in this span of time.

## 2023-02-20 NOTE — CONSULT NOTE ADULT - SUBJECTIVE AND OBJECTIVE BOX
76 yo M patient with a PMH of dementia, depression, HTN, cirrhosis with portal hypertension and a hospitalization for hepatic encephalopathy, diabetes, HFpEF, rib fractures due to recurrent falls, presents to the ED for increase in his aggressive behavior. His dementia was diagnosed 2 years ago, however, symptoms of amnesia and behavioral changes have been progressing rapidly in this span of time. His aggressive behavior which was previously intermittent, has been worsening lately and becoming constant, associated with increase on his extremities tremor and other extrapyramidal symptoms, that whenever he ambulates, he sustains falls. During the falls, patient is conscious, recalls the event, and family that witnesses the event denies any features of tonic- clonic or atonic seizures. Patient and surrounding deny any chest pain, shortness of breath, cough, abdominal pain, changes in bowel/ urinary symptoms, constitutional symptoms, or other focal neurologic symptoms. Patient is being admitted to medicine for further workup and management. Neurology Team consulted for evaluation for AMS.  Labs showed hyperammonemia CTH shows no evidence of acute intracranial pathology. On 2/20/2023 Stroke code was activated for new finding of right facial droop, however, patient states he felt pooling on right of face a week ago.  LKW as per nurse is 9:10 Am. Upon presentation Patient denies having neurological symptoms no weakness, numbness, change in vision, change in speech, headache.  Patient is AOX2-3 as per resident is base line. Vitals: /76 . On exam: NIH 2 (1) answers one question correctly knows he is in the hospital does not know month or age, (1) slight right facial droop, and no other neurological focal findings. CTH, CTA, CTP show no signs of acute pathology. Patient has low NIH score 2 with no significant findings on imaging , no LVO. Patient not a candidate for IV Thrombolytics and IA intervention. Patient symptoms may be related to high glucose level vs TIA vs stroke.     Recommendations:    MRI of brain w/o brett  TTE  ASA 81 mg  Plavix 75mg  Atorvastaten 40 mg  LDL, A1c  Keep mag > 2  Neuro check Q 4h  Control glucose level  Manage BP  Fall pecautions  PT/OT/ SLP  Case discussed with Attending                       Neurology  Consult Note  02-20-23    Neurology consult)    Reason for Admission: Repeated falls    Chief Complaint: Right facial droop     78 yo M patient with a PMH of dementia, depression, HTN, cirrhosis with portal hypertension and a hospitalization for hepatic encephalopathy, diabetes, HFpEF, rib fractures due to recurrent falls, presents to the ED for increase in his aggressive behavior. His dementia was diagnosed 2 years ago, however, symptoms of amnesia and behavioral changes have been progressing rapidly in this span of time. His aggressive behavior which was previously intermittent, has been worsening lately and becoming constant, associated with increase on his extremities tremor and other extrapyramidal symptoms, that whenever he ambulates, he sustains falls. During the falls, patient is conscious, recalls the event, and family that witnesses the event denies any features of tonic- clonic or atonic seizures. Patient and surrounding deny any chest pain, shortness of breath, cough, abdominal pain, changes in bowel/ urinary symptoms, constitutional symptoms, or other focal neurologic symptoms. Patient is being admitted to medicine for further workup and management. Neurology Team consulted for evaluation for AMS.  Labs showed hyperammonemia CTH shows no evidence of acute intracranial pathology. On 2/20/2023 Stroke code was activated for new finding of right facial droop, however, patient states he felt pooling on right of face a week ago.  LKW as per nurse is 9:10 Am. Upon presentation Patient denies having neurological symptoms no weakness, numbness, change in vision, change in speech, headache.  Patient base line AOX2 as per resident.  Vitals: /76 .      PMHx:   Diabetes  Depression  Gout  Benign essential HTN  Osteoarthritis    PFHx:   FH: type 2 diabetes mellitus    PSuHx:   No significant past surgical history  Cataracts, both eyes  S/P cholecystectomy  S/P knee surgery      Medications:  MEDICATIONS  (STANDING):  aspirin  chewable 81 milliGRAM(s) Oral daily  carbidopa/levodopa  25/100 1 Tablet(s) Oral <User Schedule>  chlorhexidine 2% Cloths 1 Application(s) Topical daily  cholecalciferol 2000 Unit(s) Oral daily  dextrose 5%. 1000 milliLiter(s) (100 mL/Hr) IV Continuous <Continuous>  dextrose 5%. 1000 milliLiter(s) (50 mL/Hr) IV Continuous <Continuous>  dextrose 50% Injectable 25 Gram(s) IV Push once  dextrose 50% Injectable 12.5 Gram(s) IV Push once  dextrose 50% Injectable 25 Gram(s) IV Push once  donepezil 10 milliGRAM(s) Oral at bedtime  DULoxetine 60 milliGRAM(s) Oral daily  enoxaparin Injectable 40 milliGRAM(s) SubCutaneous every 24 hours  glucagon  Injectable 1 milliGRAM(s) IntraMuscular once  insulin glargine Injectable (LANTUS) 10 Unit(s) SubCutaneous at bedtime  insulin lispro (ADMELOG) corrective regimen sliding scale   SubCutaneous three times a day before meals  lactulose Syrup 15 Gram(s) Oral every 2 hours  latanoprost 0.005% Ophthalmic Solution 1 Drop(s) Both EYES at bedtime  magnesium oxide 400 milliGRAM(s) Oral three times a day with meals  magnesium sulfate  IVPB 2 Gram(s) IV Intermittent once  magnesium sulfate  IVPB 2 Gram(s) IV Intermittent once  memantine 10 milliGRAM(s) Oral daily  pyridoxine 100 milliGRAM(s) Oral daily  rifAXIMin 550 milliGRAM(s) Oral two times a day    MEDICATIONS  (PRN):  dextrose Oral Gel 15 Gram(s) Oral once PRN Blood Glucose LESS THAN 70 milliGRAM(s)/deciliter  haloperidol    Injectable 1 milliGRAM(s) IV Push every 8 hours PRN Agitation  midodrine. 5 milliGRAM(s) Oral three times a day PRN Give one hour prior to physical activity    Home Medications:  aspirin 81 mg oral tablet: 1 tab(s) orally once a day (16 Feb 2023 16:23)  carbidopa-levodopa 25 mg-100 mg oral tablet: 1 tab(s) orally 2 times a day (16 Feb 2023 16:23)  donepezil 10 mg oral tablet: 1 tab(s) orally once a day (at bedtime) (16 Feb 2023 16:23)  DULoxetine 60 mg oral delayed release capsule: 1 cap(s) orally once a day (16 Feb 2023 16:23)  lactulose 10 g/15 mL oral syrup: 30 milliliter(s) orally every 4 to 8 hours -4 BMs per day to prevent hepatic encephalopathy) (16 Feb 2023 16:23)  latanoprost 0.005% ophthalmic solution: 1 drop(s) to each affected eye once a day (in the evening) (16 Feb 2023 16:23)  memantine 10 mg oral tablet: 1 tab(s) orally once a day (16 Feb 2023 16:23)  metFORMIN 1000 mg oral tablet: 1.5 tab(s) orally 2 times a day (16 Feb 2023 16:23)  quinapril 20 mg oral tablet: 1 tab(s) orally once a day (16 Feb 2023 16:23)  Vitamin B6 100 mg oral tablet: 1 tab(s) orally once a day (16 Feb 2023 16:23)  Vitamin D3 2000 intl units oral tablet: 1 tab(s) orally once a day (16 Feb 2023 16:23)      Vitals:  T(C): 35.9 (02-20-23 @ 05:37), Max: 36.7 (02-19-23 @ 14:12)  HR: 77 (02-20-23 @ 05:37) (77 - 96)  BP: 156/95 (02-20-23 @ 05:37) (111/64 - 171/75)  RR: 18 (02-20-23 @ 05:37) (18 - 20)  SpO2: 97% (02-19-23 @ 14:12) (97% - 97%)    Labs:                        12.5   5.52  )-----------( 122      ( 19 Feb 2023 07:58 )             34.4     02-19    141  |  107  |  15  ----------------------------<  165<H>  4.3   |  24  |  0.6<L>    Ca    8.6      19 Feb 2023 07:58  Mg     1.3     02-19    TPro  6.0  /  Alb  2.7<L>  /  TBili  1.8<H>  /  DBili  x   /  AST  31  /  ALT  <5  /  AlkPhos  93  02-19    CAPILLARY BLOOD GLUCOSE  300 (20 Feb 2023 11:15)    POCT Blood Glucose.: 330 mg/dL (20 Feb 2023 11:21)    LIVER FUNCTIONS - ( 19 Feb 2023 07:58 )  Alb: 2.7 g/dL / Pro: 6.0 g/dL / ALK PHOS: 93 U/L / ALT: <5 U/L / AST: 31 U/L / GGT: x           CSF:    PHYSICAL EXAMINATION:  General: Well-developed, well nourished, in no acute distress.  Eyes: Conjunctiva and sclera clear.  Neurologic:  - Mental Status:  Alert, awake, oriented to person, place, does not know month, year, and his age.  Speech is fluent with intact naming, repetition, and comprehension; Good overall fund of knowledge   - Cranial Nerves II-XII:    II:  Visual fields are full to confrontation; Pupils are equal, round, and reactive to light.  III, IV, VI:  Extraocular movements are intact without nystagmus.  V:  Facial sensation is intact in the V1-V3 distribution bilaterally.  VII:   Right Face is slightly asymmetric with normal eye closure and smile  VIII:  Hearing is intact to finger rub.  IX, X:  Uvula is midline and soft palate rises symmetrically  XI:  Head turning and shoulder shrug are intact.  XII:  Tongue protudes in the midline.  - Motor:  Strength is 5/5 throughout.  There is no pronator drift.  Normal muscle bulk and tone throughout.  - Sensory:  Intact throughout to light touch.  - Coordination:  Finger-nose-finger intact.  - Gait:   deferred for safety of patient.    Radiology:  CT Head No Cont:  (16 Feb 2023 13:19)    < from: CT Brain Stroke Protocol (02.20.23 @ 10:38) >  IMPRESSION:  No evidence of acute transcortical infarct, acute intracranial   hemorrhage, or mass effect.    < from: CT Perfusion w/ Maps w/ IV Cont (02.20.23 @ 10:44) >  IMPRESSION:    PERFUSION:  Apparent 8 cc mismatch volume within the right temporal lobe which is of   uncertain clinical significance as above.    CTA HEAD:  No evidence of vessel occlusion or aneurysm.    Overall relatively greater opacification of the left JAYDEN compared with   the right JAYDEN, which may be secondary to multifocal at least moderate   stenosis versus congenital dominance    Moderate stenosis of a distal branch of the superior division of the   right M3, likely multiple further intervention.    CTA NECK:  No evidence of greater than 50%carotid or vertebral artery stenosis.                   Neurology  Consult Note  02-20-23    Neurology consult)    Reason for Admission: Repeated falls    Chief Complaint: Right facial droop     78 yo M patient with a PMH of dementia, depression, HTN, cirrhosis with portal hypertension and a hospitalization for hepatic encephalopathy, diabetes, HFpEF, rib fractures due to recurrent falls, presents to the ED for increase in his aggressive behavior. His dementia was diagnosed 2 years ago, however, symptoms of amnesia and behavioral changes have been progressing rapidly in this span of time. His aggressive behavior which was previously intermittent, has been worsening lately and becoming constant, associated with increase on his extremities tremor and other extrapyramidal symptoms, that whenever he ambulates, he sustains falls. During the falls, patient is conscious, recalls the event, and family that witnesses the event denies any features of tonic- clonic or atonic seizures. Patient and surrounding deny any chest pain, shortness of breath, cough, abdominal pain, changes in bowel/ urinary symptoms, constitutional symptoms, or other focal neurologic symptoms. Patient is being admitted to medicine for further workup and management. Neurology Team consulted for evaluation for AMS.  Labs showed hyperammonemia CTH shows no evidence of acute intracranial pathology. On 2/20/2023 Stroke code was activated for new finding of right facial droop, however, patient states he felt pooling on right of face a week ago.  LKW as per nurse is 9:10 Am. Upon presentation Patient denies having neurological symptoms no weakness, numbness, change in vision, change in speech, headache.  Patient base line AOX2 as per resident.  Vitals: /76 .      PMHx:   Diabetes  Depression  Gout  Benign essential HTN  Osteoarthritis    PFHx:   FH: type 2 diabetes mellitus    PSuHx:   No significant past surgical history  Cataracts, both eyes  S/P cholecystectomy  S/P knee surgery      Medications:  MEDICATIONS  (STANDING):  aspirin  chewable 81 milliGRAM(s) Oral daily  carbidopa/levodopa  25/100 1 Tablet(s) Oral <User Schedule>  chlorhexidine 2% Cloths 1 Application(s) Topical daily  cholecalciferol 2000 Unit(s) Oral daily  dextrose 5%. 1000 milliLiter(s) (100 mL/Hr) IV Continuous <Continuous>  dextrose 5%. 1000 milliLiter(s) (50 mL/Hr) IV Continuous <Continuous>  dextrose 50% Injectable 25 Gram(s) IV Push once  dextrose 50% Injectable 12.5 Gram(s) IV Push once  dextrose 50% Injectable 25 Gram(s) IV Push once  donepezil 10 milliGRAM(s) Oral at bedtime  DULoxetine 60 milliGRAM(s) Oral daily  enoxaparin Injectable 40 milliGRAM(s) SubCutaneous every 24 hours  glucagon  Injectable 1 milliGRAM(s) IntraMuscular once  insulin glargine Injectable (LANTUS) 10 Unit(s) SubCutaneous at bedtime  insulin lispro (ADMELOG) corrective regimen sliding scale   SubCutaneous three times a day before meals  lactulose Syrup 15 Gram(s) Oral every 2 hours  latanoprost 0.005% Ophthalmic Solution 1 Drop(s) Both EYES at bedtime  magnesium oxide 400 milliGRAM(s) Oral three times a day with meals  magnesium sulfate  IVPB 2 Gram(s) IV Intermittent once  magnesium sulfate  IVPB 2 Gram(s) IV Intermittent once  memantine 10 milliGRAM(s) Oral daily  pyridoxine 100 milliGRAM(s) Oral daily  rifAXIMin 550 milliGRAM(s) Oral two times a day    MEDICATIONS  (PRN):  dextrose Oral Gel 15 Gram(s) Oral once PRN Blood Glucose LESS THAN 70 milliGRAM(s)/deciliter  haloperidol    Injectable 1 milliGRAM(s) IV Push every 8 hours PRN Agitation  midodrine. 5 milliGRAM(s) Oral three times a day PRN Give one hour prior to physical activity    Home Medications:  aspirin 81 mg oral tablet: 1 tab(s) orally once a day (16 Feb 2023 16:23)  carbidopa-levodopa 25 mg-100 mg oral tablet: 1 tab(s) orally 2 times a day (16 Feb 2023 16:23)  donepezil 10 mg oral tablet: 1 tab(s) orally once a day (at bedtime) (16 Feb 2023 16:23)  DULoxetine 60 mg oral delayed release capsule: 1 cap(s) orally once a day (16 Feb 2023 16:23)  lactulose 10 g/15 mL oral syrup: 30 milliliter(s) orally every 4 to 8 hours -4 BMs per day to prevent hepatic encephalopathy) (16 Feb 2023 16:23)  latanoprost 0.005% ophthalmic solution: 1 drop(s) to each affected eye once a day (in the evening) (16 Feb 2023 16:23)  memantine 10 mg oral tablet: 1 tab(s) orally once a day (16 Feb 2023 16:23)  metFORMIN 1000 mg oral tablet: 1.5 tab(s) orally 2 times a day (16 Feb 2023 16:23)  quinapril 20 mg oral tablet: 1 tab(s) orally once a day (16 Feb 2023 16:23)  Vitamin B6 100 mg oral tablet: 1 tab(s) orally once a day (16 Feb 2023 16:23)  Vitamin D3 2000 intl units oral tablet: 1 tab(s) orally once a day (16 Feb 2023 16:23)      Vitals:  T(C): 35.9 (02-20-23 @ 05:37), Max: 36.7 (02-19-23 @ 14:12)  HR: 77 (02-20-23 @ 05:37) (77 - 96)  BP: 156/95 (02-20-23 @ 05:37) (111/64 - 171/75)  RR: 18 (02-20-23 @ 05:37) (18 - 20)  SpO2: 97% (02-19-23 @ 14:12) (97% - 97%)    Labs:                        12.5   5.52  )-----------( 122      ( 19 Feb 2023 07:58 )             34.4     02-19    141  |  107  |  15  ----------------------------<  165<H>  4.3   |  24  |  0.6<L>    Ca    8.6      19 Feb 2023 07:58  Mg     1.3     02-19    TPro  6.0  /  Alb  2.7<L>  /  TBili  1.8<H>  /  DBili  x   /  AST  31  /  ALT  <5  /  AlkPhos  93  02-19    CAPILLARY BLOOD GLUCOSE  300 (20 Feb 2023 11:15)    POCT Blood Glucose.: 330 mg/dL (20 Feb 2023 11:21)    LIVER FUNCTIONS - ( 19 Feb 2023 07:58 )  Alb: 2.7 g/dL / Pro: 6.0 g/dL / ALK PHOS: 93 U/L / ALT: <5 U/L / AST: 31 U/L / GGT: x           CSF:    PHYSICAL EXAMINATION:  General: Well-developed, well nourished, in no acute distress.  Eyes: Conjunctiva and sclera clear.  Neurologic:  - Mental Status:  Alert, awake, oriented to person, place, does not know month, year, and his age Speech at the baseline   - Cranial Nerves II-XII:    II:  Visual fields are full to confrontation; Pupils are equal, round, and reactive to light.  III, IV, VI:  Extraocular movements are intact without nystagmus.  V:  Facial sensation is intact in the V1-V3 distribution bilaterally.  VII:   Right Face is slightly asymmetric with normal eye closure and smile  VIII:  Hearing is intact to finger rub.  IX, X:  Uvula is midline and soft palate rises symmetrically  XI:  Head turning and shoulder shrug are intact.  XII:  Tongue protudes in the midline.  - Motor:  Strength is 5/5 throughout.  There is no pronator drift.  Normal muscle bulk and tone throughout.  - Sensory:  Intact throughout to light touch.  - Coordination:  Finger-nose-finger intact.  - Gait:   deferred for safety of patient.    NIH score: 1 for face but seems to be old. 2 for questions but has dementia       Radiology:  CT Head No Cont:  (16 Feb 2023 13:19)    < from: CT Brain Stroke Protocol (02.20.23 @ 10:38) >  IMPRESSION:  No evidence of acute transcortical infarct, acute intracranial   hemorrhage, or mass effect.    < from: CT Perfusion w/ Maps w/ IV Cont (02.20.23 @ 10:44) >  IMPRESSION:    PERFUSION:  Apparent 8 cc mismatch volume within the right temporal lobe which is of   uncertain clinical significance as above.    CTA HEAD:  No evidence of vessel occlusion or aneurysm.    Overall relatively greater opacification of the left JAYDEN compared with   the right JAYDEN, which may be secondary to multifocal at least moderate   stenosis versus congenital dominance    Moderate stenosis of a distal branch of the superior division of the   right M3, likely multiple further intervention.    CTA NECK:  No evidence of greater than 50%carotid or vertebral artery stenosis.

## 2023-02-20 NOTE — CONSULT NOTE ADULT - NS ATTEND AMEND GEN_ALL_CORE FT
I visited the patient during stroke code. Patient who presented with hepatic encephalopathy. Nurse noted this morning at 9 am that patient has right facial weakness and slurred speech. On exam patient not oriented to his age and month, has mild right facial weakness but speech seems to be baseline. Per family patient has history of [place] palsy. Has would not calculate orientation due to dementia. New NIH score is 1. CT head showed no acute infarct. CTA head and neck and CTP showed mild Apparent 8 cc mismatch volume within the right temporal lobe, no evidence of vessel occlusion or aneurysm, bilateral JAYDEN stenosis and moderate stenosis of a distal branch of the superior division of the   right M3 and CTA showed no evidence of greater than 50% carotid or vertebral artery stenosis. Decided not to give TNK given the low NIH score. Recommended to add Plavix, q4h neuro check and Brain MRI. Will follow

## 2023-02-20 NOTE — SWALLOW BEDSIDE ASSESSMENT ADULT - COMMENTS
stroke code 10:00 am; -CT. pt received alert, on RA. in NAD. some confusion.
pt received alert, on RA. in NAD. expressed preference for soft consistency.

## 2023-02-20 NOTE — CONSULT NOTE ADULT - ASSESSMENT
76 yo M patient with a PMH of dementia, depression, HTN, cirrhosis with portal hypertension and a hospitalization for hepatic encephalopathy, diabetes, HFpEF, rib fractures due to recurrent falls, presents to the ED for increase in his aggressive behavior. His dementia was diagnosed 2 years ago, however, symptoms of amnesia and behavioral changes have been progressing rapidly in this span of time. His aggressive behavior which was previously intermittent, has been worsening lately and becoming constant, associated with increase on his extremities tremor and other extrapyramidal symptoms, that whenever he ambulates, he sustains falls. During the falls, patient is conscious, recalls the event, and family that witnesses the event denies any features of tonic- clonic or atonic seizures. Patient and surrounding deny any chest pain, shortness of breath, cough, abdominal pain, changes in bowel/ urinary symptoms, constitutional symptoms, or other focal neurologic symptoms. Patient is being admitted to medicine for further workup and management. Neurology Team consulted for evaluation for AMS.  Labs showed hyperammonemia CTH shows no evidence of acute intracranial pathology. On 2/20/2023 Stroke code was activated for new finding of right facial droop, however, patient states he felt pooling on right of face a week ago.  LKW as per nurse is 9:10 Am. Upon presentation patient denies having neurological symptoms no weakness, numbness, change in vision, change in speech, headache.  As per Resident patient base line is  AOX2.  Vitals: /76 . On exam: NIH 2 (1) answers one question correctly knows he is in the hospital does not know month or age, (1) slight right facial droop, and no other neurological focal findings. CTH, CTA, CTP show no signs of acute pathology. Patient has low NIH score 2 with no significant findings on imaging , no LVO. Patient not a candidate for IV Thrombolytics and IA intervention. Patient symptoms may be related to high glucose level vs TIA vs stroke.     Recommendations:    MRI of brain w/o brett  TTE  ASA 81 mg  Plavix 75mg  Atorvastaten 40 mg  LDL, A1c  Keep mag > 2  Neuro check Q 4h  Control glucose level  Manage BP  Fall pecautions  PT/OT/ SLP  Neurology will continue to follow  Thank you for your consult  Case discussed with Attending

## 2023-02-20 NOTE — PROGRESS NOTE ADULT - SUBJECTIVE AND OBJECTIVE BOX
Neurology Progress Note    Interval History:          PAST MEDICAL & SURGICAL HISTORY:  Diabetes  Depression  Gout  Benign essential HTN  Osteoarthritis  Cataracts, both eyes  S/P cholecystectomy  S/P knee surgery      Medications:  aspirin  chewable 81 milliGRAM(s) Oral daily  carbidopa/levodopa  25/100 1 Tablet(s) Oral <User Schedule>  chlorhexidine 2% Cloths 1 Application(s) Topical daily  cholecalciferol 2000 Unit(s) Oral daily  dextrose 5%. 1000 milliLiter(s) IV Continuous <Continuous>  dextrose 5%. 1000 milliLiter(s) IV Continuous <Continuous>  dextrose 50% Injectable 25 Gram(s) IV Push once  dextrose 50% Injectable 12.5 Gram(s) IV Push once  dextrose 50% Injectable 25 Gram(s) IV Push once  dextrose Oral Gel 15 Gram(s) Oral once PRN  donepezil 10 milliGRAM(s) Oral at bedtime  DULoxetine 60 milliGRAM(s) Oral daily  enoxaparin Injectable 40 milliGRAM(s) SubCutaneous every 24 hours  glucagon  Injectable 1 milliGRAM(s) IntraMuscular once  haloperidol    Injectable 1 milliGRAM(s) IV Push every 8 hours PRN  insulin glargine Injectable (LANTUS) 10 Unit(s) SubCutaneous at bedtime  insulin lispro (ADMELOG) corrective regimen sliding scale   SubCutaneous three times a day before meals  lactulose Syrup 15 Gram(s) Oral every 2 hours  latanoprost 0.005% Ophthalmic Solution 1 Drop(s) Both EYES at bedtime  magnesium oxide 400 milliGRAM(s) Oral three times a day with meals  magnesium sulfate  IVPB 2 Gram(s) IV Intermittent once  magnesium sulfate  IVPB 2 Gram(s) IV Intermittent once  memantine 10 milliGRAM(s) Oral daily  midodrine. 5 milliGRAM(s) Oral three times a day PRN  pyridoxine 100 milliGRAM(s) Oral daily  rifAXIMin 550 milliGRAM(s) Oral two times a day      Vital Signs Last 24 Hrs  T(C): 35.9 (20 Feb 2023 05:37), Max: 36.7 (19 Feb 2023 14:12)  T(F): 96.7 (20 Feb 2023 05:37), Max: 98 (19 Feb 2023 14:12)  HR: 77 (20 Feb 2023 05:37) (77 - 96)  BP: 156/95 (20 Feb 2023 05:37) (111/64 - 171/75)  RR: 18 (20 Feb 2023 05:37) (18 - 20)  SpO2: 97% (19 Feb 2023 14:12) (97% - 97%)    Parameters below as of 19 Feb 2023 14:12  Patient On (Oxygen Delivery Method): room air        Neurological Exam:   Mental status: Awake, alert and oriented x3.  Recent and remote memory intact.  Naming, repetition and comprehension intact.  Attention/concentration intact.  No dysarthria, no aphasia.  Fund of knowledge appropriate.    Cranial nerves: Pupils equally round and reactive to light, visual fields full, no nystagmus, extraocular muscles intact, V1 through V3 intact bilaterally and symmetric, face symmetric, hearing intact to finger rub, palate elevation symmetric, tongue was midline.  Motor:  MRC grading 5/5 b/l UE/LE.   strength 5/5.  Normal tone and bulk.  No abnormal movements.    Sensation: Intact to light touch, proprioception, and pinprick.   Coordination: No dysmetria on finger-to-nose and heel-to-shin.  No dysdiadokinesia.  Reflexes: 2+ in bilateral UE/LE, downgoing toes bilaterally. (-) Thomas.  Gait: Narrow and steady. No ataxia.  Romberg negative      Labs:  CBC Full  -  ( 19 Feb 2023 07:58 )  WBC Count : 5.52 K/uL  RBC Count : 3.47 M/uL  Hemoglobin : 12.5 g/dL  Hematocrit : 34.4 %  Platelet Count - Automated : 122 K/uL  Mean Cell Volume : 99.1 fL  Mean Cell Hemoglobin : 36.0 pg  Mean Cell Hemoglobin Concentration : 36.3 g/dL  Auto Neutrophil # : 2.97 K/uL  Auto Lymphocyte # : 1.56 K/uL  Auto Monocyte # : 0.61 K/uL  Auto Eosinophil # : 0.32 K/uL  Auto Basophil # : 0.05 K/uL  Auto Neutrophil % : 53.7 %  Auto Lymphocyte % : 28.3 %  Auto Monocyte % : 11.1 %  Auto Eosinophil % : 5.8 %  Auto Basophil % : 0.9 %    02-19    141  |  107  |  15  ----------------------------<  165<H>  4.3   |  24  |  0.6<L>    Ca    8.6      19 Feb 2023 07:58  Mg     1.3     02-19    TPro  6.0  /  Alb  2.7<L>  /  TBili  1.8<H>  /  DBili  x   /  AST  31  /  ALT  <5  /  AlkPhos  93  02-19    LIVER FUNCTIONS - ( 19 Feb 2023 07:58 )  Alb: 2.7 g/dL / Pro: 6.0 g/dL / ALK PHOS: 93 U/L / ALT: <5 U/L / AST: 31 U/L / GGT: x            Neurology Progress Note    Interval History:    Patient seen and examined at bedside. There were no acute events overnight.      PAST MEDICAL & SURGICAL HISTORY:  Diabetes  Depression  Gout  Benign essential HTN  Osteoarthritis  Cataracts, both eyes  S/P cholecystectomy  S/P knee surgery      Medications:  aspirin  chewable 81 milliGRAM(s) Oral daily  carbidopa/levodopa  25/100 1 Tablet(s) Oral <User Schedule>  chlorhexidine 2% Cloths 1 Application(s) Topical daily  cholecalciferol 2000 Unit(s) Oral daily  dextrose 5%. 1000 milliLiter(s) IV Continuous <Continuous>  dextrose 5%. 1000 milliLiter(s) IV Continuous <Continuous>  dextrose 50% Injectable 25 Gram(s) IV Push once  dextrose 50% Injectable 12.5 Gram(s) IV Push once  dextrose 50% Injectable 25 Gram(s) IV Push once  dextrose Oral Gel 15 Gram(s) Oral once PRN  donepezil 10 milliGRAM(s) Oral at bedtime  DULoxetine 60 milliGRAM(s) Oral daily  enoxaparin Injectable 40 milliGRAM(s) SubCutaneous every 24 hours  glucagon  Injectable 1 milliGRAM(s) IntraMuscular once  haloperidol    Injectable 1 milliGRAM(s) IV Push every 8 hours PRN  insulin glargine Injectable (LANTUS) 10 Unit(s) SubCutaneous at bedtime  insulin lispro (ADMELOG) corrective regimen sliding scale   SubCutaneous three times a day before meals  lactulose Syrup 15 Gram(s) Oral every 2 hours  latanoprost 0.005% Ophthalmic Solution 1 Drop(s) Both EYES at bedtime  magnesium oxide 400 milliGRAM(s) Oral three times a day with meals  magnesium sulfate  IVPB 2 Gram(s) IV Intermittent once  magnesium sulfate  IVPB 2 Gram(s) IV Intermittent once  memantine 10 milliGRAM(s) Oral daily  midodrine. 5 milliGRAM(s) Oral three times a day PRN  pyridoxine 100 milliGRAM(s) Oral daily  rifAXIMin 550 milliGRAM(s) Oral two times a day      Vital Signs Last 24 Hrs  T(C): 35.9 (20 Feb 2023 05:37), Max: 36.7 (19 Feb 2023 14:12)  T(F): 96.7 (20 Feb 2023 05:37), Max: 98 (19 Feb 2023 14:12)  HR: 77 (20 Feb 2023 05:37) (77 - 96)  BP: 156/95 (20 Feb 2023 05:37) (111/64 - 171/75)  RR: 18 (20 Feb 2023 05:37) (18 - 20)  SpO2: 97% (19 Feb 2023 14:12) (97% - 97%)    Parameters below as of 19 Feb 2023 14:12  Patient On (Oxygen Delivery Method): room air        Neurological Exam:   Mental status: Awake, alert and oriented x3. No dysarthria, no aphasia. Follows commands.  Cranial nerves: Pupils equally round and reactive to light, no nystagmus, extraocular muscles intact, V1 through V3 intact bilaterally and symmetric, face asymmetric on R side, hearing intact to finger rub, palate elevation symmetric, tongue was midline.  Motor: MRC grading 5/5 B/L UE/LE.  strength 5/5. Normal tone and bulk. No abnormal movements.    Sensation: Intact to light touch, proprioception, and pinprick.   Coordination: No dysmetria on finger-to-nose and heel-to-shin.  No dysdiadokinesia.  Reflexes: 2+ in bilateral UE/LE, downgoing toes bilaterally. (-) Thomas.  Gait: Narrow and steady. No ataxia.  Romberg negative      Labs:  CBC Full  -  ( 19 Feb 2023 07:58 )  WBC Count : 5.52 K/uL  RBC Count : 3.47 M/uL  Hemoglobin : 12.5 g/dL  Hematocrit : 34.4 %  Platelet Count - Automated : 122 K/uL  Mean Cell Volume : 99.1 fL  Mean Cell Hemoglobin : 36.0 pg  Mean Cell Hemoglobin Concentration : 36.3 g/dL  Auto Neutrophil # : 2.97 K/uL  Auto Lymphocyte # : 1.56 K/uL  Auto Monocyte # : 0.61 K/uL  Auto Eosinophil # : 0.32 K/uL  Auto Basophil # : 0.05 K/uL  Auto Neutrophil % : 53.7 %  Auto Lymphocyte % : 28.3 %  Auto Monocyte % : 11.1 %  Auto Eosinophil % : 5.8 %  Auto Basophil % : 0.9 %    02-19    141  |  107  |  15  ----------------------------<  165<H>  4.3   |  24  |  0.6<L>    Ca    8.6      19 Feb 2023 07:58  Mg     1.3     02-19    TPro  6.0  /  Alb  2.7<L>  /  TBili  1.8<H>  /  DBili  x   /  AST  31  /  ALT  <5  /  AlkPhos  93  02-19    LIVER FUNCTIONS - ( 19 Feb 2023 07:58 )  Alb: 2.7 g/dL / Pro: 6.0 g/dL / ALK PHOS: 93 U/L / ALT: <5 U/L / AST: 31 U/L / GGT: x             < from: CT Brain Stroke Protocol (02.20.23 @ 10:38) >  PROCEDURE DATE:  02/20/2023          INTERPRETATION:  CLINICAL HISTORY/REASON FOR EXAM: Stroke code. New right   facial droop. Dementia at baseline.    TECHNIQUE: Axial CT scanning of the brain was obtained from the skull   base to the vertex without the administration of intravenous contrast.   Reformatted coronal and sagittal images were subsequently obtained and   reviewed.    COMPARISON: 2/16/2023    FINDINGS:  Thereis no CT evidence of acute transcortical infarct. Age-related   involutional changes and chronic microvascular ischemic changes.    There is no hydrocephalus, mass effect, or acute intracranial hemorrhage.   No extra-axial collection. Basal cisterns are patent.    The visualized paranasal sinuses and mastoid air cells are clear.    The calvarium is intact.    Bilateral cataract surgery.    IMPRESSION:  No evidence of acute transcortical infarct, acute intracranial   hemorrhage, or mass effect.    Dr. Mayuri Vásquez discussed preliminary findings with YARON HOPSON on   2/20/2023 10:48 AM with readback.      < from: CT Angio Head w/ IV Cont (02.20.23 @ 10:58) >  PROCEDURE DATE:  02/20/2023    ******PRELIMINARY REPORT******      ******PRELIMINARY REPORT******           INTERPRETATION:  Clinical History / Reason for exam: Stroke code, new   right facial droop.    TECHNIQUE: Following the intravenous administration of 50 cc of Omnipaque   350 IV contrast, serial axial images were obtained through the brain. The   CT perfusion data set was post processed per [facility] protocol   generating color maps of CBF (cerebral blood flow), CBV (cerebral blood   volume), MTT (mean transit time), and Tmax. CT angiography of the   intracranial (head) and extracranial (neck) circulation was then   performed after initiation of an additional bolus of Omnipaque 350.   Multiple contiguous axial images from the skull base to vertex were   acquired. Maximal intensity projection images were additionally included   and reviewed. 3-D reformations of the vasculature were also submitted for   review.    CAROTID STENOSIS REFERENCE: (NASCET = 100x1-(N/D)). N=greatest narrowing.   D=normal distal diameter - MILD = <50% stenosis. - MODERATE = 50-69%   stenosis. - SEVERE = 70-89% stenosis. - HAIRLINE/CRITICAL = 90-99%   stenosis. - OCCLUDED = 100% stenosis.    COMPARISON: No direct comparison is available.    FINDINGS:    PERFUSION:    CBF less than 30% volume: 0 cc  Tmax greater than 6 seconds: 8 cc  Mismatch volume: 8 cc  Mismatch ratio: Infinite.    The CT perfusion study demonstrates an apparent 8 cc mismatch volume   within the right temporal lobe which is unlikely eligible for neuro   intervention due to small size and of uncertain clinical significance as   it does not correspond to the reported symptoms.    HEAD CTA:    Please note that contrast timing mildly limits evaluation for small   aneurysms.    There is mild plaque within the bilateral carotid siphons predominantly   at the clinoid/supraclinoid region with resulting up to mild stenosis on   the right and no significant stenosis on the left. No evidence of large   aneurysm or vascular formation.    There is overall relatively greater opacification of the left JAYDEN   compared with the right JAYDEN, which may be secondary to multifocal at   least moderate stenosis versus congenital dominance. Bilateral ACAs   remain patent.    Right MCA is patent. There is at least moderate stenosis of a distal   branch of the superior division of the right M3. Left MCA is patent.    Bilateral V4 segments are patent to the vertebrobasilar confluence. SCA   origins are patent. PCAs are patent without evidence of large aneurysm or   vascular malformation.    Visualized portions of the dural venous sinuses are patent without   evidence of suspicious filling defect.    NECK CTA:  Three-vessel aortic arch. Origins of the brachiocephalic and left common   carotid artery cannot be well evaluated due to streak artifact from   left-sided contrast injection. There is mild tortuosity of the proximal   left common carotid artery and left subclavian artery.    Bilateral common carotid arteries are patent to the bifurcations. There   is mild plaque at the bilateral carotid bulbs without evidence of   significant resulting stenosis. No evidence of significant stenosis at   the bilateral ICA or ECA origins. Bilateral cervical ICAs are mildly   tortuous but otherwise of normal course and caliber to the intracranial   circulation.    There is a small amount of mixed atherosclerotic plaque in the region of   the left vertebral artery origin without evidence of significant   resulting stenosis. There is mild prominence of the left vertebral   artery. Bilateral vertebral arteries are otherwise normal in course and   caliber to the intracranial circulation.    IMPRESSION:    PERFUSION:  Apparent 8 cc mismatch volume within the right temporal lobe which is of   uncertain clinical significance as above.    CTA HEAD:  No evidence of vessel occlusion or aneurysm.    Overall relatively greater opacification of the left JAYDEN compared with   the right JAYDEN, which may be secondary to multifocal at least moderate   stenosis versus congenital dominance    Moderate stenosis of a distal branch of the superior division of the   right M3, likely multiple further intervention.    CTA NECK:  No evidence of greater than 50%carotid or vertebral artery stenosis.    A callback request was placed at the time of dictation.    Dr. Mayuri Vásquez discussed preliminary findings with YOLANDA PEÑA on   2/20/2023 11:26 AM with readback.

## 2023-02-21 LAB
BLD GP AB SCN SERPL QL: SIGNIFICANT CHANGE UP
CHOLEST SERPL-MCNC: 120 MG/DL — SIGNIFICANT CHANGE UP
GLUCOSE BLDC GLUCOMTR-MCNC: 163 MG/DL — HIGH (ref 70–99)
GLUCOSE BLDC GLUCOMTR-MCNC: 203 MG/DL — HIGH (ref 70–99)
GLUCOSE BLDC GLUCOMTR-MCNC: 240 MG/DL — HIGH (ref 70–99)
GLUCOSE BLDC GLUCOMTR-MCNC: 256 MG/DL — HIGH (ref 70–99)
HDLC SERPL-MCNC: 53 MG/DL — SIGNIFICANT CHANGE UP
LIPID PNL WITH DIRECT LDL SERPL: 50 MG/DL — SIGNIFICANT CHANGE UP
NON HDL CHOLESTEROL: 67 MG/DL — SIGNIFICANT CHANGE UP
TRIGL SERPL-MCNC: 83 MG/DL — SIGNIFICANT CHANGE UP

## 2023-02-21 PROCEDURE — 99232 SBSQ HOSP IP/OBS MODERATE 35: CPT | Mod: 25

## 2023-02-21 PROCEDURE — 70551 MRI BRAIN STEM W/O DYE: CPT | Mod: 26

## 2023-02-21 RX ORDER — SODIUM CHLORIDE 9 MG/ML
1000 INJECTION, SOLUTION INTRAVENOUS
Refills: 0 | Status: DISCONTINUED | OUTPATIENT
Start: 2023-02-21 | End: 2023-02-23

## 2023-02-21 RX ORDER — DEXTROSE 50 % IN WATER 50 %
25 SYRINGE (ML) INTRAVENOUS ONCE
Refills: 0 | Status: DISCONTINUED | OUTPATIENT
Start: 2023-02-21 | End: 2023-02-23

## 2023-02-21 RX ORDER — QUETIAPINE FUMARATE 200 MG/1
25 TABLET, FILM COATED ORAL AT BEDTIME
Refills: 0 | Status: DISCONTINUED | OUTPATIENT
Start: 2023-02-21 | End: 2023-02-23

## 2023-02-21 RX ORDER — LISINOPRIL 2.5 MG/1
20 TABLET ORAL ONCE
Refills: 0 | Status: COMPLETED | OUTPATIENT
Start: 2023-02-21 | End: 2023-02-21

## 2023-02-21 RX ORDER — INSULIN GLARGINE 100 [IU]/ML
10 INJECTION, SOLUTION SUBCUTANEOUS AT BEDTIME
Refills: 0 | Status: DISCONTINUED | OUTPATIENT
Start: 2023-02-21 | End: 2023-02-23

## 2023-02-21 RX ORDER — DEXTROSE 50 % IN WATER 50 %
15 SYRINGE (ML) INTRAVENOUS ONCE
Refills: 0 | Status: DISCONTINUED | OUTPATIENT
Start: 2023-02-21 | End: 2023-02-23

## 2023-02-21 RX ORDER — GLUCAGON INJECTION, SOLUTION 0.5 MG/.1ML
1 INJECTION, SOLUTION SUBCUTANEOUS ONCE
Refills: 0 | Status: DISCONTINUED | OUTPATIENT
Start: 2023-02-21 | End: 2023-02-23

## 2023-02-21 RX ORDER — INSULIN LISPRO 100/ML
2 VIAL (ML) SUBCUTANEOUS
Refills: 0 | Status: DISCONTINUED | OUTPATIENT
Start: 2023-02-21 | End: 2023-02-23

## 2023-02-21 RX ORDER — DEXTROSE 50 % IN WATER 50 %
12.5 SYRINGE (ML) INTRAVENOUS ONCE
Refills: 0 | Status: DISCONTINUED | OUTPATIENT
Start: 2023-02-21 | End: 2023-02-23

## 2023-02-21 RX ORDER — INSULIN LISPRO 100/ML
VIAL (ML) SUBCUTANEOUS
Refills: 0 | Status: DISCONTINUED | OUTPATIENT
Start: 2023-02-21 | End: 2023-02-22

## 2023-02-21 RX ADMIN — Medication 2000 UNIT(S): at 12:06

## 2023-02-21 RX ADMIN — CLOPIDOGREL BISULFATE 75 MILLIGRAM(S): 75 TABLET, FILM COATED ORAL at 12:05

## 2023-02-21 RX ADMIN — MAGNESIUM OXIDE 400 MG ORAL TABLET 400 MILLIGRAM(S): 241.3 TABLET ORAL at 16:43

## 2023-02-21 RX ADMIN — LACTULOSE 15 GRAM(S): 10 SOLUTION ORAL at 23:24

## 2023-02-21 RX ADMIN — ENOXAPARIN SODIUM 40 MILLIGRAM(S): 100 INJECTION SUBCUTANEOUS at 12:06

## 2023-02-21 RX ADMIN — CHLORHEXIDINE GLUCONATE 1 APPLICATION(S): 213 SOLUTION TOPICAL at 16:43

## 2023-02-21 RX ADMIN — DULOXETINE HYDROCHLORIDE 60 MILLIGRAM(S): 30 CAPSULE, DELAYED RELEASE ORAL at 12:05

## 2023-02-21 RX ADMIN — LACTULOSE 15 GRAM(S): 10 SOLUTION ORAL at 09:35

## 2023-02-21 RX ADMIN — LACTULOSE 15 GRAM(S): 10 SOLUTION ORAL at 16:42

## 2023-02-21 RX ADMIN — INSULIN GLARGINE 10 UNIT(S): 100 INJECTION, SOLUTION SUBCUTANEOUS at 21:25

## 2023-02-21 RX ADMIN — LACTULOSE 15 GRAM(S): 10 SOLUTION ORAL at 21:24

## 2023-02-21 RX ADMIN — CARBIDOPA AND LEVODOPA 1 TABLET(S): 25; 100 TABLET ORAL at 05:15

## 2023-02-21 RX ADMIN — Medication 6: at 16:43

## 2023-02-21 RX ADMIN — MEMANTINE HYDROCHLORIDE 10 MILLIGRAM(S): 10 TABLET ORAL at 12:05

## 2023-02-21 RX ADMIN — Medication 100 MILLIGRAM(S): at 12:05

## 2023-02-21 RX ADMIN — ATORVASTATIN CALCIUM 40 MILLIGRAM(S): 80 TABLET, FILM COATED ORAL at 21:26

## 2023-02-21 RX ADMIN — Medication 2: at 12:06

## 2023-02-21 RX ADMIN — Medication 2 UNIT(S): at 16:43

## 2023-02-21 RX ADMIN — MAGNESIUM OXIDE 400 MG ORAL TABLET 400 MILLIGRAM(S): 241.3 TABLET ORAL at 12:05

## 2023-02-21 RX ADMIN — Medication 2: at 08:08

## 2023-02-21 RX ADMIN — LACTULOSE 15 GRAM(S): 10 SOLUTION ORAL at 12:07

## 2023-02-21 RX ADMIN — LACTULOSE 15 GRAM(S): 10 SOLUTION ORAL at 08:08

## 2023-02-21 RX ADMIN — LACTULOSE 15 GRAM(S): 10 SOLUTION ORAL at 18:48

## 2023-02-21 RX ADMIN — LATANOPROST 1 DROP(S): 0.05 SOLUTION/ DROPS OPHTHALMIC; TOPICAL at 21:27

## 2023-02-21 RX ADMIN — CARBIDOPA AND LEVODOPA 1 TABLET(S): 25; 100 TABLET ORAL at 16:43

## 2023-02-21 RX ADMIN — DONEPEZIL HYDROCHLORIDE 10 MILLIGRAM(S): 10 TABLET, FILM COATED ORAL at 21:24

## 2023-02-21 RX ADMIN — Medication 81 MILLIGRAM(S): at 12:06

## 2023-02-21 RX ADMIN — LISINOPRIL 20 MILLIGRAM(S): 2.5 TABLET ORAL at 18:48

## 2023-02-21 RX ADMIN — MAGNESIUM OXIDE 400 MG ORAL TABLET 400 MILLIGRAM(S): 241.3 TABLET ORAL at 08:14

## 2023-02-21 RX ADMIN — QUETIAPINE FUMARATE 25 MILLIGRAM(S): 200 TABLET, FILM COATED ORAL at 21:25

## 2023-02-21 RX ADMIN — MIDODRINE HYDROCHLORIDE 5 MILLIGRAM(S): 2.5 TABLET ORAL at 09:35

## 2023-02-21 RX ADMIN — LACTULOSE 15 GRAM(S): 10 SOLUTION ORAL at 15:18

## 2023-02-21 NOTE — GOALS OF CARE CONVERSATION - ADVANCED CARE PLANNING - CONVERSATION DETAILS
As pt has hx of Dementia and AAOx2, discussion was made with his wife Christine. Diagnosis, prognosis and goals of care were discussed. they would like full medical management as well as compression and intubation if needed As pt has hx of Dementia and AAOx2, discussion was made with his wife Christine. Diagnosis, prognosis and goals of care were discussed. they would like full medical management as well as compression and intubation if needed.

## 2023-02-21 NOTE — PROGRESS NOTE ADULT - SUBJECTIVE AND OBJECTIVE BOX
STACY CARDENAS 77y Male  MRN#: [id number]   Hospital Day: 5d    HPI:  78 yo M patient with a PMH of:  - dementia, depression,   - HTN,   - cirrhosis with portal hypertension and a hospitalization for hepatic encephalopathy  - diabetes,   - HFpEF,   - rib fractures due to recurrent falls, presents to the ED for increase in his aggressive behavior.    His dementia was diagnosed 2 years ago, however, symptoms of amnesia and behavioral changes have been progressing rapidly in this span of time.  His aggressive behavior which was previously intermittent, has been worsening lately and becoming constant, associated with increase on his extremities tremor and other extrapyramidal symptoms, that whenever he ambulates, he sustains falls.    During the falls, patient is conscious, recalls the event, and family that witnesses the event denies any features of tonic- clonic or atonic seizures.  Patient and surrounding deny any chest pain, shortness of breath, cough, abdominal pain, changes in bowel/ urinary symptoms, constitutional symptoms, or other focal neurologic symptoms.    In the ED, the patient was HD stable, afebrile.  Labs were only significant for a magnesium level of 1.4 (being repleted).  CXR was negative for any acute cardiopulmonary pathology, but showed bilateral opacities that are comparable to previous chest xrays.  Pelvis xray was negative for any fracture or misalignment, and CTH was negative for any acute intracranial pathology.    Patient is being admitted to medicine for further workup and management.   (16 Feb 2023 16:23)      SUBJECTIVE  Patient is a 77y old Male who presents with a chief complaint of Aggressive behavior (20 Feb 2023 11:34)  Currently admitted to medicine with the primary diagnosis of Fall      INTERVAL HPI AND OVERNIGHT EVENTS:  Patient was examined and seen at bedside. This morning he is resting comfortably in bed and reports no issues or overnight events.    OBJECTIVE  PAST MEDICAL & SURGICAL HISTORY  Diabetes    Depression    Gout    Benign essential HTN    Osteoarthritis    Cataracts, both eyes    S/P cholecystectomy    S/P knee surgery      ALLERGIES:  Keflex (Hives)  Keflex (Unknown)    MEDICATIONS:  STANDING MEDICATIONS  aspirin  chewable 81 milliGRAM(s) Oral daily  atorvastatin 40 milliGRAM(s) Oral at bedtime  carbidopa/levodopa  25/100 1 Tablet(s) Oral <User Schedule>  chlorhexidine 2% Cloths 1 Application(s) Topical daily  cholecalciferol 2000 Unit(s) Oral daily  clopidogrel Tablet 75 milliGRAM(s) Oral daily  dextrose 5%. 1000 milliLiter(s) IV Continuous <Continuous>  dextrose 5%. 1000 milliLiter(s) IV Continuous <Continuous>  dextrose 50% Injectable 25 Gram(s) IV Push once  dextrose 50% Injectable 12.5 Gram(s) IV Push once  dextrose 50% Injectable 25 Gram(s) IV Push once  donepezil 10 milliGRAM(s) Oral at bedtime  DULoxetine 60 milliGRAM(s) Oral daily  enoxaparin Injectable 40 milliGRAM(s) SubCutaneous every 24 hours  glucagon  Injectable 1 milliGRAM(s) IntraMuscular once  insulin glargine Injectable (LANTUS) 10 Unit(s) SubCutaneous at bedtime  insulin lispro (ADMELOG) corrective regimen sliding scale   SubCutaneous three times a day before meals  lactulose Syrup 15 Gram(s) Oral every 2 hours  latanoprost 0.005% Ophthalmic Solution 1 Drop(s) Both EYES at bedtime  magnesium oxide 400 milliGRAM(s) Oral three times a day with meals  memantine 10 milliGRAM(s) Oral daily  pyridoxine 100 milliGRAM(s) Oral daily  QUEtiapine 25 milliGRAM(s) Oral at bedtime  rifAXIMin 550 milliGRAM(s) Oral two times a day    PRN MEDICATIONS  dextrose Oral Gel 15 Gram(s) Oral once PRN  haloperidol    Injectable 1 milliGRAM(s) IV Push every 8 hours PRN  midodrine. 5 milliGRAM(s) Oral three times a day PRN      VITAL SIGNS: Last 24 Hours  T(C): 36.2 (21 Feb 2023 05:00), Max: 36.5 (20 Feb 2023 23:29)  T(F): 97.1 (21 Feb 2023 05:00), Max: 97.7 (20 Feb 2023 23:29)  HR: 75 (21 Feb 2023 05:00) (73 - 87)  BP: 133/76 (21 Feb 2023 05:00) (133/76 - 162/73)  BP(mean): 96 (21 Feb 2023 05:00) (96 - 96)  RR: 18 (21 Feb 2023 05:00) (18 - 18)  SpO2: 99% (21 Feb 2023 03:30) (98% - 99%)    LABS:                        12.7   5.48  )-----------( 142      ( 20 Feb 2023 13:24 )             34.3     02-20    135  |  101  |  11  ----------------------------<  264<H>  4.0   |  28  |  0.6<L>    Ca    8.6      20 Feb 2023 13:24  Mg     1.7     02-20    TPro  6.2  /  Alb  2.7<L>  /  TBili  1.3<H>  /  DBili  x   /  AST  46<H>  /  ALT  32  /  AlkPhos  143<H>  02-20      PHYSICAL EXAM:  CONSTITUTIONAL: No acute distress, well-developed, well-groomed, AAOx2  HEAD: Atraumatic, normocephalic  EYES: EOM intact, PERRLA, conjunctiva and sclera clear  PULMONARY: Clear to auscultation bilaterally; no wheezes, rales, or rhonchi  CARDIOVASCULAR: Regular rate and rhythm; no murmurs, rubs, or gallops  GASTROINTESTINAL: Soft, non-tender, non-distended; bowel sounds present  MUSCULOSKELETAL: 2+ peripheral pulses; no clubbing, no cyanosis, no edema  NEUROLOGY: right facial drop    ASSESSMENT & PLAN  77M PMHx dementia, HTN, cirrhosis c/b portal HTN, h/o hepatic encephalopathy; DM2, HFpEF here with behavioral disturbance, frequent falls.    #Facial Drop:  - on 2/20: right facial drop noticed, pt AAO x2, rest of neurologic exam WNL  - Stroke code was called  - CT Angio Head w/ IV Cont: Apparent 8 cc mismatch volume within the right temporal lobe which is of uncertain clinical significance as above.  - CTA HEAD: No evidence of vessel occlusion or aneurysm.  - CTA NECK: No evidence of greater than 50%carotid or vertebral artery stenosis.  - TTE: EF: 60 to 65%. Elevated mean left atrial pressure. Mildly enlarged left atrium. Degenerative mitral valve. Mild mitral valve regurgitation. Severe thickening and calcification of the posterior mitral valve leaflet.  - Neuro following: MRI of brain w/o brett    #Behavioral disturbance, with frequent falls  #Dementia   On admission CTH neg  - CT chest/ abd with bl atelectasis  - UA not done, however no urinary symptoms   - REEG negative   - c/w Rifaximin, lactulose to 3 loose BM daily  - Neuro cs appreciated   - PT note, unsafe to assess, pt with significant drop in SBP and c/o dizziness on 2/17, d/c lisinopril  - Orthostatics done on 2/17: positive -> Give midodrine 5 mg 1 hr before PT eval -> 2/21: pt still very orthostatic when PT eval      #Parkinson's dementia   - Sinemet 25/100 BID  - Donepezil 10  - Duloxetine 60  - Memantine 10    #HTN  - Hold lisinopril 20 due to orthostasis during PT eval on 2/17  Patient has hx of orthostatic hypotension per family, will need midodrine one hour prior to PT eval     #Liver Cirrhosis  - CT abd c/w cirrhosis, +portal htn  - Rifaximin, lactulose as above  outpt f/u    #DM2  outpt f/u    #HFpEF, chronic  outpt f/u    #DVT ppx  Lovenox    Pending (specify):  PT f/u, STR placement    Disposition: STR

## 2023-02-22 LAB
ALBUMIN SERPL ELPH-MCNC: 2.6 G/DL — LOW (ref 3.5–5.2)
ALP SERPL-CCNC: 113 U/L — SIGNIFICANT CHANGE UP (ref 30–115)
ALT FLD-CCNC: 13 U/L — SIGNIFICANT CHANGE UP (ref 0–41)
ANION GAP SERPL CALC-SCNC: 8 MMOL/L — SIGNIFICANT CHANGE UP (ref 7–14)
AST SERPL-CCNC: 42 U/L — HIGH (ref 0–41)
BILIRUB SERPL-MCNC: 1.7 MG/DL — HIGH (ref 0.2–1.2)
BUN SERPL-MCNC: 12 MG/DL — SIGNIFICANT CHANGE UP (ref 10–20)
CALCIUM SERPL-MCNC: 8.3 MG/DL — LOW (ref 8.4–10.5)
CHLORIDE SERPL-SCNC: 106 MMOL/L — SIGNIFICANT CHANGE UP (ref 98–110)
CO2 SERPL-SCNC: 26 MMOL/L — SIGNIFICANT CHANGE UP (ref 17–32)
CREAT SERPL-MCNC: 0.6 MG/DL — LOW (ref 0.7–1.5)
EGFR: 99 ML/MIN/1.73M2 — SIGNIFICANT CHANGE UP
GLUCOSE BLDC GLUCOMTR-MCNC: 143 MG/DL — HIGH (ref 70–99)
GLUCOSE BLDC GLUCOMTR-MCNC: 154 MG/DL — HIGH (ref 70–99)
GLUCOSE BLDC GLUCOMTR-MCNC: 167 MG/DL — HIGH (ref 70–99)
GLUCOSE BLDC GLUCOMTR-MCNC: 359 MG/DL — HIGH (ref 70–99)
GLUCOSE SERPL-MCNC: 147 MG/DL — HIGH (ref 70–99)
HCT VFR BLD CALC: 34 % — LOW (ref 42–52)
HGB BLD-MCNC: 12.2 G/DL — LOW (ref 14–18)
MAGNESIUM SERPL-MCNC: 1.8 MG/DL — SIGNIFICANT CHANGE UP (ref 1.8–2.4)
MCHC RBC-ENTMCNC: 35.9 G/DL — SIGNIFICANT CHANGE UP (ref 32–37)
MCHC RBC-ENTMCNC: 36.2 PG — HIGH (ref 27–31)
MCV RBC AUTO: 100.9 FL — HIGH (ref 80–94)
NRBC # BLD: 0 /100 WBCS — SIGNIFICANT CHANGE UP (ref 0–0)
PLATELET # BLD AUTO: 135 K/UL — SIGNIFICANT CHANGE UP (ref 130–400)
POTASSIUM SERPL-MCNC: 4 MMOL/L — SIGNIFICANT CHANGE UP (ref 3.5–5)
POTASSIUM SERPL-SCNC: 4 MMOL/L — SIGNIFICANT CHANGE UP (ref 3.5–5)
PROT SERPL-MCNC: 5.8 G/DL — LOW (ref 6–8)
RBC # BLD: 3.37 M/UL — LOW (ref 4.7–6.1)
RBC # FLD: 14.3 % — SIGNIFICANT CHANGE UP (ref 11.5–14.5)
SODIUM SERPL-SCNC: 140 MMOL/L — SIGNIFICANT CHANGE UP (ref 135–146)
WBC # BLD: 5.03 K/UL — SIGNIFICANT CHANGE UP (ref 4.8–10.8)
WBC # FLD AUTO: 5.03 K/UL — SIGNIFICANT CHANGE UP (ref 4.8–10.8)

## 2023-02-22 PROCEDURE — 99232 SBSQ HOSP IP/OBS MODERATE 35: CPT

## 2023-02-22 RX ORDER — MIDODRINE HYDROCHLORIDE 2.5 MG/1
5 TABLET ORAL THREE TIMES A DAY
Refills: 0 | Status: DISCONTINUED | OUTPATIENT
Start: 2023-02-22 | End: 2023-02-23

## 2023-02-22 RX ADMIN — Medication 2 UNIT(S): at 12:05

## 2023-02-22 RX ADMIN — MIDODRINE HYDROCHLORIDE 5 MILLIGRAM(S): 2.5 TABLET ORAL at 12:19

## 2023-02-22 RX ADMIN — CARBIDOPA AND LEVODOPA 1 TABLET(S): 25; 100 TABLET ORAL at 05:50

## 2023-02-22 RX ADMIN — CARBIDOPA AND LEVODOPA 1 TABLET(S): 25; 100 TABLET ORAL at 17:35

## 2023-02-22 RX ADMIN — Medication 1: at 07:47

## 2023-02-22 RX ADMIN — MAGNESIUM OXIDE 400 MG ORAL TABLET 400 MILLIGRAM(S): 241.3 TABLET ORAL at 07:49

## 2023-02-22 RX ADMIN — MEMANTINE HYDROCHLORIDE 10 MILLIGRAM(S): 10 TABLET ORAL at 12:16

## 2023-02-22 RX ADMIN — ATORVASTATIN CALCIUM 40 MILLIGRAM(S): 80 TABLET, FILM COATED ORAL at 22:09

## 2023-02-22 RX ADMIN — Medication 2 UNIT(S): at 07:48

## 2023-02-22 RX ADMIN — LACTULOSE 15 GRAM(S): 10 SOLUTION ORAL at 07:49

## 2023-02-22 RX ADMIN — LACTULOSE 15 GRAM(S): 10 SOLUTION ORAL at 12:15

## 2023-02-22 RX ADMIN — MAGNESIUM OXIDE 400 MG ORAL TABLET 400 MILLIGRAM(S): 241.3 TABLET ORAL at 12:18

## 2023-02-22 RX ADMIN — LACTULOSE 15 GRAM(S): 10 SOLUTION ORAL at 05:51

## 2023-02-22 RX ADMIN — DONEPEZIL HYDROCHLORIDE 10 MILLIGRAM(S): 10 TABLET, FILM COATED ORAL at 22:09

## 2023-02-22 RX ADMIN — LATANOPROST 1 DROP(S): 0.05 SOLUTION/ DROPS OPHTHALMIC; TOPICAL at 22:11

## 2023-02-22 RX ADMIN — QUETIAPINE FUMARATE 25 MILLIGRAM(S): 200 TABLET, FILM COATED ORAL at 22:09

## 2023-02-22 RX ADMIN — LACTULOSE 15 GRAM(S): 10 SOLUTION ORAL at 17:34

## 2023-02-22 RX ADMIN — MIDODRINE HYDROCHLORIDE 5 MILLIGRAM(S): 2.5 TABLET ORAL at 16:21

## 2023-02-22 RX ADMIN — INSULIN GLARGINE 10 UNIT(S): 100 INJECTION, SOLUTION SUBCUTANEOUS at 22:10

## 2023-02-22 RX ADMIN — MAGNESIUM OXIDE 400 MG ORAL TABLET 400 MILLIGRAM(S): 241.3 TABLET ORAL at 17:34

## 2023-02-22 RX ADMIN — Medication 2 UNIT(S): at 16:20

## 2023-02-22 RX ADMIN — CLOPIDOGREL BISULFATE 75 MILLIGRAM(S): 75 TABLET, FILM COATED ORAL at 12:17

## 2023-02-22 RX ADMIN — Medication 81 MILLIGRAM(S): at 12:15

## 2023-02-22 RX ADMIN — LACTULOSE 15 GRAM(S): 10 SOLUTION ORAL at 00:32

## 2023-02-22 RX ADMIN — DULOXETINE HYDROCHLORIDE 60 MILLIGRAM(S): 30 CAPSULE, DELAYED RELEASE ORAL at 12:17

## 2023-02-22 RX ADMIN — LACTULOSE 15 GRAM(S): 10 SOLUTION ORAL at 16:20

## 2023-02-22 RX ADMIN — Medication 2000 UNIT(S): at 12:17

## 2023-02-22 RX ADMIN — ENOXAPARIN SODIUM 40 MILLIGRAM(S): 100 INJECTION SUBCUTANEOUS at 12:07

## 2023-02-22 RX ADMIN — Medication 5: at 12:05

## 2023-02-22 RX ADMIN — CHLORHEXIDINE GLUCONATE 1 APPLICATION(S): 213 SOLUTION TOPICAL at 12:14

## 2023-02-22 RX ADMIN — Medication 100 MILLIGRAM(S): at 12:16

## 2023-02-22 NOTE — CHART NOTE - NSCHARTNOTEFT_GEN_A_CORE
MRI reviewed, no further inpatient Neurologic work up. Cont ASA/Plavix x3 months, statin. Discussed with attending.

## 2023-02-22 NOTE — PROGRESS NOTE ADULT - SUBJECTIVE AND OBJECTIVE BOX
STACY CARDENAS 77y Male  MRN#: [id number]   Hospital Day: 6d    HPI:  76 yo M patient with a PMH of:  - dementia, depression,   - HTN,   - cirrhosis with portal hypertension and a hospitalization for hepatic encephalopathy  - diabetes,   - HFpEF,   - rib fractures due to recurrent falls, presents to the ED for increase in his aggressive behavior.    His dementia was diagnosed 2 years ago, however, symptoms of amnesia and behavioral changes have been progressing rapidly in this span of time.  His aggressive behavior which was previously intermittent, has been worsening lately and becoming constant, associated with increase on his extremities tremor and other extrapyramidal symptoms, that whenever he ambulates, he sustains falls.    During the falls, patient is conscious, recalls the event, and family that witnesses the event denies any features of tonic- clonic or atonic seizures.  Patient and surrounding deny any chest pain, shortness of breath, cough, abdominal pain, changes in bowel/ urinary symptoms, constitutional symptoms, or other focal neurologic symptoms.    In the ED, the patient was HD stable, afebrile.  Labs were only significant for a magnesium level of 1.4 (being repleted).  CXR was negative for any acute cardiopulmonary pathology, but showed bilateral opacities that are comparable to previous chest xrays.  Pelvis xray was negative for any fracture or misalignment, and CTH was negative for any acute intracranial pathology.    Patient is being admitted to medicine for further workup and management.   (16 Feb 2023 16:23)      SUBJECTIVE  Patient is a 77y old Male who presents with a chief complaint of Aggressive behavior (21 Feb 2023 14:06)  Currently admitted to medicine with the primary diagnosis of Fall      INTERVAL HPI AND OVERNIGHT EVENTS:  Patient was examined and seen at bedside. This morning he is resting comfortably in bed and reports no issues or overnight events.    OBJECTIVE  PAST MEDICAL & SURGICAL HISTORY  Diabetes    Depression    Gout    Benign essential HTN    Osteoarthritis    Cataracts, both eyes    S/P cholecystectomy    S/P knee surgery      ALLERGIES:  Keflex (Hives)  Keflex (Unknown)    MEDICATIONS:  STANDING MEDICATIONS  aspirin  chewable 81 milliGRAM(s) Oral daily  atorvastatin 40 milliGRAM(s) Oral at bedtime  carbidopa/levodopa  25/100 1 Tablet(s) Oral <User Schedule>  chlorhexidine 2% Cloths 1 Application(s) Topical daily  cholecalciferol 2000 Unit(s) Oral daily  clopidogrel Tablet 75 milliGRAM(s) Oral daily  dextrose 5%. 1000 milliLiter(s) IV Continuous <Continuous>  dextrose 5%. 1000 milliLiter(s) IV Continuous <Continuous>  dextrose 50% Injectable 25 Gram(s) IV Push once  dextrose 50% Injectable 12.5 Gram(s) IV Push once  dextrose 50% Injectable 25 Gram(s) IV Push once  donepezil 10 milliGRAM(s) Oral at bedtime  DULoxetine 60 milliGRAM(s) Oral daily  enoxaparin Injectable 40 milliGRAM(s) SubCutaneous every 24 hours  glucagon  Injectable 1 milliGRAM(s) IntraMuscular once  insulin glargine Injectable (LANTUS) 10 Unit(s) SubCutaneous at bedtime  insulin lispro (ADMELOG) corrective regimen sliding scale   SubCutaneous three times a day before meals  insulin lispro Injectable (ADMELOG) 2 Unit(s) SubCutaneous three times a day before meals  lactulose Syrup 15 Gram(s) Oral every 2 hours  latanoprost 0.005% Ophthalmic Solution 1 Drop(s) Both EYES at bedtime  magnesium oxide 400 milliGRAM(s) Oral three times a day with meals  memantine 10 milliGRAM(s) Oral daily  pyridoxine 100 milliGRAM(s) Oral daily  QUEtiapine 25 milliGRAM(s) Oral at bedtime  rifAXIMin 550 milliGRAM(s) Oral two times a day    PRN MEDICATIONS  dextrose Oral Gel 15 Gram(s) Oral once PRN  haloperidol    Injectable 1 milliGRAM(s) IV Push every 8 hours PRN  midodrine. 5 milliGRAM(s) Oral three times a day PRN      VITAL SIGNS: Last 24 Hours  T(C): 36.4 (22 Feb 2023 04:33), Max: 36.4 (22 Feb 2023 04:33)  T(F): 97.6 (22 Feb 2023 04:33), Max: 97.6 (22 Feb 2023 04:33)  HR: 81 (22 Feb 2023 04:33) (81 - 94)  BP: 127/59 (22 Feb 2023 04:33) (125/67 - 180/81)  BP(mean): --  RR: 17 (22 Feb 2023 04:33) (17 - 18)  SpO2: --    LABS:                        12.7   5.48  )-----------( 142      ( 20 Feb 2023 13:24 )             34.3     02-20    135  |  101  |  11  ----------------------------<  264<H>  4.0   |  28  |  0.6<L>    Ca    8.6      20 Feb 2023 13:24  Mg     1.7     02-20    TPro  6.2  /  Alb  2.7<L>  /  TBili  1.3<H>  /  DBili  x   /  AST  46<H>  /  ALT  32  /  AlkPhos  143<H>  02-20      PHYSICAL EXAM:  CONSTITUTIONAL: No acute distress, well-developed, well-groomed, AAOx2  HEAD: Atraumatic, normocephalic  EYES: EOM intact, PERRLA, conjunctiva and sclera clear  PULMONARY: Clear to auscultation bilaterally; no wheezes, rales, or rhonchi  CARDIOVASCULAR: Regular rate and rhythm; no murmurs, rubs, or gallops  GASTROINTESTINAL: Soft, non-tender, non-distended; bowel sounds present  MUSCULOSKELETAL: 2+ peripheral pulses; no clubbing, no cyanosis, no edema  NEUROLOGY: right facial drop    ASSESSMENT & PLAN  77M PMHx dementia, HTN, cirrhosis c/b portal HTN, h/o hepatic encephalopathy; DM2, HFpEF here with behavioral disturbance, frequent falls.    #Facial Drop:  - on 2/20: right facial drop noticed, pt AAO x2, rest of neurologic exam WNL  - Stroke code was called  - CT Angio Head w/ IV Cont: Apparent 8 cc mismatch volume within the right temporal lobe which is of uncertain clinical significance as above.  - CTA HEAD: No evidence of vessel occlusion or aneurysm.  - CTA NECK: No evidence of greater than 50%carotid or vertebral artery stenosis.  - TTE: EF: 60 to 65%. Elevated mean left atrial pressure. Mildly enlarged left atrium. Degenerative mitral valve. Mild mitral valve regurgitation. Severe thickening and calcification of the posterior mitral valve leaflet.  - Neuro following: MRI of brain w/o brett: f/u results     #Behavioral disturbance, with frequent falls  #Dementia   On admission CTH neg  - CT chest/ abd with bl atelectasis  - UA not done, however no urinary symptoms   - REEG negative   - c/w Rifaximin, lactulose to 3 loose BM daily  - Neuro cs appreciated   - PT note, unsafe to assess, pt with significant drop in SBP and c/o dizziness on 2/17, d/c lisinopril  - Orthostatics done on 2/17: positive -> Give midodrine 5 mg 1 hr before PT eval -> 2/21: pt still very orthostatic when PT eval -> abdominal binder and LE compression wraps     #Parkinson's dementia   - Sinemet 25/100 BID  - Donepezil 10  - Duloxetine 60  - Memantine 10    #HTN  - Hold lisinopril 20 due to orthostasis during PT eval on 2/17  Patient has hx of orthostatic hypotension per family, will need midodrine one hour prior to PT eval     #Liver Cirrhosis  - CT abd c/w cirrhosis, +portal htn  - Rifaximin, lactulose as above  outpt f/u    #DM2  outpt f/u    #HFpEF, chronic  outpt f/u    #DVT ppx  Lovenox    Pending (specify):  PT f/u, STR placement    Disposition: STR    STACY CARDENAS 77y Male  MRN#: [id number]   Hospital Day: 6d    HPI:  78 yo M patient with a PMH of:  - dementia, depression,   - HTN,   - cirrhosis with portal hypertension and a hospitalization for hepatic encephalopathy  - diabetes,   - HFpEF,   - rib fractures due to recurrent falls, presents to the ED for increase in his aggressive behavior.    His dementia was diagnosed 2 years ago, however, symptoms of amnesia and behavioral changes have been progressing rapidly in this span of time.  His aggressive behavior which was previously intermittent, has been worsening lately and becoming constant, associated with increase on his extremities tremor and other extrapyramidal symptoms, that whenever he ambulates, he sustains falls.    During the falls, patient is conscious, recalls the event, and family that witnesses the event denies any features of tonic- clonic or atonic seizures.  Patient and surrounding deny any chest pain, shortness of breath, cough, abdominal pain, changes in bowel/ urinary symptoms, constitutional symptoms, or other focal neurologic symptoms.    In the ED, the patient was HD stable, afebrile.  Labs were only significant for a magnesium level of 1.4 (being repleted).  CXR was negative for any acute cardiopulmonary pathology, but showed bilateral opacities that are comparable to previous chest xrays.  Pelvis xray was negative for any fracture or misalignment, and CTH was negative for any acute intracranial pathology.    Patient is being admitted to medicine for further workup and management.   (16 Feb 2023 16:23)      SUBJECTIVE  Patient is a 77y old Male who presents with a chief complaint of Aggressive behavior (21 Feb 2023 14:06)  Currently admitted to medicine with the primary diagnosis of Fall      INTERVAL HPI AND OVERNIGHT EVENTS:  Patient was examined and seen at bedside. This morning he is resting comfortably in bed and reports no issues or overnight events.    OBJECTIVE  PAST MEDICAL & SURGICAL HISTORY  Diabetes    Depression    Gout    Benign essential HTN    Osteoarthritis    Cataracts, both eyes    S/P cholecystectomy    S/P knee surgery      ALLERGIES:  Keflex (Hives)  Keflex (Unknown)    MEDICATIONS:  STANDING MEDICATIONS  aspirin  chewable 81 milliGRAM(s) Oral daily  atorvastatin 40 milliGRAM(s) Oral at bedtime  carbidopa/levodopa  25/100 1 Tablet(s) Oral <User Schedule>  chlorhexidine 2% Cloths 1 Application(s) Topical daily  cholecalciferol 2000 Unit(s) Oral daily  clopidogrel Tablet 75 milliGRAM(s) Oral daily  dextrose 5%. 1000 milliLiter(s) IV Continuous <Continuous>  dextrose 5%. 1000 milliLiter(s) IV Continuous <Continuous>  dextrose 50% Injectable 25 Gram(s) IV Push once  dextrose 50% Injectable 12.5 Gram(s) IV Push once  dextrose 50% Injectable 25 Gram(s) IV Push once  donepezil 10 milliGRAM(s) Oral at bedtime  DULoxetine 60 milliGRAM(s) Oral daily  enoxaparin Injectable 40 milliGRAM(s) SubCutaneous every 24 hours  glucagon  Injectable 1 milliGRAM(s) IntraMuscular once  insulin glargine Injectable (LANTUS) 10 Unit(s) SubCutaneous at bedtime  insulin lispro (ADMELOG) corrective regimen sliding scale   SubCutaneous three times a day before meals  insulin lispro Injectable (ADMELOG) 2 Unit(s) SubCutaneous three times a day before meals  lactulose Syrup 15 Gram(s) Oral every 2 hours  latanoprost 0.005% Ophthalmic Solution 1 Drop(s) Both EYES at bedtime  magnesium oxide 400 milliGRAM(s) Oral three times a day with meals  memantine 10 milliGRAM(s) Oral daily  pyridoxine 100 milliGRAM(s) Oral daily  QUEtiapine 25 milliGRAM(s) Oral at bedtime  rifAXIMin 550 milliGRAM(s) Oral two times a day    PRN MEDICATIONS  dextrose Oral Gel 15 Gram(s) Oral once PRN  haloperidol    Injectable 1 milliGRAM(s) IV Push every 8 hours PRN  midodrine. 5 milliGRAM(s) Oral three times a day PRN      VITAL SIGNS: Last 24 Hours  T(C): 36.4 (22 Feb 2023 04:33), Max: 36.4 (22 Feb 2023 04:33)  T(F): 97.6 (22 Feb 2023 04:33), Max: 97.6 (22 Feb 2023 04:33)  HR: 81 (22 Feb 2023 04:33) (81 - 94)  BP: 127/59 (22 Feb 2023 04:33) (125/67 - 180/81)  BP(mean): --  RR: 17 (22 Feb 2023 04:33) (17 - 18)  SpO2: --    LABS:                        12.7   5.48  )-----------( 142      ( 20 Feb 2023 13:24 )             34.3     02-20    135  |  101  |  11  ----------------------------<  264<H>  4.0   |  28  |  0.6<L>    Ca    8.6      20 Feb 2023 13:24  Mg     1.7     02-20    TPro  6.2  /  Alb  2.7<L>  /  TBili  1.3<H>  /  DBili  x   /  AST  46<H>  /  ALT  32  /  AlkPhos  143<H>  02-20      PHYSICAL EXAM:  CONSTITUTIONAL: No acute distress, well-developed, well-groomed, AAOx2  HEAD: Atraumatic, normocephalic  EYES: EOM intact, PERRLA, conjunctiva and sclera clear  PULMONARY: Clear to auscultation bilaterally; no wheezes, rales, or rhonchi  CARDIOVASCULAR: Regular rate and rhythm; no murmurs, rubs, or gallops  GASTROINTESTINAL: Soft, non-tender, non-distended; bowel sounds present  MUSCULOSKELETAL: 2+ peripheral pulses; no clubbing, no cyanosis, no edema  NEUROLOGY: right facial drop    ASSESSMENT & PLAN  77M PMHx dementia, HTN, cirrhosis c/b portal HTN, h/o hepatic encephalopathy; DM2, HFpEF here with behavioral disturbance, frequent falls.    #Facial Drop:  - on 2/20: right facial drop noticed, pt AAO x2, rest of neurologic exam WNL  - Stroke code was called  - CT Angio Head w/ IV Cont: Apparent 8 cc mismatch volume within the right temporal lobe which is of uncertain clinical significance as above.  - CTA HEAD: No evidence of vessel occlusion or aneurysm.  - CTA NECK: No evidence of greater than 50%carotid or vertebral artery stenosis.  - TTE: EF: 60 to 65%. Elevated mean left atrial pressure. Mildly enlarged left atrium. Degenerative mitral valve. Mild mitral valve regurgitation. Severe thickening and calcification of the posterior mitral valve leaflet.  - Neuro following: MRI of brain w/o brett: negative     #Behavioral disturbance, with frequent falls  #Dementia   On admission CTH neg  - CT chest/ abd with bl atelectasis  - UA not done, however no urinary symptoms   - REEG negative   - c/w Rifaximin, lactulose to 3 loose BM daily  - Neuro cs appreciated   - PT note, unsafe to assess, pt with significant drop in SBP and c/o dizziness on 2/17, d/c lisinopril  - Orthostatics done on 2/17: positive -> Give midodrine 5 mg 1 hr before PT eval -> 2/21: pt still very orthostatic when PT eval -> abdominal binder and LE compression wraps   - c/w Midodrine TID, hold if BP>130/80      #Parkinson's dementia   - Sinemet 25/100 BID  - Donepezil 10  - Duloxetine 60  - Memantine 10    #HTN  - Hold lisinopril 20 due to orthostasis during PT eval on 2/17  - Patient has hx of orthostatic hypotension per family, will need midodrine one hour prior to PT eval     #Liver Cirrhosis  - CT abd c/w cirrhosis, +portal htn  - Rifaximin, lactulose as above  outpt f/u    #DM2  outpt f/u    #HFpEF, chronic  outpt f/u    #DVT ppx  Lovenox    Pending (specify):  PT f/u, STR placement    Disposition: STR

## 2023-02-22 NOTE — PROGRESS NOTE ADULT - ATTENDING COMMENTS
***My note supersedes any discrepancies that may be above in the resident's assessment and plan***    77M PMHx dementia, HTN, cirrhosis c/b portal HTN, h/o hepatic encephalopathy; DM2, HFpEF here with behavioral disturbance, frequent falls.    #Facial Drop:  - on 2/20: right facial drop noticed, pt AAO x2, rest of neurologic exam WNL  - Stroke code was called  - Pt was sent to urgent CT< from:          CT Angio Head w/ IV Cont (02.20.23 @ 10:58) >       Apparent 8 cc mismatch volume within the right temporal lobe which is of        uncertain clinical significance as above.         CTA HEAD:       No evidence of vessel occlusion or aneurysm.         CTA NECK:       No evidence of greater than 50%carotid or vertebral artery stenosis.    - Neuro following: MRI of brain w/o brett, TTE        #Behavioral disturbance, with frequent falls  #Dementia   On admission CTH neg  - CT chest/ abd with bl atelectasis  - UA not done, however no urinary symptoms   - REEG negative   - c/w Rifaximin, lactulose to 3 loose BM daily  - Neuro cs appreciated   - PT note, unsafe to assess, pt with significant drop in SBP and c/o dizziness on 2/17, d/c lisinopril  - Orthostatics done on 2/17: positive -> Give midodrine 5 mg 1 hr before PT eval     #Parkinson's dementia   - Sinemet 25/100 BID  - Donepezil 10  - Duloxetine 60  - Memantine 10    #HTN  - Hold lisinopril 20 due to orthostasis during PT eval on 2/17  Patient has hx of orthostatic hypotension per family, will need midodrine one hour prior to PT eval     #Liver Cirrhosis  - CT abd c/w cirrhosis, +portal htn  - Rifaximin, lactulose as above  outpt f/u    #DM2  outpt f/u    #HFpEF, chronic  outpt f/u    #DVT ppx  Lovenox    Pending (specify):  PT f/u, STR placement    Disposition: STR
***My note supersedes any discrepancies that may be above in the resident's assessment and plan***    77M PMHx dementia, HTN, cirrhosis c/b portal HTN, h/o hepatic encephalopathy; DM2, HFpEF here with behavioral disturbance, frequent falls.    #Facial Drop:  - on 2/20: right facial drop noticed, pt AAO x2, rest of neurologic exam WNL  - Stroke code was called  - Pt was sent to urgent CT< from:          CT Angio Head w/ IV Cont (02.20.23 @ 10:58) >       Apparent 8 cc mismatch volume within the right temporal lobe which is of        uncertain clinical significance as above.         CTA HEAD:       No evidence of vessel occlusion or aneurysm.         CTA NECK:       No evidence of greater than 50%carotid or vertebral artery stenosis.    - Neuro following: MRI of brain w/o brett, TTE        #Behavioral disturbance, with frequent falls  #Dementia   On admission CTH neg  - CT chest/ abd with bl atelectasis  - UA not done, however no urinary symptoms   - REEG negative   - c/w Rifaximin, lactulose to 3 loose BM daily  - Neuro cs appreciated   - PT note, unsafe to assess, pt with significant drop in SBP and c/o dizziness on 2/17, d/c lisinopril  - Orthostatics done on 2/17: positive -> Give midodrine 5 mg 1 hr before PT eval     #Parkinson's dementia   - Sinemet 25/100 BID  - Donepezil 10  - Duloxetine 60  - Memantine 10    #HTN  - Hold lisinopril 20 due to orthostasis during PT eval on 2/17  Patient has hx of orthostatic hypotension per family, will need midodrine one hour prior to PT eval     #Liver Cirrhosis  - CT abd c/w cirrhosis, +portal htn  - Rifaximin, lactulose as above  outpt f/u    #DM2  outpt f/u    #HFpEF, chronic  outpt f/u    #DVT ppx  Lovenox    Pending (specify):  PT f/u, STR placement  , MRI of brain, Neuro f/up  Disposition: STR .
77M PMHx dementia, HTN, cirrhosis c/b portal HTN, h/o hepatic encephalopathy; DM2, HFpEF here with behavioral disturbance, frequent falls.    #Facial Drop:  - on 2/20: right facial drop noticed, pt AAO x2, rest of neurologic exam WNL  - Stroke code was called  - Pt was sent to urgent CT  CT Angio Head w/ IV Cont (02.20.23 @ 10:58) >       Apparent 8 cc mismatch volume within the right temporal lobe which is of        uncertain clinical significance as above.  CTA HEAD:       No evidence of vessel occlusion or aneurysm.  CTA NECK:       No evidence of greater than 50%carotid or vertebral artery stenosis.  - Neuro following: MRI of brain w/o brett- negative     #Behavioral disturbance, with frequent falls  #Dementia   #Orthostatic Hypotension   - REEG negative   - c/w Rifaximin, lactulose to 3 loose BM daily  - Neuro cs appreciated   - PT note, unsafe to assess, pt with significant drop in SBP  - start midodrine and monitor orthostatics     #Parkinson's dementia   - Sinemet 25/100 BID  - Donepezil 10  - Duloxetine 60  - Memantine 10    #HTN  - lisinopril dc'd     #Liver Cirrhosis  - CT abd c/w cirrhosis, +portal htn  - Rifaximin, lactulose as above    #DM2- c/w basal bolus     #HFpEF, chronic- currently euvolemic     #DVT ppx- Lovenox    Pending (specify):  pending STR placement       Total time spent to complete patient's bedside assessment, review medical chart, discuss medical plan of care with covering medical team was more than 35 minutes  with >50% of time spent face to face with patient, discussion with patient/family and/or coordination of care      Michelle Keller DO

## 2023-02-22 NOTE — PROGRESS NOTE ADULT - PROVIDER SPECIALTY LIST ADULT
Internal Medicine
Neurology
Internal Medicine

## 2023-02-23 ENCOUNTER — TRANSCRIPTION ENCOUNTER (OUTPATIENT)
Age: 78
End: 2023-02-23

## 2023-02-23 VITALS
DIASTOLIC BLOOD PRESSURE: 62 MMHG | OXYGEN SATURATION: 96 % | SYSTOLIC BLOOD PRESSURE: 132 MMHG | TEMPERATURE: 98 F | RESPIRATION RATE: 16 BRPM | HEART RATE: 74 BPM

## 2023-02-23 LAB
FOLATE SERPL-MCNC: 14.5 NG/ML — SIGNIFICANT CHANGE UP
GLUCOSE BLDC GLUCOMTR-MCNC: 131 MG/DL — HIGH (ref 70–99)
GLUCOSE BLDC GLUCOMTR-MCNC: 169 MG/DL — HIGH (ref 70–99)
GLUCOSE BLDC GLUCOMTR-MCNC: 286 MG/DL — HIGH (ref 70–99)
SARS-COV-2 RNA SPEC QL NAA+PROBE: SIGNIFICANT CHANGE UP
VIT B12 SERPL-MCNC: 761 PG/ML — SIGNIFICANT CHANGE UP (ref 232–1245)

## 2023-02-23 PROCEDURE — 99239 HOSP IP/OBS DSCHRG MGMT >30: CPT

## 2023-02-23 RX ORDER — MIDODRINE HYDROCHLORIDE 2.5 MG/1
1 TABLET ORAL
Qty: 0 | Refills: 0 | DISCHARGE
Start: 2023-02-23

## 2023-02-23 RX ORDER — ASPIRIN/CALCIUM CARB/MAGNESIUM 324 MG
1 TABLET ORAL
Qty: 0 | Refills: 0 | DISCHARGE

## 2023-02-23 RX ORDER — DULOXETINE HYDROCHLORIDE 30 MG/1
1 CAPSULE, DELAYED RELEASE ORAL
Qty: 0 | Refills: 0 | DISCHARGE
Start: 2023-02-23

## 2023-02-23 RX ORDER — MAGNESIUM OXIDE 400 MG ORAL TABLET 241.3 MG
1 TABLET ORAL
Qty: 0 | Refills: 0 | DISCHARGE
Start: 2023-02-23

## 2023-02-23 RX ORDER — ASPIRIN/CALCIUM CARB/MAGNESIUM 324 MG
1 TABLET ORAL
Qty: 0 | Refills: 0 | DISCHARGE
Start: 2023-02-23

## 2023-02-23 RX ORDER — RIFAXIMIN 200 MG/1
1 TABLET ORAL
Qty: 0 | Refills: 0 | DISCHARGE
Start: 2023-02-23

## 2023-02-23 RX ORDER — QUINAPRIL HYDROCHLORIDE 40 MG/1
1 TABLET, FILM COATED ORAL
Qty: 0 | Refills: 0 | DISCHARGE

## 2023-02-23 RX ORDER — QUETIAPINE FUMARATE 200 MG/1
12.5 TABLET, FILM COATED ORAL AT BEDTIME
Refills: 0 | Status: DISCONTINUED | OUTPATIENT
Start: 2023-02-23 | End: 2023-02-23

## 2023-02-23 RX ORDER — QUETIAPINE FUMARATE 200 MG/1
12.5 TABLET, FILM COATED ORAL
Qty: 0 | Refills: 0 | DISCHARGE
Start: 2023-02-23

## 2023-02-23 RX ORDER — DULOXETINE HYDROCHLORIDE 30 MG/1
1 CAPSULE, DELAYED RELEASE ORAL
Qty: 0 | Refills: 0 | DISCHARGE

## 2023-02-23 RX ORDER — LACTULOSE 10 G/15ML
15 SOLUTION ORAL
Refills: 0 | Status: DISCONTINUED | OUTPATIENT
Start: 2023-02-23 | End: 2023-02-23

## 2023-02-23 RX ORDER — QUETIAPINE FUMARATE 200 MG/1
1 TABLET, FILM COATED ORAL
Qty: 0 | Refills: 0 | DISCHARGE
Start: 2023-02-23

## 2023-02-23 RX ORDER — CLOPIDOGREL BISULFATE 75 MG/1
1 TABLET, FILM COATED ORAL
Qty: 0 | Refills: 0 | DISCHARGE
Start: 2023-02-23

## 2023-02-23 RX ORDER — ATORVASTATIN CALCIUM 80 MG/1
1 TABLET, FILM COATED ORAL
Qty: 0 | Refills: 0 | DISCHARGE
Start: 2023-02-23

## 2023-02-23 RX ADMIN — LACTULOSE 15 GRAM(S): 10 SOLUTION ORAL at 12:09

## 2023-02-23 RX ADMIN — LACTULOSE 15 GRAM(S): 10 SOLUTION ORAL at 05:44

## 2023-02-23 RX ADMIN — LACTULOSE 15 GRAM(S): 10 SOLUTION ORAL at 17:25

## 2023-02-23 RX ADMIN — Medication 100 MILLIGRAM(S): at 12:07

## 2023-02-23 RX ADMIN — CLOPIDOGREL BISULFATE 75 MILLIGRAM(S): 75 TABLET, FILM COATED ORAL at 12:07

## 2023-02-23 RX ADMIN — CARBIDOPA AND LEVODOPA 1 TABLET(S): 25; 100 TABLET ORAL at 17:22

## 2023-02-23 RX ADMIN — Medication 2 UNIT(S): at 12:06

## 2023-02-23 RX ADMIN — Medication 2 UNIT(S): at 08:38

## 2023-02-23 RX ADMIN — LACTULOSE 15 GRAM(S): 10 SOLUTION ORAL at 08:39

## 2023-02-23 RX ADMIN — Medication 3: at 12:06

## 2023-02-23 RX ADMIN — ENOXAPARIN SODIUM 40 MILLIGRAM(S): 100 INJECTION SUBCUTANEOUS at 12:09

## 2023-02-23 RX ADMIN — Medication 1: at 17:22

## 2023-02-23 RX ADMIN — MIDODRINE HYDROCHLORIDE 5 MILLIGRAM(S): 2.5 TABLET ORAL at 05:43

## 2023-02-23 RX ADMIN — MAGNESIUM OXIDE 400 MG ORAL TABLET 400 MILLIGRAM(S): 241.3 TABLET ORAL at 17:23

## 2023-02-23 RX ADMIN — MEMANTINE HYDROCHLORIDE 10 MILLIGRAM(S): 10 TABLET ORAL at 12:07

## 2023-02-23 RX ADMIN — Medication 2000 UNIT(S): at 12:07

## 2023-02-23 RX ADMIN — CHLORHEXIDINE GLUCONATE 1 APPLICATION(S): 213 SOLUTION TOPICAL at 12:21

## 2023-02-23 RX ADMIN — MAGNESIUM OXIDE 400 MG ORAL TABLET 400 MILLIGRAM(S): 241.3 TABLET ORAL at 08:23

## 2023-02-23 RX ADMIN — Medication 2 UNIT(S): at 17:22

## 2023-02-23 RX ADMIN — MAGNESIUM OXIDE 400 MG ORAL TABLET 400 MILLIGRAM(S): 241.3 TABLET ORAL at 12:07

## 2023-02-23 RX ADMIN — Medication 81 MILLIGRAM(S): at 12:08

## 2023-02-23 RX ADMIN — DULOXETINE HYDROCHLORIDE 60 MILLIGRAM(S): 30 CAPSULE, DELAYED RELEASE ORAL at 12:09

## 2023-02-23 RX ADMIN — CARBIDOPA AND LEVODOPA 1 TABLET(S): 25; 100 TABLET ORAL at 05:43

## 2023-02-23 NOTE — DISCHARGE NOTE PROVIDER - ATTENDING DISCHARGE PHYSICAL EXAMINATION:
CONSTITUTIONAL: No acute distress, well-developed, well-groomed, AAOx2  HEAD: Atraumatic, normocephalic  EYES: EOM intact, PERRLA, conjunctiva and sclera clear  PULMONARY: Clear to auscultation bilaterally; no wheezes, rales, or rhonchi  CARDIOVASCULAR: Regular rate and rhythm; no murmurs, rubs, or gallops  GASTROINTESTINAL: Soft, non-tender, non-distended; bowel sounds present  MUSCULOSKELETAL: 2+ peripheral pulses; no clubbing, no cyanosis, no edema  NEUROLOGY: right facial droop

## 2023-02-23 NOTE — DISCHARGE NOTE PROVIDER - CARE PROVIDER_API CALL
Lemuel Fernandez)  Neurology  [address] [place] [address]  Phone: [phone]  Fax: [phone]  Follow Up Time: 1 month    Adam Kirkland ()  Infectious Disease; Internal Medicine  [address] [place] [address]  Phone: [phone]  Fax: [phone]  Follow Up Time: 2 weeks

## 2023-02-23 NOTE — DISCHARGE NOTE NURSING/CASE MANAGEMENT/SOCIAL WORK - PATIENT PORTAL LINK FT
You can access the FollowMyHealth Patient Portal offered by [facility] by registering at the following website: [email or web address]. By joining [facility]’s FollowMyHealth portal, you will also be able to view your health information using other applications (apps) compatible with our system.

## 2023-02-23 NOTE — DISCHARGE NOTE NURSING/CASE MANAGEMENT/SOCIAL WORK - NSDCVIVACCINE_GEN_ALL_CORE_FT
Tdap; 19-Oct-2018 14:41; Deepthi Hamilton); [employer]; [id number] (Exp. Date: 10-Oct-2020); IntraMuscular; Deltoid Right.; 0.5 milliLiter(s); VIS (VIS Published: 09-May-2013, VIS Presented: 19-Oct-2018);

## 2023-02-23 NOTE — DISCHARGE NOTE PROVIDER - HOSPITAL COURSE
HPI:  78 yo M patient with a PMH of:  - dementia, depression,   - HTN,   - cirrhosis with portal hypertension and a hospitalization for hepatic encephalopathy  - diabetes,   - HFpEF,   - rib fractures due to recurrent falls, presents to the ED for increase in his aggressive behavior.    His dementia was diagnosed 2 years ago, however, symptoms of amnesia and behavioral changes have been progressing rapidly in this span of time.  His aggressive behavior which was previously intermittent, has been worsening lately and becoming constant, associated with increase on his extremities tremor and other extrapyramidal symptoms, that whenever he ambulates, he sustains falls.    During the falls, patient is conscious, recalls the event, and family that witnesses the event denies any features of tonic- clonic or atonic seizures.  Patient and surrounding deny any chest pain, shortness of breath, cough, abdominal pain, changes in bowel/ urinary symptoms, constitutional symptoms, or other focal neurologic symptoms.    In the ED, the patient was HD stable, afebrile.  Labs were only significant for a magnesium level of 1.4 (being repleted).  CXR was negative for any acute cardiopulmonary pathology, but showed bilateral opacities that are comparable to previous chest xrays.  Pelvis xray was negative for any fracture or misalignment, and CTH was negative for any acute intracranial pathology.    Patient is being admitted to medicine for further workup and management.   (16 Feb 2023 16:23)      #Facial Drop:  - on 2/20: right facial drop noticed, pt AAO x2, rest of neurologic exam WNL  - Stroke code was called  - Pt was sent to urgent CT  CT Angio Head w/ IV Cont (02.20.23 @ 10:58) >       Apparent 8 cc mismatch volume within the right temporal lobe which is of        uncertain clinical significance as above.  CTA HEAD:       No evidence of vessel occlusion or aneurysm.  CTA NECK:       No evidence of greater than 50%carotid or vertebral artery stenosis.  - Neuro following: MRI of brain w/o brett- negative , c/w ASA/Plavix for 3 months    #Behavioral disturbance, with frequent falls  #Dementia   #Orthostatic Hypotension   - REEG negative   - c/w Rifaximin, lactulose to 3 loose BM daily  - PT note, unsafe to assess, pt with significant drop in SBP  - started midodrine and monitor orthostatics     #Parkinson's dementia   - Sinemet 25/100 BID  - Donepezil 10  - Duloxetine 60  - Memantine 10    #HTN  - lisinopril dc'd     #Liver Cirrhosis  - CT abd c/w cirrhosis, +portal htn  - Rifaximin, lactulose    #DM2- c/w basal bolus     #HFpEF, chronic- currently euvolemic        HPI:  76 yo M patient with a PMH of:  - dementia, depression,   - HTN,   - cirrhosis with portal hypertension and a hospitalization for hepatic encephalopathy  - diabetes,   - HFpEF,   - rib fractures due to recurrent falls, presents to the ED for increase in his aggressive behavior.    His dementia was diagnosed 2 years ago, however, symptoms of amnesia and behavioral changes have been progressing rapidly in this span of time.  His aggressive behavior which was previously intermittent, has been worsening lately and becoming constant, associated with increase on his extremities tremor and other extrapyramidal symptoms, that whenever he ambulates, he sustains falls.    During the falls, patient is conscious, recalls the event, and family that witnesses the event denies any features of tonic- clonic or atonic seizures.  Patient and surrounding deny any chest pain, shortness of breath, cough, abdominal pain, changes in bowel/ urinary symptoms, constitutional symptoms, or other focal neurologic symptoms.    In the ED, the patient was HD stable, afebrile.  Labs were only significant for a magnesium level of 1.4 (being repleted).  CXR was negative for any acute cardiopulmonary pathology, but showed bilateral opacities that are comparable to previous chest xrays.  Pelvis xray was negative for any fracture or misalignment, and CTH was negative for any acute intracranial pathology.    Patient is being admitted to medicine for further workup and management.   (16 Feb 2023 16:23)      #Facial Droop:  - on 2/20: right facial droop noticed, pt AAO x2, rest of neurologic exam WNL  - Stroke code was called  - Pt was sent to urgent CT  CT Angio Head w/ IV Cont (02.20.23 @ 10:58) >       Apparent 8 cc mismatch volume within the right temporal lobe which is of        uncertain clinical significance as above.  CTA HEAD:       No evidence of vessel occlusion or aneurysm.  CTA NECK:       No evidence of greater than 50%carotid or vertebral artery stenosis.  - Neuro following: MRI of brain w/o brett- negative , c/w ASA/Plavix for 3 months    #Behavioral disturbance, with frequent falls  #Dementia   #Orthostatic Hypotension   - REEG negative   - c/w Rifaximin, lactulose to 3 loose BM daily  - PT worked with pt   - started midodrine for orthostatic BP    #Parkinson's dementia   - Sinemet 25/100 BID  - Donepezil 10  - Duloxetine 60  - Memantine 10    #HTN  - lisinopril dc'd     #Liver Cirrhosis  - CT abd c/w cirrhosis, +portal htn  - Rifaximin, lactulose    #DM2- c/w basal bolus     #HFpEF, chronic- currently euvolemic

## 2023-02-23 NOTE — DISCHARGE NOTE PROVIDER - NSDCFUADDINST_GEN_ALL_CORE_FT
- Please makes sure to follow up with neurology as patient was started on Aspirin and Plavix that need to be taken for 3 months only.  - Please makes sure to follow up with Neurology, you were started on Aspirin and Plavix that need to be taken for 3 months only.

## 2023-02-23 NOTE — DISCHARGE NOTE PROVIDER - PROVIDER TOKENS
PROVIDER:[TOKEN:[[id number]],FOLLOWUP:[1 month]],PROVIDER:[TOKEN:[[id number]],FOLLOWUP:[2 weeks]]

## 2023-02-23 NOTE — DISCHARGE NOTE PROVIDER - NSDCMRMEDTOKEN_GEN_ALL_CORE_FT
aspirin 81 mg oral tablet, chewable: 1 tab(s) orally once a day  atorvastatin 40 mg oral tablet: 1 tab(s) orally once a day (at bedtime)  carbidopa-levodopa 25 mg-100 mg oral tablet: 1 tab(s) orally 2 times a day  clopidogrel 75 mg oral tablet: 1 tab(s) orally once a day  donepezil 10 mg oral tablet: 1 tab(s) orally once a day (at bedtime)  DULoxetine 60 mg oral delayed release capsule: 1 cap(s) orally once a day  lactulose 10 g/15 mL oral syrup: 30 milliliter(s) orally every 4 to 8 hours -4 BMs per day to prevent hepatic encephalopathy)  latanoprost 0.005% ophthalmic solution: 1 drop(s) to each affected eye once a day (in the evening)  magnesium oxide 400 mg oral tablet: 1 tab(s) orally 3 times a day (with meals)  memantine 10 mg oral tablet: 1 tab(s) orally once a day  metFORMIN 1000 mg oral tablet: 1.5 tab(s) orally 2 times a day  QUEtiapine 25 mg oral tablet: 1 tab(s) orally once a day (at bedtime)  rifAXIMin 550 mg oral tablet: 1 tab(s) orally 2 times a day  Vitamin B6 100 mg oral tablet: 1 tab(s) orally once a day  Vitamin D3 2000 intl units oral tablet: 1 tab(s) orally once a day   aspirin 81 mg oral tablet, chewable: 1 tab(s) orally once a day  atorvastatin 40 mg oral tablet: 1 tab(s) orally once a day (at bedtime)  carbidopa-levodopa 25 mg-100 mg oral tablet: 1 tab(s) orally 2 times a day  clopidogrel 75 mg oral tablet: 1 tab(s) orally once a day  donepezil 10 mg oral tablet: 1 tab(s) orally once a day (at bedtime)  DULoxetine 60 mg oral delayed release capsule: 1 cap(s) orally once a day  lactulose 10 g/15 mL oral syrup: 30 milliliter(s) orally every 4 to 8 hours -4 BMs per day to prevent hepatic encephalopathy)  latanoprost 0.005% ophthalmic solution: 1 drop(s) to each affected eye once a day (in the evening)  magnesium oxide 400 mg oral tablet: 1 tab(s) orally 3 times a day (with meals)  memantine 10 mg oral tablet: 1 tab(s) orally once a day  metFORMIN 1000 mg oral tablet: 1.5 tab(s) orally 2 times a day  midodrine 5 mg oral tablet: 1 tab(s) orally 3 times a day  QUEtiapine: 12.5 milligram(s) orally once a day (at bedtime)  QUEtiapine 25 mg oral tablet: 1 tab(s) orally once a day (at bedtime)  rifAXIMin 550 mg oral tablet: 1 tab(s) orally 2 times a day  Vitamin B6 100 mg oral tablet: 1 tab(s) orally once a day  Vitamin D3 2000 intl units oral tablet: 1 tab(s) orally once a day   aspirin 81 mg oral tablet, chewable: 1 tab(s) orally once a day  atorvastatin 40 mg oral tablet: 1 tab(s) orally once a day (at bedtime)  carbidopa-levodopa 25 mg-100 mg oral tablet: 1 tab(s) orally 2 times a day  clopidogrel 75 mg oral tablet: 1 tab(s) orally once a day  donepezil 10 mg oral tablet: 1 tab(s) orally once a day (at bedtime)  DULoxetine 60 mg oral delayed release capsule: 1 cap(s) orally once a day  lactulose 10 g/15 mL oral syrup: 30 milliliter(s) orally every 8 hours - goal 4 BMs per day to prevent hepatic encephalopathy)  latanoprost 0.005% ophthalmic solution: 1 drop(s) to each affected eye once a day (in the evening)  Mag-Ox 400 oral tablet: 1 tab(s) orally 3 times a day (with meals)  memantine 10 mg oral tablet: 1 tab(s) orally once a day  metFORMIN 1000 mg oral tablet: 1.5 tab(s) orally 2 times a day  midodrine 5 mg oral tablet: 1 tab(s) orally 3 times a day. Please hold if Systolic BP &gt; 140 or Diastolic BP &gt; 90  QUEtiapine: 12.5 milligram(s) orally once a day (at bedtime)  rifAXIMin 550 mg oral tablet: 1 tab(s) orally 2 times a day  Vitamin B6 100 mg oral tablet: 1 tab(s) orally once a day  Vitamin D3 2000 intl units oral tablet: 1 tab(s) orally once a day

## 2023-03-02 ENCOUNTER — INPATIENT (INPATIENT)
Facility: HOSPITAL | Age: 78
LOS: 6 days | Discharge: NURSING, REHAB OR LONG-TERM CARE | DRG: 101 | End: 2023-03-09
Attending: INTERNAL MEDICINE | Admitting: HOSPITALIST
Payer: MEDICARE

## 2023-03-02 VITALS
SYSTOLIC BLOOD PRESSURE: 143 MMHG | DIASTOLIC BLOOD PRESSURE: 68 MMHG | HEART RATE: 73 BPM | RESPIRATION RATE: 18 BRPM | TEMPERATURE: 98 F | OXYGEN SATURATION: 99 %

## 2023-03-02 DIAGNOSIS — R41.82 ALTERED MENTAL STATUS, UNSPECIFIED: ICD-10-CM

## 2023-03-02 DIAGNOSIS — H26.9 UNSPECIFIED CATARACT: Chronic | ICD-10-CM

## 2023-03-02 DIAGNOSIS — Z90.49 ACQUIRED ABSENCE OF OTHER SPECIFIED PARTS OF DIGESTIVE TRACT: Chronic | ICD-10-CM

## 2023-03-02 DIAGNOSIS — Z98.890 OTHER SPECIFIED POSTPROCEDURAL STATES: Chronic | ICD-10-CM

## 2023-03-02 LAB
ALBUMIN SERPL ELPH-MCNC: 2.7 G/DL — LOW (ref 3.5–5.2)
ALP SERPL-CCNC: 113 U/L — SIGNIFICANT CHANGE UP (ref 30–115)
ALT FLD-CCNC: 39 U/L — SIGNIFICANT CHANGE UP (ref 0–41)
AMMONIA BLD-MCNC: 46 UMOL/L — SIGNIFICANT CHANGE UP (ref 11–55)
ANION GAP SERPL CALC-SCNC: 7 MMOL/L — SIGNIFICANT CHANGE UP (ref 7–14)
APTT BLD: 30.5 SEC — SIGNIFICANT CHANGE UP (ref 27–39.2)
AST SERPL-CCNC: 36 U/L — SIGNIFICANT CHANGE UP (ref 0–41)
BASOPHILS # BLD AUTO: 0.06 K/UL — SIGNIFICANT CHANGE UP (ref 0–0.2)
BASOPHILS NFR BLD AUTO: 1.2 % — HIGH (ref 0–1)
BILIRUB SERPL-MCNC: 1.5 MG/DL — HIGH (ref 0.2–1.2)
BUN SERPL-MCNC: 34 MG/DL — HIGH (ref 10–20)
CALCIUM SERPL-MCNC: 8.9 MG/DL — SIGNIFICANT CHANGE UP (ref 8.4–10.5)
CHLORIDE SERPL-SCNC: 103 MMOL/L — SIGNIFICANT CHANGE UP (ref 98–110)
CO2 SERPL-SCNC: 31 MMOL/L — SIGNIFICANT CHANGE UP (ref 17–32)
CREAT SERPL-MCNC: 0.9 MG/DL — SIGNIFICANT CHANGE UP (ref 0.7–1.5)
EGFR: 88 ML/MIN/1.73M2 — SIGNIFICANT CHANGE UP
EOSINOPHIL # BLD AUTO: 0.25 K/UL — SIGNIFICANT CHANGE UP (ref 0–0.7)
EOSINOPHIL NFR BLD AUTO: 5.1 % — SIGNIFICANT CHANGE UP (ref 0–8)
GLUCOSE SERPL-MCNC: 244 MG/DL — HIGH (ref 70–99)
HCT VFR BLD CALC: 39.4 % — LOW (ref 42–52)
HGB BLD-MCNC: 14 G/DL — SIGNIFICANT CHANGE UP (ref 14–18)
IMM GRANULOCYTES NFR BLD AUTO: 0.2 % — SIGNIFICANT CHANGE UP (ref 0.1–0.3)
INR BLD: 1.27 RATIO — SIGNIFICANT CHANGE UP (ref 0.65–1.3)
LACTATE SERPL-SCNC: 2.3 MMOL/L — HIGH (ref 0.7–2)
LIDOCAIN IGE QN: 32 U/L — SIGNIFICANT CHANGE UP (ref 7–60)
LYMPHOCYTES # BLD AUTO: 1.33 K/UL — SIGNIFICANT CHANGE UP (ref 1.2–3.4)
LYMPHOCYTES # BLD AUTO: 27 % — SIGNIFICANT CHANGE UP (ref 20.5–51.1)
MCHC RBC-ENTMCNC: 35.5 G/DL — SIGNIFICANT CHANGE UP (ref 32–37)
MCHC RBC-ENTMCNC: 35.7 PG — HIGH (ref 27–31)
MCV RBC AUTO: 100.5 FL — HIGH (ref 80–94)
MONOCYTES # BLD AUTO: 0.84 K/UL — HIGH (ref 0.1–0.6)
MONOCYTES NFR BLD AUTO: 17.1 % — HIGH (ref 1.7–9.3)
NEUTROPHILS # BLD AUTO: 2.43 K/UL — SIGNIFICANT CHANGE UP (ref 1.4–6.5)
NEUTROPHILS NFR BLD AUTO: 49.4 % — SIGNIFICANT CHANGE UP (ref 42.2–75.2)
NRBC # BLD: 0 /100 WBCS — SIGNIFICANT CHANGE UP (ref 0–0)
NT-PROBNP SERPL-SCNC: 67 PG/ML — SIGNIFICANT CHANGE UP (ref 0–300)
PLATELET # BLD AUTO: 126 K/UL — LOW (ref 130–400)
POTASSIUM SERPL-MCNC: 4.7 MMOL/L — SIGNIFICANT CHANGE UP (ref 3.5–5)
POTASSIUM SERPL-SCNC: 4.7 MMOL/L — SIGNIFICANT CHANGE UP (ref 3.5–5)
PROT SERPL-MCNC: 6.1 G/DL — SIGNIFICANT CHANGE UP (ref 6–8)
PROTHROM AB SERPL-ACNC: 14.6 SEC — HIGH (ref 9.95–12.87)
RBC # BLD: 3.92 M/UL — LOW (ref 4.7–6.1)
RBC # FLD: 13.8 % — SIGNIFICANT CHANGE UP (ref 11.5–14.5)
SARS-COV-2 RNA SPEC QL NAA+PROBE: SIGNIFICANT CHANGE UP
SODIUM SERPL-SCNC: 141 MMOL/L — SIGNIFICANT CHANGE UP (ref 135–146)
TROPONIN T SERPL-MCNC: <0.01 NG/ML — SIGNIFICANT CHANGE UP
WBC # BLD: 4.92 K/UL — SIGNIFICANT CHANGE UP (ref 4.8–10.8)
WBC # FLD AUTO: 4.92 K/UL — SIGNIFICANT CHANGE UP (ref 4.8–10.8)

## 2023-03-02 PROCEDURE — 86900 BLOOD TYPING SEROLOGIC ABO: CPT

## 2023-03-02 PROCEDURE — 86780 TREPONEMA PALLIDUM: CPT

## 2023-03-02 PROCEDURE — U0003: CPT

## 2023-03-02 PROCEDURE — 95819 EEG AWAKE AND ASLEEP: CPT

## 2023-03-02 PROCEDURE — 83735 ASSAY OF MAGNESIUM: CPT

## 2023-03-02 PROCEDURE — 86850 RBC ANTIBODY SCREEN: CPT

## 2023-03-02 PROCEDURE — 84443 ASSAY THYROID STIM HORMONE: CPT

## 2023-03-02 PROCEDURE — 87186 SC STD MICRODIL/AGAR DIL: CPT

## 2023-03-02 PROCEDURE — 99223 1ST HOSP IP/OBS HIGH 75: CPT

## 2023-03-02 PROCEDURE — 36415 COLL VENOUS BLD VENIPUNCTURE: CPT

## 2023-03-02 PROCEDURE — 82746 ASSAY OF FOLIC ACID SERUM: CPT

## 2023-03-02 PROCEDURE — 70450 CT HEAD/BRAIN W/O DYE: CPT | Mod: 26,MA

## 2023-03-02 PROCEDURE — 99285 EMERGENCY DEPT VISIT HI MDM: CPT | Mod: FS

## 2023-03-02 PROCEDURE — 80053 COMPREHEN METABOLIC PANEL: CPT

## 2023-03-02 PROCEDURE — 83605 ASSAY OF LACTIC ACID: CPT

## 2023-03-02 PROCEDURE — 86901 BLOOD TYPING SEROLOGIC RH(D): CPT

## 2023-03-02 PROCEDURE — 82607 VITAMIN B-12: CPT

## 2023-03-02 PROCEDURE — U0005: CPT

## 2023-03-02 PROCEDURE — 85610 PROTHROMBIN TIME: CPT

## 2023-03-02 PROCEDURE — 85025 COMPLETE CBC W/AUTO DIFF WBC: CPT

## 2023-03-02 PROCEDURE — 71045 X-RAY EXAM CHEST 1 VIEW: CPT | Mod: 26

## 2023-03-02 PROCEDURE — 93010 ELECTROCARDIOGRAM REPORT: CPT

## 2023-03-02 PROCEDURE — 87086 URINE CULTURE/COLONY COUNT: CPT

## 2023-03-02 PROCEDURE — 81001 URINALYSIS AUTO W/SCOPE: CPT

## 2023-03-02 PROCEDURE — 82962 GLUCOSE BLOOD TEST: CPT

## 2023-03-02 RX ORDER — DULOXETINE HYDROCHLORIDE 30 MG/1
60 CAPSULE, DELAYED RELEASE ORAL DAILY
Refills: 0 | Status: DISCONTINUED | OUTPATIENT
Start: 2023-03-02 | End: 2023-03-09

## 2023-03-02 RX ORDER — LATANOPROST 0.05 MG/ML
1 SOLUTION/ DROPS OPHTHALMIC; TOPICAL AT BEDTIME
Refills: 0 | Status: DISCONTINUED | OUTPATIENT
Start: 2023-03-02 | End: 2023-03-09

## 2023-03-02 RX ORDER — ACETAMINOPHEN 500 MG
650 TABLET ORAL EVERY 6 HOURS
Refills: 0 | Status: DISCONTINUED | OUTPATIENT
Start: 2023-03-02 | End: 2023-03-09

## 2023-03-02 RX ORDER — CARBIDOPA AND LEVODOPA 25; 100 MG/1; MG/1
1 TABLET ORAL
Refills: 0 | Status: DISCONTINUED | OUTPATIENT
Start: 2023-03-02 | End: 2023-03-03

## 2023-03-02 RX ORDER — DONEPEZIL HYDROCHLORIDE 10 MG/1
10 TABLET, FILM COATED ORAL AT BEDTIME
Refills: 0 | Status: DISCONTINUED | OUTPATIENT
Start: 2023-03-02 | End: 2023-03-09

## 2023-03-02 RX ORDER — MIDODRINE HYDROCHLORIDE 2.5 MG/1
5 TABLET ORAL THREE TIMES A DAY
Refills: 0 | Status: DISCONTINUED | OUTPATIENT
Start: 2023-03-02 | End: 2023-03-09

## 2023-03-02 RX ORDER — ASPIRIN/CALCIUM CARB/MAGNESIUM 324 MG
81 TABLET ORAL DAILY
Refills: 0 | Status: DISCONTINUED | OUTPATIENT
Start: 2023-03-02 | End: 2023-03-09

## 2023-03-02 RX ORDER — CLOPIDOGREL BISULFATE 75 MG/1
75 TABLET, FILM COATED ORAL DAILY
Refills: 0 | Status: DISCONTINUED | OUTPATIENT
Start: 2023-03-03 | End: 2023-03-09

## 2023-03-02 RX ORDER — MEMANTINE HYDROCHLORIDE 10 MG/1
10 TABLET ORAL DAILY
Refills: 0 | Status: DISCONTINUED | OUTPATIENT
Start: 2023-03-02 | End: 2023-03-09

## 2023-03-02 RX ORDER — SODIUM CHLORIDE 9 MG/ML
1000 INJECTION, SOLUTION INTRAVENOUS ONCE
Refills: 0 | Status: COMPLETED | OUTPATIENT
Start: 2023-03-02 | End: 2023-03-02

## 2023-03-02 RX ORDER — MIDAZOLAM HYDROCHLORIDE 1 MG/ML
1 INJECTION, SOLUTION INTRAMUSCULAR; INTRAVENOUS ONCE
Refills: 0 | Status: DISCONTINUED | OUTPATIENT
Start: 2023-03-02 | End: 2023-03-02

## 2023-03-02 RX ORDER — ATORVASTATIN CALCIUM 80 MG/1
40 TABLET, FILM COATED ORAL AT BEDTIME
Refills: 0 | Status: DISCONTINUED | OUTPATIENT
Start: 2023-03-02 | End: 2023-03-09

## 2023-03-02 RX ORDER — ENOXAPARIN SODIUM 100 MG/ML
40 INJECTION SUBCUTANEOUS EVERY 24 HOURS
Refills: 0 | Status: DISCONTINUED | OUTPATIENT
Start: 2023-03-02 | End: 2023-03-09

## 2023-03-02 RX ORDER — LACTULOSE 10 G/15ML
20 SOLUTION ORAL EVERY 8 HOURS
Refills: 0 | Status: DISCONTINUED | OUTPATIENT
Start: 2023-03-02 | End: 2023-03-09

## 2023-03-02 RX ORDER — QUETIAPINE FUMARATE 200 MG/1
12.5 TABLET, FILM COATED ORAL AT BEDTIME
Refills: 0 | Status: DISCONTINUED | OUTPATIENT
Start: 2023-03-02 | End: 2023-03-09

## 2023-03-02 RX ADMIN — SODIUM CHLORIDE 1000 MILLILITER(S): 9 INJECTION, SOLUTION INTRAVENOUS at 21:14

## 2023-03-02 RX ADMIN — MIDAZOLAM HYDROCHLORIDE 1 MILLIGRAM(S): 1 INJECTION, SOLUTION INTRAMUSCULAR; INTRAVENOUS at 19:46

## 2023-03-02 NOTE — ED PROVIDER NOTE - NS ED ATTENDING STATEMENT MOD
This was a shared visit with the [facility]. I reviewed and verified the documentation and independently performed the documented:

## 2023-03-02 NOTE — H&P ADULT - HISTORY OF PRESENT ILLNESS
77 year old Male patient PMH  Parkinson's dementia, depression, HTN, decompensated liver cirrhosis 2/2 MAYA, DM, HFpEF, rib fractures due to recurrent falls presents for altered mental status. Patient noted to be at baseline mental status 3 days ago per family. 2 days ago family and NH staff noticed patient more confused. Of note, patient has had previous admission for agitation 2/2 to progression of dementia as well as admission for hepatic encephalopathy.       In the ED vitals stable.   Labs show lactate 2.3.  CTH: No acute intracranial pathology, stable exam since 2/20/2023.  Was given 1L LR bolus and versed in the ED.  77 year old Male patient PMH  Parkinson's dementia, depression, HTN, liver cirrhosis 2/2 MAYA, DM, CAD, rib fractures due to recurrent falls presents for altered mental status. On my encounter patient is AAOx2(not to time) but unable to participate in history taking due to confusion. On the NH documentation assessment reads "sleepy, poor appetite, combative. Send to ED for evaluation". Unable to contact family for collateral. Per chart, patient noted to be at baseline mental status 3 days ago per family. 2 days ago family and NH staff noticed patient more confused. Of note, patient has had previous admission for agitation 2/2 to progression of dementia as well as admission for hepatic encephalopathy. Patient is not lethargic on my exam.     In the ED vitals stable.   Labs show lactate 2.3.  CTH: No acute intracranial pathology, stable exam since 2/20/2023.  Was given 1L LR bolus and versed in the ED.

## 2023-03-02 NOTE — ED PROVIDER NOTE - OBJECTIVE STATEMENT
78 yo male with a pmh of cirrhosis, htn, dm , parkinson's BIBA from nursing facility for ams. family at bedside states his last known normal was Monday. no fevers, chill, cough, n/v/d, difficulty breathing.

## 2023-03-02 NOTE — H&P ADULT - ATTENDING COMMENTS
Patient seen and examined at bedside independently of the residents. I read the resident's note and agree with the plan with the additions and corrections as noted below. My note supersedes the resident's note.     REVIEW OF SYSTEMS:  CONSTITUTIONAL: No weakness, fevers or chills  EYES/ENT: No visual changes;  No vertigo or throat pain   NECK: No pain or stiffness  RESPIRATORY: No cough, wheezing, hemoptysis; No shortness of breath  CARDIOVASCULAR: No chest pain or palpitations  GASTROINTESTINAL: No abdominal or epigastric pain. No nausea, vomiting, or hematemesis; No diarrhea or constipation. No melena or hematochezia.  GENITOURINARY: No dysuria, frequency or hematuria  NEUROLOGICAL: No numbness or weakness  MSK: No pain. No weakness.   SKIN: No itching, rashes.     PMH: Parkinson's dementia, Depression, HTN, DM II, CAD, Liver Cirrhosis 2/2 MAYA, recurrent fall     FHx: Reviewed. No fhx of asthma/copd, No fhx of liver and pulmonary disease. No fhx of hematological disorder.     Physical Exam:  GEN: No acute distress. Awake, Alert and oriented x 2.   Head: Atraumatic, Normocephalic.   Eye: PEERLA. No sclera icterus. EOMI.   ENT: Normal oropharynx, no thyromegaly, no mass, no lymphadenopathy.   LUNGS: Clear to auscultation bilaterally. No wheeze/rales/crackles.   HEART: Normal. S1/S2 present. RRR. No murmur/gallops.   ABD: Soft, non-tender, non-distended. Bowel sounds present.   EXT: No pitting edema. No erythema. No tenderness.  Integumentary: No rash, No sore, No petechia.   NEURO: CN III-XII intact. Moving all extremities.     Vital Signs Last 24 Hrs  T(C): 36.6 (02 Mar 2023 16:43), Max: 36.6 (02 Mar 2023 16:43)  T(F): 97.8 (02 Mar 2023 16:43), Max: 97.8 (02 Mar 2023 16:43)  HR: 73 (02 Mar 2023 16:43) (73 - 73)  BP: 143/68 (02 Mar 2023 16:43) (143/68 - 143/68)  BP(mean): --  RR: 18 (02 Mar 2023 16:43) (18 - 18)  SpO2: 99% (02 Mar 2023 16:43) (99% - 99%)    Parameters below as of 02 Mar 2023 16:43  Patient On (Oxygen Delivery Method): room air      Please see the above notes for Labs and radiology.     Assessment and Plan:     78 yo M with hx of Parkinson's dementia, Depression, HTN, DM II, CAD, Liver Cirrhosis 2/2 MAYA, recurrent fall presents to ED for AMS.     AMS   - Ddx: metabolic encephalopathy vs. progressive dementia  - CTH neg for acute intracranial pathology.   - serum NH4 46.   - check UA, TSH, RPR, B12, folate   - check REEG  - c/w home medication.     Parkinson dementia   - on carbidopa/levodopa, memantine, donepezil    Depression - on Seroquel and duloxetine    Liver Cirrhosis 2/2 MAYA - on lactulose and rifaximin   CAD s/p stent - ASA, plavix, statin  HTN/HLD - c/w home med  DM II - monitor FS AC HS. Insulin regimen.   Orthostatic Hypotension - on midodrine    DVT ppx: Lovenox SC  GI ppx: not indicated.   Diet: DASH/CC diet  Diet: Activity: as tolerated.     Date seen by the attendin2023  Total time spent: 75 minutes.

## 2023-03-02 NOTE — H&P ADULT - ASSESSMENT
77 year old Male patient PMH  Parkinson's dementia, depression, HTN, decompensated liver cirrhosis 2/2 MAYA, DM, HFpEF, rib fractures due to recurrent falls presents for altered mental status.     #altered mental status   #Parkinson's Dementia   #depression  --  ddx:  met encephalopathy, hypercarbia, delirium, polypharmacy / drug induced,  infections  r/o cva  --  possibly just underlying dementia, but r/o other causes  --  not hepatic encephalopathy -->ammonia 46, has been compliant with lactulose per NH documentation   --  check labs:  tsh, folate, b12, rpr, esr/crp, u drug screen, drug levels, Vbg,  ua/ucx  --  cxr: no acute CPD  --  CTH: No acute intracranial pathology, stable exam since 2/20/2023.  --  check eeg  --  repeat lactate  --  c/w donepezil though, duloxetine, memantine, carbidopa/ levodopa    #decompensated liver cirrhosis 2/2 MAYA - c/w lactulose, rifaximin   #HTN - On quinapril, c/w lisinopril  #DM- hold home meds, SS PRN. monitor FS   #HFpEF -       DVT ppx: Lovenox  Diet: DASH, CC  Activity: [place]  Dispo: from NH    77 year old Male patient PMH Parkinson's dementia, depression, HTN, CAD, liver cirrhosis 2/2 MAYA, DM, rib fractures due to recurrent falls presents for altered mental status.     #altered mental status - probably just underlying dementia, but r/o other causes  #Parkinson's Dementia   #depression  --  ddx:  met encephalopathy, hypercarbia, delirium, polypharmacy / drug induced,  infections  r/o cva  --  not hepatic encephalopathy -->ammonia 46, has been compliant with lactulose per NH documentation   --  check labs:  tsh, folate, b12, rpr, esr/crp, u drug screen, drug levels, Vbg,  ua/ucx  --  cxr: no acute CPD  --  CTH: No acute intracranial pathology, stable exam since 2/20/2023.  --  check eeg  --  repeat lactate  --  c/w donepezil, duloxetine, memantine, quetiapine, carbidopa/ levodopa  --  encourage PO intake    #liver cirrhosis 2/2 MAYA - c/w lactulose, rifaximin   #HTN - c/w lisinopril 5mg   #orthostatic hypotension - c/w midodrine TID   #CAD s/p stent - c/w ASA, plavix, atorvastatin   #DM- hold home meds, SS PRN. monitor FS     DVT ppx: Lovenox  Diet: DASH, CC  Activity: [place]  Dispo: from NH

## 2023-03-02 NOTE — H&P ADULT - NSHPPHYSICALEXAM_GEN_ALL_CORE
VITALS:   T(C): 36.6 (03-02-23 @ 16:43), Max: 36.6 (03-02-23 @ 16:43)  HR: 73 (03-02-23 @ 16:43) (73 - 73)  BP: 143/68 (03-02-23 @ 16:43) (143/68 - 143/68)  RR: 18 (03-02-23 @ 16:43) (18 - 18)  SpO2: 99% (03-02-23 @ 16:43) (99% - 99%)    GENERAL: NAD, lying in bed comfortably  HEAD:  Atraumatic, normocephalic  EYES: EOMI, PERRLA, conjunctiva and sclera clear  ENT: Moist mucous membranes  NECK: Supple, no JVD  HEART: Regular rate and rhythm, no murmurs, rubs, or gallops  LUNGS: Unlabored respirations.  Clear to auscultation bilaterally, no crackles, wheezing, or rhonchi  ABDOMEN: Soft, nontender, nondistended, +BS  EXTREMITIES: 2+ peripheral pulses bilaterally. No clubbing, cyanosis, or edema  NERVOUS SYSTEM:  A&Ox3, no focal deficits   SKIN: No rashes or lesions VITALS:   T(C): 36.6 (03-02-23 @ 16:43), Max: 36.6 (03-02-23 @ 16:43)  HR: 73 (03-02-23 @ 16:43) (73 - 73)  BP: 143/68 (03-02-23 @ 16:43) (143/68 - 143/68)  RR: 18 (03-02-23 @ 16:43) (18 - 18)  SpO2: 99% (03-02-23 @ 16:43) (99% - 99%)    GENERAL: NAD, lying in bed comfortably  HEAD:  Atraumatic, normocephalic  EYES: EOMI, PERRLA, conjunctiva and sclera clear  ENT: Moist mucous membranes  NECK: Supple, no JVD  HEART: Regular rate and rhythm, no murmurs, rubs, or gallops  LUNGS: Unlabored respirations.  Clear to auscultation bilaterally, no crackles, wheezing, or rhonchi  ABDOMEN: Soft, nontender, nondistended, +BS  EXTREMITIES: 2+ peripheral pulses bilaterally. No clubbing, cyanosis, or edema  NERVOUS SYSTEM:  A&Ox2, no focal deficits   SKIN: No rashes or lesions

## 2023-03-02 NOTE — ED PROVIDER NOTE - PHYSICAL EXAMINATION
Gen: NAD  Head: NCAT  HEENT: PERRL, oral mucosa moist, normal conjunctiva, oropharynx clear without exudate or erythema  Lung: CTAB, no respiratory distress, no wheezing, rales, rhonchi  CV: normal s1/s2, rrr, Normal perfusion, pulses 2+ throughout  Abd: soft, NTND, no CVA tenderness  Genitourinary: no pelvic tenderness  MSK: No edema, no visible deformities  Neuro: No focal neurologic deficits  Skin: No rash   Psych: normal affect

## 2023-03-02 NOTE — H&P ADULT - NSHPLABSRESULTS_GEN_ALL_CORE
LABS/RADIOLOGY RESULTS:                          14.0   4.92  )-----------( 126      ( 02 Mar 2023 18:16 )             39.4   03-02    141  |  103  |  34<H>  ----------------------------<  244<H>  4.7   |  31  |  0.9    Ca    8.9      02 Mar 2023 18:16    TPro  6.1  /  Alb  2.7<L>  /  TBili  1.5<H>  /  DBili  x   /  AST  36  /  ALT  39  /  AlkPhos  113  03-02  PT/INR - ( 02 Mar 2023 18:16 )   PT: 14.60 sec;   INR: 1.27 ratio         PTT - ( 02 Mar 2023 18:16 )  PTT:30.5 secBlood Cultures

## 2023-03-02 NOTE — ED PROVIDER NOTE - ATTENDING APP SHARED VISIT CONTRIBUTION OF CARE
77-year-old male history of cirrhosis, hypertension, diabetes, Parkinson's, now brought in by ambulance, wife and son at the bedside contributed to history, patient brought in from nursing home for evaluation of altered mental status x3 days.  Last seen normal 4 days ago by his family.  He is unclear why the patient was sent in today.  As per wife, patient is with decreased p.o. intake    vss, elderly male, alert but disoriented, does not respond to questions or follow commands, anicteric, dry mucous membranes, no JVD, neck supple, decreased breath sounds bilateral, RRR, equal radial pulses bilat, abd soft/nt/nd, no cva tend. no calves tend, no edema. no rashes.

## 2023-03-02 NOTE — ED PROVIDER NOTE - CLINICAL SUMMARY MEDICAL DECISION MAKING FREE TEXT BOX
Altered mental status.  Head CT negative.  Dehydrated on exam.  IV fluids administered.  Ammonia normal.  Troponin negative.    Labs and EKG were ordered and reviewed by me.  Imaging was ordered and reviewed by me.  Appropriate medications for patient's presenting complaints were ordered and effects were reassessed.  Patient's records prior EKG and prior imaging were reviewed.  Additional history was obtained from EMS and wife and son.  Escalation to admission/observation was considered.  Patient requires inpatient hospitalization - monitored setting.

## 2023-03-03 LAB
ALBUMIN SERPL ELPH-MCNC: 2.7 G/DL — LOW (ref 3.5–5.2)
ALP SERPL-CCNC: 102 U/L — SIGNIFICANT CHANGE UP (ref 30–115)
ALT FLD-CCNC: 32 U/L — SIGNIFICANT CHANGE UP (ref 0–41)
ANION GAP SERPL CALC-SCNC: 11 MMOL/L — SIGNIFICANT CHANGE UP (ref 7–14)
APPEARANCE UR: CLEAR — SIGNIFICANT CHANGE UP
AST SERPL-CCNC: 39 U/L — SIGNIFICANT CHANGE UP (ref 0–41)
BACTERIA # UR AUTO: NEGATIVE — SIGNIFICANT CHANGE UP
BASOPHILS # BLD AUTO: 0.05 K/UL — SIGNIFICANT CHANGE UP (ref 0–0.2)
BASOPHILS NFR BLD AUTO: 1 % — SIGNIFICANT CHANGE UP (ref 0–1)
BILIRUB SERPL-MCNC: 1.7 MG/DL — HIGH (ref 0.2–1.2)
BILIRUB UR-MCNC: ABNORMAL
BUN SERPL-MCNC: 26 MG/DL — HIGH (ref 10–20)
CALCIUM SERPL-MCNC: 8.9 MG/DL — SIGNIFICANT CHANGE UP (ref 8.4–10.5)
CHLORIDE SERPL-SCNC: 104 MMOL/L — SIGNIFICANT CHANGE UP (ref 98–110)
CO2 SERPL-SCNC: 29 MMOL/L — SIGNIFICANT CHANGE UP (ref 17–32)
COLOR SPEC: YELLOW — SIGNIFICANT CHANGE UP
CREAT SERPL-MCNC: 0.7 MG/DL — SIGNIFICANT CHANGE UP (ref 0.7–1.5)
DIFF PNL FLD: NEGATIVE — SIGNIFICANT CHANGE UP
EGFR: 95 ML/MIN/1.73M2 — SIGNIFICANT CHANGE UP
EOSINOPHIL # BLD AUTO: 0.32 K/UL — SIGNIFICANT CHANGE UP (ref 0–0.7)
EOSINOPHIL NFR BLD AUTO: 6.2 % — SIGNIFICANT CHANGE UP (ref 0–8)
EPI CELLS # UR: 2 /HPF — SIGNIFICANT CHANGE UP (ref 0–5)
FOLATE SERPL-MCNC: 17.2 NG/ML — SIGNIFICANT CHANGE UP
GLUCOSE BLDC GLUCOMTR-MCNC: 168 MG/DL — HIGH (ref 70–99)
GLUCOSE BLDC GLUCOMTR-MCNC: 205 MG/DL — HIGH (ref 70–99)
GLUCOSE BLDC GLUCOMTR-MCNC: 96 MG/DL — SIGNIFICANT CHANGE UP (ref 70–99)
GLUCOSE SERPL-MCNC: 158 MG/DL — HIGH (ref 70–99)
GLUCOSE UR QL: NEGATIVE — SIGNIFICANT CHANGE UP
HCT VFR BLD CALC: 38.5 % — LOW (ref 42–52)
HGB BLD-MCNC: 13.6 G/DL — LOW (ref 14–18)
HYALINE CASTS # UR AUTO: 2 /LPF — SIGNIFICANT CHANGE UP (ref 0–7)
IMM GRANULOCYTES NFR BLD AUTO: 0.2 % — SIGNIFICANT CHANGE UP (ref 0.1–0.3)
KETONES UR-MCNC: ABNORMAL
LACTATE SERPL-SCNC: 2 MMOL/L — SIGNIFICANT CHANGE UP (ref 0.7–2)
LEUKOCYTE ESTERASE UR-ACNC: NEGATIVE — SIGNIFICANT CHANGE UP
LYMPHOCYTES # BLD AUTO: 1.49 K/UL — SIGNIFICANT CHANGE UP (ref 1.2–3.4)
LYMPHOCYTES # BLD AUTO: 28.8 % — SIGNIFICANT CHANGE UP (ref 20.5–51.1)
MAGNESIUM SERPL-MCNC: 1.3 MG/DL — LOW (ref 1.8–2.4)
MCHC RBC-ENTMCNC: 35.3 G/DL — SIGNIFICANT CHANGE UP (ref 32–37)
MCHC RBC-ENTMCNC: 35.8 PG — HIGH (ref 27–31)
MCV RBC AUTO: 101.3 FL — HIGH (ref 80–94)
MONOCYTES # BLD AUTO: 0.67 K/UL — HIGH (ref 0.1–0.6)
MONOCYTES NFR BLD AUTO: 12.9 % — HIGH (ref 1.7–9.3)
NEUTROPHILS # BLD AUTO: 2.64 K/UL — SIGNIFICANT CHANGE UP (ref 1.4–6.5)
NEUTROPHILS NFR BLD AUTO: 50.9 % — SIGNIFICANT CHANGE UP (ref 42.2–75.2)
NITRITE UR-MCNC: NEGATIVE — SIGNIFICANT CHANGE UP
NRBC # BLD: 0 /100 WBCS — SIGNIFICANT CHANGE UP (ref 0–0)
PH UR: 8 — SIGNIFICANT CHANGE UP (ref 5–8)
PLATELET # BLD AUTO: 119 K/UL — LOW (ref 130–400)
POTASSIUM SERPL-MCNC: 4 MMOL/L — SIGNIFICANT CHANGE UP (ref 3.5–5)
POTASSIUM SERPL-SCNC: 4 MMOL/L — SIGNIFICANT CHANGE UP (ref 3.5–5)
PROT SERPL-MCNC: 6 G/DL — SIGNIFICANT CHANGE UP (ref 6–8)
PROT UR-MCNC: ABNORMAL
RBC # BLD: 3.8 M/UL — LOW (ref 4.7–6.1)
RBC # FLD: 13.8 % — SIGNIFICANT CHANGE UP (ref 11.5–14.5)
RBC CASTS # UR COMP ASSIST: 6 /HPF — HIGH (ref 0–4)
SODIUM SERPL-SCNC: 144 MMOL/L — SIGNIFICANT CHANGE UP (ref 135–146)
SP GR SPEC: 1.03 — SIGNIFICANT CHANGE UP (ref 1.01–1.03)
TSH SERPL-MCNC: 3.69 UIU/ML — SIGNIFICANT CHANGE UP (ref 0.27–4.2)
UROBILINOGEN FLD QL: ABNORMAL
VIT B12 SERPL-MCNC: 877 PG/ML — SIGNIFICANT CHANGE UP (ref 232–1245)
WBC # BLD: 5.18 K/UL — SIGNIFICANT CHANGE UP (ref 4.8–10.8)
WBC # FLD AUTO: 5.18 K/UL — SIGNIFICANT CHANGE UP (ref 4.8–10.8)
WBC UR QL: 4 /HPF — SIGNIFICANT CHANGE UP (ref 0–5)

## 2023-03-03 PROCEDURE — 99222 1ST HOSP IP/OBS MODERATE 55: CPT

## 2023-03-03 PROCEDURE — 95819 EEG AWAKE AND ASLEEP: CPT | Mod: 26

## 2023-03-03 PROCEDURE — 99233 SBSQ HOSP IP/OBS HIGH 50: CPT

## 2023-03-03 RX ORDER — MAGNESIUM SULFATE 500 MG/ML
2 VIAL (ML) INJECTION ONCE
Refills: 0 | Status: COMPLETED | OUTPATIENT
Start: 2023-03-03 | End: 2023-03-03

## 2023-03-03 RX ORDER — GLUCAGON INJECTION, SOLUTION 0.5 MG/.1ML
1 INJECTION, SOLUTION SUBCUTANEOUS ONCE
Refills: 0 | Status: DISCONTINUED | OUTPATIENT
Start: 2023-03-03 | End: 2023-03-09

## 2023-03-03 RX ORDER — LEVETIRACETAM 250 MG/1
250 TABLET, FILM COATED ORAL
Refills: 0 | Status: DISCONTINUED | OUTPATIENT
Start: 2023-03-03 | End: 2023-03-09

## 2023-03-03 RX ORDER — DEXTROSE 50 % IN WATER 50 %
25 SYRINGE (ML) INTRAVENOUS ONCE
Refills: 0 | Status: DISCONTINUED | OUTPATIENT
Start: 2023-03-03 | End: 2023-03-09

## 2023-03-03 RX ORDER — CARBIDOPA AND LEVODOPA 25; 100 MG/1; MG/1
1 TABLET ORAL DAILY
Refills: 0 | Status: COMPLETED | OUTPATIENT
Start: 2023-03-03 | End: 2023-03-05

## 2023-03-03 RX ORDER — SODIUM CHLORIDE 9 MG/ML
1000 INJECTION, SOLUTION INTRAVENOUS
Refills: 0 | Status: DISCONTINUED | OUTPATIENT
Start: 2023-03-03 | End: 2023-03-09

## 2023-03-03 RX ORDER — METHYLPREDNISOLONE 4 MG
500 TABLET ORAL DAILY
Refills: 0 | Status: DISCONTINUED | OUTPATIENT
Start: 2023-03-03 | End: 2023-03-04

## 2023-03-03 RX ORDER — DEXTROSE 50 % IN WATER 50 %
15 SYRINGE (ML) INTRAVENOUS ONCE
Refills: 0 | Status: DISCONTINUED | OUTPATIENT
Start: 2023-03-03 | End: 2023-03-09

## 2023-03-03 RX ORDER — DEXTROSE 50 % IN WATER 50 %
12.5 SYRINGE (ML) INTRAVENOUS ONCE
Refills: 0 | Status: DISCONTINUED | OUTPATIENT
Start: 2023-03-03 | End: 2023-03-09

## 2023-03-03 RX ORDER — INSULIN LISPRO 100/ML
VIAL (ML) SUBCUTANEOUS
Refills: 0 | Status: DISCONTINUED | OUTPATIENT
Start: 2023-03-03 | End: 2023-03-09

## 2023-03-03 RX ADMIN — Medication 500 MILLIGRAM(S): at 12:39

## 2023-03-03 RX ADMIN — DONEPEZIL HYDROCHLORIDE 10 MILLIGRAM(S): 10 TABLET, FILM COATED ORAL at 21:46

## 2023-03-03 RX ADMIN — CLOPIDOGREL BISULFATE 75 MILLIGRAM(S): 75 TABLET, FILM COATED ORAL at 12:22

## 2023-03-03 RX ADMIN — QUETIAPINE FUMARATE 12.5 MILLIGRAM(S): 200 TABLET, FILM COATED ORAL at 21:47

## 2023-03-03 RX ADMIN — MIDODRINE HYDROCHLORIDE 5 MILLIGRAM(S): 2.5 TABLET ORAL at 17:50

## 2023-03-03 RX ADMIN — MEMANTINE HYDROCHLORIDE 10 MILLIGRAM(S): 10 TABLET ORAL at 12:21

## 2023-03-03 RX ADMIN — ATORVASTATIN CALCIUM 40 MILLIGRAM(S): 80 TABLET, FILM COATED ORAL at 21:46

## 2023-03-03 RX ADMIN — LACTULOSE 20 GRAM(S): 10 SOLUTION ORAL at 16:31

## 2023-03-03 RX ADMIN — MIDODRINE HYDROCHLORIDE 5 MILLIGRAM(S): 2.5 TABLET ORAL at 21:47

## 2023-03-03 RX ADMIN — ENOXAPARIN SODIUM 40 MILLIGRAM(S): 100 INJECTION SUBCUTANEOUS at 17:50

## 2023-03-03 RX ADMIN — CARBIDOPA AND LEVODOPA 1 TABLET(S): 25; 100 TABLET ORAL at 08:30

## 2023-03-03 RX ADMIN — DULOXETINE HYDROCHLORIDE 60 MILLIGRAM(S): 30 CAPSULE, DELAYED RELEASE ORAL at 12:21

## 2023-03-03 RX ADMIN — MIDODRINE HYDROCHLORIDE 5 MILLIGRAM(S): 2.5 TABLET ORAL at 08:30

## 2023-03-03 RX ADMIN — Medication 4: at 17:51

## 2023-03-03 RX ADMIN — Medication 25 GRAM(S): at 12:21

## 2023-03-03 RX ADMIN — Medication 81 MILLIGRAM(S): at 12:22

## 2023-03-03 RX ADMIN — LACTULOSE 20 GRAM(S): 10 SOLUTION ORAL at 21:46

## 2023-03-03 RX ADMIN — LEVETIRACETAM 250 MILLIGRAM(S): 250 TABLET, FILM COATED ORAL at 19:42

## 2023-03-03 RX ADMIN — LATANOPROST 1 DROP(S): 0.05 SOLUTION/ DROPS OPHTHALMIC; TOPICAL at 23:00

## 2023-03-03 RX ADMIN — Medication 2: at 08:33

## 2023-03-03 NOTE — CONSULT NOTE ADULT - SUBJECTIVE AND OBJECTIVE BOX
Neurology Consult Note    HPI:  77 year old Male patient PMH  Parkinson's dementia, depression, HTN, liver cirrhosis 2/2 MAYA, DM, CAD, rib fractures due to recurrent falls presents for altered mental status. On my encounter patient is AAOx2(not to time) but unable to participate in history taking due to confusion. On the NH documentation assessment reads "sleepy, poor appetite, combative. Send to ED for evaluation". Unable to contact family for collateral. Per chart, patient noted to be at baseline mental status 3 days ago per family. 2 days ago family and NH staff noticed patient more confused. Of note, patient has had previous admission for agitation 2/2 to progression of dementia as well as admission for hepatic encephalopathy. Patient is not lethargic on my exam.     In the ED vitals stable.   Labs show lactate 2.3.  CTH: No acute intracranial pathology, stable exam since 2/20/2023.  Was given 1L LR bolus and versed in the ED.  (02 Mar 2023 22:23)      PAST MEDICAL & SURGICAL HISTORY:  Diabetes  Depression  Gout  Benign essential HTN  Osteoarthritis  Cataracts, both eyes  S/P cholecystectomy  S/P knee surgery      Medications:  acetaminophen     Tablet .. 650 milliGRAM(s) Oral every 6 hours PRN  aspirin  chewable 81 milliGRAM(s) Oral daily  atorvastatin 40 milliGRAM(s) Oral at bedtime  carbidopa/levodopa  25/100 1 Tablet(s) Oral <User Schedule>  clopidogrel Tablet 75 milliGRAM(s) Oral daily  dextrose 5%. 1000 milliLiter(s) IV Continuous <Continuous>  dextrose 5%. 1000 milliLiter(s) IV Continuous <Continuous>  dextrose 50% Injectable 25 Gram(s) IV Push once  dextrose 50% Injectable 12.5 Gram(s) IV Push once  dextrose 50% Injectable 25 Gram(s) IV Push once  dextrose Oral Gel 15 Gram(s) Oral once PRN  donepezil 10 milliGRAM(s) Oral at bedtime  DULoxetine 60 milliGRAM(s) Oral daily  enoxaparin Injectable 40 milliGRAM(s) SubCutaneous every 24 hours  glucagon  Injectable 1 milliGRAM(s) IntraMuscular once  insulin lispro (ADMELOG) corrective regimen sliding scale   SubCutaneous three times a day before meals  lactulose Syrup 20 Gram(s) Oral every 8 hours  latanoprost 0.005% Ophthalmic Solution 1 Drop(s) Both EYES at bedtime  magnesium gluconate 500 milliGRAM(s) Oral daily  memantine 10 milliGRAM(s) Oral daily  midodrine. 5 milliGRAM(s) Oral three times a day  QUEtiapine 12.5 milliGRAM(s) Oral at bedtime  rifAXIMin 550 milliGRAM(s) Oral two times a day      Vital Signs Last 24 Hrs  T(C): 36.4 (03 Mar 2023 07:41), Max: 36.6 (02 Mar 2023 16:43)  T(F): 97.6 (03 Mar 2023 07:41), Max: 97.8 (02 Mar 2023 16:43)  HR: 81 (03 Mar 2023 07:41) (73 - 81)  BP: 133/61 (03 Mar 2023 07:41) (133/61 - 143/68)  RR: 18 (03 Mar 2023 07:41) (18 - 18)  SpO2: 96% (03 Mar 2023 07:41) (96% - 99%)    Parameters below as of 03 Mar 2023 07:41  Patient On (Oxygen Delivery Method): room air      Neurological Exam:   Mental status: Awake, alert and oriented x2 to person, place. No dysarthria, no aphasia. Follows commands.  Cranial nerves: Pupils equally round and reactive to light, no nystagmus, extraocular muscles intact, V1 through V3 intact bilaterally and symmetric, face slightly asymmetric on right, tongue was midline.  Motor: MRC grading 5/5 B/L UE/LE.  strength 5/5. Normal tone and bulk, no cogwheel rigidity. No abnormal movements, no resting tremor. +slight right asterixis   Sensation: Intact to light touch in all extremities.  Coordination: No dysmetria on finger-to-nose testing.  Reflexes: 2+ in bilateral UE/LE, downgoing toes bilaterally.  Gait: Deferred.    Labs:  CBC Full  -  ( 03 Mar 2023 07:25 )  WBC Count : 5.18 K/uL  RBC Count : 3.80 M/uL  Hemoglobin : 13.6 g/dL  Hematocrit : 38.5 %  Platelet Count - Automated : 119 K/uL  Mean Cell Volume : 101.3 fL  Mean Cell Hemoglobin : 35.8 pg  Mean Cell Hemoglobin Concentration : 35.3 g/dL  Auto Neutrophil # : 2.64 K/uL  Auto Lymphocyte # : 1.49 K/uL  Auto Monocyte # : 0.67 K/uL  Auto Eosinophil # : 0.32 K/uL  Auto Basophil # : 0.05 K/uL  Auto Neutrophil % : 50.9 %  Auto Lymphocyte % : 28.8 %  Auto Monocyte % : 12.9 %  Auto Eosinophil % : 6.2 %  Auto Basophil % : 1.0 %    03-03    144  |  104  |  26<H>  ----------------------------<  158<H>  4.0   |  29  |  0.7    Ca    8.9      03 Mar 2023 07:25  Mg     1.3     03-03    TPro  6.0  /  Alb  2.7<L>  /  TBili  1.7<H>  /  DBili  x   /  AST  39  /  ALT  32  /  AlkPhos  102  03-03    LIVER FUNCTIONS - ( 03 Mar 2023 07:25 )  Alb: 2.7 g/dL / Pro: 6.0 g/dL / ALK PHOS: 102 U/L / ALT: 32 U/L / AST: 39 U/L / GGT: x           PT/INR - ( 02 Mar 2023 18:16 )   PT: 14.60 sec;   INR: 1.27 ratio       PTT - ( 02 Mar 2023 18:16 )  PTT:30.5 sec      < from: CT Head No Cont (03.02.23 @ 20:17) >  PROCEDURE DATE:  03/02/2023          INTERPRETATION:  CLINICAL INDICATION: Altered status.    TECHNIQUE: CT of the head was performed without the administration of   intravenous contrast.    COMPARISON: CT head dated 2/20/2023.    FINDINGS:    There is stable prominence of the sulci, sylvian fissures, and ventricles   likely reflecting mild diffuse parenchymal volume loss.    There is no evidence of acute territorial infarct or intracranial   hemorrhage. There is no space-occupying lesion or midline shift.    There is no evidence of hydrocephalus. There are no extra-axial fluid   collections.    The visualized intraorbital contents are normal. The imaged portions of   the paranasal sinuses are aerated. The mastoid air cells are aerated. The   visualized soft tissues and osseous structures appear normal.      IMPRESSION:    No acute intracranial pathology, stable exam since 2/20/2023.      < from: EEG (03.03.23 @ 00:00) >  PROCEDURE DATE:  03/03/2023          INTERPRETATION:  Exam Performed on: 16 Channel Digital Routine EEG done   on [employer] recording system.    History  AMS,OSTEOARTHRITIS,GOUT,DEPRESSION,DIABETES,BENIGN ESSENTIAL HTN    Medications    Medication  Date  VERSED INJECTABLE  <Date>2023/03/02    State  Awake, asleep, and drowsy    Symmetry  Symmetric      Organization  Less than optimally organized    PDR  Continuous  Background: 5-6 hz    Generalized Slowing  Yes  mild - moderate    Focal Slowing  Yes  Left  mild    frontal, Parasaggital      Breach Artifact: No  -      Activation Procedure  Hyper Ventilation      Photic Stimulation  No    Epileptiform Activity  Yes    Frequency:  rare    Side:  Left    Type:  Sharp transients    Area of Activity:  Parasaggital    Events:    Impression:  Abnormal due to the presence of: focal slowing as above, generalized   slowing as above, interictal activity as above      Clinical Correlation & Recommendations  Consistent with diffuse cerebral electrophysiological dysfunction.    Secondary to none specific cause., Consistent with focal   electrophysiological dysfunction.  Secondary to structural/metabolic   cause., The significance of sharp transience in regard to   epileptogenicity is not clear.  Clinical correlation is recommended.

## 2023-03-03 NOTE — PATIENT PROFILE ADULT - FALL HARM RISK - HARM RISK INTERVENTIONS

## 2023-03-03 NOTE — CONSULT NOTE ADULT - ATTENDING COMMENTS
PT w/ MMP including cirrhosis/hepatic encephalopathy currently p/w transient AMS likely TME found to have transient epileptiform sharps on REEG clinically back to baseline recommend start empiric keppra 250 mg BID.  In meantime no evidence of parkinsonian findings on current and previous assessments and pt unclear who made initial diagnosis of IPD therefore taper sinemet.  Rest of recommendations as above.

## 2023-03-03 NOTE — CONSULT NOTE ADULT - ASSESSMENT
This is a 78 yo M patient with a PMHx of dementia, depression, HTN, cirrhosis with portal hypertension and a hospitalization for hepatic encephalopathy, diabetes, HFpEF, rib fractures due to recurrent falls, presents to the ED for AMS. His dementia was diagnosed 2 years ago, however, symptoms of amnesia and behavioral changes have been progressing rapidly in this span of time. Neurology was consulted for evaluation of AMS. Patient was recently admitted to [facility] and was evaluated by Neurology for AMS and a facial droop, stroke work up was negative. Labs showed hyperammonemia on previous admission, patient was treated with lactulose and rifaximin. On this admission, NH3 is 46. CT Head showed no acute abnormalities. REEG was performed and showed rare left sided sharp transients in the parasaggital region.    Plan:  #Altered mental status and behavioral changes with hx of hepatic encephalopathy  - CT Head reviewed, showed no acute abnormalities  - MRI Brain reviewed from prior admission, no evidence of acute infarcts  - EEG was performed on this admission and showed rare left sided sharp transients in the parasaggital region  - Recommend Keppra 250 mg BID  - Can decrease Sinemet to 1 tablet for 3 days and then stop, no evidence of Parkinson symptoms  - Continue rest of home medications  - NH3 level 46  - Can f/u in Outpatient [facility]rology [facility] on 3/14  - Continue rest of care as per primary team This is a 78 yo M patient with a PMHx of dementia, depression, HTN, cirrhosis with portal hypertension and a hospitalization for hepatic encephalopathy, diabetes, HFpEF, rib fractures due to recurrent falls, presents to the ED for AMS. His dementia was diagnosed 2 years ago, however, symptoms of amnesia and behavioral changes have been progressing rapidly in this span of time. Neurology was consulted for evaluation of AMS. Patient was recently admitted to [facility] and was evaluated by Neurology for AMS and a facial droop, stroke work up was negative. Labs showed hyperammonemia on previous admission, patient was treated with lactulose and rifaximin. On this admission, NH3 is 46. CT Head showed no acute abnormalities. REEG was performed and showed rare left sided sharp transients in the parasaggital region.    Plan:  #Altered mental status and behavioral changes with hx of hepatic encephalopathy, EEG (+) for rare L sided sharp transients  - CT Head reviewed, showed no acute abnormalities  - MRI Brain reviewed from prior admission, no evidence of acute infarcts  - EEG was performed on this admission and showed rare left sided sharp transients in the parasaggital region  - Recommend to start Keppra 250 mg BID  - Can decrease Sinemet to 1 tablet for 3 days and then stop, no evidence of Parkinson symptoms  - Continue rest of home medications  - NH3 level 46  - Can f/u in Outpatient [facility]rology [facility] on 3/14  - Continue rest of care as per primary team This is a 78 yo M patient with a PMHx of dementia, depression, HTN, cirrhosis with portal hypertension and a hospitalization for hepatic encephalopathy, diabetes, HFpEF, rib fractures due to recurrent falls, presents to the ED for AMS. His dementia was diagnosed 2 years ago, however, symptoms of amnesia and behavioral changes have been progressing rapidly in this span of time. Neurology was consulted for evaluation of AMS. Patient was recently admitted to [facility] and was evaluated by Neurology for AMS and a facial droop, stroke work up was negative. Labs showed hyperammonemia on previous admission, patient was treated with lactulose and rifaximin. On this admission, NH3 is 46. CT Head showed no acute abnormalities. REEG was performed and showed rare left sided sharp transients in the parasaggital region.    Plan:  #Altered mental status and behavioral changes with hx of hepatic encephalopathy, EEG (+) for rare L sided sharp transients  - CT Head reviewed, showed no acute abnormalities  - MRI Brain reviewed from prior admission, no evidence of acute infarcts  - EEG was performed on this admission and showed rare left sided sharp transients in the parasaggital region  - Recommend to start Keppra 250 mg BID  - Can decrease Sinemet to 1 tablet for 3 days and then discontinue, no evidence of Parkinsonian symptoms on exam currently or on previous admission  - Continue rest of home medications  - NH3 level 46  - Can f/u in Outpatient [facility]rology [facility] on 3/14  - Continue rest of care as per primary team

## 2023-03-04 LAB
ALBUMIN SERPL ELPH-MCNC: 2.6 G/DL — LOW (ref 3.5–5.2)
ALP SERPL-CCNC: 103 U/L — SIGNIFICANT CHANGE UP (ref 30–115)
ALT FLD-CCNC: 32 U/L — SIGNIFICANT CHANGE UP (ref 0–41)
ANION GAP SERPL CALC-SCNC: 10 MMOL/L — SIGNIFICANT CHANGE UP (ref 7–14)
ANISOCYTOSIS BLD QL: SLIGHT — SIGNIFICANT CHANGE UP
AST SERPL-CCNC: 36 U/L — SIGNIFICANT CHANGE UP (ref 0–41)
BASOPHILS # BLD AUTO: 0.04 K/UL — SIGNIFICANT CHANGE UP (ref 0–0.2)
BASOPHILS NFR BLD AUTO: 0.9 % — SIGNIFICANT CHANGE UP (ref 0–1)
BILIRUB SERPL-MCNC: 1.8 MG/DL — HIGH (ref 0.2–1.2)
BLD GP AB SCN SERPL QL: SIGNIFICANT CHANGE UP
BUN SERPL-MCNC: 18 MG/DL — SIGNIFICANT CHANGE UP (ref 10–20)
BURR CELLS BLD QL SMEAR: PRESENT — SIGNIFICANT CHANGE UP
CALCIUM SERPL-MCNC: 8.3 MG/DL — LOW (ref 8.4–10.5)
CHLORIDE SERPL-SCNC: 105 MMOL/L — SIGNIFICANT CHANGE UP (ref 98–110)
CO2 SERPL-SCNC: 26 MMOL/L — SIGNIFICANT CHANGE UP (ref 17–32)
CREAT SERPL-MCNC: 0.6 MG/DL — LOW (ref 0.7–1.5)
EGFR: 99 ML/MIN/1.73M2 — SIGNIFICANT CHANGE UP
EOSINOPHIL # BLD AUTO: 0.11 K/UL — SIGNIFICANT CHANGE UP (ref 0–0.7)
EOSINOPHIL NFR BLD AUTO: 2.6 % — SIGNIFICANT CHANGE UP (ref 0–8)
GLUCOSE BLDC GLUCOMTR-MCNC: 141 MG/DL — HIGH (ref 70–99)
GLUCOSE BLDC GLUCOMTR-MCNC: 178 MG/DL — HIGH (ref 70–99)
GLUCOSE BLDC GLUCOMTR-MCNC: 201 MG/DL — HIGH (ref 70–99)
GLUCOSE BLDC GLUCOMTR-MCNC: 274 MG/DL — HIGH (ref 70–99)
GLUCOSE SERPL-MCNC: 186 MG/DL — HIGH (ref 70–99)
HCT VFR BLD CALC: 35.1 % — LOW (ref 42–52)
HGB BLD-MCNC: 12.6 G/DL — LOW (ref 14–18)
INR BLD: 1.37 RATIO — HIGH (ref 0.65–1.3)
LYMPHOCYTES # BLD AUTO: 1.21 K/UL — SIGNIFICANT CHANGE UP (ref 1.2–3.4)
LYMPHOCYTES # BLD AUTO: 28.1 % — SIGNIFICANT CHANGE UP (ref 20.5–51.1)
MAGNESIUM SERPL-MCNC: 1.3 MG/DL — LOW (ref 1.8–2.4)
MANUAL SMEAR VERIFICATION: SIGNIFICANT CHANGE UP
MCHC RBC-ENTMCNC: 35.5 PG — HIGH (ref 27–31)
MCHC RBC-ENTMCNC: 35.9 G/DL — SIGNIFICANT CHANGE UP (ref 32–37)
MCV RBC AUTO: 98.9 FL — HIGH (ref 80–94)
MICROCYTES BLD QL: SLIGHT — SIGNIFICANT CHANGE UP
MONOCYTES # BLD AUTO: 0.19 K/UL — SIGNIFICANT CHANGE UP (ref 0.1–0.6)
MONOCYTES NFR BLD AUTO: 4.4 % — SIGNIFICANT CHANGE UP (ref 1.7–9.3)
NEUTROPHILS # BLD AUTO: 2.72 K/UL — SIGNIFICANT CHANGE UP (ref 1.4–6.5)
NEUTROPHILS NFR BLD AUTO: 63.1 % — SIGNIFICANT CHANGE UP (ref 42.2–75.2)
PLAT MORPH BLD: NORMAL — SIGNIFICANT CHANGE UP
PLATELET # BLD AUTO: 115 K/UL — LOW (ref 130–400)
POIKILOCYTOSIS BLD QL AUTO: SLIGHT — SIGNIFICANT CHANGE UP
POLYCHROMASIA BLD QL SMEAR: SLIGHT — SIGNIFICANT CHANGE UP
POTASSIUM SERPL-MCNC: 4.1 MMOL/L — SIGNIFICANT CHANGE UP (ref 3.5–5)
POTASSIUM SERPL-SCNC: 4.1 MMOL/L — SIGNIFICANT CHANGE UP (ref 3.5–5)
PROT SERPL-MCNC: 5.5 G/DL — LOW (ref 6–8)
PROTHROM AB SERPL-ACNC: 15.8 SEC — HIGH (ref 9.95–12.87)
RBC # BLD: 3.55 M/UL — LOW (ref 4.7–6.1)
RBC # FLD: 13.4 % — SIGNIFICANT CHANGE UP (ref 11.5–14.5)
RBC BLD AUTO: ABNORMAL
SMUDGE CELLS # BLD: PRESENT — SIGNIFICANT CHANGE UP
SODIUM SERPL-SCNC: 141 MMOL/L — SIGNIFICANT CHANGE UP (ref 135–146)
T PALLIDUM AB TITR SER: NEGATIVE — SIGNIFICANT CHANGE UP
VARIANT LYMPHS # BLD: 0.9 % — SIGNIFICANT CHANGE UP (ref 0–5)
WBC # BLD: 4.31 K/UL — LOW (ref 4.8–10.8)
WBC # FLD AUTO: 4.31 K/UL — LOW (ref 4.8–10.8)

## 2023-03-04 PROCEDURE — 99232 SBSQ HOSP IP/OBS MODERATE 35: CPT

## 2023-03-04 RX ORDER — MAGNESIUM SULFATE 500 MG/ML
2 VIAL (ML) INJECTION
Refills: 0 | Status: COMPLETED | OUTPATIENT
Start: 2023-03-04 | End: 2023-03-04

## 2023-03-04 RX ADMIN — Medication 6: at 12:09

## 2023-03-04 RX ADMIN — Medication 81 MILLIGRAM(S): at 11:49

## 2023-03-04 RX ADMIN — DONEPEZIL HYDROCHLORIDE 10 MILLIGRAM(S): 10 TABLET, FILM COATED ORAL at 21:24

## 2023-03-04 RX ADMIN — CLOPIDOGREL BISULFATE 75 MILLIGRAM(S): 75 TABLET, FILM COATED ORAL at 11:49

## 2023-03-04 RX ADMIN — MIDODRINE HYDROCHLORIDE 5 MILLIGRAM(S): 2.5 TABLET ORAL at 11:48

## 2023-03-04 RX ADMIN — ENOXAPARIN SODIUM 40 MILLIGRAM(S): 100 INJECTION SUBCUTANEOUS at 17:25

## 2023-03-04 RX ADMIN — Medication 25 GRAM(S): at 15:55

## 2023-03-04 RX ADMIN — Medication 2: at 08:07

## 2023-03-04 RX ADMIN — Medication 25 GRAM(S): at 11:48

## 2023-03-04 RX ADMIN — LACTULOSE 20 GRAM(S): 10 SOLUTION ORAL at 21:24

## 2023-03-04 RX ADMIN — LACTULOSE 20 GRAM(S): 10 SOLUTION ORAL at 05:29

## 2023-03-04 RX ADMIN — Medication 25 GRAM(S): at 13:23

## 2023-03-04 RX ADMIN — DULOXETINE HYDROCHLORIDE 60 MILLIGRAM(S): 30 CAPSULE, DELAYED RELEASE ORAL at 11:48

## 2023-03-04 RX ADMIN — LATANOPROST 1 DROP(S): 0.05 SOLUTION/ DROPS OPHTHALMIC; TOPICAL at 21:24

## 2023-03-04 RX ADMIN — LEVETIRACETAM 250 MILLIGRAM(S): 250 TABLET, FILM COATED ORAL at 17:25

## 2023-03-04 RX ADMIN — QUETIAPINE FUMARATE 12.5 MILLIGRAM(S): 200 TABLET, FILM COATED ORAL at 21:24

## 2023-03-04 RX ADMIN — LEVETIRACETAM 250 MILLIGRAM(S): 250 TABLET, FILM COATED ORAL at 05:26

## 2023-03-04 RX ADMIN — MIDODRINE HYDROCHLORIDE 5 MILLIGRAM(S): 2.5 TABLET ORAL at 05:27

## 2023-03-04 RX ADMIN — MIDODRINE HYDROCHLORIDE 5 MILLIGRAM(S): 2.5 TABLET ORAL at 17:26

## 2023-03-04 RX ADMIN — CARBIDOPA AND LEVODOPA 1 TABLET(S): 25; 100 TABLET ORAL at 11:48

## 2023-03-04 RX ADMIN — MEMANTINE HYDROCHLORIDE 10 MILLIGRAM(S): 10 TABLET ORAL at 11:49

## 2023-03-04 RX ADMIN — LEVETIRACETAM 250 MILLIGRAM(S): 250 TABLET, FILM COATED ORAL at 08:07

## 2023-03-04 RX ADMIN — LACTULOSE 20 GRAM(S): 10 SOLUTION ORAL at 13:23

## 2023-03-04 RX ADMIN — Medication 4: at 17:23

## 2023-03-04 RX ADMIN — ATORVASTATIN CALCIUM 40 MILLIGRAM(S): 80 TABLET, FILM COATED ORAL at 21:24

## 2023-03-05 LAB
ALBUMIN SERPL ELPH-MCNC: 2.4 G/DL — LOW (ref 3.5–5.2)
ALP SERPL-CCNC: 108 U/L — SIGNIFICANT CHANGE UP (ref 30–115)
ALT FLD-CCNC: 28 U/L — SIGNIFICANT CHANGE UP (ref 0–41)
ANION GAP SERPL CALC-SCNC: 9 MMOL/L — SIGNIFICANT CHANGE UP (ref 7–14)
AST SERPL-CCNC: 32 U/L — SIGNIFICANT CHANGE UP (ref 0–41)
BASOPHILS # BLD AUTO: 0.05 K/UL — SIGNIFICANT CHANGE UP (ref 0–0.2)
BASOPHILS NFR BLD AUTO: 0.9 % — SIGNIFICANT CHANGE UP (ref 0–1)
BILIRUB SERPL-MCNC: 1.8 MG/DL — HIGH (ref 0.2–1.2)
BUN SERPL-MCNC: 18 MG/DL — SIGNIFICANT CHANGE UP (ref 10–20)
CALCIUM SERPL-MCNC: 8.2 MG/DL — LOW (ref 8.4–10.5)
CHLORIDE SERPL-SCNC: 104 MMOL/L — SIGNIFICANT CHANGE UP (ref 98–110)
CO2 SERPL-SCNC: 27 MMOL/L — SIGNIFICANT CHANGE UP (ref 17–32)
CREAT SERPL-MCNC: 0.7 MG/DL — SIGNIFICANT CHANGE UP (ref 0.7–1.5)
EGFR: 95 ML/MIN/1.73M2 — SIGNIFICANT CHANGE UP
EOSINOPHIL # BLD AUTO: 0.36 K/UL — SIGNIFICANT CHANGE UP (ref 0–0.7)
EOSINOPHIL NFR BLD AUTO: 6.5 % — SIGNIFICANT CHANGE UP (ref 0–8)
GLUCOSE BLDC GLUCOMTR-MCNC: 160 MG/DL — HIGH (ref 70–99)
GLUCOSE BLDC GLUCOMTR-MCNC: 190 MG/DL — HIGH (ref 70–99)
GLUCOSE BLDC GLUCOMTR-MCNC: 212 MG/DL — HIGH (ref 70–99)
GLUCOSE BLDC GLUCOMTR-MCNC: 215 MG/DL — HIGH (ref 70–99)
GLUCOSE SERPL-MCNC: 133 MG/DL — HIGH (ref 70–99)
HCT VFR BLD CALC: 37.6 % — LOW (ref 42–52)
HGB BLD-MCNC: 13.3 G/DL — LOW (ref 14–18)
IMM GRANULOCYTES NFR BLD AUTO: 0.2 % — SIGNIFICANT CHANGE UP (ref 0.1–0.3)
INR BLD: 1.29 RATIO — SIGNIFICANT CHANGE UP (ref 0.65–1.3)
LYMPHOCYTES # BLD AUTO: 1.99 K/UL — SIGNIFICANT CHANGE UP (ref 1.2–3.4)
LYMPHOCYTES # BLD AUTO: 35.9 % — SIGNIFICANT CHANGE UP (ref 20.5–51.1)
MAGNESIUM SERPL-MCNC: 1.8 MG/DL — SIGNIFICANT CHANGE UP (ref 1.8–2.4)
MCHC RBC-ENTMCNC: 35.3 PG — HIGH (ref 27–31)
MCHC RBC-ENTMCNC: 35.4 G/DL — SIGNIFICANT CHANGE UP (ref 32–37)
MCV RBC AUTO: 99.7 FL — HIGH (ref 80–94)
MONOCYTES # BLD AUTO: 0.47 K/UL — SIGNIFICANT CHANGE UP (ref 0.1–0.6)
MONOCYTES NFR BLD AUTO: 8.5 % — SIGNIFICANT CHANGE UP (ref 1.7–9.3)
NEUTROPHILS # BLD AUTO: 2.66 K/UL — SIGNIFICANT CHANGE UP (ref 1.4–6.5)
NEUTROPHILS NFR BLD AUTO: 48 % — SIGNIFICANT CHANGE UP (ref 42.2–75.2)
NRBC # BLD: 0 /100 WBCS — SIGNIFICANT CHANGE UP (ref 0–0)
PLATELET # BLD AUTO: 136 K/UL — SIGNIFICANT CHANGE UP (ref 130–400)
POTASSIUM SERPL-MCNC: 3.9 MMOL/L — SIGNIFICANT CHANGE UP (ref 3.5–5)
POTASSIUM SERPL-SCNC: 3.9 MMOL/L — SIGNIFICANT CHANGE UP (ref 3.5–5)
PROT SERPL-MCNC: 5.7 G/DL — LOW (ref 6–8)
PROTHROM AB SERPL-ACNC: 14.8 SEC — HIGH (ref 9.95–12.87)
RBC # BLD: 3.77 M/UL — LOW (ref 4.7–6.1)
RBC # FLD: 13.5 % — SIGNIFICANT CHANGE UP (ref 11.5–14.5)
SODIUM SERPL-SCNC: 140 MMOL/L — SIGNIFICANT CHANGE UP (ref 135–146)
WBC # BLD: 5.54 K/UL — SIGNIFICANT CHANGE UP (ref 4.8–10.8)
WBC # FLD AUTO: 5.54 K/UL — SIGNIFICANT CHANGE UP (ref 4.8–10.8)

## 2023-03-05 PROCEDURE — 99232 SBSQ HOSP IP/OBS MODERATE 35: CPT

## 2023-03-05 RX ADMIN — MIDODRINE HYDROCHLORIDE 5 MILLIGRAM(S): 2.5 TABLET ORAL at 11:21

## 2023-03-05 RX ADMIN — MEMANTINE HYDROCHLORIDE 10 MILLIGRAM(S): 10 TABLET ORAL at 11:21

## 2023-03-05 RX ADMIN — Medication 4: at 11:22

## 2023-03-05 RX ADMIN — DONEPEZIL HYDROCHLORIDE 10 MILLIGRAM(S): 10 TABLET, FILM COATED ORAL at 22:06

## 2023-03-05 RX ADMIN — Medication 81 MILLIGRAM(S): at 11:21

## 2023-03-05 RX ADMIN — QUETIAPINE FUMARATE 12.5 MILLIGRAM(S): 200 TABLET, FILM COATED ORAL at 22:06

## 2023-03-05 RX ADMIN — LEVETIRACETAM 250 MILLIGRAM(S): 250 TABLET, FILM COATED ORAL at 05:34

## 2023-03-05 RX ADMIN — Medication 2: at 07:39

## 2023-03-05 RX ADMIN — MIDODRINE HYDROCHLORIDE 5 MILLIGRAM(S): 2.5 TABLET ORAL at 05:34

## 2023-03-05 RX ADMIN — CARBIDOPA AND LEVODOPA 1 TABLET(S): 25; 100 TABLET ORAL at 11:20

## 2023-03-05 RX ADMIN — DULOXETINE HYDROCHLORIDE 60 MILLIGRAM(S): 30 CAPSULE, DELAYED RELEASE ORAL at 11:21

## 2023-03-05 RX ADMIN — CLOPIDOGREL BISULFATE 75 MILLIGRAM(S): 75 TABLET, FILM COATED ORAL at 11:20

## 2023-03-05 RX ADMIN — LACTULOSE 20 GRAM(S): 10 SOLUTION ORAL at 05:33

## 2023-03-05 RX ADMIN — LACTULOSE 20 GRAM(S): 10 SOLUTION ORAL at 22:05

## 2023-03-05 RX ADMIN — LATANOPROST 1 DROP(S): 0.05 SOLUTION/ DROPS OPHTHALMIC; TOPICAL at 22:05

## 2023-03-05 RX ADMIN — LACTULOSE 20 GRAM(S): 10 SOLUTION ORAL at 13:37

## 2023-03-05 RX ADMIN — ATORVASTATIN CALCIUM 40 MILLIGRAM(S): 80 TABLET, FILM COATED ORAL at 22:06

## 2023-03-05 RX ADMIN — ENOXAPARIN SODIUM 40 MILLIGRAM(S): 100 INJECTION SUBCUTANEOUS at 17:35

## 2023-03-05 RX ADMIN — Medication 2: at 17:34

## 2023-03-05 RX ADMIN — LEVETIRACETAM 250 MILLIGRAM(S): 250 TABLET, FILM COATED ORAL at 17:37

## 2023-03-05 RX ADMIN — MIDODRINE HYDROCHLORIDE 5 MILLIGRAM(S): 2.5 TABLET ORAL at 17:35

## 2023-03-06 ENCOUNTER — TRANSCRIPTION ENCOUNTER (OUTPATIENT)
Age: 78
End: 2023-03-06

## 2023-03-06 LAB
-  AMIKACIN: SIGNIFICANT CHANGE UP
-  AMOXICILLIN/CLAVULANIC ACID: SIGNIFICANT CHANGE UP
-  AMPICILLIN/SULBACTAM: SIGNIFICANT CHANGE UP
-  AMPICILLIN: SIGNIFICANT CHANGE UP
-  AZTREONAM: SIGNIFICANT CHANGE UP
-  CEFAZOLIN: SIGNIFICANT CHANGE UP
-  CEFEPIME: SIGNIFICANT CHANGE UP
-  CEFOXITIN: SIGNIFICANT CHANGE UP
-  CEFTRIAXONE: SIGNIFICANT CHANGE UP
-  CEFUROXIME: SIGNIFICANT CHANGE UP
-  CIPROFLOXACIN: SIGNIFICANT CHANGE UP
-  ERTAPENEM: SIGNIFICANT CHANGE UP
-  GENTAMICIN: SIGNIFICANT CHANGE UP
-  IMIPENEM: SIGNIFICANT CHANGE UP
-  LEVOFLOXACIN: SIGNIFICANT CHANGE UP
-  MEROPENEM: SIGNIFICANT CHANGE UP
-  NITROFURANTOIN: SIGNIFICANT CHANGE UP
-  PIPERACILLIN/TAZOBACTAM: SIGNIFICANT CHANGE UP
-  TOBRAMYCIN: SIGNIFICANT CHANGE UP
-  TRIMETHOPRIM/SULFAMETHOXAZOLE: SIGNIFICANT CHANGE UP
ALBUMIN SERPL ELPH-MCNC: 2.6 G/DL — LOW (ref 3.5–5.2)
ALP SERPL-CCNC: 109 U/L — SIGNIFICANT CHANGE UP (ref 30–115)
ALT FLD-CCNC: 30 U/L — SIGNIFICANT CHANGE UP (ref 0–41)
ANION GAP SERPL CALC-SCNC: 10 MMOL/L — SIGNIFICANT CHANGE UP (ref 7–14)
AST SERPL-CCNC: 32 U/L — SIGNIFICANT CHANGE UP (ref 0–41)
BASOPHILS # BLD AUTO: 0.06 K/UL — SIGNIFICANT CHANGE UP (ref 0–0.2)
BASOPHILS NFR BLD AUTO: 1.2 % — HIGH (ref 0–1)
BILIRUB SERPL-MCNC: 1.8 MG/DL — HIGH (ref 0.2–1.2)
BUN SERPL-MCNC: 18 MG/DL — SIGNIFICANT CHANGE UP (ref 10–20)
CALCIUM SERPL-MCNC: 8.5 MG/DL — SIGNIFICANT CHANGE UP (ref 8.4–10.5)
CHLORIDE SERPL-SCNC: 105 MMOL/L — SIGNIFICANT CHANGE UP (ref 98–110)
CO2 SERPL-SCNC: 27 MMOL/L — SIGNIFICANT CHANGE UP (ref 17–32)
CREAT SERPL-MCNC: 0.6 MG/DL — LOW (ref 0.7–1.5)
CULTURE RESULTS: SIGNIFICANT CHANGE UP
EGFR: 99 ML/MIN/1.73M2 — SIGNIFICANT CHANGE UP
EOSINOPHIL # BLD AUTO: 0.3 K/UL — SIGNIFICANT CHANGE UP (ref 0–0.7)
EOSINOPHIL NFR BLD AUTO: 6.2 % — SIGNIFICANT CHANGE UP (ref 0–8)
GLUCOSE BLDC GLUCOMTR-MCNC: 167 MG/DL — HIGH (ref 70–99)
GLUCOSE BLDC GLUCOMTR-MCNC: 234 MG/DL — HIGH (ref 70–99)
GLUCOSE BLDC GLUCOMTR-MCNC: 241 MG/DL — HIGH (ref 70–99)
GLUCOSE BLDC GLUCOMTR-MCNC: 269 MG/DL — HIGH (ref 70–99)
GLUCOSE SERPL-MCNC: 177 MG/DL — HIGH (ref 70–99)
HCT VFR BLD CALC: 40 % — LOW (ref 42–52)
HGB BLD-MCNC: 14.1 G/DL — SIGNIFICANT CHANGE UP (ref 14–18)
IMM GRANULOCYTES NFR BLD AUTO: 0.2 % — SIGNIFICANT CHANGE UP (ref 0.1–0.3)
INR BLD: 1.32 RATIO — HIGH (ref 0.65–1.3)
LYMPHOCYTES # BLD AUTO: 1.82 K/UL — SIGNIFICANT CHANGE UP (ref 1.2–3.4)
LYMPHOCYTES # BLD AUTO: 37.7 % — SIGNIFICANT CHANGE UP (ref 20.5–51.1)
MAGNESIUM SERPL-MCNC: 1.5 MG/DL — LOW (ref 1.8–2.4)
MCHC RBC-ENTMCNC: 35.3 G/DL — SIGNIFICANT CHANGE UP (ref 32–37)
MCHC RBC-ENTMCNC: 35.5 PG — HIGH (ref 27–31)
MCV RBC AUTO: 100.8 FL — HIGH (ref 80–94)
METHOD TYPE: SIGNIFICANT CHANGE UP
MONOCYTES # BLD AUTO: 0.48 K/UL — SIGNIFICANT CHANGE UP (ref 0.1–0.6)
MONOCYTES NFR BLD AUTO: 9.9 % — HIGH (ref 1.7–9.3)
NEUTROPHILS # BLD AUTO: 2.16 K/UL — SIGNIFICANT CHANGE UP (ref 1.4–6.5)
NEUTROPHILS NFR BLD AUTO: 44.8 % — SIGNIFICANT CHANGE UP (ref 42.2–75.2)
NRBC # BLD: 0 /100 WBCS — SIGNIFICANT CHANGE UP (ref 0–0)
ORGANISM # SPEC MICROSCOPIC CNT: SIGNIFICANT CHANGE UP
ORGANISM # SPEC MICROSCOPIC CNT: SIGNIFICANT CHANGE UP
PLATELET # BLD AUTO: 137 K/UL — SIGNIFICANT CHANGE UP (ref 130–400)
POTASSIUM SERPL-MCNC: 4.2 MMOL/L — SIGNIFICANT CHANGE UP (ref 3.5–5)
POTASSIUM SERPL-SCNC: 4.2 MMOL/L — SIGNIFICANT CHANGE UP (ref 3.5–5)
PROT SERPL-MCNC: 5.7 G/DL — LOW (ref 6–8)
PROTHROM AB SERPL-ACNC: 15.2 SEC — HIGH (ref 9.95–12.87)
RBC # BLD: 3.97 M/UL — LOW (ref 4.7–6.1)
RBC # FLD: 13.5 % — SIGNIFICANT CHANGE UP (ref 11.5–14.5)
SODIUM SERPL-SCNC: 142 MMOL/L — SIGNIFICANT CHANGE UP (ref 135–146)
SPECIMEN SOURCE: SIGNIFICANT CHANGE UP
WBC # BLD: 4.83 K/UL — SIGNIFICANT CHANGE UP (ref 4.8–10.8)
WBC # FLD AUTO: 4.83 K/UL — SIGNIFICANT CHANGE UP (ref 4.8–10.8)

## 2023-03-06 PROCEDURE — 99232 SBSQ HOSP IP/OBS MODERATE 35: CPT

## 2023-03-06 RX ORDER — MAGNESIUM SULFATE 500 MG/ML
2 VIAL (ML) INJECTION ONCE
Refills: 0 | Status: COMPLETED | OUTPATIENT
Start: 2023-03-06 | End: 2023-03-06

## 2023-03-06 RX ORDER — LEVETIRACETAM 250 MG/1
1 TABLET, FILM COATED ORAL
Qty: 60 | Refills: 0
Start: 2023-03-06 | End: 2023-04-04

## 2023-03-06 RX ORDER — MAGNESIUM SULFATE 500 MG/ML
2 VIAL (ML) INJECTION ONCE
Refills: 0 | Status: DISCONTINUED | OUTPATIENT
Start: 2023-03-06 | End: 2023-03-07

## 2023-03-06 RX ADMIN — LEVETIRACETAM 250 MILLIGRAM(S): 250 TABLET, FILM COATED ORAL at 06:03

## 2023-03-06 RX ADMIN — LACTULOSE 20 GRAM(S): 10 SOLUTION ORAL at 22:03

## 2023-03-06 RX ADMIN — LACTULOSE 20 GRAM(S): 10 SOLUTION ORAL at 06:03

## 2023-03-06 RX ADMIN — MIDODRINE HYDROCHLORIDE 5 MILLIGRAM(S): 2.5 TABLET ORAL at 06:03

## 2023-03-06 RX ADMIN — Medication 4: at 17:39

## 2023-03-06 RX ADMIN — ATORVASTATIN CALCIUM 40 MILLIGRAM(S): 80 TABLET, FILM COATED ORAL at 22:03

## 2023-03-06 RX ADMIN — Medication 2: at 07:56

## 2023-03-06 RX ADMIN — Medication 1 TABLET(S): at 17:46

## 2023-03-06 RX ADMIN — QUETIAPINE FUMARATE 12.5 MILLIGRAM(S): 200 TABLET, FILM COATED ORAL at 22:04

## 2023-03-06 RX ADMIN — DONEPEZIL HYDROCHLORIDE 10 MILLIGRAM(S): 10 TABLET, FILM COATED ORAL at 22:03

## 2023-03-06 RX ADMIN — LATANOPROST 1 DROP(S): 0.05 SOLUTION/ DROPS OPHTHALMIC; TOPICAL at 22:03

## 2023-03-06 RX ADMIN — LEVETIRACETAM 250 MILLIGRAM(S): 250 TABLET, FILM COATED ORAL at 17:50

## 2023-03-06 RX ADMIN — ENOXAPARIN SODIUM 40 MILLIGRAM(S): 100 INJECTION SUBCUTANEOUS at 17:45

## 2023-03-06 RX ADMIN — Medication 6: at 11:42

## 2023-03-06 RX ADMIN — Medication 25 GRAM(S): at 11:16

## 2023-03-06 NOTE — PHYSICAL THERAPY INITIAL EVALUATION ADULT - GENERAL OBSERVATIONS, REHAB EVAL
10:55-11:07. Pt encountered semifowler in bed sleeping in NAD, had difficulty keeping his eyes open when PT attempted to wake pt. Pt kept stating "leave me alone". Nubia FIGUEROA notified. Will f/u with PT.

## 2023-03-06 NOTE — DISCHARGE NOTE PROVIDER - NSDCCPCAREPLAN_GEN_ALL_CORE_FT
PRINCIPAL DISCHARGE DIAGNOSIS  Diagnosis: E-coli UTI  Assessment and Plan of Treatment: You were noted either on arrival or during this hospitalization to have a Urinary Tract Infection. You will be prescribed augmentin for an E Coli UTI. Please refer to the list of medications present on your discharge paperwork. Be sure to complete taking the full course, whether you have symptoms or not, as prescribed.  While taking antibiotics, you may benefit from taking a probiotic such as florastore to help to try and prevent an infectious type of diarrhea known as C Diff. If you notice that you begin having severe watery diarrhea, more than 4-5 episodes a day, please see your Primary Care Doctor or come to the ER to have your stool tested for this infection.   It is not necessary to repeat a urine test to see if the infection is gone, it is assumed that after treatment it should have resolved. However, if you continue to have symptoms, please see your Primary Care doctor or return to the ER.

## 2023-03-06 NOTE — DISCHARGE NOTE PROVIDER - CARE PROVIDER_API CALL
Adam Kirkland (DO)  Infectious Disease; Internal Medicine  [address] [place] [address]  Phone: [phone]  Fax: [phone]  Follow Up Time:

## 2023-03-06 NOTE — DISCHARGE NOTE PROVIDER - HOSPITAL COURSE
77 year old Male patient PMH  Parkinson's dementia, depression, HTN, liver cirrhosis 2/2 MAYA, DM, CAD, rib fractures due to recurrent falls presents for altered mental status. On my encounter patient is AAOx2(not to time) but unable to participate in history taking due to confusion. On the NH documentation assessment reads "sleepy, poor appetite, combative. Send to ED for evaluation". Unable to contact family for collateral. Per chart, patient noted to be at baseline mental status 3 days ago per family. 2 days ago family and NH staff noticed patient more confused. Of note, patient has had previous admission for agitation 2/2 to progression of dementia as well as admission for hepatic encephalopathy. Patient is not lethargic on my exam.   In the ED vitals stable.  Labs show lactate 2.3. CTH: No acute intracranial pathology, stable exam since 2/20/2023.  Was given 1L LR bolus and versed in the ED.     # Altered mental status -possibly  due to seizure- clinically mentation has improved   CT scan no acute intracranial pathology  EEG positive for epileptiform activity   pt started on keppra per neurology  Sinemet being tapered off - no Parkinson symptoms per neurology  pt's mental status is improved today - he is AA& Ox 2 and following commands  PT consult - ambulates with RW at SNF  fall precautions  no asterixis and ammonia level  46- doubt hepatic encephalopathy at this time  UA negative for infection  CXR - no pneumonia, + atelectasis  macrocytic anemia - folate and vit B12 WNL    # Hypomagnesemia- supplemented    # Dementia - c/w memantine, donepezil, seroquel qhs  # Depression - on duloxetine  # Liver Cirrhosis due to MAYA/history of hepatic encephalopathy - on lactulose and rifaximin   monitor BMs - want 2-3 stools daily  # CAD s/p stent - ASA, plavix, statin  # Orthostatic hypotension on midodrine  # DM type 2 - check FS   77 year old Male patient PMH  Parkinson's dementia, depression, HTN, liver cirrhosis 2/2 MAYA, DM, CAD, rib fractures due to recurrent falls presents for altered mental status. On my encounter patient is AAOx2(not to time) but unable to participate in history taking due to confusion. On the NH documentation assessment reads "sleepy, poor appetite, combative. Send to ED for evaluation". Unable to contact family for collateral. Per chart, patient noted to be at baseline mental status 3 days ago per family. 2 days ago family and NH staff noticed patient more confused. Of note, patient has had previous admission for agitation 2/2 to progression of dementia as well as admission for hepatic encephalopathy. Patient is not lethargic on my exam.   In the ED vitals stable.  Labs show lactate 2.3. CTH: No acute intracranial pathology, stable exam since 2/20/2023.  Was given 1L LR bolus and versed in the ED.     # Altered mental status -possibly  due to seizure- clinically mentation has improved   CT scan no acute intracranial pathology  EEG positive for epileptiform activity   pt started on keppra per neurology  Sinemet being tapered off - no Parkinson symptoms per neurology  pt's mental status is improved today - he is AA& Ox 2 and following commands  PT consult - ambulates with RW at SNF  fall precautions  no asterixis and ammonia level  46- doubt hepatic encephalopathy at this time  UA negative for infection. UCx 3/3: 50-99k E coli catheterized (straight cath) --> start augmentin for possible UTI  CXR - no pneumonia, + atelectasis  macrocytic anemia - folate and vit B12 WNL    # Hypomagnesemia- supplemented    # Dementia - c/w memantine, donepezil, seroquel qhs  # Depression - on duloxetine  # Liver Cirrhosis due to MAYA/history of hepatic encephalopathy - on lactulose and rifaximin   monitor BMs - want 2-3 stools daily  # CAD s/p stent - ASA, plavix, statin  # Orthostatic hypotension on midodrine  # DM type 2 - check FS   77 year old Male patient PMH  Parkinson's dementia, depression, HTN, liver cirrhosis 2/2 MAYA, DM, CAD, rib fractures due to recurrent falls presents for altered mental status. On my encounter patient is AAOx2(not to time) but unable to participate in history taking due to confusion. On the NH documentation assessment reads "sleepy, poor appetite, combative. Send to ED for evaluation". Unable to contact family for collateral. Per chart, patient noted to be at baseline mental status 3 days ago per family. 2 days ago family and NH staff noticed patient more confused. Of note, patient has had previous admission for agitation 2/2 to progression of dementia as well as admission for hepatic encephalopathy. Patient is not lethargic on my exam.   In the ED vitals stable.  Labs show lactate 2.3. CTH: No acute intracranial pathology, stable exam since 2/20/2023.  Was given 1L LR bolus and versed in the ED.     # Altered mental status -possibly  due to seizure- clinically mentation has improved   CT scan no acute intracranial pathology  EEG positive for epileptiform activity   pt started on keppra per neurology  Sinemet tapered off - no Parkinson symptoms per neurology  pt's mental status is improved today - he is AA& Ox 2 and following commands  PT consult - ambulates with RW at SNF  fall precautions  no asterixis and ammonia level  46- doubt hepatic encephalopathy at this time  UA negative for infection. UCx 3/3: 50-99k E coli catheterized (straight cath) --> start augmentin for possible UTI  CXR - no pneumonia, + atelectasis  macrocytic anemia - folate and vit B12 WNL    # Hypomagnesemia- supplemented    # Dementia - c/w memantine, donepezil, seroquel qhs  # Depression - on duloxetine  # Liver Cirrhosis due to MAYA/history of hepatic encephalopathy - on lactulose and rifaximin   monitor BMs - want 2-3 stools daily  # CAD s/p stent - ASA, plavix, statin  # Orthostatic hypotension on midodrine  # DM type 2 - check FS   77 year old Male patient PMH  Parkinson's dementia, depression, HTN, liver cirrhosis 2/2 MAYA, DM, CAD, rib fractures due to recurrent falls presents for altered mental status. On my encounter patient is AAOx2(not to time) but unable to participate in history taking due to confusion. On the NH documentation assessment reads "sleepy, poor appetite, combative. Send to ED for evaluation". Unable to contact family for collateral. Per chart, patient noted to be at baseline mental status 3 days ago per family. 2 days ago family and NH staff noticed patient more confused. Of note, patient has had previous admission for agitation 2/2 to progression of dementia as well as admission for hepatic encephalopathy. Patient is not lethargic on my exam.   In the ED vitals stable.  Labs show lactate 2.3. CTH: No acute intracranial pathology, stable exam since 2/20/2023.  Was given 1L LR bolus and versed in the ED.     # Altered mental status -possibly  due to seizure- clinically mentation has improved   CT scan no acute intracranial pathology  EEG positive for epileptiform activity   pt started on keppra per neurology  Sinemet tapered off - no Parkinson symptoms per neurology  pt's mental status is improved today - he is AA& Ox 2 and following commands  PT consult - ambulates with RW at SNF  fall precautions  no asterixis and ammonia level  46- doubt hepatic encephalopathy at this time  UA negative for infection. UCx 3/3: 50-99k E coli catheterized (straight cath) --> start augmentin for possible UTI  CXR - no pneumonia, + atelectasis  macrocytic anemia - folate and vit B12 WNL    # Hypomagnesemia- supplemented    # Dementia - c/w memantine, donepezil, seroquel qhs  # Depression - on duloxetine  # Liver Cirrhosis due to MAYA/history of hepatic encephalopathy - on lactulose and rifaximin   monitor BMs - want 2-3 stools daily  # CAD s/p stent - ASA, plavix, statin  # Orthostatic hypotension on midodrine  # DM type 2 - check FS    AttendingNote:   Patient seen and examined independently. I personally had a face-to-face encounter with the patient, examined the patient myself,  personally reviewed labs & radiology images, and reviewed the plan of care with the housestaff. Agree with Resident's note but my note supersedes the resident's note in matters hereby listed.     S : No CP/SOB  Other ROS neg.    Corroborate with above exam.    Labs/Rad : Reviewed.     D/c to NH. Meds per rec

## 2023-03-06 NOTE — DISCHARGE NOTE PROVIDER - NSDCMRMEDTOKEN_GEN_ALL_CORE_FT
aspirin 81 mg oral tablet, chewable: 1 tab(s) orally once a day  atorvastatin 40 mg oral tablet: 1 tab(s) orally once a day (at bedtime)  carbidopa-levodopa 25 mg-100 mg oral tablet: 1 tab(s) orally 2 times a day  clopidogrel 75 mg oral tablet: 1 tab(s) orally once a day  donepezil 10 mg oral tablet: 1 tab(s) orally once a day (at bedtime)  DULoxetine 60 mg oral delayed release capsule: 1 cap(s) orally once a day  lactulose 10 g/15 mL oral syrup: 30 milliliter(s) orally every 8 hours - goal 4 BMs per day to prevent hepatic encephalopathy)  latanoprost 0.005% ophthalmic solution: 1 drop(s) to each affected eye once a day (in the evening)  Mag-Ox 400 oral tablet: 1 tab(s) orally 3 times a day (with meals)  memantine 10 mg oral tablet: 1 tab(s) orally once a day  metFORMIN 1000 mg oral tablet: 1.5 tab(s) orally 2 times a day  midodrine 5 mg oral tablet: 1 tab(s) orally 3 times a day. Please hold if Systolic BP &gt; 140 or Diastolic BP &gt; 90  QUEtiapine: 12.5 milligram(s) orally once a day (at bedtime)  rifAXIMin 550 mg oral tablet: 1 tab(s) orally 2 times a day  Vitamin B6 100 mg oral tablet: 1 tab(s) orally once a day  Vitamin D3 2000 intl units oral tablet: 1 tab(s) orally once a day   aspirin 81 mg oral tablet, chewable: 1 tab(s) orally once a day  atorvastatin 40 mg oral tablet: 1 tab(s) orally once a day (at bedtime)  clopidogrel 75 mg oral tablet: 1 tab(s) orally once a day  donepezil 10 mg oral tablet: 1 tab(s) orally once a day (at bedtime)  DULoxetine 60 mg oral delayed release capsule: 1 cap(s) orally once a day  lactulose 10 g/15 mL oral syrup: 30 milliliter(s) orally every 8 hours - goal 4 BMs per day to prevent hepatic encephalopathy)  latanoprost 0.005% ophthalmic solution: 1 drop(s) to each affected eye once a day (in the evening)  Mag-Ox 400 oral tablet: 1 tab(s) orally 3 times a day (with meals)  memantine 10 mg oral tablet: 1 tab(s) orally once a day  metFORMIN 1000 mg oral tablet: 1.5 tab(s) orally 2 times a day  midodrine 5 mg oral tablet: 1 tab(s) orally 3 times a day. Please hold if Systolic BP &gt; 140 or Diastolic BP &gt; 90  QUEtiapine: 12.5 milligram(s) orally once a day (at bedtime)  rifAXIMin 550 mg oral tablet: 1 tab(s) orally 2 times a day  Vitamin B6 100 mg oral tablet: 1 tab(s) orally once a day  Vitamin D3 2000 intl units oral tablet: 1 tab(s) orally once a day   amoxicillin-clavulanate 875 mg-125 mg oral tablet: 1 tab(s) orally 2 times a day through 3/11/23  aspirin 81 mg oral tablet, chewable: 1 tab(s) orally once a day  atorvastatin 40 mg oral tablet: 1 tab(s) orally once a day (at bedtime)  clopidogrel 75 mg oral tablet: 1 tab(s) orally once a day  donepezil 10 mg oral tablet: 1 tab(s) orally once a day (at bedtime)  DULoxetine 60 mg oral delayed release capsule: 1 cap(s) orally once a day  lactulose 10 g/15 mL oral syrup: 30 milliliter(s) orally every 8 hours - goal 4 BMs per day to prevent hepatic encephalopathy)  latanoprost 0.005% ophthalmic solution: 1 drop(s) to each affected eye once a day (in the evening)  Mag-Ox 400 oral tablet: 1 tab(s) orally 3 times a day (with meals)  memantine 10 mg oral tablet: 1 tab(s) orally once a day  metFORMIN 1000 mg oral tablet: 1.5 tab(s) orally 2 times a day  midodrine 5 mg oral tablet: 1 tab(s) orally 3 times a day. Please hold if Systolic BP &gt; 140 or Diastolic BP &gt; 90  QUEtiapine: 12.5 milligram(s) orally once a day (at bedtime)  rifAXIMin 550 mg oral tablet: 1 tab(s) orally 2 times a day  Vitamin B6 100 mg oral tablet: 1 tab(s) orally once a day  Vitamin D3 2000 intl units oral tablet: 1 tab(s) orally once a day

## 2023-03-07 LAB
ALBUMIN SERPL ELPH-MCNC: 2.5 G/DL — LOW (ref 3.5–5.2)
ALP SERPL-CCNC: 109 U/L — SIGNIFICANT CHANGE UP (ref 30–115)
ALT FLD-CCNC: 30 U/L — SIGNIFICANT CHANGE UP (ref 0–41)
ANION GAP SERPL CALC-SCNC: 8 MMOL/L — SIGNIFICANT CHANGE UP (ref 7–14)
AST SERPL-CCNC: 30 U/L — SIGNIFICANT CHANGE UP (ref 0–41)
BASOPHILS # BLD AUTO: 0.06 K/UL — SIGNIFICANT CHANGE UP (ref 0–0.2)
BASOPHILS NFR BLD AUTO: 1.1 % — HIGH (ref 0–1)
BILIRUB SERPL-MCNC: 1.8 MG/DL — HIGH (ref 0.2–1.2)
BLD GP AB SCN SERPL QL: SIGNIFICANT CHANGE UP
BUN SERPL-MCNC: 15 MG/DL — SIGNIFICANT CHANGE UP (ref 10–20)
CALCIUM SERPL-MCNC: 8.5 MG/DL — SIGNIFICANT CHANGE UP (ref 8.4–10.5)
CHLORIDE SERPL-SCNC: 105 MMOL/L — SIGNIFICANT CHANGE UP (ref 98–110)
CO2 SERPL-SCNC: 28 MMOL/L — SIGNIFICANT CHANGE UP (ref 17–32)
CREAT SERPL-MCNC: 0.7 MG/DL — SIGNIFICANT CHANGE UP (ref 0.7–1.5)
EGFR: 95 ML/MIN/1.73M2 — SIGNIFICANT CHANGE UP
EOSINOPHIL # BLD AUTO: 0.41 K/UL — SIGNIFICANT CHANGE UP (ref 0–0.7)
EOSINOPHIL NFR BLD AUTO: 7.3 % — SIGNIFICANT CHANGE UP (ref 0–8)
GLUCOSE BLDC GLUCOMTR-MCNC: 157 MG/DL — HIGH (ref 70–99)
GLUCOSE BLDC GLUCOMTR-MCNC: 216 MG/DL — HIGH (ref 70–99)
GLUCOSE BLDC GLUCOMTR-MCNC: 219 MG/DL — HIGH (ref 70–99)
GLUCOSE BLDC GLUCOMTR-MCNC: 232 MG/DL — HIGH (ref 70–99)
GLUCOSE SERPL-MCNC: 166 MG/DL — HIGH (ref 70–99)
HCT VFR BLD CALC: 36.3 % — LOW (ref 42–52)
HGB BLD-MCNC: 13.2 G/DL — LOW (ref 14–18)
IMM GRANULOCYTES NFR BLD AUTO: 0.2 % — SIGNIFICANT CHANGE UP (ref 0.1–0.3)
INR BLD: 1.36 RATIO — HIGH (ref 0.65–1.3)
LYMPHOCYTES # BLD AUTO: 1.98 K/UL — SIGNIFICANT CHANGE UP (ref 1.2–3.4)
LYMPHOCYTES # BLD AUTO: 35.3 % — SIGNIFICANT CHANGE UP (ref 20.5–51.1)
MAGNESIUM SERPL-MCNC: 1.4 MG/DL — LOW (ref 1.8–2.4)
MCHC RBC-ENTMCNC: 35.9 PG — HIGH (ref 27–31)
MCHC RBC-ENTMCNC: 36.4 G/DL — SIGNIFICANT CHANGE UP (ref 32–37)
MCV RBC AUTO: 98.6 FL — HIGH (ref 80–94)
MONOCYTES # BLD AUTO: 0.52 K/UL — SIGNIFICANT CHANGE UP (ref 0.1–0.6)
MONOCYTES NFR BLD AUTO: 9.3 % — SIGNIFICANT CHANGE UP (ref 1.7–9.3)
NEUTROPHILS # BLD AUTO: 2.63 K/UL — SIGNIFICANT CHANGE UP (ref 1.4–6.5)
NEUTROPHILS NFR BLD AUTO: 46.8 % — SIGNIFICANT CHANGE UP (ref 42.2–75.2)
NRBC # BLD: 0 /100 WBCS — SIGNIFICANT CHANGE UP (ref 0–0)
PLATELET # BLD AUTO: 146 K/UL — SIGNIFICANT CHANGE UP (ref 130–400)
POTASSIUM SERPL-MCNC: 4.2 MMOL/L — SIGNIFICANT CHANGE UP (ref 3.5–5)
POTASSIUM SERPL-SCNC: 4.2 MMOL/L — SIGNIFICANT CHANGE UP (ref 3.5–5)
PROT SERPL-MCNC: 5.6 G/DL — LOW (ref 6–8)
PROTHROM AB SERPL-ACNC: 15.7 SEC — HIGH (ref 9.95–12.87)
RBC # BLD: 3.68 M/UL — LOW (ref 4.7–6.1)
RBC # FLD: 13.6 % — SIGNIFICANT CHANGE UP (ref 11.5–14.5)
SARS-COV-2 RNA SPEC QL NAA+PROBE: SIGNIFICANT CHANGE UP
SODIUM SERPL-SCNC: 141 MMOL/L — SIGNIFICANT CHANGE UP (ref 135–146)
WBC # BLD: 5.61 K/UL — SIGNIFICANT CHANGE UP (ref 4.8–10.8)
WBC # FLD AUTO: 5.61 K/UL — SIGNIFICANT CHANGE UP (ref 4.8–10.8)

## 2023-03-07 PROCEDURE — 99232 SBSQ HOSP IP/OBS MODERATE 35: CPT

## 2023-03-07 RX ORDER — MAGNESIUM SULFATE 500 MG/ML
2 VIAL (ML) INJECTION ONCE
Refills: 0 | Status: COMPLETED | OUTPATIENT
Start: 2023-03-07 | End: 2023-03-07

## 2023-03-07 RX ORDER — MAGNESIUM SULFATE 500 MG/ML
2 VIAL (ML) INJECTION
Refills: 0 | Status: COMPLETED | OUTPATIENT
Start: 2023-03-07 | End: 2023-03-07

## 2023-03-07 RX ADMIN — ATORVASTATIN CALCIUM 40 MILLIGRAM(S): 80 TABLET, FILM COATED ORAL at 21:57

## 2023-03-07 RX ADMIN — Medication 1 TABLET(S): at 17:07

## 2023-03-07 RX ADMIN — Medication 4: at 12:03

## 2023-03-07 RX ADMIN — LACTULOSE 20 GRAM(S): 10 SOLUTION ORAL at 21:57

## 2023-03-07 RX ADMIN — MIDODRINE HYDROCHLORIDE 5 MILLIGRAM(S): 2.5 TABLET ORAL at 12:04

## 2023-03-07 RX ADMIN — MEMANTINE HYDROCHLORIDE 10 MILLIGRAM(S): 10 TABLET ORAL at 12:04

## 2023-03-07 RX ADMIN — Medication 1 TABLET(S): at 05:11

## 2023-03-07 RX ADMIN — LACTULOSE 20 GRAM(S): 10 SOLUTION ORAL at 13:57

## 2023-03-07 RX ADMIN — DONEPEZIL HYDROCHLORIDE 10 MILLIGRAM(S): 10 TABLET, FILM COATED ORAL at 21:57

## 2023-03-07 RX ADMIN — CLOPIDOGREL BISULFATE 75 MILLIGRAM(S): 75 TABLET, FILM COATED ORAL at 12:04

## 2023-03-07 RX ADMIN — ENOXAPARIN SODIUM 40 MILLIGRAM(S): 100 INJECTION SUBCUTANEOUS at 17:08

## 2023-03-07 RX ADMIN — DULOXETINE HYDROCHLORIDE 60 MILLIGRAM(S): 30 CAPSULE, DELAYED RELEASE ORAL at 12:04

## 2023-03-07 RX ADMIN — QUETIAPINE FUMARATE 12.5 MILLIGRAM(S): 200 TABLET, FILM COATED ORAL at 21:57

## 2023-03-07 RX ADMIN — LEVETIRACETAM 250 MILLIGRAM(S): 250 TABLET, FILM COATED ORAL at 17:08

## 2023-03-07 RX ADMIN — Medication 4: at 17:06

## 2023-03-07 RX ADMIN — Medication 25 GRAM(S): at 12:06

## 2023-03-07 RX ADMIN — LEVETIRACETAM 250 MILLIGRAM(S): 250 TABLET, FILM COATED ORAL at 05:11

## 2023-03-07 RX ADMIN — Medication 81 MILLIGRAM(S): at 12:04

## 2023-03-07 RX ADMIN — MIDODRINE HYDROCHLORIDE 5 MILLIGRAM(S): 2.5 TABLET ORAL at 17:08

## 2023-03-07 RX ADMIN — LATANOPROST 1 DROP(S): 0.05 SOLUTION/ DROPS OPHTHALMIC; TOPICAL at 21:59

## 2023-03-07 RX ADMIN — LACTULOSE 20 GRAM(S): 10 SOLUTION ORAL at 05:11

## 2023-03-07 RX ADMIN — Medication 25 GRAM(S): at 16:42

## 2023-03-07 RX ADMIN — MIDODRINE HYDROCHLORIDE 5 MILLIGRAM(S): 2.5 TABLET ORAL at 05:11

## 2023-03-07 RX ADMIN — Medication 2: at 07:54

## 2023-03-07 NOTE — PHYSICAL THERAPY INITIAL EVALUATION ADULT - GENERAL OBSERVATIONS, REHAB EVAL
patient extremely lethargic, responding but not opening eyes, CAROLINA Diaz and CAROLINA Rios case manager were both notified.

## 2023-03-08 LAB
ALBUMIN SERPL ELPH-MCNC: 2.5 G/DL — LOW (ref 3.5–5.2)
ALP SERPL-CCNC: 111 U/L — SIGNIFICANT CHANGE UP (ref 30–115)
ALT FLD-CCNC: 29 U/L — SIGNIFICANT CHANGE UP (ref 0–41)
ANION GAP SERPL CALC-SCNC: 8 MMOL/L — SIGNIFICANT CHANGE UP (ref 7–14)
AST SERPL-CCNC: 30 U/L — SIGNIFICANT CHANGE UP (ref 0–41)
BASOPHILS # BLD AUTO: 0.06 K/UL — SIGNIFICANT CHANGE UP (ref 0–0.2)
BASOPHILS NFR BLD AUTO: 1 % — SIGNIFICANT CHANGE UP (ref 0–1)
BILIRUB SERPL-MCNC: 1.6 MG/DL — HIGH (ref 0.2–1.2)
BUN SERPL-MCNC: 16 MG/DL — SIGNIFICANT CHANGE UP (ref 10–20)
CALCIUM SERPL-MCNC: 8.5 MG/DL — SIGNIFICANT CHANGE UP (ref 8.4–10.5)
CHLORIDE SERPL-SCNC: 108 MMOL/L — SIGNIFICANT CHANGE UP (ref 98–110)
CO2 SERPL-SCNC: 27 MMOL/L — SIGNIFICANT CHANGE UP (ref 17–32)
CREAT SERPL-MCNC: 0.7 MG/DL — SIGNIFICANT CHANGE UP (ref 0.7–1.5)
EGFR: 95 ML/MIN/1.73M2 — SIGNIFICANT CHANGE UP
EOSINOPHIL # BLD AUTO: 0.41 K/UL — SIGNIFICANT CHANGE UP (ref 0–0.7)
EOSINOPHIL NFR BLD AUTO: 6.5 % — SIGNIFICANT CHANGE UP (ref 0–8)
GLUCOSE BLDC GLUCOMTR-MCNC: 156 MG/DL — HIGH (ref 70–99)
GLUCOSE BLDC GLUCOMTR-MCNC: 163 MG/DL — HIGH (ref 70–99)
GLUCOSE BLDC GLUCOMTR-MCNC: 198 MG/DL — HIGH (ref 70–99)
GLUCOSE BLDC GLUCOMTR-MCNC: 341 MG/DL — HIGH (ref 70–99)
GLUCOSE SERPL-MCNC: 167 MG/DL — HIGH (ref 70–99)
HCT VFR BLD CALC: 36.1 % — LOW (ref 42–52)
HGB BLD-MCNC: 12.8 G/DL — LOW (ref 14–18)
IMM GRANULOCYTES NFR BLD AUTO: 0.3 % — SIGNIFICANT CHANGE UP (ref 0.1–0.3)
INR BLD: 1.31 RATIO — HIGH (ref 0.65–1.3)
LYMPHOCYTES # BLD AUTO: 2.01 K/UL — SIGNIFICANT CHANGE UP (ref 1.2–3.4)
LYMPHOCYTES # BLD AUTO: 32 % — SIGNIFICANT CHANGE UP (ref 20.5–51.1)
MAGNESIUM SERPL-MCNC: 1.6 MG/DL — LOW (ref 1.8–2.4)
MCHC RBC-ENTMCNC: 35.5 G/DL — SIGNIFICANT CHANGE UP (ref 32–37)
MCHC RBC-ENTMCNC: 35.7 PG — HIGH (ref 27–31)
MCV RBC AUTO: 100.6 FL — HIGH (ref 80–94)
MONOCYTES # BLD AUTO: 0.57 K/UL — SIGNIFICANT CHANGE UP (ref 0.1–0.6)
MONOCYTES NFR BLD AUTO: 9.1 % — SIGNIFICANT CHANGE UP (ref 1.7–9.3)
NEUTROPHILS # BLD AUTO: 3.21 K/UL — SIGNIFICANT CHANGE UP (ref 1.4–6.5)
NEUTROPHILS NFR BLD AUTO: 51.1 % — SIGNIFICANT CHANGE UP (ref 42.2–75.2)
NRBC # BLD: 0 /100 WBCS — SIGNIFICANT CHANGE UP (ref 0–0)
PLATELET # BLD AUTO: 153 K/UL — SIGNIFICANT CHANGE UP (ref 130–400)
POTASSIUM SERPL-MCNC: 4.2 MMOL/L — SIGNIFICANT CHANGE UP (ref 3.5–5)
POTASSIUM SERPL-SCNC: 4.2 MMOL/L — SIGNIFICANT CHANGE UP (ref 3.5–5)
PROT SERPL-MCNC: 5.5 G/DL — LOW (ref 6–8)
PROTHROM AB SERPL-ACNC: 15.1 SEC — HIGH (ref 9.95–12.87)
RBC # BLD: 3.59 M/UL — LOW (ref 4.7–6.1)
RBC # FLD: 13.5 % — SIGNIFICANT CHANGE UP (ref 11.5–14.5)
SODIUM SERPL-SCNC: 143 MMOL/L — SIGNIFICANT CHANGE UP (ref 135–146)
WBC # BLD: 6.28 K/UL — SIGNIFICANT CHANGE UP (ref 4.8–10.8)
WBC # FLD AUTO: 6.28 K/UL — SIGNIFICANT CHANGE UP (ref 4.8–10.8)

## 2023-03-08 PROCEDURE — 99232 SBSQ HOSP IP/OBS MODERATE 35: CPT

## 2023-03-08 RX ORDER — INSULIN GLARGINE 100 [IU]/ML
5 INJECTION, SOLUTION SUBCUTANEOUS ONCE
Refills: 0 | Status: COMPLETED | OUTPATIENT
Start: 2023-03-08 | End: 2023-03-08

## 2023-03-08 RX ORDER — INSULIN GLARGINE 100 [IU]/ML
5 INJECTION, SOLUTION SUBCUTANEOUS EVERY MORNING
Refills: 0 | Status: DISCONTINUED | OUTPATIENT
Start: 2023-03-09 | End: 2023-03-09

## 2023-03-08 RX ORDER — MAGNESIUM SULFATE 500 MG/ML
2 VIAL (ML) INJECTION ONCE
Refills: 0 | Status: COMPLETED | OUTPATIENT
Start: 2023-03-08 | End: 2023-03-08

## 2023-03-08 RX ADMIN — MIDODRINE HYDROCHLORIDE 5 MILLIGRAM(S): 2.5 TABLET ORAL at 05:21

## 2023-03-08 RX ADMIN — Medication 8: at 11:58

## 2023-03-08 RX ADMIN — LACTULOSE 20 GRAM(S): 10 SOLUTION ORAL at 06:33

## 2023-03-08 RX ADMIN — Medication 2: at 17:16

## 2023-03-08 RX ADMIN — LATANOPROST 1 DROP(S): 0.05 SOLUTION/ DROPS OPHTHALMIC; TOPICAL at 21:52

## 2023-03-08 RX ADMIN — LEVETIRACETAM 250 MILLIGRAM(S): 250 TABLET, FILM COATED ORAL at 05:21

## 2023-03-08 RX ADMIN — Medication 2: at 07:42

## 2023-03-08 RX ADMIN — LEVETIRACETAM 250 MILLIGRAM(S): 250 TABLET, FILM COATED ORAL at 17:17

## 2023-03-08 RX ADMIN — DULOXETINE HYDROCHLORIDE 60 MILLIGRAM(S): 30 CAPSULE, DELAYED RELEASE ORAL at 12:00

## 2023-03-08 RX ADMIN — MEMANTINE HYDROCHLORIDE 10 MILLIGRAM(S): 10 TABLET ORAL at 12:00

## 2023-03-08 RX ADMIN — QUETIAPINE FUMARATE 12.5 MILLIGRAM(S): 200 TABLET, FILM COATED ORAL at 21:52

## 2023-03-08 RX ADMIN — LACTULOSE 20 GRAM(S): 10 SOLUTION ORAL at 21:52

## 2023-03-08 RX ADMIN — Medication 81 MILLIGRAM(S): at 12:00

## 2023-03-08 RX ADMIN — Medication 1 TABLET(S): at 05:21

## 2023-03-08 RX ADMIN — CLOPIDOGREL BISULFATE 75 MILLIGRAM(S): 75 TABLET, FILM COATED ORAL at 12:00

## 2023-03-08 RX ADMIN — DONEPEZIL HYDROCHLORIDE 10 MILLIGRAM(S): 10 TABLET, FILM COATED ORAL at 21:52

## 2023-03-08 RX ADMIN — Medication 25 GRAM(S): at 08:54

## 2023-03-08 RX ADMIN — MIDODRINE HYDROCHLORIDE 5 MILLIGRAM(S): 2.5 TABLET ORAL at 11:59

## 2023-03-08 RX ADMIN — LACTULOSE 20 GRAM(S): 10 SOLUTION ORAL at 13:06

## 2023-03-08 RX ADMIN — INSULIN GLARGINE 5 UNIT(S): 100 INJECTION, SOLUTION SUBCUTANEOUS at 17:16

## 2023-03-08 RX ADMIN — ENOXAPARIN SODIUM 40 MILLIGRAM(S): 100 INJECTION SUBCUTANEOUS at 17:17

## 2023-03-08 RX ADMIN — Medication 1 TABLET(S): at 17:17

## 2023-03-08 RX ADMIN — ATORVASTATIN CALCIUM 40 MILLIGRAM(S): 80 TABLET, FILM COATED ORAL at 21:53

## 2023-03-08 RX ADMIN — MIDODRINE HYDROCHLORIDE 5 MILLIGRAM(S): 2.5 TABLET ORAL at 17:17

## 2023-03-08 NOTE — PROGRESS NOTE ADULT - ATTENDING COMMENTS
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     # Altered mental status -possibly  due to seizure- clinically mentation has improved, patient tolerating diet well( needs to be assisted during feeding)  CT scan no acute intracranial pathology  EEG positive for epileptiform activity   pt started on keppra per neurology  Sinemet being tapered off - no Parkinson symptoms per neurology  pt's mental status is improved today - he is AA& Ox 2 and following commands  PT consult - ambulates with RW at [facility]  fall precautions  no asterixis and ammonia level  46- doubt hepatic encephalopathy at this time  UA negative for infection  CXR - no pneumonia, + atelectasis  macrocytic anemia - folate and vit B12 WNL    # Hypomagnesemia- supplemented      # Dementia  on memantine, donepezil  seroquel at bedtime    # Depression - on duloxetine    # 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 for 5 days with PO Augmentin    #DVT ppx: Lovenox SQ    full code status / guarded prognosis    #Progress Note Handoff: obtain PT eval, supplement Mg , d/c planning , d/c covid swab  Family discussion: yes, medical team Disposition: STR     Total time spent to complete patient's bedside assessment, review medical chart, discuss medical plan of care with covering medical team was more than 35 minutes with >50% of time spent face to face with patient, discussion with patient/family and/or coordination of care
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     # Altered mental status -possibly  due to seizure- clinically mentation has improved, patient tolerating diet well( needs to be assisted during feeding)  CT scan no acute intracranial pathology  EEG positive for epileptiform activity   pt started on keppra per neurology  Sinemet being tapered off - no Parkinson symptoms per neurology  pt's mental status is improved today - he is AA& Ox 2 and following commands  PT consult - ambulates with RW at [facility]  fall precautions  no asterixis and ammonia level  46- doubt hepatic encephalopathy at this time  UA negative for infection  CXR - no pneumonia, + atelectasis  macrocytic anemia - folate and vit B12 WNL    # Hypomagnesemia- supplemented      # Dementia  on memantine, donepezil  seroquel at bedtime    # Depression - on duloxetine    # 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 for 5 days with PO Augmentin    #DVT ppx: Lovenox SQ    full code status / guarded prognosis    #Progress Note Handoff: f/up  PT , supplement Mg , d/c planning , d/c covid swab  Family discussion: yes, medical team Disposition: STR     Total time spent to complete patient's bedside assessment, review medical chart, discuss medical plan of care with covering medical team was more than 35 minutes with >50% of time spent face to face with patient, discussion with patient/family and/or coordination of care .
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     # Altered mental status -possibly  due to seizure- clinically mentation has improved, patient tolerating diet well( needs to be assisted during feeding)  CT scan no acute intracranial pathology  EEG positive for epileptiform activity   pt started on keppra per neurology  Sinemet being tapered off - no Parkinson symptoms per neurology  pt's mental status is improved today - he is AA& Ox 2 and following commands  PT consult - ambulates with RW at [facility]  fall precautions  no asterixis and ammonia level  46- doubt hepatic encephalopathy at this time  UA negative for infection  CXR - no pneumonia, + atelectasis  macrocytic anemia - folate and vit B12 WNL    # Hypomagnesemia- supplemented      # Dementia  on memantine, donepezil  seroquel at bedtime  Doesn't participate in PT 2/2 dementia.     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 for 5 days with PO Augmentin    #DVT ppx: Lovenox SQ    full code status / guarded prognosis    #Progress Note Handoff: f/up  PT , supplement Mg , d/c planning , d/c covid swab  Family discussion: yes, medical team Disposition: STR   May have a bed by 3/9.

## 2023-03-09 ENCOUNTER — TRANSCRIPTION ENCOUNTER (OUTPATIENT)
Age: 78
End: 2023-03-09

## 2023-03-09 VITALS
DIASTOLIC BLOOD PRESSURE: 66 MMHG | OXYGEN SATURATION: 98 % | SYSTOLIC BLOOD PRESSURE: 133 MMHG | HEART RATE: 79 BPM | TEMPERATURE: 97 F | RESPIRATION RATE: 18 BRPM

## 2023-03-09 LAB
ALBUMIN SERPL ELPH-MCNC: 2.6 G/DL — LOW (ref 3.5–5.2)
ALP SERPL-CCNC: 101 U/L — SIGNIFICANT CHANGE UP (ref 30–115)
ALT FLD-CCNC: 28 U/L — SIGNIFICANT CHANGE UP (ref 0–41)
ANION GAP SERPL CALC-SCNC: 7 MMOL/L — SIGNIFICANT CHANGE UP (ref 7–14)
AST SERPL-CCNC: 31 U/L — SIGNIFICANT CHANGE UP (ref 0–41)
BASOPHILS # BLD AUTO: 0.04 K/UL — SIGNIFICANT CHANGE UP (ref 0–0.2)
BASOPHILS NFR BLD AUTO: 0.7 % — SIGNIFICANT CHANGE UP (ref 0–1)
BILIRUB SERPL-MCNC: 1.6 MG/DL — HIGH (ref 0.2–1.2)
BUN SERPL-MCNC: 16 MG/DL — SIGNIFICANT CHANGE UP (ref 10–20)
CALCIUM SERPL-MCNC: 8.4 MG/DL — SIGNIFICANT CHANGE UP (ref 8.4–10.4)
CHLORIDE SERPL-SCNC: 105 MMOL/L — SIGNIFICANT CHANGE UP (ref 98–110)
CO2 SERPL-SCNC: 27 MMOL/L — SIGNIFICANT CHANGE UP (ref 17–32)
CREAT SERPL-MCNC: 0.7 MG/DL — SIGNIFICANT CHANGE UP (ref 0.7–1.5)
EGFR: 95 ML/MIN/1.73M2 — SIGNIFICANT CHANGE UP
EOSINOPHIL # BLD AUTO: 0.28 K/UL — SIGNIFICANT CHANGE UP (ref 0–0.7)
EOSINOPHIL NFR BLD AUTO: 5.2 % — SIGNIFICANT CHANGE UP (ref 0–8)
GLUCOSE BLDC GLUCOMTR-MCNC: 170 MG/DL — HIGH (ref 70–99)
GLUCOSE BLDC GLUCOMTR-MCNC: 194 MG/DL — HIGH (ref 70–99)
GLUCOSE BLDC GLUCOMTR-MCNC: 205 MG/DL — HIGH (ref 70–99)
GLUCOSE SERPL-MCNC: 193 MG/DL — HIGH (ref 70–99)
HCT VFR BLD CALC: 35.8 % — LOW (ref 42–52)
HGB BLD-MCNC: 12.7 G/DL — LOW (ref 14–18)
IMM GRANULOCYTES NFR BLD AUTO: 0.2 % — SIGNIFICANT CHANGE UP (ref 0.1–0.3)
INR BLD: 1.39 RATIO — HIGH (ref 0.65–1.3)
LYMPHOCYTES # BLD AUTO: 1.78 K/UL — SIGNIFICANT CHANGE UP (ref 1.2–3.4)
LYMPHOCYTES # BLD AUTO: 33.3 % — SIGNIFICANT CHANGE UP (ref 20.5–51.1)
MAGNESIUM SERPL-MCNC: 1.4 MG/DL — LOW (ref 1.8–2.4)
MCHC RBC-ENTMCNC: 35.5 G/DL — SIGNIFICANT CHANGE UP (ref 32–37)
MCHC RBC-ENTMCNC: 35.7 PG — HIGH (ref 27–31)
MCV RBC AUTO: 100.6 FL — HIGH (ref 80–94)
MONOCYTES # BLD AUTO: 0.57 K/UL — SIGNIFICANT CHANGE UP (ref 0.1–0.6)
MONOCYTES NFR BLD AUTO: 10.7 % — HIGH (ref 1.7–9.3)
NEUTROPHILS # BLD AUTO: 2.66 K/UL — SIGNIFICANT CHANGE UP (ref 1.4–6.5)
NEUTROPHILS NFR BLD AUTO: 49.9 % — SIGNIFICANT CHANGE UP (ref 42.2–75.2)
NRBC # BLD: 0 /100 WBCS — SIGNIFICANT CHANGE UP (ref 0–0)
PLATELET # BLD AUTO: 148 K/UL — SIGNIFICANT CHANGE UP (ref 130–400)
POTASSIUM SERPL-MCNC: 4.3 MMOL/L — SIGNIFICANT CHANGE UP (ref 3.5–5)
POTASSIUM SERPL-SCNC: 4.3 MMOL/L — SIGNIFICANT CHANGE UP (ref 3.5–5)
PROT SERPL-MCNC: 5.6 G/DL — LOW (ref 6–8)
PROTHROM AB SERPL-ACNC: 16 SEC — HIGH (ref 9.95–12.87)
RBC # BLD: 3.56 M/UL — LOW (ref 4.7–6.1)
RBC # FLD: 13.5 % — SIGNIFICANT CHANGE UP (ref 11.5–14.5)
SODIUM SERPL-SCNC: 139 MMOL/L — SIGNIFICANT CHANGE UP (ref 135–146)
WBC # BLD: 5.34 K/UL — SIGNIFICANT CHANGE UP (ref 4.8–10.8)
WBC # FLD AUTO: 5.34 K/UL — SIGNIFICANT CHANGE UP (ref 4.8–10.8)

## 2023-03-09 PROCEDURE — 99239 HOSP IP/OBS DSCHRG MGMT >30: CPT

## 2023-03-09 RX ORDER — MAGNESIUM SULFATE 500 MG/ML
2 VIAL (ML) INJECTION ONCE
Refills: 0 | Status: COMPLETED | OUTPATIENT
Start: 2023-03-09 | End: 2023-03-09

## 2023-03-09 RX ORDER — MAGNESIUM OXIDE 400 MG ORAL TABLET 241.3 MG
400 TABLET ORAL
Refills: 0 | Status: DISCONTINUED | OUTPATIENT
Start: 2023-03-09 | End: 2023-03-09

## 2023-03-09 RX ADMIN — LACTULOSE 20 GRAM(S): 10 SOLUTION ORAL at 05:17

## 2023-03-09 RX ADMIN — DULOXETINE HYDROCHLORIDE 60 MILLIGRAM(S): 30 CAPSULE, DELAYED RELEASE ORAL at 11:59

## 2023-03-09 RX ADMIN — MEMANTINE HYDROCHLORIDE 10 MILLIGRAM(S): 10 TABLET ORAL at 11:58

## 2023-03-09 RX ADMIN — Medication 4: at 17:36

## 2023-03-09 RX ADMIN — Medication 2: at 11:55

## 2023-03-09 RX ADMIN — Medication 25 GRAM(S): at 10:18

## 2023-03-09 RX ADMIN — Medication 1 TABLET(S): at 05:17

## 2023-03-09 RX ADMIN — Medication 1 TABLET(S): at 17:46

## 2023-03-09 RX ADMIN — Medication 2: at 08:13

## 2023-03-09 RX ADMIN — INSULIN GLARGINE 5 UNIT(S): 100 INJECTION, SOLUTION SUBCUTANEOUS at 10:58

## 2023-03-09 RX ADMIN — LEVETIRACETAM 250 MILLIGRAM(S): 250 TABLET, FILM COATED ORAL at 17:46

## 2023-03-09 RX ADMIN — LEVETIRACETAM 250 MILLIGRAM(S): 250 TABLET, FILM COATED ORAL at 05:17

## 2023-03-09 RX ADMIN — CLOPIDOGREL BISULFATE 75 MILLIGRAM(S): 75 TABLET, FILM COATED ORAL at 11:59

## 2023-03-09 RX ADMIN — MAGNESIUM OXIDE 400 MG ORAL TABLET 400 MILLIGRAM(S): 241.3 TABLET ORAL at 11:58

## 2023-03-09 RX ADMIN — MIDODRINE HYDROCHLORIDE 5 MILLIGRAM(S): 2.5 TABLET ORAL at 05:17

## 2023-03-09 RX ADMIN — MAGNESIUM OXIDE 400 MG ORAL TABLET 400 MILLIGRAM(S): 241.3 TABLET ORAL at 17:45

## 2023-03-09 RX ADMIN — LACTULOSE 20 GRAM(S): 10 SOLUTION ORAL at 14:44

## 2023-03-09 RX ADMIN — ENOXAPARIN SODIUM 40 MILLIGRAM(S): 100 INJECTION SUBCUTANEOUS at 17:47

## 2023-03-09 RX ADMIN — Medication 81 MILLIGRAM(S): at 11:59

## 2023-03-09 NOTE — PROGRESS NOTE ADULT - ASSESSMENT
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     1. Altered mental status -? due to seizure  CT scan no acute intracranial pathology  EEG positive for epileptiform activity   pt started on keppra per neurology  Sinemet being tapered off - no Parkinson symptoms per neurology  repleted for hypomagnesemia and keep level >2  pt's mental status is improved today - he is AA& Ox 2 and following commands  PT consult - ambulates with RW at   fall precautions  no asterixis and ammonia level 56 - doubt hepatic encephalopathy at this time  UA negative for infection  CXR - no pneumonia, + atelectasis  macrocytic anemia - folate and vit B12 WNL     2. Dementia  on memantine, donepezil  seroquel at bedtime    3. Depression - on duloxetine    4. 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 CAD s/p stent - ASA, plavix, statin    6. Orthostatic hypotension on midodrine    7. DM type 2 - check FS    8. DVT ppx: Lovenox SQ      full code status - discussed with the son today  guarded prognosis      PROGRESS NOTE HANDOFF    Pending: PT consult, labs in AM    Family discussion: with son Dion Posada today    Disposition: [facility]
77 year old Male patient PMH Parkinson's dementia, depression, HTN, CAD, liver cirrhosis 2/2 MAYA, DM, rib fractures due to recurrent falls presents for altered mental status.     #AMS, improved  #Parkinson's Dementia   #Depression  #Suspected UTI  - CTH: No acute intracranial pathology, stable exam since 2/20/2023  - CXR: atelectasis, no PNA  - not hepatic encephalopathy -->ammonia 46, has been compliant with lactulose per NH documentation   - TSH normal, folate normal, folate, B12 normal, RPR negative  - UCx 3/3: 50-99k E coli catheterized --> start augmentin for possible UTI  - 3/3 EEG with epileptiform activity  - c/w donepezil, duloxetine, memantine, quetiapine  - Neuro recs: start Keppra 250 mg BID;  decrease Sinemet to 1 tablet for 3 days and then discontinue, no evidence of Parkinsonian symptoms on exam currently or on previous admission  - encourage PO intake    #liver cirrhosis 2/2 MAYA - c/w lactulose, rifaximin   #HTN - c/w lisinopril 5mg   #orthostatic hypotension - c/w midodrine TID   #CAD s/p stent - c/w ASA, plavix, atorvastatin   #DM- hold home meds, SS PRN. monitor FS     DVT ppx: Lovenox  Diet: DASH, CC  Activity: [place]  Dispo: from NH     
77 year old Male patient PMH Parkinson's dementia, depression, HTN, CAD, liver cirrhosis 2/2 MAYA, DM, rib fractures due to recurrent falls presents for altered mental status.     #altered mental status, delirium vs underlying dementia -- improved   #Parkinson's Dementia   #depression  #H/o hepatic encephalopathy   --  ddx:  met encephalopathy, hypercarbia, delirium, polypharmacy / drug induced,  infections  r/o cva  --  not hepatic encephalopathy -->ammonia 46, has been compliant with lactulose per NH documentation   --  TSH, folate, B12 normal. RPR negative  --  cxr: no acute CPD  --  CTH: No acute intracranial pathology, stable exam since 2/20/2023.  --  EEG rare L sided sharp transients   --  c/w donepezil, duloxetine, memantine, quetiapine  --  encourage PO intake  -- Neuro eval: tapered off sinemet; started keppra 250mg BID, f/o [facility] on 3/14  - UA negative UCx 50-99k E Coli catheterized (straight cath) --> started augmentin BID x5 days for possible UTI    #liver cirrhosis 2/2 MAYA - c/w lactulose, rifaximin   #HTN - c/w lisinopril 5mg   #orthostatic hypotension - c/w midodrine TID   #CAD s/p stent - c/w ASA, plavix, atorvastatin   #DM- hold home meds, SS PRN. monitor FS     DVT ppx: Lovenox  Diet: DASH, CC  Activity: AAT  Dispo: dc to NH today 
77 year old Male patient PMH Parkinson's dementia, depression, HTN, CAD, liver cirrhosis 2/2 MAYA, DM, rib fractures due to recurrent falls presents for altered mental status.     #altered mental status, delirium vs underlying dementia -- improved   #Parkinson's Dementia   #depression  #H/o hepatic encephalopathy   --  ddx:  met encephalopathy, hypercarbia, delirium, polypharmacy / drug induced,  infections  r/o cva  --  not hepatic encephalopathy -->ammonia 46, has been compliant with lactulose per NH documentation   --  TSH, folate, B12 normal. RPR negative  --  cxr: no acute CPD  --  CTH: No acute intracranial pathology, stable exam since 2/20/2023.  --  EEG rare L sided sharp transients   --  c/w donepezil, duloxetine, memantine, quetiapine  --  encourage PO intake  -- Neuro eval: tapered off sinemet; started keppra 250mg BID, f/o [facility]  - UA negative UCx 50-99k E Coli catheterized (straight cath) --> started augmentin BID x5days for possible UTI    #liver cirrhosis 2/2 MAYA - c/w lactulose, rifaximin   #HTN - c/w lisinopril 5mg   #orthostatic hypotension - c/w midodrine TID   #CAD s/p stent - c/w ASA, plavix, atorvastatin   #DM- hold home meds, SS PRN. monitor FS     DVT ppx: Lovenox  Diet: DASH, CC  Activity: AAT  Dispo: from NH   
77 year old Male patient PMH Parkinson's dementia, depression, HTN, CAD, liver cirrhosis 2/2 MAYA, DM, rib fractures due to recurrent falls presents for altered mental status.     #altered mental status, delirium vs underlying dementia -- improved   #Parkinson's Dementia   #depression  #H/o hepatic encephalopathy   --  ddx:  met encephalopathy, hypercarbia, delirium, polypharmacy / drug induced,  infections  r/o cva  --  not hepatic encephalopathy -->ammonia 46, has been compliant with lactulose per NH documentation   --  TSH, folate, B12 normal. RPR negative  --  cxr: no acute CPD  --  CTH: No acute intracranial pathology, stable exam since 2/20/2023.  --  EEG rare L sided sharp transients   --  c/w donepezil, duloxetine, memantine, quetiapine  --  encourage PO intake  -- Neuro eval: tapered off sinemet; started keppra 250mg BID, f/o [facility] 3/14  - UA negative UCx 50-99k E Coli catheterized (straight cath) --> started augmentin BID x5 days for possible UTI    #liver cirrhosis 2/2 MAYA - c/w lactulose, rifaximin   #HTN - c/w lisinopril 5mg   #orthostatic hypotension - c/w midodrine TID   #CAD s/p stent - c/w ASA, plavix, atorvastatin   #DM- hold home meds, SS PRN. monitor FS     DVT ppx: Lovenox  Diet: DASH, CC  Activity: AAT  Dispo: dc to NH pending bed

## 2023-03-09 NOTE — PROGRESS NOTE ADULT - PROVIDER SPECIALTY LIST ADULT
Internal Medicine
Hospitalist
Internal Medicine

## 2023-03-09 NOTE — DISCHARGE NOTE NURSING/CASE MANAGEMENT/SOCIAL WORK - NSDCVIVACCINE_GEN_ALL_CORE_FT
Tdap; 19-Oct-2018 14:41; Deepthi Hamilton); Particle; [id number] (Exp. Date: 10-Oct-2020); IntraMuscular; Deltoid Right.; 0.5 milliLiter(s); VIS (VIS Published: 09-May-2013, VIS Presented: 19-Oct-2018);

## 2023-03-09 NOTE — DISCHARGE NOTE NURSING/CASE MANAGEMENT/SOCIAL WORK - PATIENT PORTAL LINK FT
You can access the FollowMyHealth Patient Portal offered by [facility] by registering at the following website: [email or web address]. By joining [facility] [employer]’s FollowMyHealth portal, you will also be able to view your health information using other applications (apps) compatible with our system.

## 2023-03-09 NOTE — PROGRESS NOTE ADULT - SUBJECTIVE AND OBJECTIVE BOX
STACY CARDENAS  SI-N 3A (Back) 026 C ([facility]-N 3A (Back))        Patient was evaluated and examined  by bedside, more awake today, has good appetite      REVIEW OF SYSTEMS: unable to obtain, patient remains confused         T(C): , Max: 36.6 (03-05-23 @ 05:00)  HR: 76 (03-05-23 @ 13:07)  BP: 118/58 (03-05-23 @ 13:07)  RR: 18 (03-05-23 @ 13:07)  SpO2: 95% (03-05-23 @ 13:07)  CAPILLARY BLOOD GLUCOSE      POCT Blood Glucose.: 215 mg/dL (05 Mar 2023 11:19)  POCT Blood Glucose.: 160 mg/dL (05 Mar 2023 07:36)  POCT Blood Glucose.: 141 mg/dL (04 Mar 2023 21:48)  POCT Blood Glucose.: 201 mg/dL (04 Mar 2023 17:16)      PHYSICAL EXAM:  General: NAD, awake ,  patient is laying comfortably in bed  HEENT: AT, NC  Lungs: CTA B/L, no wheezing, no rhonchi  CVS: normal S1, S2, RRR, NO M/G/R  Abdomen: soft, bowel sounds present, non-tender, non-distended  Extremities: no edema, no clubbing, no cyanosis, positive peripheral pulses b/l  Neuro: confused, on/off lethargic state  Skin: no rash, no ecchymosis      LAB  CBC  Date: 03-05-23 @ 09:11  Mean cell Qzxfzxxrco28.3  Mean cell Hemoglobin Conc35.4  Mean cell Volum 99.7  Platelet count-Automate 136  RBC Count 3.77  Red Cell Distrib Width13.5  WBC Count5.54  % Albumin, Urine--  Hematocrit 37.6  Hemoglobin 13.3  CBC  Date: 03-04-23 @ 04:30  Mean cell Csiiapothi88.5  Mean cell Hemoglobin Conc35.9  Mean cell Volum 98.9  Platelet count-Automate 115  RBC Count 3.55  Red Cell Distrib Width13.4  WBC Count4.31  % Albumin, Urine--  Hematocrit 35.1  Hemoglobin 12.6  CBC  Date: 03-03-23 @ 07:25  Mean cell Hbjwjayjbi85.8  Mean cell Hemoglobin Conc35.3  Mean cell Volum 101.3  Platelet count-Automate 119  RBC Count 3.80  Red Cell Distrib Width13.8  WBC Count5.18  % Albumin, Urine--  Hematocrit 38.5  Hemoglobin 13.6  CBC  Date: 03-02-23 @ 18:16  Mean cell Trexdqujte18.7  Mean cell Hemoglobin Conc35.5  Mean cell Volum 100.5  Platelet count-Automate 126  RBC Count 3.92  Red Cell Distrib Width13.8  WBC Count4.92  % Albumin, Urine--  Hematocrit 39.4  Hemoglobin 14.0    [facility]  03-05-23 @ 09:11  Blood Gas Arterial-Calcium,Ionized--  Blood Urea Nitrogen, Serum 18 mg/dL [10 - 20]  Carbon Dioxide, Serum27 mmol/L [17 - 32]  Chloride, Ehkjk533 mmol/L [98 - 110]  Creatinie, Serum0.7 mg/dL [0.7 - 1.5]  Glucose, Hhvja793 mg/dL<H> [70 - 99]  Potassium, Serum3.9 mmol/L [3.5 - 5.0]  Sodium, Serum 140 mmol/L [135 - 146]  [facility]  03-04-23 @ 04:30  Blood Gas Arterial-Calcium,Ionized--  Blood Urea Nitrogen, Serum 18 mg/dL [10 - 20]  Carbon Dioxide, Serum26 mmol/L [17 - 32]  Chloride, Gvpax298 mmol/L [98 - 110]  Creatinie, Serum0.6 mg/dL<L> [0.7 - 1.5]  Glucose, Zanjp107 mg/dL<H> [70 - 99]  Potassium, Serum4.1 mmol/L [3.5 - 5.0]  Sodium, Serum 141 mmol/L [135 - 146]  [facility]  03-03-23 @ 07:25  Blood Gas Arterial-Calcium,Ionized--  Blood Urea Nitrogen, Serum 26 mg/dL<H> [10 - 20]  Carbon Dioxide, Serum29 mmol/L [17 - 32]  Chloride, Kqlwy437 mmol/L [98 - 110]  Creatinie, Serum0.7 mg/dL [0.7 - 1.5]  Glucose, Sjlik721 mg/dL<H> [70 - 99]  Potassium, Serum4.0 mmol/L [3.5 - 5.0]  Sodium, Serum 144 mmol/L [135 - 146]  [facility]  03-02-23 @ 18:16  Blood Gas Arterial-Calcium,Ionized--  Blood Urea Nitrogen, Serum 34 mg/dL<H> [10 - 20]  Carbon Dioxide, Serum31 mmol/L [17 - 32]  Chloride, Tgnbq705 mmol/L [98 - 110]  Creatinie, Serum0.9 mg/dL [0.7 - 1.5]  Glucose, Fgapz214 mg/dL<H> [70 - 99]  Potassium, Serum4.7 mmol/L [3.5 - 5.0]  Sodium, Serum 141 mmol/L [135 - 146]        PT/INR - ( 05 Mar 2023 09:11 )   PT: 14.80 sec;   INR: 1.29 ratio               Microbiology:    Culture - Urine (collected 03-03-23 @ 13:40)  Source: Catheterized Catheterized  Preliminary Report (03-05-23 @ 10:24):    50,000 - 99,000 CFU/mL Escherichia coli      Medications:  acetaminophen     Tablet .. 650 milliGRAM(s) Oral every 6 hours PRN  aspirin  chewable 81 milliGRAM(s) Oral daily  atorvastatin 40 milliGRAM(s) Oral at bedtime  clopidogrel Tablet 75 milliGRAM(s) Oral daily  dextrose 5%. 1000 milliLiter(s) IV Continuous <Continuous>  dextrose 5%. 1000 milliLiter(s) IV Continuous <Continuous>  dextrose 50% Injectable 25 Gram(s) IV Push once  dextrose 50% Injectable 12.5 Gram(s) IV Push once  dextrose 50% Injectable 25 Gram(s) IV Push once  dextrose Oral Gel 15 Gram(s) Oral once PRN  donepezil 10 milliGRAM(s) Oral at bedtime  DULoxetine 60 milliGRAM(s) Oral daily  enoxaparin Injectable 40 milliGRAM(s) SubCutaneous every 24 hours  glucagon  Injectable 1 milliGRAM(s) IntraMuscular once  insulin lispro (ADMELOG) corrective regimen sliding scale   SubCutaneous three times a day before meals  lactulose Syrup 20 Gram(s) Oral every 8 hours  latanoprost 0.005% Ophthalmic Solution 1 Drop(s) Both EYES at bedtime  levETIRAcetam 250 milliGRAM(s) Oral two times a day  memantine 10 milliGRAM(s) Oral daily  midodrine. 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     # Altered mental status -possibly  due to seizure- clinically mentation has improved   CT scan no acute intracranial pathology  EEG positive for epileptiform activity   pt started on keppra per neurology  Sinemet being tapered off - no Parkinson symptoms per neurology  pt's mental status is improved today - he is AA& Ox 2 and following commands  PT consult - ambulates with RW at SNF  fall precautions  no asterixis and ammonia level  46- doubt hepatic encephalopathy at this time  UA negative for infection  CXR - no pneumonia, + atelectasis  macrocytic anemia - folate and vit B12 WNL    # Hypomagnesemia- supplemented      # Dementia  on memantine, donepezil  seroquel at bedtime    # Depression - on duloxetine    # Liver Cirrhosis due to MAYA/history of hepatic encephalopathy - on lactulose and rifaximin   monitor BMs - want 2-3 stools daily    # CAD s/p stent - ASA, plavix, statin    # Orthostatic hypotension on midodrine    # DM type 2 - check FS    #DVT ppx: Lovenox SQ    full code status / guarded prognosis    #Progress Note Handoff: monitor neuro status,  d/c sinemet , monitor electrolytes, f/up PT .  Family discussion: yes, medical team Disposition: STR once medically stable    Total time spent to complete patient's bedside assessment, review medical chart, discuss medical plan of care with covering medical team was more than 35 minutes with >50% of time spent face to face with patient, discussion with patient/family and/or coordination of care          
Internal Medicine Progress Note    Location: [address] (Back) 026 C  Patient Name: STACY CARDENAS  MRN: [id number]    Patient is a 77y old  Male who presents with a chief complaint of AMS (07 Mar 2023 10:05)      Subjective  NAEON. This morning, patient was seen and examined at bedside. Patient reports doing fine, says to let him sleep     Objective  Vital Signs Last 24 Hrs  T(C): 36.8 (08 Mar 2023 04:39), Max: 36.8 (08 Mar 2023 04:39)  T(F): 98.3 (08 Mar 2023 04:39), Max: 98.3 (08 Mar 2023 04:39)  HR: 86 (08 Mar 2023 04:39) (79 - 86)  BP: 117/68 (08 Mar 2023 04:39) (117/68 - 131/63)  BP(mean): --  RR: 18 (08 Mar 2023 07:59) (17 - 18)  SpO2: 96% (08 Mar 2023 07:59) (95% - 96%)    Parameters below as of 08 Mar 2023 07:59  Patient On (Oxygen Delivery Method): room air        Intake/output     Physical Exam  General: nontoxic, NAD  Chest/lung: CTAB  Heart: RRR, +S1 S2  Abdomen: soft, nontender, nondistended  Extremities: warm and well perfused, no LE pitting edema  Neuro: no gross focal deficits  Psych: AAOX2 follows commands        Labs  CBC:                       12.8   6.28  )-----------( 153      ( 08 Mar 2023 06:26 )             36.1     BMP: 03-08    143  |  108  |  16  ----------------------------<  167<H>  4.2   |  27  |  0.7    Ca    8.5      08 Mar 2023 06:26  Mg     1.6     03-08    TPro  5.5<L>  /  Alb  2.5<L>  /  TBili  1.6<H>  /  DBili  x   /  AST  30  /  ALT  29  /  AlkPhos  111  03-08    Coag: PT/INR - ( 08 Mar 2023 06:26 )   PT: 15.10 sec;   INR: 1.31 ratio           ABG:   Cardiac markers:       Micro:        Imaging:    Standing Medications  MEDICATIONS  (STANDING):  amoxicillin  875 milliGRAM(s)/clavulanate 1 Tablet(s) Oral two times a day  aspirin  chewable 81 milliGRAM(s) Oral daily  atorvastatin 40 milliGRAM(s) Oral at bedtime  clopidogrel Tablet 75 milliGRAM(s) Oral daily  dextrose 5%. 1000 milliLiter(s) (50 mL/Hr) IV Continuous <Continuous>  dextrose 5%. 1000 milliLiter(s) (100 mL/Hr) IV Continuous <Continuous>  dextrose 50% Injectable 25 Gram(s) IV Push once  dextrose 50% Injectable 12.5 Gram(s) IV Push once  dextrose 50% Injectable 25 Gram(s) IV Push once  donepezil 10 milliGRAM(s) Oral at bedtime  DULoxetine 60 milliGRAM(s) Oral daily  enoxaparin Injectable 40 milliGRAM(s) SubCutaneous every 24 hours  glucagon  Injectable 1 milliGRAM(s) IntraMuscular once  insulin lispro (ADMELOG) corrective regimen sliding scale   SubCutaneous three times a day before meals  lactulose Syrup 20 Gram(s) Oral every 8 hours  latanoprost 0.005% Ophthalmic Solution 1 Drop(s) Both EYES at bedtime  levETIRAcetam 250 milliGRAM(s) Oral two times a day  magnesium sulfate  IVPB 2 Gram(s) IV Intermittent once  memantine 10 milliGRAM(s) Oral daily  midodrine. 5 milliGRAM(s) Oral three times a day  QUEtiapine 12.5 milliGRAM(s) Oral at bedtime  rifAXIMin 550 milliGRAM(s) Oral two times a day    PRN Medications  MEDICATIONS  (PRN):  acetaminophen     Tablet .. 650 milliGRAM(s) Oral every 6 hours PRN Temp greater or equal to 38C (100.4F), Mild Pain (1 - 3)  dextrose Oral Gel 15 Gram(s) Oral once PRN Blood Glucose LESS THAN 70 milliGRAM(s)/deciliter  
Internal Medicine Progress Note    Location: [address] (Back) 026 C  Patient Name: STACY CARDENAS  MRN: [id number]    Patient is a 77y old  Male who presents with a chief complaint of AMS (07 Mar 2023 10:05)      Subjective  NAEON. This morning, patient was seen and examined at bedside. Patient states he is fine    Objective  Vital Signs Last 24 Hrs  T(C): 36.2 (09 Mar 2023 04:28), Max: 36.8 (08 Mar 2023 20:46)  T(F): 97.2 (09 Mar 2023 04:28), Max: 98.2 (08 Mar 2023 20:46)  HR: 53 (09 Mar 2023 04:28) (53 - 82)  BP: 139/65 (09 Mar 2023 04:28) (107/58 - 149/80)  BP(mean): --  RR: 19 (09 Mar 2023 04:28) (17 - 19)  SpO2: 95% (09 Mar 2023 04:28) (94% - 95%)    Parameters below as of 08 Mar 2023 20:46  Patient On (Oxygen Delivery Method): room air        Intake/output     Physical Exam  General: nontoxic, NAD  Chest/lung: CTAB  Heart: RRR, +S1 S2  Abdomen: soft, nontender, nondistended  Extremities: warm and well perfused, no LE pitting edema  Neuro: no gross focal deficits  Psych: AAOX2 follows commands      Labs  CBC:                       12.8   6.28  )-----------( 153      ( 08 Mar 2023 06:26 )             36.1     BMP: 03-08    143  |  108  |  16  ----------------------------<  167<H>  4.2   |  27  |  0.7    Ca    8.5      08 Mar 2023 06:26  Mg     1.6     03-08    TPro  5.5<L>  /  Alb  2.5<L>  /  TBili  1.6<H>  /  DBili  x   /  AST  30  /  ALT  29  /  AlkPhos  111  03-08    Coag: PT/INR - ( 08 Mar 2023 06:26 )   PT: 15.10 sec;   INR: 1.31 ratio           ABG:   Cardiac markers:       Micro:        Imaging:    Standing Medications  MEDICATIONS  (STANDING):  amoxicillin  875 milliGRAM(s)/clavulanate 1 Tablet(s) Oral two times a day  aspirin  chewable 81 milliGRAM(s) Oral daily  atorvastatin 40 milliGRAM(s) Oral at bedtime  clopidogrel Tablet 75 milliGRAM(s) Oral daily  dextrose 5%. 1000 milliLiter(s) (50 mL/Hr) IV Continuous <Continuous>  dextrose 5%. 1000 milliLiter(s) (100 mL/Hr) IV Continuous <Continuous>  dextrose 50% Injectable 25 Gram(s) IV Push once  dextrose 50% Injectable 12.5 Gram(s) IV Push once  dextrose 50% Injectable 25 Gram(s) IV Push once  donepezil 10 milliGRAM(s) Oral at bedtime  DULoxetine 60 milliGRAM(s) Oral daily  enoxaparin Injectable 40 milliGRAM(s) SubCutaneous every 24 hours  glucagon  Injectable 1 milliGRAM(s) IntraMuscular once  insulin glargine Injectable (LANTUS) 5 Unit(s) SubCutaneous every morning  insulin lispro (ADMELOG) corrective regimen sliding scale   SubCutaneous three times a day before meals  lactulose Syrup 20 Gram(s) Oral every 8 hours  latanoprost 0.005% Ophthalmic Solution 1 Drop(s) Both EYES at bedtime  levETIRAcetam 250 milliGRAM(s) Oral two times a day  memantine 10 milliGRAM(s) Oral daily  midodrine. 5 milliGRAM(s) Oral three times a day  QUEtiapine 12.5 milliGRAM(s) Oral at bedtime  rifAXIMin 550 milliGRAM(s) Oral two times a day    PRN Medications  MEDICATIONS  (PRN):  acetaminophen     Tablet .. 650 milliGRAM(s) Oral every 6 hours PRN Temp greater or equal to 38C (100.4F), Mild Pain (1 - 3)  dextrose Oral Gel 15 Gram(s) Oral once PRN Blood Glucose LESS THAN 70 milliGRAM(s)/deciliter  
  STACY CARDENAS  77y Male    INTERVAL HPI/OVERNIGHT EVENTS:    pt feels well - denies chest pain, SOB, N/V, abdominal pain  he is awake, alert, oriented to place  no fever  ate lunch    T(F): 97.8 (03-03-23 @ 16:08), Max: 97.8 (03-03-23 @ 16:08)  HR: 74 (03-03-23 @ 16:08) (74 - 81)  BP: 144/64 (03-03-23 @ 16:08) (133/61 - 144/64)  RR: 18 (03-03-23 @ 16:08) (18 - 18)  SpO2: 96% (03-03-23 @ 16:08) (96% - 96%) on RA    I&O's Summary    CAPILLARY BLOOD GLUCOSE      POCT Blood Glucose.: 96 mg/dL (03 Mar 2023 11:50)  POCT Blood Glucose.: 168 mg/dL (03 Mar 2023 08:16)  POCT Blood Glucose.: 238 mg/dL (02 Mar 2023 17:42)        PHYSICAL EXAM:  GENERAL: NAD  HEAD:  Normocephalic  EYES:  conjunctiva and sclera clear  ENMT: Moist mucous membranes  NERVOUS SYSTEM:  Alert, awake, Good concentration, no asterixis, following commands, moving all extremities  CHEST/LUNG: CTA b/l  HEART: Regular rate and rhythm  ABDOMEN: Soft, Nontender, Nondistended; Bowel sounds present  EXTREMITIES:   No edema  SKIN: warm, dry    Consultant(s) Notes Reviewed:  [x ] YES  [ ] NO  Care Discussed with Consultants/Other Providers [ x] YES  [ ] NO    MEDICATIONS  (STANDING):  aspirin  chewable 81 milliGRAM(s) Oral daily  atorvastatin 40 milliGRAM(s) Oral at bedtime  carbidopa/levodopa  25/100 1 Tablet(s) Oral daily  clopidogrel Tablet 75 milliGRAM(s) Oral daily  dextrose 5%. 1000 milliLiter(s) (50 mL/Hr) IV Continuous <Continuous>  dextrose 5%. 1000 milliLiter(s) (100 mL/Hr) IV Continuous <Continuous>  dextrose 50% Injectable 25 Gram(s) IV Push once  dextrose 50% Injectable 12.5 Gram(s) IV Push once  dextrose 50% Injectable 25 Gram(s) IV Push once  donepezil 10 milliGRAM(s) Oral at bedtime  DULoxetine 60 milliGRAM(s) Oral daily  enoxaparin Injectable 40 milliGRAM(s) SubCutaneous every 24 hours  glucagon  Injectable 1 milliGRAM(s) IntraMuscular once  insulin lispro (ADMELOG) corrective regimen sliding scale   SubCutaneous three times a day before meals  lactulose Syrup 20 Gram(s) Oral every 8 hours  latanoprost 0.005% Ophthalmic Solution 1 Drop(s) Both EYES at bedtime  levETIRAcetam 250 milliGRAM(s) Oral two times a day  magnesium gluconate 500 milliGRAM(s) Oral daily  memantine 10 milliGRAM(s) Oral daily  midodrine. 5 milliGRAM(s) Oral three times a day  QUEtiapine 12.5 milliGRAM(s) Oral at bedtime  rifAXIMin 550 milliGRAM(s) Oral two times a day    MEDICATIONS  (PRN):  acetaminophen     Tablet .. 650 milliGRAM(s) Oral every 6 hours PRN Temp greater or equal to 38C (100.4F), Mild Pain (1 - 3)  dextrose Oral Gel 15 Gram(s) Oral once PRN Blood Glucose LESS THAN 70 milliGRAM(s)/deciliter      LABS:                        13.6   5.18  )-----------( 119      ( 03 Mar 2023 07:25 )             38.5     03-03    144  |  104  |  26<H>  ----------------------------<  158<H>  4.0   |  29  |  0.7    Ca    8.9      03 Mar 2023 07:25  Mg     1.3     03-03    TPro  6.0  /  Alb  2.7<L>  /  TBili  1.7<H>  /  DBili  x   /  AST  39  /  ALT  32  /  AlkPhos  102  03-03    PT/INR - ( 02 Mar 2023 18:16 )   PT: 14.60 sec;   INR: 1.27 ratio         PTT - ( 02 Mar 2023 18:16 )  PTT:30.5 sec      RADIOLOGY & ADDITIONAL TESTS:    Imaging or report Personally Reviewed:  [x ] YES  [ ] NO    < from: Xray Chest 1 View AP/PA (03.02.23 @ 21:12) >    Impression:    Mild bilateral subsegmental atelectasis. Otherwise no radiographic   evidence of acute cardiopulmonary disease.    Suggestion of a single small bowel loop in the upper abdomen measuring   upper limits of normal at 3 cm. If of concern, dedicated abdominal   radiographs can be obtained.    < end of copied text >      < from: CT Head No Cont (03.02.23 @ 20:17) >  IMPRESSION:    No acute intracranial pathology, stable exam since 2/20/2023.    < end of copied text >      < from: MR Head No Cont (02.21.23 @ 18:07) >  IMPRESSION:    1.  No acute infarct    2.  Mild microvascular ischemic change      < end of copied text >      < from: TTE Echo Complete w/o Contrast w/ Doppler (02.20.23 @ 15:33) >    Summary:   1. Left ventricular ejection fraction, by visual estimation, is 60 to   65%.   2. Elevated mean left atrial pressure.   3. Mildly enlarged left atrium.   4. Degenerative mitral valve.   5. Mild mitral valve regurgitation.   6. Severe thickening and calcification of the posterior mitral valve   leaflet.   7. Severely decreased posterior mitral leaflet mobility.   8. Sclerotic aortic valve with normal opening.    < end of copied text >      < from: EEG (03.03.23 @ 00:00) >    Impression:  Abnormal due to the presence of: focal slowing as above, generalized   slowing as above, interictal activity as above    < end of copied text >      Case discussed with resident today          
  STACY CARDENAS  [facility]-N 3A (Back) 026 C ([facility]-N 3A (Back))      Patient was evaluated and examined  by bedside, on/off lethargic, wakes up for medications and food intake      REVIEW OF SYSTEMS: unable to obtain, patient is confused         T(C): , Max: 36.6 (03-03-23 @ 16:08)  HR: 72 (03-04-23 @ 05:26)  BP: 107/56 (03-04-23 @ 05:26)  RR: 18 (03-04-23 @ 05:26)  SpO2: 93% (03-04-23 @ 05:26)  CAPILLARY BLOOD GLUCOSE      POCT Blood Glucose.: 274 mg/dL (04 Mar 2023 12:03)  POCT Blood Glucose.: 178 mg/dL (04 Mar 2023 08:04)  POCT Blood Glucose.: 205 mg/dL (03 Mar 2023 17:33)      PHYSICAL EXAM:  General: NAD, sleepy ,  patient is laying comfortably in bed  HEENT: AT, NC  Lungs: CTA B/L, no wheezing, no rhonchi  CVS: normal S1, S2, RRR, NO M/G/R  Abdomen: soft, bowel sounds present, non-tender, non-distended  Extremities: no edema, no clubbing, no cyanosis, positive peripheral pulses b/l  Neuro: confused, on/off lethargic state  Skin: no rash, no ecchymosis      LAB  CBC  Date: 03-04-23 @ 04:30  Mean cell Usyolmwysa51.5  Mean cell Hemoglobin Conc35.9  Mean cell Volum 98.9  Platelet count-Automate 115  RBC Count 3.55  Red Cell Distrib Width13.4  WBC Count4.31  % Albumin, Urine--  Hematocrit 35.1  Hemoglobin 12.6  CBC  Date: 03-03-23 @ 07:25  Mean cell Bpewgblkrb82.8  Mean cell Hemoglobin Conc35.3  Mean cell Volum 101.3  Platelet count-Automate 119  RBC Count 3.80  Red Cell Distrib Width13.8  WBC Count5.18  % Albumin, Urine--  Hematocrit 38.5  Hemoglobin 13.6  CBC  Date: 03-02-23 @ 18:16  Mean cell Xqzckcfkak01.7  Mean cell Hemoglobin Conc35.5  Mean cell Volum 100.5  Platelet count-Automate 126  RBC Count 3.92  Red Cell Distrib Width13.8  WBC Count4.92  % Albumin, Urine--  Hematocrit 39.4  Hemoglobin 14.0    [facility]  03-04-23 @ 04:30  Blood Gas Arterial-Calcium,Ionized--  Blood Urea Nitrogen, Serum 18 mg/dL [10 - 20]  Carbon Dioxide, Serum26 mmol/L [17 - 32]  Chloride, Oorjl237 mmol/L [98 - 110]  Creatinie, Serum0.6 mg/dL<L> [0.7 - 1.5]  Glucose, Hwqlf514 mg/dL<H> [70 - 99]  Potassium, Serum4.1 mmol/L [3.5 - 5.0]  Sodium, Serum 141 mmol/L [135 - 146]  [facility]  03-03-23 @ 07:25  Blood Gas Arterial-Calcium,Ionized--  Blood Urea Nitrogen, Serum 26 mg/dL<H> [10 - 20]  Carbon Dioxide, Serum29 mmol/L [17 - 32]  Chloride, Idcnn999 mmol/L [98 - 110]  Creatinie, Serum0.7 mg/dL [0.7 - 1.5]  Glucose, Axuft905 mg/dL<H> [70 - 99]  Potassium, Serum4.0 mmol/L [3.5 - 5.0]  Sodium, Serum 144 mmol/L [135 - 146]  [facility]  03-02-23 @ 18:16  Blood Gas Arterial-Calcium,Ionized--  Blood Urea Nitrogen, Serum 34 mg/dL<H> [10 - 20]  Carbon Dioxide, Serum31 mmol/L [17 - 32]  Chloride, Sxycq804 mmol/L [98 - 110]  Creatinie, Serum0.9 mg/dL [0.7 - 1.5]  Glucose, Jeasa395 mg/dL<H> [70 - 99]  Potassium, Serum4.7 mmol/L [3.5 - 5.0]  Sodium, Serum 141 mmol/L [135 - 146]        PT/INR - ( 04 Mar 2023 04:30 )   PT: 15.80 sec;   INR: 1.37 ratio         PTT - ( 02 Mar 2023 18:16 )  PTT:30.5 sec      Medications:  acetaminophen     Tablet .. 650 milliGRAM(s) Oral every 6 hours PRN  aspirin  chewable 81 milliGRAM(s) Oral daily  atorvastatin 40 milliGRAM(s) Oral at bedtime  carbidopa/levodopa  25/100 1 Tablet(s) Oral daily  clopidogrel Tablet 75 milliGRAM(s) Oral daily  dextrose 5%. 1000 milliLiter(s) IV Continuous <Continuous>  dextrose 5%. 1000 milliLiter(s) IV Continuous <Continuous>  dextrose 50% Injectable 25 Gram(s) IV Push once  dextrose 50% Injectable 12.5 Gram(s) IV Push once  dextrose 50% Injectable 25 Gram(s) IV Push once  dextrose Oral Gel 15 Gram(s) Oral once PRN  donepezil 10 milliGRAM(s) Oral at bedtime  DULoxetine 60 milliGRAM(s) Oral daily  enoxaparin Injectable 40 milliGRAM(s) SubCutaneous every 24 hours  glucagon  Injectable 1 milliGRAM(s) IntraMuscular once  insulin lispro (ADMELOG) corrective regimen sliding scale   SubCutaneous three times a day before meals  lactulose Syrup 20 Gram(s) Oral every 8 hours  latanoprost 0.005% Ophthalmic Solution 1 Drop(s) Both EYES at bedtime  levETIRAcetam 250 milliGRAM(s) Oral two times a day  magnesium sulfate  IVPB 2 Gram(s) IV Intermittent every 2 hours  memantine 10 milliGRAM(s) Oral daily  midodrine. 5 milliGRAM(s) Oral three times a day  QUEtiapine 12.5 milliGRAM(s) Oral at bedtime  rifAXIMin 550 milliGRAM(s) Oral two times a day        Assessment and Plan:  76 y/o man with PMH of Parkinson's dementia, Depression, HTN, DM II, CAD, Liver Cirrhosis due to MAYA, hepatic encephalopathy, rib fractures due to recurrent falls was sent from [facility] for altered mental status and lethargy.     # Altered mental status -possibly  due to seizure  CT scan no acute intracranial pathology  EEG positive for epileptiform activity   pt started on keppra per neurology  Sinemet being tapered off - no Parkinson symptoms per neurology  pt's mental status is improved today - he is AA& Ox 2 and following commands  PT consult - ambulates with RW at SNF  fall precautions  no asterixis and ammonia level  46- doubt hepatic encephalopathy at this time  UA negative for infection  CXR - no pneumonia, + atelectasis  macrocytic anemia - folate and vit B12 WNL    # Hypomagnesemia- supplemented      # Dementia  on memantine, donepezil  seroquel at bedtime    # Depression - on duloxetine    # Liver Cirrhosis due to MAYA/history of hepatic encephalopathy - on lactulose and rifaximin   monitor BMs - want 2-3 stools daily    # CAD s/p stent - ASA, plavix, statin    # Orthostatic hypotension on midodrine    # DM type 2 - check FS    #DVT ppx: Lovenox SQ    full code status / guarded prognosis    #Progress Note Handoff: monitor neuro status, taper and d/c sinemet ,supplement MG.  Family discussion: yes, medical team Disposition: STR once medically stable    Total time spent to complete patient's bedside assessment, review medical chart, discuss medical plan of care with covering medical team was more than 35 minutes with >50% of time spent face to face with patient, discussion with patient/family and/or coordination of care        
Internal Medicine Progress Note    Location: [address] ([address]) [address]  Patient Name: STACY CARDENAS  MRN: [id number]    Patient is a 77y old  Male who presents with a chief complaint of AMS (05 Mar 2023 15:37)      Subjective  NAEON. This morning, patient was seen and examined at bedside. Patient reports doing fine      Objective  Vital Signs Last 24 Hrs  T(C): 36.4 (06 Mar 2023 05:00), Max: 36.4 (06 Mar 2023 05:00)  T(F): 97.6 (06 Mar 2023 05:00), Max: 97.6 (06 Mar 2023 05:00)  HR: 74 (06 Mar 2023 05:00) (74 - 82)  BP: 118/59 (06 Mar 2023 05:00) (118/58 - 150/83)  BP(mean): --  RR: 18 (06 Mar 2023 05:00) (18 - 18)  SpO2: 95% (05 Mar 2023 13:07) (95% - 95%)    Parameters below as of 05 Mar 2023 13:07  Patient On (Oxygen Delivery Method): room air        Intake/output   03-05-23 @ 07:01  -  03-06-23 @ 07:00  --------------------------------------------------------  IN: 560 mL / OUT: 240 mL / NET: 320 mL        Physical Exam  General: nontoxic, NAD  Eyes: conjunctiva and sclera clear  ENT: moist mucous membranes  Chest/lung: dimnished BS BL  Heart: RRR, +S1 S2  Abdomen: soft, nontender, nondistended  Extremities: no cyanosis/clubbing   Neuro: no gross focal deficits  Psych: awake alert     Labs  CBC:                       14.1   4.83  )-----------( 137      ( 06 Mar 2023 08:50 )             40.0     BMP: 03-06    142  |  105  |  18  ----------------------------<  177<H>  4.2   |  27  |  0.6<L>    Ca    8.5      06 Mar 2023 08:50  Mg     1.5     03-06    TPro  5.7<L>  /  Alb  2.6<L>  /  TBili  1.8<H>  /  DBili  x   /  AST  32  /  ALT  30  /  AlkPhos  109  03-06    Coag: PT/INR - ( 06 Mar 2023 08:50 )   PT: 15.20 sec;   INR: 1.32 ratio           ABG:   Cardiac markers:       Micro:      Culture - Urine (collected 03 Mar 2023 13:40)  Source: Catheterized Catheterized  Final Report (06 Mar 2023 08:27):    50,000 - 99,000 CFU/mL Escherichia coli  Organism: Escherichia coli (06 Mar 2023 08:27)  Organism: Escherichia coli (06 Mar 2023 08:27)        Imaging:    Standing Medications  MEDICATIONS  (STANDING):  amoxicillin  875 milliGRAM(s)/clavulanate 1 Tablet(s) Oral two times a day  aspirin  chewable 81 milliGRAM(s) Oral daily  atorvastatin 40 milliGRAM(s) Oral at bedtime  clopidogrel Tablet 75 milliGRAM(s) Oral daily  dextrose 5%. 1000 milliLiter(s) (50 mL/Hr) IV Continuous <Continuous>  dextrose 5%. 1000 milliLiter(s) (100 mL/Hr) IV Continuous <Continuous>  dextrose 50% Injectable 25 Gram(s) IV Push once  dextrose 50% Injectable 25 Gram(s) IV Push once  dextrose 50% Injectable 12.5 Gram(s) IV Push once  donepezil 10 milliGRAM(s) Oral at bedtime  DULoxetine 60 milliGRAM(s) Oral daily  enoxaparin Injectable 40 milliGRAM(s) SubCutaneous every 24 hours  glucagon  Injectable 1 milliGRAM(s) IntraMuscular once  insulin lispro (ADMELOG) corrective regimen sliding scale   SubCutaneous three times a day before meals  lactulose Syrup 20 Gram(s) Oral every 8 hours  latanoprost 0.005% Ophthalmic Solution 1 Drop(s) Both EYES at bedtime  levETIRAcetam 250 milliGRAM(s) Oral two times a day  memantine 10 milliGRAM(s) Oral daily  midodrine. 5 milliGRAM(s) Oral three times a day  QUEtiapine 12.5 milliGRAM(s) Oral at bedtime  rifAXIMin 550 milliGRAM(s) Oral two times a day    PRN Medications  MEDICATIONS  (PRN):  acetaminophen     Tablet .. 650 milliGRAM(s) Oral every 6 hours PRN Temp greater or equal to 38C (100.4F), Mild Pain (1 - 3)  dextrose Oral Gel 15 Gram(s) Oral once PRN Blood Glucose LESS THAN 70 milliGRAM(s)/deciliter  
Internal Medicine Progress Note    Location: [address] ([address]) [address]  Patient Name: STACY CARDENAS  MRN: [id number]    Patient is a 77y old  Male who presents with a chief complaint of AMS (06 Mar 2023 10:12)      Subjective  NAEON. This morning, patient was seen and examined at bedside. Patient reports doing fine, appears comfortble    Objective  Vital Signs Last 24 Hrs  T(C): 37.2 (07 Mar 2023 04:33), Max: 37.2 (07 Mar 2023 04:33)  T(F): 98.9 (07 Mar 2023 04:33), Max: 98.9 (07 Mar 2023 04:33)  HR: 84 (07 Mar 2023 04:33) (84 - 88)  BP: 101/57 (07 Mar 2023 04:33) (101/57 - 130/76)  BP(mean): --  RR: 18 (07 Mar 2023 09:22) (18 - 20)  SpO2: 95% (07 Mar 2023 09:22) (95% - 95%)    Parameters below as of 07 Mar 2023 09:22  Patient On (Oxygen Delivery Method): room air        Intake/output     Physical Exam  General: nontoxic, NAD  Chest/lung: CTAB  Heart: RRR, +S1 S2  Abdomen: soft, nontender, nondistended  Extremities: warm and well perfused, no LE pitting edema  Neuro: no gross focal deficits  Psych: AAOX2 follows commands      Labs  CBC:                       13.2   5.61  )-----------( 146      ( 07 Mar 2023 08:25 )             36.3     BMP: 03-06    142  |  105  |  18  ----------------------------<  177<H>  4.2   |  27  |  0.6<L>    Ca    8.5      06 Mar 2023 08:50  Mg     1.5     03-06    TPro  5.7<L>  /  Alb  2.6<L>  /  TBili  1.8<H>  /  DBili  x   /  AST  32  /  ALT  30  /  AlkPhos  109  03-06    Coag: PT/INR - ( 07 Mar 2023 08:25 )   PT: 15.70 sec;   INR: 1.36 ratio           ABG:   Cardiac markers:       Micro:        Imaging:    Standing Medications  MEDICATIONS  (STANDING):  amoxicillin  875 milliGRAM(s)/clavulanate 1 Tablet(s) Oral two times a day  aspirin  chewable 81 milliGRAM(s) Oral daily  atorvastatin 40 milliGRAM(s) Oral at bedtime  clopidogrel Tablet 75 milliGRAM(s) Oral daily  dextrose 5%. 1000 milliLiter(s) (100 mL/Hr) IV Continuous <Continuous>  dextrose 5%. 1000 milliLiter(s) (50 mL/Hr) IV Continuous <Continuous>  dextrose 50% Injectable 25 Gram(s) IV Push once  dextrose 50% Injectable 12.5 Gram(s) IV Push once  dextrose 50% Injectable 25 Gram(s) IV Push once  donepezil 10 milliGRAM(s) Oral at bedtime  DULoxetine 60 milliGRAM(s) Oral daily  enoxaparin Injectable 40 milliGRAM(s) SubCutaneous every 24 hours  glucagon  Injectable 1 milliGRAM(s) IntraMuscular once  insulin lispro (ADMELOG) corrective regimen sliding scale   SubCutaneous three times a day before meals  lactulose Syrup 20 Gram(s) Oral every 8 hours  latanoprost 0.005% Ophthalmic Solution 1 Drop(s) Both EYES at bedtime  levETIRAcetam 250 milliGRAM(s) Oral two times a day  magnesium sulfate  IVPB 2 Gram(s) IV Intermittent once  memantine 10 milliGRAM(s) Oral daily  midodrine. 5 milliGRAM(s) Oral three times a day  QUEtiapine 12.5 milliGRAM(s) Oral at bedtime  rifAXIMin 550 milliGRAM(s) Oral two times a day    PRN Medications  MEDICATIONS  (PRN):  acetaminophen     Tablet .. 650 milliGRAM(s) Oral every 6 hours PRN Temp greater or equal to 38C (100.4F), Mild Pain (1 - 3)  dextrose Oral Gel 15 Gram(s) Oral once PRN Blood Glucose LESS THAN 70 milliGRAM(s)/deciliter

## 2023-03-14 DIAGNOSIS — D50.9 IRON DEFICIENCY ANEMIA, UNSPECIFIED: ICD-10-CM

## 2023-03-14 DIAGNOSIS — I11.0 HYPERTENSIVE HEART DISEASE WITH HEART FAILURE: ICD-10-CM

## 2023-03-14 DIAGNOSIS — E11.9 TYPE 2 DIABETES MELLITUS WITHOUT COMPLICATIONS: ICD-10-CM

## 2023-03-14 DIAGNOSIS — I95.1 ORTHOSTATIC HYPOTENSION: ICD-10-CM

## 2023-03-14 DIAGNOSIS — N39.0 URINARY TRACT INFECTION, SITE NOT SPECIFIED: ICD-10-CM

## 2023-03-14 DIAGNOSIS — I50.32 CHRONIC DIASTOLIC (CONGESTIVE) HEART FAILURE: ICD-10-CM

## 2023-03-14 DIAGNOSIS — E83.42 HYPOMAGNESEMIA: ICD-10-CM

## 2023-03-14 DIAGNOSIS — K76.0 FATTY (CHANGE OF) LIVER, NOT ELSEWHERE CLASSIFIED: ICD-10-CM

## 2023-03-14 DIAGNOSIS — M10.9 GOUT, UNSPECIFIED: ICD-10-CM

## 2023-03-14 DIAGNOSIS — G40.909 EPILEPSY, UNSPECIFIED, NOT INTRACTABLE, WITHOUT STATUS EPILEPTICUS: ICD-10-CM

## 2023-03-14 DIAGNOSIS — I25.10 ATHEROSCLEROTIC HEART DISEASE OF NATIVE CORONARY ARTERY WITHOUT ANGINA PECTORIS: ICD-10-CM

## 2023-03-14 DIAGNOSIS — B96.20 UNSPECIFIED ESCHERICHIA COLI [E. COLI] AS THE CAUSE OF DISEASES CLASSIFIED ELSEWHERE: ICD-10-CM

## 2023-03-14 DIAGNOSIS — F32.A DEPRESSION, UNSPECIFIED: ICD-10-CM

## 2023-03-14 DIAGNOSIS — M19.90 UNSPECIFIED OSTEOARTHRITIS, UNSPECIFIED SITE: ICD-10-CM

## 2023-03-14 DIAGNOSIS — K76.6 PORTAL HYPERTENSION: ICD-10-CM

## 2023-03-14 DIAGNOSIS — G20 PARKINSON'S DISEASE: ICD-10-CM

## 2023-03-14 DIAGNOSIS — F02.80 DEMENTIA IN OTHER DISEASES CLASSIFIED ELSEWHERE, UNSPECIFIED SEVERITY, WITHOUT BEHAVIORAL DISTURBANCE, PSYCHOTIC DISTURBANCE, MOOD DISTURBANCE, AND ANXIETY: ICD-10-CM

## 2023-03-27 ENCOUNTER — EMERGENCY (EMERGENCY)
Facility: HOSPITAL | Age: 78
LOS: 0 days | Discharge: HOME | End: 2023-03-27
Attending: EMERGENCY MEDICINE
Payer: MEDICARE

## 2023-03-27 DIAGNOSIS — Z90.49 ACQUIRED ABSENCE OF OTHER SPECIFIED PARTS OF DIGESTIVE TRACT: Chronic | ICD-10-CM

## 2023-03-27 DIAGNOSIS — E11.9 TYPE 2 DIABETES MELLITUS WITHOUT COMPLICATIONS: ICD-10-CM

## 2023-03-27 DIAGNOSIS — I10 ESSENTIAL (PRIMARY) HYPERTENSION: ICD-10-CM

## 2023-03-27 DIAGNOSIS — F32.A DEPRESSION, UNSPECIFIED: ICD-10-CM

## 2023-03-27 DIAGNOSIS — Z90.49 ACQUIRED ABSENCE OF OTHER SPECIFIED PARTS OF DIGESTIVE TRACT: ICD-10-CM

## 2023-03-27 DIAGNOSIS — Z87.19 PERSONAL HISTORY OF OTHER DISEASES OF THE DIGESTIVE SYSTEM: ICD-10-CM

## 2023-03-27 DIAGNOSIS — Z87.828 PERSONAL HISTORY OF OTHER (HEALED) PHYSICAL INJURY AND TRAUMA: ICD-10-CM

## 2023-03-27 DIAGNOSIS — F02.80 DEMENTIA IN OTHER DISEASES CLASSIFIED ELSEWHERE, UNSPECIFIED SEVERITY, WITHOUT BEHAVIORAL DISTURBANCE, PSYCHOTIC DISTURBANCE, MOOD DISTURBANCE, AND ANXIETY: ICD-10-CM

## 2023-03-27 DIAGNOSIS — Z87.39 PERSONAL HISTORY OF OTHER DISEASES OF THE MUSCULOSKELETAL SYSTEM AND CONNECTIVE TISSUE: ICD-10-CM

## 2023-03-27 DIAGNOSIS — M19.90 UNSPECIFIED OSTEOARTHRITIS, UNSPECIFIED SITE: ICD-10-CM
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
## 2023-03-27 DIAGNOSIS — Z88.1 ALLERGY STATUS TO OTHER ANTIBIOTIC AGENTS STATUS: ICD-10-CM

## 2023-03-27 DIAGNOSIS — I25.10 ATHEROSCLEROTIC HEART DISEASE OF NATIVE CORONARY ARTERY WITHOUT ANGINA PECTORIS: ICD-10-CM

## 2023-03-27 DIAGNOSIS — Z79.84 LONG TERM (CURRENT) USE OF ORAL HYPOGLYCEMIC DRUGS: ICD-10-CM

## 2023-03-27 DIAGNOSIS — Z98.890 OTHER SPECIFIED POSTPROCEDURAL STATES: Chronic | ICD-10-CM

## 2023-03-27 DIAGNOSIS — Z79.82 LONG TERM (CURRENT) USE OF ASPIRIN: ICD-10-CM

## 2023-03-27 DIAGNOSIS — H26.9 UNSPECIFIED CATARACT: Chronic | ICD-10-CM

## 2023-03-27 DIAGNOSIS — M10.9 GOUT, UNSPECIFIED: ICD-10-CM

## 2023-03-27 DIAGNOSIS — G20 PARKINSON'S DISEASE: ICD-10-CM

## 2023-03-27 DIAGNOSIS — M54.9 DORSALGIA, UNSPECIFIED: ICD-10-CM

## 2023-03-27 DIAGNOSIS — M84.48XA PATHOLOGICAL FRACTURE, OTHER SITE, INITIAL ENCOUNTER FOR FRACTURE: ICD-10-CM

## 2023-03-27 DIAGNOSIS — M54.2 CERVICALGIA: ICD-10-CM

## 2023-03-27 DIAGNOSIS — Z20.822 CONTACT WITH AND (SUSPECTED) EXPOSURE TO COVID-19: ICD-10-CM

## 2023-03-27 DIAGNOSIS — Z79.02 LONG TERM (CURRENT) USE OF ANTITHROMBOTICS/ANTIPLATELETS: ICD-10-CM

## 2023-03-27 PROCEDURE — 70450 CT HEAD/BRAIN W/O DYE: CPT

## 2023-03-27 PROCEDURE — 71045 X-RAY EXAM CHEST 1 VIEW: CPT

## 2023-03-27 PROCEDURE — U0005: CPT

## 2023-03-27 PROCEDURE — 99284 EMERGENCY DEPT VISIT MOD MDM: CPT | Mod: 25

## 2023-03-27 PROCEDURE — 99284 EMERGENCY DEPT VISIT MOD MDM: CPT

## 2023-03-27 PROCEDURE — 85025 COMPLETE CBC W/AUTO DIFF WBC: CPT

## 2023-03-27 PROCEDURE — 71045 X-RAY EXAM CHEST 1 VIEW: CPT | Mod: 26

## 2023-03-27 PROCEDURE — 36415 COLL VENOUS BLD VENIPUNCTURE: CPT

## 2023-03-27 PROCEDURE — 80048 BASIC METABOLIC PNL TOTAL CA: CPT

## 2023-03-27 PROCEDURE — 70450 CT HEAD/BRAIN W/O DYE: CPT | Mod: 26,MH

## 2023-03-27 PROCEDURE — U0003: CPT

## 2023-03-27 PROCEDURE — 72125 CT NECK SPINE W/O DYE: CPT | Mod: 26,MH

## 2023-03-27 PROCEDURE — 72125 CT NECK SPINE W/O DYE: CPT

## 2023-03-27 NOTE — CONSULT NOTE ADULT - SUBJECTIVE AND OBJECTIVE BOX
Neurosurgery Consultation Note    HPI  This is a 77 year old male PMH Parkinson's dementia, depression, HTN, liver cirrhosis 2/2 MAYA, DM, CAD, rib fractures due to recurrent falls, seen by neurosurgery team for a stable c1 fx in 10/2022 reportedly presenting for fall from NH per ED staff.  In ED CT c spine work up demonstrating fracture of the anterior arch of C1. There has been a slight increase in widening at the fracture site.  CTH negative.    Pt seen and examined at the bedside.  At present time the pt is laying in bed in NAD with a rigid C collar in place.  Pt is unable to recall events that brought him the to hospital.  Pt c/o of back and neck soreness however is demonstrating a stable neurological exam, PENA x4 with good strength, SILT.      Subjective: 77yMale with a pmhx of FH: type 2 diabetes mellitus    Diabetes    Depression    Gout    Benign essential HTN    Osteoarthritis    No significant past surgical history    Cataracts, both eyes    S/P cholecystectomy    S/P knee surgery    FALL    17    [email or web address]      s/p     Allergies    Keflex (Hives)    Intolerances        Vital Signs Last 24 Hrs  T(C): --  T(F): --  HR: --  BP: --  BP(mean): --  RR: --  SpO2: --              REVIEW OF SYSTEMS    [x ] A ten-point review of systems was otherwise negative except as noted.  [ ] Due to altered mental status/intubation, subjective information were not able to be obtained from the patient. History was obtained, to the extent possible, from review of the chart and collateral sources of information.      Exam:  laying in bed in NAD  rigid C collar in place  AO1 (name) Verbal function intact  tongue midline, facial motions symmetric   EOMI  Motor: MAEx4, 5/5 power in b/l UE and LE  b/l  intact  b/l df pf 5/5  Sensation: SILT        CBC Full  -  ( 27 Mar 2023 13:00 )  WBC Count : 4.81 K/uL  RBC Count : 3.86 M/uL  Hemoglobin : 13.9 g/dL  Hematocrit : 38.8 %  Platelet Count - Automated : 115 K/uL  Mean Cell Volume : 100.5 fL  Mean Cell Hemoglobin : 36.0 pg  Mean Cell Hemoglobin Concentration : 35.8 g/dL  Auto Neutrophil # : 2.60 K/uL  Auto Lymphocyte # : 1.36 K/uL  Auto Monocyte # : 0.63 K/uL  Auto Eosinophil # : 0.18 K/uL  Auto Basophil # : 0.03 K/uL  Auto Neutrophil % : 54.1 %  Auto Lymphocyte % : 28.3 %  Auto Monocyte % : 13.1 %  Auto Eosinophil % : 3.7 %  Auto Basophil % : 0.6 %    03-27    141  |  106  |  20  ----------------------------<  244<H>  5.3<H>   |  23  |  0.7    Ca    9.2      27 Mar 2023 13:00              Wound:    Imaging:  IMPRESSION:    In comparison with the prior CT scan of the cervical spine dated October 13, 2022:    Again demonstrated is a fracture of the anterior arch of C1. There has been a slight increase in widening at the fracture site.    The case was discussed with Dr. Motta on March 27, 2023 time 4:10 PM, with read back.    --- End of Report ---    IMPRESSION:    In comparison with the prior CT scan of the brain dated March 2, 2023:    No acute intracranial hemorrhage.    Stable examination.    --- End of Report ---    DIANNA PADILLA MD; Attending Radiologist  This document has been electronically signed. Mar 27 2023 4:10PM      Assessment/Plan:   This is a 77 year old male with PMH Parkinson's dementia, depression, HTN, liver cirrhosis 2/2 MAYA, DM, CAD, rib fractures due to recurrent falls presenting s/p fall (?HT?LOC?AC) found with slight increase in widening of C1 anterior arch fx      Images reviewed  No acute Neurosurgical intervention at this time  Please maintain Rigid C collar at all times  Pain control  Follow up with Dr Paez outpt in x2 weeks      Plan and care discussed with Dr Paez, ED team

## 2023-04-07 NOTE — ED PROVIDER NOTE - CLINICAL SUMMARY MEDICAL DECISION MAKING FREE TEXT BOX
Patients' wife, Alfredo Valentin alled on his behald and wanted to know if Dr. Christopher Lima would please refill his blood thinner medication, \"Effient 10mg. Tab\"! He only has one pill left. Please advise and let her know.  Thanks see dt chart

## 2023-04-10 ENCOUNTER — EMERGENCY (EMERGENCY)
Facility: HOSPITAL | Age: 78
LOS: 0 days | Discharge: HOME | End: 2023-04-10
Attending: EMERGENCY MEDICINE
Payer: MEDICARE

## 2023-04-10 VITALS
TEMPERATURE: 98 F | WEIGHT: 210.1 LBS | HEART RATE: 79 BPM | RESPIRATION RATE: 18 BRPM | OXYGEN SATURATION: 97 % | DIASTOLIC BLOOD PRESSURE: 70 MMHG | SYSTOLIC BLOOD PRESSURE: 147 MMHG

## 2023-04-10 DIAGNOSIS — Z79.02 LONG TERM (CURRENT) USE OF ANTITHROMBOTICS/ANTIPLATELETS: ICD-10-CM
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
## 2023-04-10 DIAGNOSIS — I25.10 ATHEROSCLEROTIC HEART DISEASE OF NATIVE CORONARY ARTERY WITHOUT ANGINA PECTORIS: ICD-10-CM

## 2023-04-10 DIAGNOSIS — Z88.8 ALLERGY STATUS TO OTHER DRUGS, MEDICAMENTS AND BIOLOGICAL SUBSTANCES: ICD-10-CM

## 2023-04-10 DIAGNOSIS — E11.9 TYPE 2 DIABETES MELLITUS WITHOUT COMPLICATIONS: ICD-10-CM

## 2023-04-10 DIAGNOSIS — Z87.19 PERSONAL HISTORY OF OTHER DISEASES OF THE DIGESTIVE SYSTEM: ICD-10-CM

## 2023-04-10 DIAGNOSIS — I10 ESSENTIAL (PRIMARY) HYPERTENSION: ICD-10-CM
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
## 2023-04-10 DIAGNOSIS — R74.01 ELEVATION OF LEVELS OF LIVER TRANSAMINASE LEVELS: ICD-10-CM

## 2023-04-10 DIAGNOSIS — H26.9 UNSPECIFIED CATARACT: Chronic | ICD-10-CM

## 2023-04-10 DIAGNOSIS — F03.90 UNSPECIFIED DEMENTIA, UNSPECIFIED SEVERITY, WITHOUT BEHAVIORAL DISTURBANCE, PSYCHOTIC DISTURBANCE, MOOD DISTURBANCE, AND ANXIETY: ICD-10-CM

## 2023-04-10 DIAGNOSIS — S12.000A UNSPECIFIED DISPLACED FRACTURE OF FIRST CERVICAL VERTEBRA, INITIAL ENCOUNTER FOR CLOSED FRACTURE: ICD-10-CM

## 2023-04-10 DIAGNOSIS — Z98.890 OTHER SPECIFIED POSTPROCEDURAL STATES: Chronic | ICD-10-CM

## 2023-04-10 DIAGNOSIS — G20 PARKINSON'S DISEASE: ICD-10-CM

## 2023-04-10 DIAGNOSIS — Z90.49 ACQUIRED ABSENCE OF OTHER SPECIFIED PARTS OF DIGESTIVE TRACT: Chronic | ICD-10-CM
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
## 2023-04-10 DIAGNOSIS — F32.A DEPRESSION, UNSPECIFIED: ICD-10-CM

## 2023-04-10 DIAGNOSIS — Z79.84 LONG TERM (CURRENT) USE OF ORAL HYPOGLYCEMIC DRUGS: ICD-10-CM

## 2023-04-10 DIAGNOSIS — Z79.82 LONG TERM (CURRENT) USE OF ASPIRIN: ICD-10-CM

## 2023-04-10 DIAGNOSIS — Z20.822 CONTACT WITH AND (SUSPECTED) EXPOSURE TO COVID-19: ICD-10-CM

## 2023-04-10 LAB
ALBUMIN SERPL ELPH-MCNC: 2.8 G/DL — LOW (ref 3.5–5.2)
ALBUMIN SERPL ELPH-MCNC: 2.8 G/DL — LOW (ref 3.5–5.2)
ALP SERPL-CCNC: 106 U/L — SIGNIFICANT CHANGE UP (ref 30–115)
ALP SERPL-CCNC: 117 U/L — HIGH (ref 30–115)
ALT FLD-CCNC: 64 U/L — HIGH (ref 0–41)
ALT FLD-CCNC: 77 U/L — HIGH (ref 0–41)
ANION GAP SERPL CALC-SCNC: 6 MMOL/L — LOW (ref 7–14)
ANION GAP SERPL CALC-SCNC: 6 MMOL/L — LOW (ref 7–14)
APPEARANCE UR: CLEAR — SIGNIFICANT CHANGE UP
APTT BLD: 32.5 SEC — SIGNIFICANT CHANGE UP (ref 27–39.2)
AST SERPL-CCNC: 165 U/L — HIGH (ref 0–41)
AST SERPL-CCNC: 61 U/L — HIGH (ref 0–41)
BACTERIA # UR AUTO: NEGATIVE — SIGNIFICANT CHANGE UP
BASOPHILS # BLD AUTO: 0.04 K/UL — SIGNIFICANT CHANGE UP (ref 0–0.2)
BASOPHILS NFR BLD AUTO: 0.8 % — SIGNIFICANT CHANGE UP (ref 0–1)
BILIRUB SERPL-MCNC: 1.4 MG/DL — HIGH (ref 0.2–1.2)
BILIRUB SERPL-MCNC: 1.5 MG/DL — HIGH (ref 0.2–1.2)
BILIRUB UR-MCNC: NEGATIVE — SIGNIFICANT CHANGE UP
BUN SERPL-MCNC: 15 MG/DL — SIGNIFICANT CHANGE UP (ref 10–20)
BUN SERPL-MCNC: 16 MG/DL — SIGNIFICANT CHANGE UP (ref 10–20)
CALCIUM SERPL-MCNC: 8.7 MG/DL — SIGNIFICANT CHANGE UP (ref 8.4–10.4)
CALCIUM SERPL-MCNC: 8.8 MG/DL — SIGNIFICANT CHANGE UP (ref 8.4–10.4)
CHLORIDE SERPL-SCNC: 101 MMOL/L — SIGNIFICANT CHANGE UP (ref 98–110)
CHLORIDE SERPL-SCNC: 102 MMOL/L — SIGNIFICANT CHANGE UP (ref 98–110)
CO2 SERPL-SCNC: 23 MMOL/L — SIGNIFICANT CHANGE UP (ref 17–32)
CO2 SERPL-SCNC: 26 MMOL/L — SIGNIFICANT CHANGE UP (ref 17–32)
COLOR SPEC: YELLOW — SIGNIFICANT CHANGE UP
CREAT SERPL-MCNC: 0.7 MG/DL — SIGNIFICANT CHANGE UP (ref 0.7–1.5)
CREAT SERPL-MCNC: 0.8 MG/DL — SIGNIFICANT CHANGE UP (ref 0.7–1.5)
DIFF PNL FLD: NEGATIVE — SIGNIFICANT CHANGE UP
EGFR: 91 ML/MIN/1.73M2 — SIGNIFICANT CHANGE UP
EGFR: 95 ML/MIN/1.73M2 — SIGNIFICANT CHANGE UP
EOSINOPHIL # BLD AUTO: 0.13 K/UL — SIGNIFICANT CHANGE UP (ref 0–0.7)
EOSINOPHIL NFR BLD AUTO: 2.5 % — SIGNIFICANT CHANGE UP (ref 0–8)
EPI CELLS # UR: 9 /HPF — HIGH (ref 0–5)
ETHANOL SERPL-MCNC: <10 MG/DL — SIGNIFICANT CHANGE UP
GLUCOSE SERPL-MCNC: 248 MG/DL — HIGH (ref 70–99)
GLUCOSE SERPL-MCNC: 268 MG/DL — HIGH (ref 70–99)
GLUCOSE UR QL: ABNORMAL
HCT VFR BLD CALC: 37.3 % — LOW (ref 42–52)
HGB BLD-MCNC: 13.5 G/DL — LOW (ref 14–18)
HYALINE CASTS # UR AUTO: 7 /LPF — SIGNIFICANT CHANGE UP (ref 0–7)
IMM GRANULOCYTES NFR BLD AUTO: 0.4 % — HIGH (ref 0.1–0.3)
INR BLD: 1.37 RATIO — HIGH (ref 0.65–1.3)
KETONES UR-MCNC: SIGNIFICANT CHANGE UP
LEUKOCYTE ESTERASE UR-ACNC: NEGATIVE — SIGNIFICANT CHANGE UP
LIDOCAIN IGE QN: 50 U/L — SIGNIFICANT CHANGE UP (ref 7–60)
LYMPHOCYTES # BLD AUTO: 1.1 K/UL — LOW (ref 1.2–3.4)
LYMPHOCYTES # BLD AUTO: 21.1 % — SIGNIFICANT CHANGE UP (ref 20.5–51.1)
MCHC RBC-ENTMCNC: 36.2 G/DL — SIGNIFICANT CHANGE UP (ref 32–37)
MCHC RBC-ENTMCNC: 36.3 PG — HIGH (ref 27–31)
MCV RBC AUTO: 100.3 FL — HIGH (ref 80–94)
MONOCYTES # BLD AUTO: 0.55 K/UL — SIGNIFICANT CHANGE UP (ref 0.1–0.6)
MONOCYTES NFR BLD AUTO: 10.6 % — HIGH (ref 1.7–9.3)
NEUTROPHILS # BLD AUTO: 3.37 K/UL — SIGNIFICANT CHANGE UP (ref 1.4–6.5)
NEUTROPHILS NFR BLD AUTO: 64.6 % — SIGNIFICANT CHANGE UP (ref 42.2–75.2)
NITRITE UR-MCNC: NEGATIVE — SIGNIFICANT CHANGE UP
NRBC # BLD: 0 /100 WBCS — SIGNIFICANT CHANGE UP (ref 0–0)
PH UR: 6 — SIGNIFICANT CHANGE UP (ref 5–8)
PLATELET # BLD AUTO: 134 K/UL — SIGNIFICANT CHANGE UP (ref 130–400)
POTASSIUM SERPL-MCNC: 4.4 MMOL/L — SIGNIFICANT CHANGE UP (ref 3.5–5)
POTASSIUM SERPL-MCNC: SIGNIFICANT CHANGE UP MMOL/L (ref 3.5–5)
POTASSIUM SERPL-SCNC: 4.4 MMOL/L — SIGNIFICANT CHANGE UP (ref 3.5–5)
POTASSIUM SERPL-SCNC: SIGNIFICANT CHANGE UP MMOL/L (ref 3.5–5)
PROT SERPL-MCNC: 6 G/DL — SIGNIFICANT CHANGE UP (ref 6–8)
PROT SERPL-MCNC: 6.7 G/DL — SIGNIFICANT CHANGE UP (ref 6–8)
PROT UR-MCNC: ABNORMAL
PROTHROM AB SERPL-ACNC: 15.8 SEC — HIGH (ref 9.95–12.87)
RBC # BLD: 3.72 M/UL — LOW (ref 4.7–6.1)
RBC # FLD: 14.6 % — HIGH (ref 11.5–14.5)
RBC CASTS # UR COMP ASSIST: 1 /HPF — SIGNIFICANT CHANGE UP (ref 0–4)
SARS-COV-2 RNA SPEC QL NAA+PROBE: SIGNIFICANT CHANGE UP
SODIUM SERPL-SCNC: 130 MMOL/L — LOW (ref 135–146)
SODIUM SERPL-SCNC: 134 MMOL/L — LOW (ref 135–146)
SP GR SPEC: >1.05 (ref 1.01–1.03)
UROBILINOGEN FLD QL: SIGNIFICANT CHANGE UP
WBC # BLD: 5.21 K/UL — SIGNIFICANT CHANGE UP (ref 4.8–10.8)
WBC # FLD AUTO: 5.21 K/UL — SIGNIFICANT CHANGE UP (ref 4.8–10.8)
WBC UR QL: 5 /HPF — SIGNIFICANT CHANGE UP (ref 0–5)

## 2023-04-10 PROCEDURE — 71045 X-RAY EXAM CHEST 1 VIEW: CPT | Mod: 26

## 2023-04-10 PROCEDURE — U0005: CPT

## 2023-04-10 PROCEDURE — 93010 ELECTROCARDIOGRAM REPORT: CPT

## 2023-04-10 PROCEDURE — 99285 EMERGENCY DEPT VISIT HI MDM: CPT | Mod: GC

## 2023-04-10 PROCEDURE — 70450 CT HEAD/BRAIN W/O DYE: CPT | Mod: MA

## 2023-04-10 PROCEDURE — 82962 GLUCOSE BLOOD TEST: CPT

## 2023-04-10 PROCEDURE — 85730 THROMBOPLASTIN TIME PARTIAL: CPT

## 2023-04-10 PROCEDURE — 99285 EMERGENCY DEPT VISIT HI MDM: CPT | Mod: 25

## 2023-04-10 PROCEDURE — 72125 CT NECK SPINE W/O DYE: CPT | Mod: 26,MA

## 2023-04-10 PROCEDURE — 72170 X-RAY EXAM OF PELVIS: CPT | Mod: 26

## 2023-04-10 PROCEDURE — 74177 CT ABD & PELVIS W/CONTRAST: CPT | Mod: 26,MA

## 2023-04-10 PROCEDURE — 71260 CT THORAX DX C+: CPT | Mod: MA

## 2023-04-10 PROCEDURE — 83690 ASSAY OF LIPASE: CPT

## 2023-04-10 PROCEDURE — 96374 THER/PROPH/DIAG INJ IV PUSH: CPT | Mod: XU

## 2023-04-10 PROCEDURE — 87086 URINE CULTURE/COLONY COUNT: CPT

## 2023-04-10 PROCEDURE — 74177 CT ABD & PELVIS W/CONTRAST: CPT | Mod: MA

## 2023-04-10 PROCEDURE — 80053 COMPREHEN METABOLIC PANEL: CPT

## 2023-04-10 PROCEDURE — 72125 CT NECK SPINE W/O DYE: CPT | Mod: MA

## 2023-04-10 PROCEDURE — 85025 COMPLETE CBC W/AUTO DIFF WBC: CPT

## 2023-04-10 PROCEDURE — 80307 DRUG TEST PRSMV CHEM ANLYZR: CPT

## 2023-04-10 PROCEDURE — 71260 CT THORAX DX C+: CPT | Mod: 26,MA

## 2023-04-10 PROCEDURE — 72170 X-RAY EXAM OF PELVIS: CPT

## 2023-04-10 PROCEDURE — 36415 COLL VENOUS BLD VENIPUNCTURE: CPT

## 2023-04-10 PROCEDURE — 71045 X-RAY EXAM CHEST 1 VIEW: CPT

## 2023-04-10 PROCEDURE — 81001 URINALYSIS AUTO W/SCOPE: CPT

## 2023-04-10 PROCEDURE — 70450 CT HEAD/BRAIN W/O DYE: CPT | Mod: 26,MA

## 2023-04-10 PROCEDURE — U0003: CPT

## 2023-04-10 PROCEDURE — 93005 ELECTROCARDIOGRAM TRACING: CPT

## 2023-04-10 PROCEDURE — 85610 PROTHROMBIN TIME: CPT

## 2023-04-10 RX ORDER — MAGNESIUM SULFATE 500 MG/ML
2 VIAL (ML) INJECTION ONCE
Refills: 0 | Status: COMPLETED | OUTPATIENT
Start: 2023-04-10 | End: 2023-04-10

## 2023-04-10 RX ORDER — SODIUM CHLORIDE 9 MG/ML
250 INJECTION INTRAMUSCULAR; INTRAVENOUS; SUBCUTANEOUS ONCE
Refills: 0 | Status: COMPLETED | OUTPATIENT
Start: 2023-04-10 | End: 2023-04-10

## 2023-04-10 RX ADMIN — SODIUM CHLORIDE 250 MILLILITER(S): 9 INJECTION INTRAMUSCULAR; INTRAVENOUS; SUBCUTANEOUS at 20:18

## 2023-04-10 RX ADMIN — Medication 25 GRAM(S): at 20:18

## 2023-04-10 NOTE — ED PROVIDER NOTE - NS ED ROS FT
GEN:  no fever, no chills, no generalized weakness  NEURO:  no headache, no dizziness  ENT: no sore throat  CV:  no chest pain  RESP:  no sob  GI:  no nausea, no vomiting, no abdominal pain  :  no dysuria  MSK:  no joint pain, no edema  SKIN:  no rash, no bruising

## 2023-04-10 NOTE — ED PROVIDER NOTE - NSFOLLOWUPINSTRUCTIONS_ED_ALL_ED_FT
V-BEAM LASER  SKIN CARE INSTRUCTIONS    Precautions to take before laser treatment    A sunscreen of SPF 15 or greater should be applied to the lesion being treated whenever exposed to the sun throughout your course of treatment and for 3 months afterwards.      Precautions to take following laser treatment    1. Do not rub, scratch, or pick at the treated area until area is healed.    2. If treated area becomes tender, reddened, swollen or draining pus, please call office   immediately.    3. Avoid contact sports or high impact activities until area healed.    4. Avoid rubbing or pressure on the treated areas from clothing, shoes, or jewelry.  A dressing can be applied to the treated area to pad it from this.    Care of the treated area    1. Leave the dressing applied in the clinic in place for the first 12-24 hours.    2. Apply Vaseline or Aquaphor ointment to the treated area 2 times per day for 7 days and cover with bandaid.    3. Showers/baths are permitted, but gently pat the area dry.  Do not rub with towel or washcloth   as area is extremely delicate during the initial days of healing.    4. Any discomfort you may have (usually not lasting more than 12-24 hours) can be relieved   with Tylenol or extra strength Tylenol.    5. If swelling occurs, an ice pack can be applied.  The ice pack should be wrapped in a soft cloth and applied to the treated area for 5-10 minutes each hour for 24 hours.    Remember, the treated area is extremely delicate and must  be treated with care.      For any concerns, call the Dermatology clinic at:  • [phone], during business hours Monday -Fridays  • [phone] evenings, weekends and holidays     (rev 9/15)         You were evaluated in the Emergency Department today, and your visit did not reveal anything immediately life-threatening.    However, it is important that you follow-up with your PRIMARY CARE PHYSICIAN within 3-5 days.    ---------------------------------------------------------------------------------------------------    Stable Cervical Spine Fracture    A cervical spine fracture is a break or crack in one of the seven bones (vertebrae) that make up the spine of the neck. These bones hold up the head and protect the spinal cord. The spinal cord runs through the center of the vertebrae. The fracture is considered stable if there is a very low risk of problems happening during healing, and if the bones have not moved out of position.    What are the causes?  This condition may be caused by:  Motor vehicle accidents.  Injuries from sports such as diving, football, biking, wrestling, or skiing.  Severe osteoporosis or other bone diseases. These may include:  Cancers that spread to the bone.  Metabolic abnormalities that cause bone weakness.    What are the signs or symptoms?  Symptoms of this condition include:  Severe neck pain after an accident or fall. Pain may spread (radiate) down the shoulders or arms.  Bruising or swelling on the back of the neck.  Numbness, tingling, sudden muscle tightening (spasms), or weakness in the arms or legs or both.    How is this diagnosed?  This condition may be diagnosed based on:  Your medical history.  A physical exam of your neck, arms, and legs.  Imaging studies of your neck, such as X-ray, CT scan, or MRI.    How is this treated?  This condition is treated with:  A device that supports your chin and the back of your head (neck brace or cervical collar). This will keep your neck from moving during the healing process.  Medicine to help relieve pain, if needed.    Follow these instructions at home:    If you have a neck brace or cervical collar:  Wear the brace or collar as told by your health care provider. Do not remove it unless told by your health care provider.  If you are allowed to remove your collar or brace for cleaning and bathing:  Follow your health care provider's instructions about how to safely take off the collar.  Wash and thoroughly dry the skin on your neck. Check your skin for irritation or sores. If you see any, tell your health care provider.  Keep your collar or brace clean by wiping it with mild soap and water and letting it air-dry completely. The pads can be hand-washed with soap and water and air-dried completely.  Ask your health care provider before making any adjustments to your collar or brace. Small adjustments may be needed over time to improve comfort and reduce pressure on your chin or on the back of your head.    If your brace or collar is not waterproof:  Do not let it get wet.  Cover it with a watertight covering when you take a bath or a shower.  Managing pain, stiffness, and swelling    If directed, put ice on the injured area. To do this:  If you have a removable brace or cervical collar, remove it as told by your health care provider.  Put ice in a plastic bag.  Place a towel between your skin and the bag.  Leave the ice on for 20 minutes, 2–3 times a day.  Activity    Ask your health care provider when it is safe to drive if you have a brace or collar on your neck.  Ask your health care provider if the medicine prescribed to you requires you to avoid driving or using machinery.  Do not lift anything that is heavier than 10 lb (4.5 kg), or the limit that you are told, until your health care provider says that it is safe.  Return to your normal activities as told by your health care provider. Ask your health care provider what activities are safe for you.    General instructions  Take over-the-counter and prescription medicines only as told by your health care provider.  Do not take baths, swim, or use a hot tub until your health care provider approves. Ask your health care provider if you can take showers. You may only be allowed to take sponge baths.  Do not use any products that contain nicotine or tobacco, such as cigarettes, e-cigarettes, and chewing tobacco. These can delay bone healing. If you need help quitting, ask your health care provider.  Your medicines may cause constipation. To prevent or treat constipation, you may need to:  Drink enough fluid to keep your urine pale yellow.  Take over-the-counter or prescription medicines.  Eat foods that are high in fiber, such as beans, whole grains, and fresh fruits and vegetables.  Limit foods that are high in fat and processed sugars, such as fried or sweet foods.  Keep all follow-up visits as told by your health care provider. This is important. Follow-up visits will help prevent long-term (chronic) or permanent injury, pain, and disability. You may need follow-up X-rays or MRI 1–3 weeks after your injury.    Contact a health care provider if:  You have irritation or sores on your skin from your brace or cervical collar.    Get help right away if:  You have neck pain that gets worse.  You develop difficulties swallowing or breathing.  You develop swelling in your neck.  You have any of the following problems in your arms or legs or both:  Numbness.  Weakness.  Burning pain.  Movement problems.  You are unable to control when you urinate or have a bowel movement (incontinence).  You have problems with coordination or difficulty walking.  These symptoms may represent a serious problem that is an emergency. Do not wait to see if the symptoms will go away. Get medical help right away. Call your local emergency services (911 in the [place] Do not drive yourself to the hospital.    Summary  A cervical spine fracture is a break or crack in one of the seven bones (vertebrae) that make up the spine of the neck.  Your fracture is considered stable if there is a very low risk of problems happening during healing.  A fracture is treated with a device to support your neck and with pain medicine, if needed. You were evaluated in the Emergency Department today, and your visit did not reveal anything immediately life-threatening.    However, it is important that you follow-up with your PRIMARY CARE PHYSICIAN within 3-5 days.  Specifically, discuss the following findings on your labs:  Bilirubin Total, Serum: 1.5 mg/dL  Alkaline Phosphatase, Serum: 117 U/L  Aspartate Aminotransferase (AST/SGOT): 61 U/L  Alanine Aminotransferase (ALT/SGPT): 64 U/L    Additionally, it is important that you follow-up with GASTROENTEROLOGY. Our Emergency Department Referral Coordinators will be reaching out to you in the next 24-48 hours from 9:00am to 5:00pm with a follow up appointment(s). Please expect a phone call from the hospital in that time frame. If you do not receive a call or if you have any questions or concerns, you can reach them at [phone].    ---------------------------------------------------------------------------------------------------    Stable Cervical Spine Fracture    A cervical spine fracture is a break or crack in one of the seven bones (vertebrae) that make up the spine of the neck. These bones hold up the head and protect the spinal cord. The spinal cord runs through the center of the vertebrae. The fracture is considered stable if there is a very low risk of problems happening during healing, and if the bones have not moved out of position.    What are the causes?  This condition may be caused by:  Motor vehicle accidents.  Injuries from sports such as diving, football, biking, wrestling, or skiing.  Severe osteoporosis or other bone diseases. These may include:  Cancers that spread to the bone.  Metabolic abnormalities that cause bone weakness.    What are the signs or symptoms?  Symptoms of this condition include:  Severe neck pain after an accident or fall. Pain may spread (radiate) down the shoulders or arms.  Bruising or swelling on the back of the neck.  Numbness, tingling, sudden muscle tightening (spasms), or weakness in the arms or legs or both.    How is this diagnosed?  This condition may be diagnosed based on:  Your medical history.  A physical exam of your neck, arms, and legs.  Imaging studies of your neck, such as X-ray, CT scan, or MRI.    How is this treated?  This condition is treated with:  A device that supports your chin and the back of your head (neck brace or cervical collar). This will keep your neck from moving during the healing process.  Medicine to help relieve pain, if needed.    Follow these instructions at home:    If you have a neck brace or cervical collar:  Wear the brace or collar as told by your health care provider. Do not remove it unless told by your health care provider.  If you are allowed to remove your collar or brace for cleaning and bathing:  Follow your health care provider's instructions about how to safely take off the collar.  Wash and thoroughly dry the skin on your neck. Check your skin for irritation or sores. If you see any, tell your health care provider.  Keep your collar or brace clean by wiping it with mild soap and water and letting it air-dry completely. The pads can be hand-washed with soap and water and air-dried completely.  Ask your health care provider before making any adjustments to your collar or brace. Small adjustments may be needed over time to improve comfort and reduce pressure on your chin or on the back of your head.    If your brace or collar is not waterproof:  Do not let it get wet.  Cover it with a watertight covering when you take a bath or a shower.  Managing pain, stiffness, and swelling    If directed, put ice on the injured area. To do this:  If you have a removable brace or cervical collar, remove it as told by your health care provider.  Put ice in a plastic bag.  Place a towel between your skin and the bag.  Leave the ice on for 20 minutes, 2–3 times a day.  Activity    Ask your health care provider when it is safe to drive if you have a brace or collar on your neck.  Ask your health care provider if the medicine prescribed to you requires you to avoid driving or using machinery.  Do not lift anything that is heavier than 10 lb (4.5 kg), or the limit that you are told, until your health care provider says that it is safe.  Return to your normal activities as told by your health care provider. Ask your health care provider what activities are safe for you.    General instructions  Take over-the-counter and prescription medicines only as told by your health care provider.  Do not take baths, swim, or use a hot tub until your health care provider approves. Ask your health care provider if you can take showers. You may only be allowed to take sponge baths.  Do not use any products that contain nicotine or tobacco, such as cigarettes, e-cigarettes, and chewing tobacco. These can delay bone healing. If you need help quitting, ask your health care provider.  Your medicines may cause constipation. To prevent or treat constipation, you may need to:  Drink enough fluid to keep your urine pale yellow.  Take over-the-counter or prescription medicines.  Eat foods that are high in fiber, such as beans, whole grains, and fresh fruits and vegetables.  Limit foods that are high in fat and processed sugars, such as fried or sweet foods.  Keep all follow-up visits as told by your health care provider. This is important. Follow-up visits will help prevent long-term (chronic) or permanent injury, pain, and disability. You may need follow-up X-rays or MRI 1–3 weeks after your injury.    Contact a health care provider if:  You have irritation or sores on your skin from your brace or cervical collar.    Get help right away if:  You have neck pain that gets worse.  You develop difficulties swallowing or breathing.  You develop swelling in your neck.  You have any of the following problems in your arms or legs or both:  Numbness.  Weakness.  Burning pain.  Movement problems.  You are unable to control when you urinate or have a bowel movement (incontinence).  You have problems with coordination or difficulty walking.  These symptoms may represent a serious problem that is an emergency. Do not wait to see if the symptoms will go away. Get medical help right away. Call your local emergency services (911 in the [place] Do not drive yourself to the hospital.    Summary  A cervical spine fracture is a break or crack in one of the seven bones (vertebrae) that make up the spine of the neck.  Your fracture is considered stable if there is a very low risk of problems happening during healing.  A fracture is treated with a device to support your neck and with pain medicine, if needed.

## 2023-04-10 NOTE — ED PROVIDER NOTE - NS ED ATTENDING STATEMENT MOD
Quality 110: Preventive Care And Screening: Influenza Immunization: Influenza immunization was not ordered or administered, reason not given Quality 111:Pneumonia Vaccination Status For Older Adults: Pneumococcal Vaccination not Administered or Previously Received, Reason not Otherwise Specified Quality 226: Preventive Care And Screening: Tobacco Use: Screening And Cessation Intervention: Patient screened for tobacco use, is a smoker AND did not received Cessation Counseling for Unknown Reasons Detail Level: Detailed Quality 130: Documentation Of Current Medications In The Medical Record: Current Medications Documented Quality 431: Preventive Care And Screening: Unhealthy Alcohol Use - Screening: Patient not identified as an unhealthy alcohol user when screened for unhealthy alcohol use using a systematic screening method This was a shared visit with the [facility]. I reviewed and verified the documentation and independently performed the documented:

## 2023-04-10 NOTE — ED PROVIDER NOTE - CARE PLAN
1 Principal Discharge DX:	Closed fracture of neck, subsequent encounter  Secondary Diagnosis:	Unwitnessed fall   Principal Discharge DX:	Closed fracture of neck, subsequent encounter  Secondary Diagnosis:	Unwitnessed fall  Secondary Diagnosis:	Abnormal transaminases

## 2023-04-10 NOTE — ED ADULT TRIAGE NOTE - CHIEF COMPLAINT QUOTE
pt BIBA s/p trip and fall. pt wasn't using walker, denies head injury, denies LOC. no anticoag use, pt on aspirin and plavix. previous has previous fx and was not wearing collar at time of fall

## 2023-04-10 NOTE — ED PROVIDER NOTE - CONSIDERATION OF ADMISSION OBSERVATION
Escalation to admission/observation was considered. However patient feels much better and is comfortable with discharge.  Appropriate follow-up was arranged. Consideration of Admission/Observation

## 2023-04-10 NOTE — ED PROVIDER NOTE - OBJECTIVE STATEMENT
78 yo male, PMHx of Parkinson's dementia, depression, HTN, liver cirrhosis 2/2 MAYA, DM, CAD, presents s/p possible fall from nursing home. Patient had a recent cervical spine fracture and is supposed to be wearing a collar. However, NH found him sitting in a chair without his collar or his walker. Patient has no complaints at this time and does not recall falling. Denies fevers, chills, chest pain, shortness of breath, nausea, vomiting, headache, dizziness, neck pain, abdominal pain.

## 2023-04-10 NOTE — ED PROVIDER NOTE - PHYSICAL EXAMINATION
CONSTITUTIONAL: Well-developed; well-nourished; in no acute distress.   SKIN: warm, dry  HEAD: Normocephalic; atraumatic.  EYES: PERRL, EOMI  ENT: No nasal discharge; airway clear.  NECK: Supple; non tender. Collar in place.  CARD: S1, S2 normal; Regular rate and rhythm.   RESP: No wheezes, rales or rhonchi.  ABD: soft ntnd  EXT: Normal ROM.  No clubbing, cyanosis or edema.   NEURO: AAOx2  PSYCH: Cooperative, appropriate.

## 2023-04-10 NOTE — ED PROVIDER NOTE - PROGRESS NOTE DETAILS
ED Attending DOMENICA Rausch  Patient seen and evaluated.  Primary survery intact. GCS 15. VS reviewed. Large bore IV placed.   Will send appropriate labs and getting appropriate trauma imaging. Resident AO: Received as a signout from Dr. Becerril and Dr. Landa, pending remaining workup, reassessment, and disposition. On exam, patient comfortable in stretcher, with soft C-collar in place, neurologically intact, HD stable, AAOx2 (baseline). Wife at bedside. Wife states patient fell today when ambulating to the bathroom. Pending repeat chemistry labs, and CT chest/abd/pelvis reads. If no acute findings, will discharge back to facility. Wife at bedside updated. Resident AO: Repeat CMP reviewed - mildly elevated bili as his baseline, but noted to have elevated transaminases. Will have patient follow-up outpatient. Resident AO: Repeat CMP reviewed - mildly elevated lft's than pt's his baseline, but noted to have elevated transaminases. Will have patient follow-up outpatient. ED Attending DOMENICA Rausch  Patient has been calm and cooperative, denies any complaints, imaging with chronic findings including cervical fracture for which patient has proper collar in place.  GCS 15.  Repeat CMP completed, not hemolyzed, LFTs noted, patient with history of liver cirrhosis 2/2 MAYA.  Copy of results provided for nursing home to have as well and for follow-up.

## 2023-04-10 NOTE — ED PROVIDER NOTE - PATIENT PORTAL LINK FT
You can access the FollowMyHealth Patient Portal offered by [facility] by registering at the following website: [email or web address]. By joining [facility]’s FollowMyHealth portal, you will also be able to view your health information using other applications (apps) compatible with our system.

## 2023-04-10 NOTE — ED PROVIDER NOTE - CLINICAL SUMMARY MEDICAL DECISION MAKING FREE TEXT BOX
77-year-old male with past medical history of parkinson's dementia (aaox2 at baseline), depression, high blood pressure, cirrhosis with portal hypertension 22/ to MAYA, DM, CAD status post stent on aspirin and Plavix, orthostatic hypotension on midodrine . HFpEF, fractures due to recurrent falls, admission from March 2 similar to March 9 for aggressive behavior and increased confusion possibly due to seizure started on Keppra and urinary tract infection, C1 anterior arch fracture with c-collar in place presents status post fall at nursing home, [facility].  Per nursing home documentation and EMS patient was ambulating without his walker and did not have a c-collar on, fell as he was walking without his walker, was found sitting on the floor.  Patient denies any pain and nursing home stated he had no complaints and was awake and alert.  Nursing home documentation also states the fall was not witnessed but that patient had no complaints but due to not wearing his c-collar called for ambulance  pt denies head trauma, no loc, not on any AC. No fever, chills, n/v, cp,  pleuritic cp, sob, palpitations, diaphoresis, cough, chest wall/rib pain, clavicular pain, ankle pain, knee pain, shoulder pain, wrist pain, no back pain, no hip pain, no ha/lh/dizziness, numbness/tingling, neck pain/ stiffness, abd pain,  melena/brbpr, urinary symptoms, edema, calf pain/swelling/erythema, sick contacts, recent travel . GCS 15.    On Exam:  Vital Signs: I have reviewed the initial vital signs.  Constitutional: Non toxic appearing pt laying on stretcher with collar in place in nad.   HEAD: No signs of basilar skull fracture.  Integumentary: No rash. No laceration, abrasions, ecchymoses or swelling.   EYES: No periorbital swelling/ecchymoses. EOM intact. No nystagmus. No subconjunctival hemorrhage.   ENT: MMM. No rhinorrhea/otorrhea. No epistaxis,  No septal hematoma. No mastoid ecchymoses. No intraoral bleeding, No loose or craked teeth, no active bleeding. No malocclusion. No TMJ pain.  NECK: C collar in place, no spinous ttp, No palpable shelves or step-offs.  BACK: No spinous tenderness. No palpable shelves or step-offs.  Cardiovascular: RRR, radial pulses 2/4 b/l. dp and pt pulses 2/4/ b/l. No pain to palpation to chest wall.  Respiratory: BS present b/l, ctabl, no wheezing or crackles, no stridor. No pain to palpation to ribs b/l. No crepitus. No subq emphysema.   Gastrointestinal: BS present throughout all 4 quadrants, soft, nd, nt. no r/g.  Musculoskeletal: FROM of all extremities. No bony tenderness. No pain to palpation to clavicles, no obvious bony deformities. No hip pain. No short leg. No internal or external rotation of LE  Neurologic: GCS 15. AAOx2. CN II-XII intact, no facial droop or slurring of speech. Motor 5/5 and sensation intact throughout upper and lower extremities. (-) Pronator. NIHSS 0.    Plan: EKG, CXR, pelvic xray, labs, pan scan, urine, reassess.     Labs and EKG were ordered and reviewed.  Imaging was ordered and reviewed by me.  Appropriate medications for patient's presenting complaints were ordered and effects were reassessed.  Patient's records (prior hospital, ED visit, and nursing home notes if available) were reviewed.  Additional history was obtained from EMS.

## 2023-04-10 NOTE — ED PROVIDER NOTE - ATTENDING CONTRIBUTION TO CARE
77-year-old male with past medical history of parkinson's dementia (aaox2 at baseline), depression, high blood pressure, cirrhosis with portal hypertension 22/ to [facility], DM, CAD status post stent on aspirin and Plavix, orthostatic hypotension on midodrine . HFpEF, fractures due to recurrent falls, admission from March 2 similar to March 9 for aggressive behavior and increased confusion possibly due to seizure started on Keppra and urinary tract infection, C1 anterior arch fracture with c-collar in place presents status post fall at nursing home, [facility].  Per nursing home documentation and EMS patient was ambulating without his walker and did not have a c-collar on, fell as he was walking without his walker, was found sitting on the floor.  Patient denies any pain and nursing home stated he had no complaints and was awake and alert.  Nursing home documentation also states the fall was not witnessed but that patient had no complaints but due to not wearing his c-collar called for ambulance  pt denies head trauma, no loc, not on any AC. No fever, chills, n/v, cp,  pleuritic cp, sob, palpitations, diaphoresis, cough, chest wall/rib pain, clavicular pain, ankle pain, knee pain, shoulder pain, wrist pain, no back pain, no hip pain, no ha/lh/dizziness, numbness/tingling, neck pain/ stiffness, abd pain,  melena/brbpr, urinary symptoms, edema, calf pain/swelling/erythema, sick contacts, recent travel . GCS 15.    On Exam:  Vital Signs: I have reviewed the initial vital signs.  Constitutional: Non toxic appearing pt laying on stretcher with collar in place in nad.   HEAD: No signs of basilar skull fracture.  Integumentary: No rash. No laceration, abrasions, ecchymoses or swelling.   EYES: No periorbital swelling/ecchymoses. EOM intact. No nystagmus. No subconjunctival hemorrhage.   ENT: MMM. No rhinorrhea/otorrhea. No epistaxis,  No septal hematoma. No mastoid ecchymoses. No intraoral bleeding, No loose or craked teeth, no active bleeding. No malocclusion. No TMJ pain.  NECK: C collar in place, no spinous ttp, No palpable shelves or step-offs.  BACK: No spinous tenderness. No palpable shelves or step-offs.  Cardiovascular: RRR, radial pulses 2/4 b/l. dp and pt pulses 2/4/ b/l. No pain to palpation to chest wall.  Respiratory: BS present b/l, ctabl, no wheezing or crackles, no stridor. No pain to palpation to ribs b/l. No crepitus. No subq emphysema.   Gastrointestinal: BS present throughout all 4 quadrants, soft, nd, nt. no r/g.  Musculoskeletal: FROM of all extremities. No bony tenderness. No pain to palpation to clavicles, no obvious bony deformities. No hip pain. No short leg. No internal or external rotation of LE  Neurologic: GCS 15. AAO x2. CN II-XII intact, no facial droop or slurring of speech. Motor 5/5 and sensation intact throughout upper and lower extremities. (-) Pronator. NIHSS 0.    Plan: EKG, CXR, pelvic xray, labs, pan scan, urine, reassess.

## 2023-04-11 LAB
CULTURE RESULTS: SIGNIFICANT CHANGE UP
SPECIMEN SOURCE: SIGNIFICANT CHANGE UP

## 2023-05-01 ENCOUNTER — RESULT REVIEW (OUTPATIENT)
Age: 78
End: 2023-05-01

## 2023-05-01 ENCOUNTER — APPOINTMENT (OUTPATIENT)
Dept: NEUROSURGERY | Facility: CLINIC | Age: 78
End: 2023-05-01
Payer: MEDICARE

## 2023-05-01 ENCOUNTER — OUTPATIENT (OUTPATIENT)
Dept: OUTPATIENT SERVICES | Facility: HOSPITAL | Age: 78
LOS: 1 days | End: 2023-05-01
Payer: MEDICARE

## 2023-05-01 VITALS — WEIGHT: 172 LBS | HEIGHT: 66 IN | BODY MASS INDEX: 27.64 KG/M2

## 2023-05-01 DIAGNOSIS — Z98.890 OTHER SPECIFIED POSTPROCEDURAL STATES: Chronic | ICD-10-CM

## 2023-05-01 DIAGNOSIS — M54.2 CERVICALGIA: ICD-10-CM

## 2023-05-01 DIAGNOSIS — Z90.49 ACQUIRED ABSENCE OF OTHER SPECIFIED PARTS OF DIGESTIVE TRACT: Chronic | ICD-10-CM

## 2023-05-01 DIAGNOSIS — H26.9 UNSPECIFIED CATARACT: Chronic | ICD-10-CM

## 2023-05-01 DIAGNOSIS — S12.031D: ICD-10-CM

## 2023-05-01 PROCEDURE — 72052 X-RAY EXAM NECK SPINE 6/>VWS: CPT | Mod: 26

## 2023-05-01 PROCEDURE — 99214 OFFICE O/P EST MOD 30 MIN: CPT

## 2023-05-01 PROCEDURE — 72052 X-RAY EXAM NECK SPINE 6/>VWS: CPT

## 2023-05-02 DIAGNOSIS — M54.2 CERVICALGIA: ICD-10-CM

## 2023-05-15 PROBLEM — S12.031D: Status: ACTIVE | Noted: 2022-10-31

## 2023-05-15 NOTE — HISTORY OF PRESENT ILLNESS
[FreeTextEntry1] : Humberto is her in f/u with C1 fracture. Flexion/extension x-ray shows some fragment mobility. he has some mild neck pain on upgaze but none on flexion or side to side turning. He has no pain numbness, tingling or weakness in the limbs. he has dementia and parkinsons.\par \par \par PE:\par Constitutional: Well appearing, no distress sitting on the chair in mild discomfort\par HEENT: Normocephalic Atraumatic, sclerae anicteric, mucus membranes moist, trachea midline\par Psychiatric: Alert and oriented to all spheres, normal mood\par Pulmonary: No respiratory distress or accessory muscle use\par \par Neurologic:\par CN II-XII grossly intact \par Palpation: no pain to palpation\par \par \par Moves all four extremities without difficulty or pain. \par Reflexes:\par 2+ patellar\par 2+ biceps\par 2+ ankle jerk\par No Thomas's\par No clonus\par No babinski\par \par Cervical collar removed while performing active/passive ROM, without pain. \par \par Signs:\par SLR negative; Yves's maneuver negative\par \par Gait: Gait not assessed, patient in wheelchair.

## 2023-07-25 NOTE — OCCUPATIONAL THERAPY INITIAL EVALUATION ADULT - ASR WT BEARING STATUS EVAL
Per [employer] website, insurance is active and pt is listed on the attributed list needing a healthy you performed in 2023  .Pt needs appt for hy, sent to [facility] to schedule via one note    
no weight-bearing restrictions
no weight-bearing restrictions

## 2023-08-21 NOTE — PROVIDER CONTACT NOTE (CHANGE IN STATUS NOTIFICATION) - RECOMMENDATIONS
MD to assess patient ¡Minda por elegir el [facility]! Agradecemos que nos haya confiado valdez atención médica.    Es importante recordar que algunos problemas son difíciles de diagnosticar y es posible que no se encuentren peter valdez primera visita. Asegúrese de hacer un seguimiento con valdez médico de atención primaria y revisar cualquier análisis de laboratorio/imágenes que se haya realizado peter valdez visita con ellos. Si no tiene un médico de atención primaria, puede comunicarse con el que figura en valdez documentación de latesha, o también puede llamar al mostrador de citas de la [facility] al [phone] para programar yahir lissett.    Todos los medicamentos pueden tener efectos secundarios y es imposible predecir qué medicamentos pueden causarle efectos secundarios. Si siente que está teniendo un efecto negativo de algún medicamento, debe dejar de tomarlo inmediatamente y buscar atención médica. No conduzca ni tome decisiones importantes peter 24 horas si ha recibido medicamentos para el dolor, sedantes o drogas que alteran el estado de ánimo peter valdez visita a la [facility] emergenc[facility].    Estaremos encantados de cuidar de usted para todas page necesidades médicas futuras. Puede regresar a la beti de emergencias en cualquier momento por cualquier síntoma nuevo o preocupante, empeoramiento de la condición o falta de mejora. Esperamos que se sienta mejor pronto.    Magan Becker MD MPH  [facility]

## 2023-09-14 NOTE — ED PROVIDER NOTE - PHYSICAL EXAMINATION
3 CONSTITUTIONAL: Well-developed; well-nourished; in no acute distress. gcs 15, speaking in full sentences, moving all extremities  SKIN: warm, dry  HEAD: Normocephalic; see above  EYES: PERRL, EOMI, no conjunctival erythema  ENT: No nasal discharge; airway clear, mucous membranes moist  NECK: supple; nontender   CARD: +S1, S2 no murmurs, gallops, or rubs. Regular rate and rhythm. radial 2+ b/l, no chest wall tenderness or crepitus  RESP: No wheezes, rales or rhonchi. CTABL  ABD: soft ntnd, no rebound, no guarding, no rigidity  EXT: moves all extremities,  No clubbing, cyanosis or edema. Tenderness to palpation of R. chest wall area. 5/5 strength B/L UE + LE.   NEURO: Alert, oriented, grossly unremarkable, cn ii-xii grossly intact, follows commands  PSYCH: Cooperative, appropriate. CONSTITUTIONAL: Well-developed; well-nourished; in mild distress. gcs 15, speaking in full sentences, moving all extremities  SKIN: warm, dry  HEAD: Normocephalic; see above  EYES: PERRL, EOMI, no conjunctival erythema  ENT: No nasal discharge; airway clear, mucous membranes moist  NECK: supple; nontender   CARD: +S1, S2 no murmurs, gallops, or rubs. Regular rate and rhythm. radial 2+ b/l, no chest wall tenderness or crepitus  RESP: No wheezes, rales or rhonchi. CTABL  ABD: soft ntnd, no rebound, no guarding, no rigidity  EXT: moves all extremities,  No clubbing, cyanosis or edema. Tenderness to palpation of R. chest wall area. 5/5 strength B/L UE + LE.   NEURO: Alert, oriented, grossly unremarkable, cn ii-xii grossly intact, follows commands  PSYCH: Cooperative, appropriate.

## 2023-09-21 ENCOUNTER — APPOINTMENT (OUTPATIENT)
Dept: NEUROSURGERY | Facility: CLINIC | Age: 78
End: 2023-09-21
Payer: MEDICARE

## 2023-09-21 VITALS — BODY MASS INDEX: 27.64 KG/M2 | WEIGHT: 172 LBS | HEIGHT: 66 IN

## 2023-09-21 PROCEDURE — 99213 OFFICE O/P EST LOW 20 MIN: CPT

## 2024-01-24 NOTE — STROKE CODE NOTE - NSSTROKETPAEXCLREL_OTHER_FT
Patient is on Synthroid as outpatient which we will continue  TSH normal on 1/17   Patient not a IV thrombolytic candidate and IA intervention No LVO on CTA and NIH is 2

## 2024-03-06 NOTE — DISCHARGE NOTE PROVIDER - NSFTFHOMEHTHYNRD_GEN_ALL_CORE
How Severe Are Your Spot(S)?: mild What Type Of Note Output Would You Prefer (Optional)?: Standard Output What Is The Reason For Today's Visit?: Full Body Skin Examination What Is The Reason For Today's Visit? (Being Monitored For X): the development of new lesions Yes

## 2024-05-08 NOTE — OCCUPATIONAL THERAPY INITIAL EVALUATION ADULT - BALANCE DISTURBANCE, IDENTIFIED IMPAIRMENT CONTRIBUTE, REHAB EVAL
[No Acute Distress] : no acute distress [PERRL] : pupils equal round and reactive to light [Normal TMs] : both tympanic membranes were normal [Supple] : supple [Clear to Auscultation] : lungs were clear to auscultation bilaterally [Regular Rhythm] : with a regular rhythm [No Edema] : there was no peripheral edema [Normal Supraclavicular Nodes] : no supraclavicular lymphadenopathy [Normal Anterior Cervical Nodes] : no anterior cervical lymphadenopathy [de-identified] : LEFT SHOULDER  PAIN ON ROM  [de-identified] : LEFT LEG LATERAL MALEOLUS ULCER CLEAN  decreased ROM/decreased strength

## 2024-05-09 NOTE — PATIENT PROFILE ADULT - FALL HARM RISK - FALLEN IN PAST
"05/09/2024   Feng Wynne   1946  A DME order was not completed because the supplies are ordered by home care or at a care facility  [facility] [address] Phone: [phone]   Fax: [phone]     PLAN: 5/9/24  Bone exposed in wound base STOP NPWT  Done: MRI with I&D 3/18/24  Need to schedule bone debridement to determine Antibiotic therapy [facility] [place], [place]  Will need to manage Blood thinner with Heparin  Dr Srinivasan's  will reach out when a date is available    Consider Temporary Colostomy to avoid wound contamination of stool  Avoid Recliner Chairs to prevent pressure on coccyx wound  No Bed Pans - utilize toilets for defecation   NEEDS: Group 2 mattress Patient will need a Group 2 mattress due to large, 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 A group 1 mattress is not adequate to offload these severe ulcerations. Patient has impaired sensation and is unable to reposition independently.  Offload with pillows - ok to sleep up on your sides  Continue High Protein diet; Ensure Max, protein bar, and supplements and meals from home.  Consider use of Primofit for urinary needs while in bed.     Wound Dressing Change: Sacrococcygeal    Cleanse wound with Vashe moist gauze, leave in place for 10 minutes; remove  Cleanse periwound skin with wound spray, pat dry   Apply light layer of Zinc Oxide barrier paste to periwound  Cut and fluff dry AMD antimicrobial roll gauze into wound bed  Cover with dry absorbant pad and secure with 2\" Medipore tape  Change 2 times a day and as needed for soilage      Repositioning:  Bed: Keep Head of bed at 30 degrees or less; Reposition MINIMALLY every 1-2 hours in bed to relieve pressure and promote perfusion to tissue turning side to side.  Chair: When up to the chair, do not sit for longer than one hour total before returning to bed for at least 60 minutes to relieve pressure and " promote perfusion to the tissue.  Completely recline/tilt for 15 minutes each hour.  Sit on a chair cushion when up to the chair.      Reduce shear and friction: Prevent skin breakdown by reducing friction and shear. Patients are less likely to slide down in bed if they re supported by pillows and the head of the bed isn t raised too high.    In a bed  Clean and smooth the bed surface.  Lift the head of the bed no more than 30 .  Use draw-sheets or transfer boards to move patients.  Lift the head of the bed no more than 30 degrees.  Raise the foot of the bed slightly.  In a wheelchair  Keep upright (don't slump) - keep weight forward onto your thighs, not on your tailbone  Support the back with a pillow.  Use a foot extension.    High Protein Diet: VEGETARIAN:  Whey protein powder - 24g per scoop (on average)  Greek yogurt - 23g per 8oz   Navy beans - 20g per cup  Cottage cheese - 14g per 1/2 cup   Lentils - 13g per 1/4 cup  2% milk - 8g per cup  Peanut butter - 8g per 2 tablespoons  Eggs - 6g per egg  Mixed nuts - 6g per 2oz  Tofu - 10g per 1/2 cup     Main Provider: Barbara Srinivasan M.D. May 9, 2024    Call us at [phone] if you have any questions about your wounds, if you have redness or swelling around your wound, have a fever of 101 degrees Fahrenheit or greater or if you have any other problems or concerns. We answer the phone Monday through Friday 8 am to 4 pm, please leave a message as we check the voicemail frequently throughout the day. If you have a concern over the weekend, please leave a message and we will return your call Monday. If the need is urgent, go to the ER or urgent care.    If you had a positive experience please indicate that on your patient satisfaction survey form that [facility] will be sending you.  It was a pleasure meeting with you today.  Thank you for allowing me and my team the privilege of caring for you today.  YOU are the reason we are here, and I truly hope we  provided you with the excellent service you deserve.  Please let us know if there is anything else we can do for you so that we can be sure you are leaving completely satisfied with your care experience.      If you have any billing related questions please call the [facility] Business office at [phone]. The clinic staff does not handle billing related matters.  If you are scheduled to have a follow up appointment, you will receive a reminder call the day before your visit. On the appointment day please arrive 15 minutes prior to your appointment time. If you are unable to keep that appointment, please call the clinic to cancel or reschedule. If you are more than 10 minutes late or greater for your scheduled appointment time, the clinic policy is that you may be asked to reschedule.    Multiple falls in time period

## 2024-09-04 NOTE — ED ADULT TRIAGE NOTE - BEFAST FACE
Patient presented to unit for Abraxane (C3D1). VSS. No complaints voiced. Abraxane infused over 30 minutes per order. Port with blood return, flushed with NS, and heparin locked. Next appointment confirmed. Patient discharged in NAD.      Problem: Oncology Care  Goal: Effective Coping  Outcome: Progressing  Goal: Improved Activity Tolerance  Outcome: Progressing  Goal: Optimal Oral Intake  Outcome: Progressing  Goal: Improved Oral Mucous Membrane Integrity  Outcome: Progressing  Goal: Optimal Pain Control and Function  Outcome: Progressing     
No

## 2024-09-06 NOTE — ED ADULT TRIAGE NOTE - CCCP TRG CHIEF CMPLNT
Detail Level: Zone
Initiate Treatment: tacrolimus 0.03 % topical ointment \\nSig: Apply to white spots on body BID
fall

## 2024-10-18 NOTE — ED PROVIDER NOTE - AXIS
Bleeding that does not stop/Swelling that gets worse/Wound/Surgical Site with redness, or foul smelling discharge or pus Bleeding that does not stop/Swelling that gets worse/Pain not relieved by Medications/Fever greater than (need to indicate Fahrenheit or Celsius)/Wound/Surgical Site with redness, or foul smelling discharge or pus/Numbness, tingling, color or temperature change to extremity/Nausea and vomiting that does not stop/Unable to urinate Left Deviation

## 2024-12-11 NOTE — PATIENT PROFILE ADULT - ARRIVAL FROM
If you are a smoker, it is important for your health to stop smoking. Please be aware that second hand smoke is also harmful.
Nursing home

## 2025-04-11 NOTE — ED PROVIDER NOTE - CHIEF COMPLAINT
Called patient and rescheduled fu appt 4/30/25 with labs prior.   
Noted, thank you    Per dispense report:    Dispensed Days Supply Quantity Provider Pharmacy    JARDIANCE  25 MG TABS 02/14/2025 90 90 tablet [facility] [place], [facility], [facility]     3 month supply dispensed on 2/14/2025, upcoming appt scheduled 4/30/2025.   To be addressed at upcoming appt.   
The patient is a 77y Male complaining of dizziness.